# Patient Record
Sex: MALE | Race: BLACK OR AFRICAN AMERICAN | Employment: OTHER | URBAN - METROPOLITAN AREA
[De-identification: names, ages, dates, MRNs, and addresses within clinical notes are randomized per-mention and may not be internally consistent; named-entity substitution may affect disease eponyms.]

---

## 2017-01-10 RX ORDER — GLIPIZIDE 5 MG/1
TABLET ORAL
Qty: 60 TABLET | Refills: 5 | Status: SHIPPED | OUTPATIENT
Start: 2017-01-10 | End: 2018-01-08 | Stop reason: SDUPTHER

## 2017-03-15 ENCOUNTER — OFFICE VISIT (OUTPATIENT)
Dept: FAMILY MEDICINE CLINIC | Age: 77
End: 2017-03-15

## 2017-03-15 VITALS
HEART RATE: 80 BPM | BODY MASS INDEX: 24.05 KG/M2 | SYSTOLIC BLOOD PRESSURE: 122 MMHG | HEIGHT: 71 IN | DIASTOLIC BLOOD PRESSURE: 72 MMHG | WEIGHT: 171.8 LBS | RESPIRATION RATE: 16 BRPM

## 2017-03-15 DIAGNOSIS — I10 ESSENTIAL HYPERTENSION, MALIGNANT: ICD-10-CM

## 2017-03-15 DIAGNOSIS — D50.0 IRON DEFICIENCY ANEMIA DUE TO CHRONIC BLOOD LOSS: ICD-10-CM

## 2017-03-15 DIAGNOSIS — E11.9 CONTROLLED TYPE 2 DIABETES MELLITUS WITHOUT COMPLICATION, WITHOUT LONG-TERM CURRENT USE OF INSULIN (HCC): Primary | ICD-10-CM

## 2017-03-15 DIAGNOSIS — E78.00 PURE HYPERCHOLESTEROLEMIA: ICD-10-CM

## 2017-03-15 LAB
ANION GAP SERPL CALCULATED.3IONS-SCNC: 18 MMOL/L (ref 3–16)
BUN BLDV-MCNC: 22 MG/DL (ref 7–20)
CALCIUM SERPL-MCNC: 11.1 MG/DL (ref 8.3–10.6)
CHLORIDE BLD-SCNC: 98 MMOL/L (ref 99–110)
CO2: 24 MMOL/L (ref 21–32)
CREAT SERPL-MCNC: 1.5 MG/DL (ref 0.8–1.3)
GFR AFRICAN AMERICAN: 55
GFR NON-AFRICAN AMERICAN: 45
GLUCOSE BLD-MCNC: 55 MG/DL (ref 70–99)
POTASSIUM SERPL-SCNC: 4 MMOL/L (ref 3.5–5.1)
SODIUM BLD-SCNC: 140 MMOL/L (ref 136–145)

## 2017-03-15 PROCEDURE — 36415 COLL VENOUS BLD VENIPUNCTURE: CPT | Performed by: INTERNAL MEDICINE

## 2017-03-15 PROCEDURE — 99214 OFFICE O/P EST MOD 30 MIN: CPT | Performed by: INTERNAL MEDICINE

## 2017-03-15 ASSESSMENT — ENCOUNTER SYMPTOMS
GASTROINTESTINAL NEGATIVE: 1
RESPIRATORY NEGATIVE: 1
ALLERGIC/IMMUNOLOGIC NEGATIVE: 1

## 2017-03-16 LAB
ESTIMATED AVERAGE GLUCOSE: 139.9 MG/DL
HBA1C MFR BLD: 6.5 %

## 2017-06-09 RX ORDER — VERAPAMIL HYDROCHLORIDE 240 MG/1
TABLET, FILM COATED, EXTENDED RELEASE ORAL
Qty: 90 TABLET | Refills: 1 | Status: SHIPPED | OUTPATIENT
Start: 2017-06-09 | End: 2017-12-08 | Stop reason: SDUPTHER

## 2017-06-21 RX ORDER — ATORVASTATIN CALCIUM 20 MG/1
TABLET, FILM COATED ORAL
Qty: 30 TABLET | Refills: 0 | Status: SHIPPED | OUTPATIENT
Start: 2017-06-21 | End: 2017-07-21 | Stop reason: SDUPTHER

## 2017-06-29 ENCOUNTER — OFFICE VISIT (OUTPATIENT)
Dept: FAMILY MEDICINE CLINIC | Age: 77
End: 2017-06-29

## 2017-06-29 VITALS
BODY MASS INDEX: 23.8 KG/M2 | WEIGHT: 170 LBS | OXYGEN SATURATION: 97 % | SYSTOLIC BLOOD PRESSURE: 128 MMHG | DIASTOLIC BLOOD PRESSURE: 78 MMHG | HEART RATE: 72 BPM | HEIGHT: 71 IN | RESPIRATION RATE: 18 BRPM

## 2017-06-29 DIAGNOSIS — E78.00 PURE HYPERCHOLESTEROLEMIA: ICD-10-CM

## 2017-06-29 DIAGNOSIS — I10 ESSENTIAL HYPERTENSION, MALIGNANT: ICD-10-CM

## 2017-06-29 DIAGNOSIS — E11.9 CONTROLLED TYPE 2 DIABETES MELLITUS WITHOUT COMPLICATION, WITHOUT LONG-TERM CURRENT USE OF INSULIN (HCC): Primary | ICD-10-CM

## 2017-06-29 LAB
ANION GAP SERPL CALCULATED.3IONS-SCNC: 16 MMOL/L (ref 3–16)
BUN BLDV-MCNC: 23 MG/DL (ref 7–20)
CALCIUM SERPL-MCNC: 10.9 MG/DL (ref 8.3–10.6)
CHLORIDE BLD-SCNC: 98 MMOL/L (ref 99–110)
CO2: 26 MMOL/L (ref 21–32)
CREAT SERPL-MCNC: 1.6 MG/DL (ref 0.8–1.3)
GFR AFRICAN AMERICAN: 51
GFR NON-AFRICAN AMERICAN: 42
GLUCOSE BLD-MCNC: 47 MG/DL (ref 70–99)
POTASSIUM SERPL-SCNC: 4.3 MMOL/L (ref 3.5–5.1)
SODIUM BLD-SCNC: 140 MMOL/L (ref 136–145)

## 2017-06-29 PROCEDURE — 99214 OFFICE O/P EST MOD 30 MIN: CPT | Performed by: INTERNAL MEDICINE

## 2017-06-29 PROCEDURE — 36415 COLL VENOUS BLD VENIPUNCTURE: CPT | Performed by: INTERNAL MEDICINE

## 2017-06-29 PROCEDURE — 4010F ACE/ARB THERAPY RXD/TAKEN: CPT | Performed by: INTERNAL MEDICINE

## 2017-06-29 ASSESSMENT — ENCOUNTER SYMPTOMS
RESPIRATORY NEGATIVE: 1
ALLERGIC/IMMUNOLOGIC NEGATIVE: 1
GASTROINTESTINAL NEGATIVE: 1

## 2017-06-30 ENCOUNTER — TELEPHONE (OUTPATIENT)
Dept: FAMILY MEDICINE CLINIC | Age: 77
End: 2017-06-30

## 2017-06-30 LAB
ESTIMATED AVERAGE GLUCOSE: 139.9 MG/DL
HBA1C MFR BLD: 6.5 %

## 2017-07-21 RX ORDER — ATORVASTATIN CALCIUM 20 MG/1
TABLET, FILM COATED ORAL
Qty: 30 TABLET | Refills: 5 | Status: SHIPPED | OUTPATIENT
Start: 2017-07-21 | End: 2018-01-22 | Stop reason: SDUPTHER

## 2017-10-02 ENCOUNTER — OFFICE VISIT (OUTPATIENT)
Dept: FAMILY MEDICINE CLINIC | Age: 77
End: 2017-10-02

## 2017-10-02 VITALS
BODY MASS INDEX: 24.11 KG/M2 | OXYGEN SATURATION: 92 % | DIASTOLIC BLOOD PRESSURE: 80 MMHG | WEIGHT: 172.2 LBS | HEART RATE: 73 BPM | RESPIRATION RATE: 18 BRPM | SYSTOLIC BLOOD PRESSURE: 132 MMHG | HEIGHT: 71 IN

## 2017-10-02 DIAGNOSIS — E78.00 PURE HYPERCHOLESTEROLEMIA: ICD-10-CM

## 2017-10-02 DIAGNOSIS — I10 ESSENTIAL HYPERTENSION, MALIGNANT: ICD-10-CM

## 2017-10-02 DIAGNOSIS — N18.30 CHRONIC RENAL FAILURE, STAGE 3 (MODERATE) (HCC): ICD-10-CM

## 2017-10-02 DIAGNOSIS — E11.9 CONTROLLED TYPE 2 DIABETES MELLITUS WITHOUT COMPLICATION, WITHOUT LONG-TERM CURRENT USE OF INSULIN (HCC): Primary | ICD-10-CM

## 2017-10-02 DIAGNOSIS — Z23 FLU VACCINE NEED: ICD-10-CM

## 2017-10-02 LAB
ANION GAP SERPL CALCULATED.3IONS-SCNC: 15 MMOL/L (ref 3–16)
BUN BLDV-MCNC: 24 MG/DL (ref 7–20)
CALCIUM SERPL-MCNC: 11 MG/DL (ref 8.3–10.6)
CHLORIDE BLD-SCNC: 100 MMOL/L (ref 99–110)
CO2: 27 MMOL/L (ref 21–32)
CREAT SERPL-MCNC: 1.4 MG/DL (ref 0.8–1.3)
GFR AFRICAN AMERICAN: 59
GFR NON-AFRICAN AMERICAN: 49
GLUCOSE BLD-MCNC: 51 MG/DL (ref 70–99)
POTASSIUM SERPL-SCNC: 4 MMOL/L (ref 3.5–5.1)
SODIUM BLD-SCNC: 142 MMOL/L (ref 136–145)

## 2017-10-02 PROCEDURE — 36415 COLL VENOUS BLD VENIPUNCTURE: CPT | Performed by: INTERNAL MEDICINE

## 2017-10-02 PROCEDURE — 90662 IIV NO PRSV INCREASED AG IM: CPT | Performed by: INTERNAL MEDICINE

## 2017-10-02 PROCEDURE — 99214 OFFICE O/P EST MOD 30 MIN: CPT | Performed by: INTERNAL MEDICINE

## 2017-10-02 PROCEDURE — G0008 ADMIN INFLUENZA VIRUS VAC: HCPCS | Performed by: INTERNAL MEDICINE

## 2017-10-02 ASSESSMENT — ENCOUNTER SYMPTOMS
RESPIRATORY NEGATIVE: 1
GASTROINTESTINAL NEGATIVE: 1
ALLERGIC/IMMUNOLOGIC NEGATIVE: 1

## 2017-10-02 NOTE — PROGRESS NOTES
Subjective:      Patient ID: Ailyn Roberts is a 76 y.o. male. HPI  Controlled type 2 diabetes mellitus without complication, without long-term current use of insulin (HCC)  Sugars are not taken, diet is stable, weight is unchanged, no reported neuropathy, no change in vision, no claudication, no foot ulcers, no new skin lesions. No change in medications. No c/o with meds. Last eye exam 12/2016. Essential hypertension, malignant  No change in meds, no c/o with meds, no chest pain, SOB, palpatations, or syncope. Home bp was 130s/80s. Pure hypercholesterolemia  Stable diet and wt. No new c/o. Chronic renal failure, stage 3 (moderate)  Stable BUN/Cr over last yr. No new c/o. Past Medical History:   Diagnosis Date    Anemia     Arthritis     MILD    Hyperlipidemia     Hypertension     Type II or unspecified type diabetes mellitus without mention of complication, not stated as uncontrolled      Current Outpatient Prescriptions   Medication Sig Dispense Refill    metFORMIN (GLUCOPHAGE) 1000 MG tablet TAKE ONE TABLET BY MOUTH ONCE DAILY WITH BREAKFAST 90 tablet 1    atorvastatin (LIPITOR) 20 MG tablet TAKE ONE TABLET BY MOUTH ONCE DAILY 30 tablet 5    verapamil (CALAN SR) 240 MG extended release tablet TAKE ONE TABLET BY MOUTH AT BEDTIME 90 tablet 1    glipiZIDE (GLUCOTROL) 5 MG tablet TAKE ONE TABLET BY MOUTH ONCE DAILY 60 tablet 5    hydrochlorothiazide (HYDRODIURIL) 25 MG tablet TAKE ONE TABLET BY MOUTH ONCE DAILY 30 tablet 11    trandolapril (MAVIK) 4 MG tablet TAKE ONE TABLET BY MOUTH TWICE DAILY 180 tablet 3    Cholecalciferol (VITAMIN D) 2000 UNITS TABS Take 1 tablet by mouth daily.  aspirin 81 MG EC tablet Take 81 mg by mouth daily. No current facility-administered medications for this visit.       No Known Allergies  Social History   Substance Use Topics    Smoking status: Never Smoker    Smokeless tobacco: Never Used    Alcohol use No     Family History   Problem Relation Age of Onset    High Blood Pressure Mother     High Blood Pressure Father     Heart Disease Father      cad    Cancer Sister      Review of Systems   Constitutional: Negative. Respiratory: Negative. Cardiovascular: Negative. Gastrointestinal: Negative. Endocrine: Negative. Genitourinary: Negative. Musculoskeletal: Negative. Allergic/Immunologic: Negative. Neurological: Negative. Hematological: Negative. Psychiatric/Behavioral: Negative. Vitals:    10/02/17 1125 10/02/17 1210   BP: 130/80 132/80   Pulse: 73    Resp: 18    SpO2: 92%    Weight: 172 lb 3.2 oz (78.1 kg)    Height: 5' 11\" (1.803 m)      Objective:   Physical Exam   Constitutional: He is oriented to person, place, and time. Vital signs are normal. He appears well-developed and well-nourished. No distress. HENT:   Head: Normocephalic and atraumatic. Right Ear: External ear normal.   Left Ear: External ear normal.   Nose: Nose normal.   Mouth/Throat: Oropharynx is clear and moist. No oropharyngeal exudate. Eyes: Conjunctivae and EOM are normal. Pupils are equal, round, and reactive to light. Neck: Trachea normal, normal range of motion, full passive range of motion without pain and phonation normal. Neck supple. Normal carotid pulses, no hepatojugular reflux and no JVD present. Carotid bruit is not present. No thyroid mass and no thyromegaly present. Cardiovascular: Normal rate, regular rhythm, normal heart sounds, intact distal pulses and normal pulses. No extrasystoles are present. PMI is not displaced. Exam reveals no gallop and no friction rub. No murmur heard. Pulses:       Carotid pulses are 2+ on the right side, and 2+ on the left side. Radial pulses are 2+ on the right side, and 2+ on the left side. Femoral pulses are 2+ on the right side, and 2+ on the left side. Popliteal pulses are 2+ on the right side, and 2+ on the left side.         Dorsalis pedis pulses are 2+ except for the following :     Chronic Renal Failure, Stage 3 (Moderate). Will do labs. Controlled Type 2 Diabetes Mellitus Without Complication, Without Long-Term Current Use of Insulin (Hcc). To continue to improve diet and maintain weight. To follow Diabetic diet. If needs further help w/ Diabetic diet to call and will refer back to dietician. Home sugar checks. To call if sudden change up or down in sugars. To do regular foot checks. Call if problem. Will do labs and adjust meds after results are evaluated. Essential Hypertension, Malignant. Continue present meds. Home BP checks. Call if>140/90. Improve diet. Avoid caffeine and salt. Call if new c/o w/ meds. Pure Hypercholesterolemia. To continue present meds. Call if new c/o.

## 2017-10-02 NOTE — ASSESSMENT & PLAN NOTE
No change in meds, no c/o with meds, no chest pain, SOB, palpatations, or syncope. Home bp was 130s/80s.

## 2017-10-02 NOTE — ASSESSMENT & PLAN NOTE
Sugars are not taken, diet is stable, weight is unchanged, no reported neuropathy, no change in vision, no claudication, no foot ulcers, no new skin lesions. No change in medications. No c/o with meds. Last eye exam 12/2016.

## 2017-10-02 NOTE — PROGRESS NOTES
Vaccine Information Sheet, \"Influenza - Inactivated\"  given to Jethro Mendez, or parent/legal guardian of  Jethro Mendez and verbalized understanding. Patient responses:    Have you ever had a reaction to a flu vaccine? No  Are you able to eat eggs without adverse effects? Yes  Do you have any current illness? No  Have you ever had Guillian Goodman Syndrome? No    Flu vaccine given per order. Please see immunization tab.

## 2017-10-03 LAB
ESTIMATED AVERAGE GLUCOSE: 134.1 MG/DL
HBA1C MFR BLD: 6.3 %

## 2017-10-05 ENCOUNTER — TELEPHONE (OUTPATIENT)
Dept: FAMILY MEDICINE CLINIC | Age: 77
End: 2017-10-05

## 2017-12-08 RX ORDER — VERAPAMIL HYDROCHLORIDE 240 MG/1
TABLET, FILM COATED, EXTENDED RELEASE ORAL
Qty: 30 TABLET | Refills: 5 | Status: SHIPPED | OUTPATIENT
Start: 2017-12-08 | End: 2018-06-11 | Stop reason: SDUPTHER

## 2017-12-11 RX ORDER — TRANDOLAPRIL TABLETS 4 MG/1
TABLET ORAL
Qty: 180 TABLET | Refills: 3 | Status: SHIPPED | OUTPATIENT
Start: 2017-12-11 | End: 2018-12-27 | Stop reason: SDUPTHER

## 2017-12-26 RX ORDER — HYDROCHLOROTHIAZIDE 25 MG/1
TABLET ORAL
Qty: 30 TABLET | Refills: 11 | Status: SHIPPED | OUTPATIENT
Start: 2017-12-26 | End: 2018-12-27 | Stop reason: SDUPTHER

## 2018-01-08 RX ORDER — GLIPIZIDE 5 MG/1
TABLET ORAL
Qty: 60 TABLET | Refills: 5 | Status: SHIPPED | OUTPATIENT
Start: 2018-01-08 | End: 2019-01-08 | Stop reason: SDUPTHER

## 2018-01-22 RX ORDER — ATORVASTATIN CALCIUM 20 MG/1
TABLET, FILM COATED ORAL
Qty: 30 TABLET | Refills: 5 | Status: SHIPPED | OUTPATIENT
Start: 2018-01-22 | End: 2018-07-28 | Stop reason: SDUPTHER

## 2018-04-03 ENCOUNTER — OFFICE VISIT (OUTPATIENT)
Dept: FAMILY MEDICINE CLINIC | Age: 78
End: 2018-04-03

## 2018-04-03 VITALS
BODY MASS INDEX: 24.22 KG/M2 | HEIGHT: 71 IN | WEIGHT: 173 LBS | DIASTOLIC BLOOD PRESSURE: 78 MMHG | SYSTOLIC BLOOD PRESSURE: 134 MMHG | HEART RATE: 88 BPM | RESPIRATION RATE: 18 BRPM

## 2018-04-03 DIAGNOSIS — N18.30 CHRONIC RENAL FAILURE, STAGE 3 (MODERATE) (HCC): ICD-10-CM

## 2018-04-03 DIAGNOSIS — E78.00 PURE HYPERCHOLESTEROLEMIA: ICD-10-CM

## 2018-04-03 DIAGNOSIS — N18.30 CONTROLLED TYPE 2 DIABETES MELLITUS WITH STAGE 3 CHRONIC KIDNEY DISEASE, WITHOUT LONG-TERM CURRENT USE OF INSULIN (HCC): ICD-10-CM

## 2018-04-03 DIAGNOSIS — K40.90 INGUINAL HERNIA, LEFT: ICD-10-CM

## 2018-04-03 DIAGNOSIS — I10 ESSENTIAL HYPERTENSION, MALIGNANT: ICD-10-CM

## 2018-04-03 DIAGNOSIS — E11.22 CONTROLLED TYPE 2 DIABETES MELLITUS WITH STAGE 3 CHRONIC KIDNEY DISEASE, WITHOUT LONG-TERM CURRENT USE OF INSULIN (HCC): ICD-10-CM

## 2018-04-03 DIAGNOSIS — D50.0 IRON DEFICIENCY ANEMIA DUE TO CHRONIC BLOOD LOSS: ICD-10-CM

## 2018-04-03 LAB
ALBUMIN SERPL-MCNC: 4.4 G/DL (ref 3.4–5)
ALP BLD-CCNC: 92 U/L (ref 40–129)
ALT SERPL-CCNC: 11 U/L (ref 10–40)
ANION GAP SERPL CALCULATED.3IONS-SCNC: 17 MMOL/L (ref 3–16)
AST SERPL-CCNC: 19 U/L (ref 15–37)
BASOPHILS ABSOLUTE: 0 K/UL (ref 0–0.2)
BASOPHILS RELATIVE PERCENT: 0.5 %
BILIRUB SERPL-MCNC: 0.4 MG/DL (ref 0–1)
BILIRUBIN DIRECT: <0.2 MG/DL (ref 0–0.3)
BILIRUBIN, INDIRECT: NORMAL MG/DL (ref 0–1)
BUN BLDV-MCNC: 27 MG/DL (ref 7–20)
CALCIUM SERPL-MCNC: 10.4 MG/DL (ref 8.3–10.6)
CHLORIDE BLD-SCNC: 98 MMOL/L (ref 99–110)
CHOLESTEROL, TOTAL: 175 MG/DL (ref 0–199)
CO2: 27 MMOL/L (ref 21–32)
CREAT SERPL-MCNC: 1.5 MG/DL (ref 0.8–1.3)
EOSINOPHILS ABSOLUTE: 0 K/UL (ref 0–0.6)
EOSINOPHILS RELATIVE PERCENT: 0.6 %
GFR AFRICAN AMERICAN: 55
GFR NON-AFRICAN AMERICAN: 45
GLUCOSE BLD-MCNC: 172 MG/DL (ref 70–99)
HCT VFR BLD CALC: 38.4 % (ref 40.5–52.5)
HDLC SERPL-MCNC: 78 MG/DL (ref 40–60)
HEMOGLOBIN: 12.7 G/DL (ref 13.5–17.5)
LDL CHOLESTEROL CALCULATED: 78 MG/DL
LYMPHOCYTES ABSOLUTE: 1.4 K/UL (ref 1–5.1)
LYMPHOCYTES RELATIVE PERCENT: 21.7 %
MCH RBC QN AUTO: 25.7 PG (ref 26–34)
MCHC RBC AUTO-ENTMCNC: 33.2 G/DL (ref 31–36)
MCV RBC AUTO: 77.6 FL (ref 80–100)
MONOCYTES ABSOLUTE: 0.5 K/UL (ref 0–1.3)
MONOCYTES RELATIVE PERCENT: 8.2 %
NEUTROPHILS ABSOLUTE: 4.5 K/UL (ref 1.7–7.7)
NEUTROPHILS RELATIVE PERCENT: 69 %
PDW BLD-RTO: 16.3 % (ref 12.4–15.4)
PLATELET # BLD: 300 K/UL (ref 135–450)
PMV BLD AUTO: 8.4 FL (ref 5–10.5)
POTASSIUM SERPL-SCNC: 4 MMOL/L (ref 3.5–5.1)
RBC # BLD: 4.94 M/UL (ref 4.2–5.9)
SODIUM BLD-SCNC: 142 MMOL/L (ref 136–145)
TOTAL PROTEIN: 7.1 G/DL (ref 6.4–8.2)
TRIGL SERPL-MCNC: 97 MG/DL (ref 0–150)
VLDLC SERPL CALC-MCNC: 19 MG/DL
WBC # BLD: 6.6 K/UL (ref 4–11)

## 2018-04-03 PROCEDURE — G8427 DOCREV CUR MEDS BY ELIG CLIN: HCPCS | Performed by: INTERNAL MEDICINE

## 2018-04-03 PROCEDURE — 1123F ACP DISCUSS/DSCN MKR DOCD: CPT | Performed by: INTERNAL MEDICINE

## 2018-04-03 PROCEDURE — 36415 COLL VENOUS BLD VENIPUNCTURE: CPT | Performed by: INTERNAL MEDICINE

## 2018-04-03 PROCEDURE — G8420 CALC BMI NORM PARAMETERS: HCPCS | Performed by: INTERNAL MEDICINE

## 2018-04-03 PROCEDURE — 1036F TOBACCO NON-USER: CPT | Performed by: INTERNAL MEDICINE

## 2018-04-03 PROCEDURE — 99214 OFFICE O/P EST MOD 30 MIN: CPT | Performed by: INTERNAL MEDICINE

## 2018-04-03 PROCEDURE — 4040F PNEUMOC VAC/ADMIN/RCVD: CPT | Performed by: INTERNAL MEDICINE

## 2018-04-03 ASSESSMENT — ENCOUNTER SYMPTOMS
ALLERGIC/IMMUNOLOGIC NEGATIVE: 1
RESPIRATORY NEGATIVE: 1
GASTROINTESTINAL NEGATIVE: 1

## 2018-04-04 LAB
ESTIMATED AVERAGE GLUCOSE: 134.1 MG/DL
HBA1C MFR BLD: 6.3 %

## 2018-04-05 ENCOUNTER — TELEPHONE (OUTPATIENT)
Dept: FAMILY MEDICINE CLINIC | Age: 78
End: 2018-04-05

## 2018-04-05 DIAGNOSIS — E61.1 IRON DEFICIENCY: Primary | ICD-10-CM

## 2018-04-07 DIAGNOSIS — E61.1 IRON DEFICIENCY: ICD-10-CM

## 2018-04-07 LAB
FERRITIN: 871.2 NG/ML (ref 30–400)
IRON SATURATION: 27 % (ref 20–50)
IRON: 73 UG/DL (ref 59–158)
TOTAL IRON BINDING CAPACITY: 275 UG/DL (ref 260–445)

## 2018-06-11 RX ORDER — VERAPAMIL HYDROCHLORIDE 240 MG/1
TABLET, FILM COATED, EXTENDED RELEASE ORAL
Qty: 30 TABLET | Refills: 5 | Status: SHIPPED | OUTPATIENT
Start: 2018-06-11 | End: 2018-12-13 | Stop reason: SDUPTHER

## 2018-07-20 ENCOUNTER — OFFICE VISIT (OUTPATIENT)
Dept: FAMILY MEDICINE CLINIC | Age: 78
End: 2018-07-20

## 2018-07-20 VITALS
HEIGHT: 71 IN | RESPIRATION RATE: 16 BRPM | DIASTOLIC BLOOD PRESSURE: 78 MMHG | BODY MASS INDEX: 24.22 KG/M2 | HEART RATE: 77 BPM | OXYGEN SATURATION: 98 % | SYSTOLIC BLOOD PRESSURE: 132 MMHG | WEIGHT: 173 LBS

## 2018-07-20 DIAGNOSIS — I10 ESSENTIAL HYPERTENSION, MALIGNANT: ICD-10-CM

## 2018-07-20 DIAGNOSIS — E11.22 CONTROLLED TYPE 2 DIABETES MELLITUS WITH STAGE 3 CHRONIC KIDNEY DISEASE, WITHOUT LONG-TERM CURRENT USE OF INSULIN (HCC): ICD-10-CM

## 2018-07-20 DIAGNOSIS — D50.9 IRON DEFICIENCY ANEMIA, UNSPECIFIED IRON DEFICIENCY ANEMIA TYPE: Primary | ICD-10-CM

## 2018-07-20 DIAGNOSIS — E78.00 PURE HYPERCHOLESTEROLEMIA: ICD-10-CM

## 2018-07-20 DIAGNOSIS — N18.30 CONTROLLED TYPE 2 DIABETES MELLITUS WITH STAGE 3 CHRONIC KIDNEY DISEASE, WITHOUT LONG-TERM CURRENT USE OF INSULIN (HCC): ICD-10-CM

## 2018-07-20 DIAGNOSIS — N18.30 CHRONIC RENAL FAILURE, STAGE 3 (MODERATE) (HCC): ICD-10-CM

## 2018-07-20 LAB
ANION GAP SERPL CALCULATED.3IONS-SCNC: 14 MMOL/L (ref 3–16)
BASOPHILS ABSOLUTE: 0.1 K/UL (ref 0–0.2)
BASOPHILS RELATIVE PERCENT: 0.9 %
BUN BLDV-MCNC: 19 MG/DL (ref 7–20)
CALCIUM SERPL-MCNC: 10.7 MG/DL (ref 8.3–10.6)
CHLORIDE BLD-SCNC: 104 MMOL/L (ref 99–110)
CO2: 24 MMOL/L (ref 21–32)
CREAT SERPL-MCNC: 1.6 MG/DL (ref 0.8–1.3)
EOSINOPHILS ABSOLUTE: 0.1 K/UL (ref 0–0.6)
EOSINOPHILS RELATIVE PERCENT: 1 %
GFR AFRICAN AMERICAN: 51
GFR NON-AFRICAN AMERICAN: 42
GLUCOSE BLD-MCNC: 78 MG/DL (ref 70–99)
HCT VFR BLD CALC: 39.1 % (ref 40.5–52.5)
HEMOGLOBIN: 12.6 G/DL (ref 13.5–17.5)
LYMPHOCYTES ABSOLUTE: 1.3 K/UL (ref 1–5.1)
LYMPHOCYTES RELATIVE PERCENT: 18.8 %
MCH RBC QN AUTO: 25.7 PG (ref 26–34)
MCHC RBC AUTO-ENTMCNC: 32.2 G/DL (ref 31–36)
MCV RBC AUTO: 80 FL (ref 80–100)
MONOCYTES ABSOLUTE: 0.7 K/UL (ref 0–1.3)
MONOCYTES RELATIVE PERCENT: 10.5 %
NEUTROPHILS ABSOLUTE: 4.8 K/UL (ref 1.7–7.7)
NEUTROPHILS RELATIVE PERCENT: 68.8 %
PDW BLD-RTO: 16.6 % (ref 12.4–15.4)
PLATELET # BLD: 274 K/UL (ref 135–450)
PMV BLD AUTO: 8.3 FL (ref 5–10.5)
POTASSIUM SERPL-SCNC: 4 MMOL/L (ref 3.5–5.1)
RBC # BLD: 4.88 M/UL (ref 4.2–5.9)
SODIUM BLD-SCNC: 142 MMOL/L (ref 136–145)
WBC # BLD: 7 K/UL (ref 4–11)

## 2018-07-20 PROCEDURE — G8427 DOCREV CUR MEDS BY ELIG CLIN: HCPCS | Performed by: INTERNAL MEDICINE

## 2018-07-20 PROCEDURE — 99214 OFFICE O/P EST MOD 30 MIN: CPT | Performed by: INTERNAL MEDICINE

## 2018-07-20 PROCEDURE — G8420 CALC BMI NORM PARAMETERS: HCPCS | Performed by: INTERNAL MEDICINE

## 2018-07-20 PROCEDURE — 3288F FALL RISK ASSESSMENT DOCD: CPT | Performed by: INTERNAL MEDICINE

## 2018-07-20 PROCEDURE — G8510 SCR DEP NEG, NO PLAN REQD: HCPCS | Performed by: INTERNAL MEDICINE

## 2018-07-20 PROCEDURE — 1123F ACP DISCUSS/DSCN MKR DOCD: CPT | Performed by: INTERNAL MEDICINE

## 2018-07-20 PROCEDURE — 1036F TOBACCO NON-USER: CPT | Performed by: INTERNAL MEDICINE

## 2018-07-20 PROCEDURE — 1101F PT FALLS ASSESS-DOCD LE1/YR: CPT | Performed by: INTERNAL MEDICINE

## 2018-07-20 PROCEDURE — 36415 COLL VENOUS BLD VENIPUNCTURE: CPT | Performed by: INTERNAL MEDICINE

## 2018-07-20 PROCEDURE — 4040F PNEUMOC VAC/ADMIN/RCVD: CPT | Performed by: INTERNAL MEDICINE

## 2018-07-20 ASSESSMENT — ENCOUNTER SYMPTOMS
GASTROINTESTINAL NEGATIVE: 1
RESPIRATORY NEGATIVE: 1
ALLERGIC/IMMUNOLOGIC NEGATIVE: 1

## 2018-07-20 ASSESSMENT — PATIENT HEALTH QUESTIONNAIRE - PHQ9
1. LITTLE INTEREST OR PLEASURE IN DOING THINGS: 0
SUM OF ALL RESPONSES TO PHQ QUESTIONS 1-9: 0
SUM OF ALL RESPONSES TO PHQ9 QUESTIONS 1 & 2: 0
2. FEELING DOWN, DEPRESSED OR HOPELESS: 0

## 2018-07-20 NOTE — PATIENT INSTRUCTIONS
All above problems reviewed and the found to be unchanged except for the following :     Chronic Renal Failure, Stage 3 (Moderate). Will check lab. 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 Will do labs and adjust meds if needed. Continue to improve diet and lose weight. Will check Blood count, possible low Iron. Essential Hypertension, Malignant. Continue present meds. Home BP checks. Call if>140/90. Improve diet. Avoid caffeine and salt. Call if new c/o w/ meds. Pure Hypercholesterolemia. Continue med. Continue to improve diet and lose weight. Call if new c/o.

## 2018-07-20 NOTE — PROGRESS NOTES
and 2+ on the left side. Dorsalis pedis pulses are 2+ on the right side, and 2+ on the left side. Posterior tibial pulses are 2+ on the right side, and 2+ on the left side. Pulmonary/Chest: Effort normal and breath sounds normal. No accessory muscle usage. No apnea, no tachypnea and no bradypnea. No respiratory distress. He has no decreased breath sounds. He has no wheezes. He has no rhonchi. He has no rales. He exhibits no tenderness. Abdominal: Soft. Normal appearance, normal aorta and bowel sounds are normal. There is no hepatosplenomegaly. There is no CVA tenderness. No hernia. Hernia confirmed negative in the ventral area. L inguinal hernia now gone s/p repair. .   Genitourinary: Rectal exam shows guaiac negative stool. Musculoskeletal: Normal range of motion. He exhibits tenderness and deformity. He exhibits no edema. OA knees moderate. 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 Lymphadenopathy:     He has no cervical adenopathy. Neurological: He is alert and oriented to person, place, and time. He has normal strength. He is not disoriented. He displays no atrophy and no tremor. No cranial nerve deficit or sensory deficit. He displays a negative Romberg sign. Diabetic foot exam was normal with intact light touch and vibratory senses. Skin: Skin is warm, dry and intact. No abrasion and no rash noted. He is not diaphoretic. No cyanosis or erythema. No pallor. Nails show no clubbing. Psychiatric: He has a normal mood and affect. His speech is normal and behavior is normal. Judgment and thought content normal. Cognition and memory are normal.       Assessment:      Problem   Chronic Renal Failure, Stage 3 (Moderate). Stables. 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 Good w/ meds. Essential Hypertension, Malignant. Good w/ meds. Pure Hypercholesterolemia. Good w/ meds. Plan:      All above problems reviewed and the found to be unchanged except for the following :     Chronic Renal Failure, Stage 3 (Moderate). Will check lab. 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 Will do labs and adjust meds if needed. Continue to improve diet and lose weight. Will check Blood count, possible low Iron. Essential Hypertension, Malignant. Continue present meds. Home BP checks. Call if>140/90. Improve diet. Avoid caffeine and salt. Call if new c/o w/ meds. Pure Hypercholesterolemia. Continue med. Continue to improve diet and lose weight. Call if new c/o.

## 2018-07-21 LAB
ESTIMATED AVERAGE GLUCOSE: 151.3 MG/DL
HBA1C MFR BLD: 6.9 %

## 2018-07-30 RX ORDER — ATORVASTATIN CALCIUM 20 MG/1
TABLET, FILM COATED ORAL
Qty: 30 TABLET | Refills: 5 | Status: SHIPPED | OUTPATIENT
Start: 2018-07-30 | End: 2019-01-29 | Stop reason: SDUPTHER

## 2018-11-02 ENCOUNTER — OFFICE VISIT (OUTPATIENT)
Dept: FAMILY MEDICINE CLINIC | Age: 78
End: 2018-11-02
Payer: MEDICARE

## 2018-11-02 VITALS
BODY MASS INDEX: 23.8 KG/M2 | HEIGHT: 71 IN | DIASTOLIC BLOOD PRESSURE: 72 MMHG | OXYGEN SATURATION: 97 % | HEART RATE: 89 BPM | WEIGHT: 170 LBS | SYSTOLIC BLOOD PRESSURE: 130 MMHG | RESPIRATION RATE: 16 BRPM

## 2018-11-02 DIAGNOSIS — E11.22 CONTROLLED TYPE 2 DIABETES MELLITUS WITH STAGE 3 CHRONIC KIDNEY DISEASE, WITHOUT LONG-TERM CURRENT USE OF INSULIN (HCC): Primary | ICD-10-CM

## 2018-11-02 DIAGNOSIS — E78.00 PURE HYPERCHOLESTEROLEMIA: ICD-10-CM

## 2018-11-02 DIAGNOSIS — N18.30 CONTROLLED TYPE 2 DIABETES MELLITUS WITH STAGE 3 CHRONIC KIDNEY DISEASE, WITHOUT LONG-TERM CURRENT USE OF INSULIN (HCC): Primary | ICD-10-CM

## 2018-11-02 DIAGNOSIS — I10 ESSENTIAL HYPERTENSION, MALIGNANT: ICD-10-CM

## 2018-11-02 DIAGNOSIS — D50.0 IRON DEFICIENCY ANEMIA DUE TO CHRONIC BLOOD LOSS: ICD-10-CM

## 2018-11-02 DIAGNOSIS — N18.30 CHRONIC RENAL FAILURE, STAGE 3 (MODERATE) (HCC): ICD-10-CM

## 2018-11-02 DIAGNOSIS — Z23 FLU VACCINE NEED: ICD-10-CM

## 2018-11-02 LAB
ANION GAP SERPL CALCULATED.3IONS-SCNC: 13 MMOL/L (ref 3–16)
BUN BLDV-MCNC: 20 MG/DL (ref 7–20)
CALCIUM SERPL-MCNC: 10.7 MG/DL (ref 8.3–10.6)
CHLORIDE BLD-SCNC: 101 MMOL/L (ref 99–110)
CO2: 26 MMOL/L (ref 21–32)
CREAT SERPL-MCNC: 1.6 MG/DL (ref 0.8–1.3)
GFR AFRICAN AMERICAN: 51
GFR NON-AFRICAN AMERICAN: 42
GLUCOSE BLD-MCNC: 43 MG/DL (ref 70–99)
HBA1C MFR BLD: 6.5 %
POTASSIUM SERPL-SCNC: 4 MMOL/L (ref 3.5–5.1)
SODIUM BLD-SCNC: 140 MMOL/L (ref 136–145)

## 2018-11-02 PROCEDURE — 83036 HEMOGLOBIN GLYCOSYLATED A1C: CPT | Performed by: INTERNAL MEDICINE

## 2018-11-02 PROCEDURE — 1123F ACP DISCUSS/DSCN MKR DOCD: CPT | Performed by: INTERNAL MEDICINE

## 2018-11-02 PROCEDURE — 1036F TOBACCO NON-USER: CPT | Performed by: INTERNAL MEDICINE

## 2018-11-02 PROCEDURE — G0008 ADMIN INFLUENZA VIRUS VAC: HCPCS | Performed by: INTERNAL MEDICINE

## 2018-11-02 PROCEDURE — G8427 DOCREV CUR MEDS BY ELIG CLIN: HCPCS | Performed by: INTERNAL MEDICINE

## 2018-11-02 PROCEDURE — G8420 CALC BMI NORM PARAMETERS: HCPCS | Performed by: INTERNAL MEDICINE

## 2018-11-02 PROCEDURE — 4040F PNEUMOC VAC/ADMIN/RCVD: CPT | Performed by: INTERNAL MEDICINE

## 2018-11-02 PROCEDURE — 1101F PT FALLS ASSESS-DOCD LE1/YR: CPT | Performed by: INTERNAL MEDICINE

## 2018-11-02 PROCEDURE — 99214 OFFICE O/P EST MOD 30 MIN: CPT | Performed by: INTERNAL MEDICINE

## 2018-11-02 PROCEDURE — 90662 IIV NO PRSV INCREASED AG IM: CPT | Performed by: INTERNAL MEDICINE

## 2018-11-02 PROCEDURE — G8484 FLU IMMUNIZE NO ADMIN: HCPCS | Performed by: INTERNAL MEDICINE

## 2018-11-02 ASSESSMENT — ENCOUNTER SYMPTOMS
RESPIRATORY NEGATIVE: 1
RHINORRHEA: 1
GASTROINTESTINAL NEGATIVE: 1
ALLERGIC/IMMUNOLOGIC NEGATIVE: 1

## 2018-11-02 NOTE — PROGRESS NOTES
Vaccine Information Sheet, \"Influenza - Inactivated\"  given to Keon Puente, or parent/legal guardian of  Keon Puente and verbalized understanding. Patient responses:    Have you ever had a reaction to a flu vaccine? No  Are you able to eat eggs without adverse effects? Yes  Do you have any current illness? No  Have you ever had Guillian Menan Syndrome? No    Flu vaccine given per order. Please see immunization tab.

## 2018-11-02 NOTE — PROGRESS NOTES
Carotid pulses are 2+ on the right side, and 2+ on the left side. Radial pulses are 2+ on the right side, and 2+ on the left side. Femoral pulses are 2+ on the right side, and 2+ on the left side. Popliteal pulses are 2+ on the right side, and 2+ on the left side. Dorsalis pedis pulses are 2+ on the right side, and 2+ on the left side. Posterior tibial pulses are 2+ on the right side, and 2+ on the left side. Pulmonary/Chest: Effort normal and breath sounds normal. No accessory muscle usage. No apnea, no tachypnea and no bradypnea. No respiratory distress. He has no decreased breath sounds. He has no wheezes. He has no rhonchi. He has no rales. Abdominal: Soft. Normal appearance, normal aorta and bowel sounds are normal. There is no hepatosplenomegaly. There is no CVA tenderness. No hernia. Hernia confirmed negative in the ventral area. L inguinal hernia now gone s/p repair. .   Genitourinary: Prostate normal and penis normal. Rectal exam shows guaiac positive stool. Musculoskeletal: Normal range of motion. He exhibits tenderness. He exhibits no edema. OA knees moderate. s/p amputations toes R foot old. Lymphadenopathy:     He has no cervical adenopathy. Neurological: He is alert and oriented to person, place, and time. He has normal strength. He is not disoriented. He displays no atrophy and no tremor. No cranial nerve deficit or sensory deficit. He exhibits normal muscle tone. He displays a negative Romberg sign. Coordination normal.   Diabetic foot exam was normal with intact light touch and vibratory senses. Skin: Skin is warm, dry and intact. No abrasion and no rash noted. He is not diaphoretic. No cyanosis or erythema. No pallor. Nails show no clubbing. Psychiatric: He has a normal mood and affect.  His speech is normal and behavior is normal. Judgment and thought content normal. Cognition and memory are normal.       Assessment:      Problem   Chronic Renal Failure, Stage 3 (Moderate) (Hcc). No new c/o. Controlled Type 2 Diabetes Mellitus With Stage 3 Chronic Kidney Disease, Without Long-Term Current Use of Insulin ([facility]). Sugars stable. Essential Hypertension, Malignant. Good w/ med. Pure Hypercholesterolemia. Stable diet and wt. Iron Deficiency Anemia Due to Chronic Blood Loss. No new c/o. Plan:     All above problems reviewed and the found to be unchanged except for the following :     Chronic Renal Failure, Stage 3 (Moderate) ([facility]). Will do labs. To control DM amd BP. Call if new c/o. Controlled Type 2 Diabetes Mellitus With Stage 3 Chronic Kidney Disease, Without Long-Term Current Use of Insulin ([facility]). Will do labs and adjust meds as needed. Call if new c/o. Continue to improve diet and exercise as tolerated. Essential Hypertension, Malignant. Continue present meds. Home BP checks. Call if>140/90. Improve diet. Avoid caffeine and salt. Call if new c/o w/ meds. Pure Hypercholesterolemia. Continue meds and diet. Call if new c/o. Iron Deficiency Anemia Due to Chronic Blood Loss. Will do labs and adjust meds if needed.               Jonathan Daley MD

## 2018-11-02 NOTE — ASSESSMENT & PLAN NOTE
No change in meds, no c/o with meds, no chest pain, SOB, palpatations, or syncope. Home bp was 130-140s/80s.

## 2018-12-13 RX ORDER — VERAPAMIL HYDROCHLORIDE 240 MG/1
TABLET, FILM COATED, EXTENDED RELEASE ORAL
Qty: 30 TABLET | Refills: 5 | Status: SHIPPED | OUTPATIENT
Start: 2018-12-13 | End: 2019-05-28 | Stop reason: SDUPTHER

## 2018-12-27 RX ORDER — HYDROCHLOROTHIAZIDE 25 MG/1
TABLET ORAL
Qty: 30 TABLET | Refills: 11 | Status: ON HOLD | OUTPATIENT
Start: 2018-12-27 | End: 2019-07-01 | Stop reason: HOSPADM

## 2018-12-27 RX ORDER — TRANDOLAPRIL TABLETS 4 MG/1
TABLET ORAL
Qty: 60 TABLET | Refills: 11 | Status: SHIPPED | OUTPATIENT
Start: 2018-12-27 | End: 2019-06-28

## 2019-01-08 RX ORDER — GLIPIZIDE 5 MG/1
TABLET ORAL
Qty: 60 TABLET | Refills: 5 | Status: SHIPPED | OUTPATIENT
Start: 2019-01-08 | End: 2019-12-03 | Stop reason: SDUPTHER

## 2019-01-29 RX ORDER — ATORVASTATIN CALCIUM 20 MG/1
TABLET, FILM COATED ORAL
Qty: 30 TABLET | Refills: 5 | Status: SHIPPED | OUTPATIENT
Start: 2019-01-29 | End: 2019-05-28 | Stop reason: SDUPTHER

## 2019-02-18 ENCOUNTER — OFFICE VISIT (OUTPATIENT)
Dept: FAMILY MEDICINE CLINIC | Age: 79
End: 2019-02-18
Payer: MEDICARE

## 2019-02-18 VITALS
WEIGHT: 176.8 LBS | BODY MASS INDEX: 24.75 KG/M2 | HEART RATE: 96 BPM | SYSTOLIC BLOOD PRESSURE: 132 MMHG | DIASTOLIC BLOOD PRESSURE: 86 MMHG | HEIGHT: 71 IN | OXYGEN SATURATION: 97 %

## 2019-02-18 DIAGNOSIS — I10 ESSENTIAL HYPERTENSION, MALIGNANT: Primary | ICD-10-CM

## 2019-02-18 DIAGNOSIS — Z01.818 PREOP EXAMINATION: ICD-10-CM

## 2019-02-18 DIAGNOSIS — H26.9 CATARACT OF BOTH EYES, UNSPECIFIED CATARACT TYPE: ICD-10-CM

## 2019-02-18 PROCEDURE — G8427 DOCREV CUR MEDS BY ELIG CLIN: HCPCS | Performed by: PHYSICIAN ASSISTANT

## 2019-02-18 PROCEDURE — 4040F PNEUMOC VAC/ADMIN/RCVD: CPT | Performed by: PHYSICIAN ASSISTANT

## 2019-02-18 PROCEDURE — 1036F TOBACCO NON-USER: CPT | Performed by: PHYSICIAN ASSISTANT

## 2019-02-18 PROCEDURE — G8420 CALC BMI NORM PARAMETERS: HCPCS | Performed by: PHYSICIAN ASSISTANT

## 2019-02-18 PROCEDURE — G8482 FLU IMMUNIZE ORDER/ADMIN: HCPCS | Performed by: PHYSICIAN ASSISTANT

## 2019-02-18 PROCEDURE — 99213 OFFICE O/P EST LOW 20 MIN: CPT | Performed by: PHYSICIAN ASSISTANT

## 2019-02-18 PROCEDURE — 1123F ACP DISCUSS/DSCN MKR DOCD: CPT | Performed by: PHYSICIAN ASSISTANT

## 2019-02-18 PROCEDURE — 1101F PT FALLS ASSESS-DOCD LE1/YR: CPT | Performed by: PHYSICIAN ASSISTANT

## 2019-05-20 ENCOUNTER — OFFICE VISIT (OUTPATIENT)
Dept: FAMILY MEDICINE CLINIC | Age: 79
End: 2019-05-20
Payer: MEDICARE

## 2019-05-20 VITALS
HEART RATE: 98 BPM | SYSTOLIC BLOOD PRESSURE: 128 MMHG | RESPIRATION RATE: 16 BRPM | WEIGHT: 173.2 LBS | DIASTOLIC BLOOD PRESSURE: 78 MMHG | HEIGHT: 71 IN | BODY MASS INDEX: 24.25 KG/M2 | OXYGEN SATURATION: 98 %

## 2019-05-20 DIAGNOSIS — N18.30 CONTROLLED TYPE 2 DIABETES MELLITUS WITH STAGE 3 CHRONIC KIDNEY DISEASE, WITHOUT LONG-TERM CURRENT USE OF INSULIN (HCC): ICD-10-CM

## 2019-05-20 DIAGNOSIS — I10 ESSENTIAL HYPERTENSION, MALIGNANT: ICD-10-CM

## 2019-05-20 DIAGNOSIS — Z00.00 MEDICARE ANNUAL WELLNESS VISIT, SUBSEQUENT: Primary | ICD-10-CM

## 2019-05-20 DIAGNOSIS — E11.22 CONTROLLED TYPE 2 DIABETES MELLITUS WITH STAGE 3 CHRONIC KIDNEY DISEASE, WITHOUT LONG-TERM CURRENT USE OF INSULIN (HCC): ICD-10-CM

## 2019-05-20 DIAGNOSIS — R53.83 FATIGUE, UNSPECIFIED TYPE: ICD-10-CM

## 2019-05-20 DIAGNOSIS — M17.0 PRIMARY OSTEOARTHRITIS OF BOTH KNEES: ICD-10-CM

## 2019-05-20 DIAGNOSIS — Z12.5 SCREENING FOR PROSTATE CANCER: ICD-10-CM

## 2019-05-20 DIAGNOSIS — I45.2 RIGHT BBB/LEFT ANT FASC BLOCK: ICD-10-CM

## 2019-05-20 DIAGNOSIS — N18.30 CHRONIC RENAL FAILURE, STAGE 3 (MODERATE) (HCC): ICD-10-CM

## 2019-05-20 DIAGNOSIS — E78.00 PURE HYPERCHOLESTEROLEMIA: ICD-10-CM

## 2019-05-20 DIAGNOSIS — Z00.00 ROUTINE GENERAL MEDICAL EXAMINATION AT A HEALTH CARE FACILITY: ICD-10-CM

## 2019-05-20 PROCEDURE — 4040F PNEUMOC VAC/ADMIN/RCVD: CPT | Performed by: INTERNAL MEDICINE

## 2019-05-20 PROCEDURE — 1123F ACP DISCUSS/DSCN MKR DOCD: CPT | Performed by: INTERNAL MEDICINE

## 2019-05-20 PROCEDURE — G0438 PPPS, INITIAL VISIT: HCPCS | Performed by: INTERNAL MEDICINE

## 2019-05-20 PROCEDURE — 99214 OFFICE O/P EST MOD 30 MIN: CPT | Performed by: INTERNAL MEDICINE

## 2019-05-20 ASSESSMENT — PATIENT HEALTH QUESTIONNAIRE - PHQ9
SUM OF ALL RESPONSES TO PHQ QUESTIONS 1-9: 0
SUM OF ALL RESPONSES TO PHQ QUESTIONS 1-9: 0

## 2019-05-20 ASSESSMENT — ENCOUNTER SYMPTOMS
RESPIRATORY NEGATIVE: 1
EYES NEGATIVE: 1
GASTROINTESTINAL NEGATIVE: 1

## 2019-05-20 ASSESSMENT — LIFESTYLE VARIABLES: HOW OFTEN DO YOU HAVE A DRINK CONTAINING ALCOHOL: 0

## 2019-05-20 ASSESSMENT — ANXIETY QUESTIONNAIRES: GAD7 TOTAL SCORE: 0

## 2019-05-20 NOTE — ASSESSMENT & PLAN NOTE
Prostate: today  Colonoscopy: 1/16/2017. Diverticulosis. No further exam recommended unless symptoms. DEXA: 2014  Eye: bilateral Cataracts 3/2019. appt next mo. Hearing: passed in office exam  Immunization: rx for Shingrix given. All other vaccines up to date.   MMSE: 30/30  Get Up and Go: passed  Tob: nonsmoker/never  ETOH: never  Caffeine: low am  Cardiac Risk Assessment: >75 yo   Living will: yes  Medical power of : yes

## 2019-05-20 NOTE — PROGRESS NOTES
Subjective:      Patient ID: Ryan Celeste is a [de-identified] y.o. male. HPI Medicare annual wellness visit, subsequent  Prostate: today  Colonoscopy: 1/16/2017. Diverticulosis. No further exam recommended unless symptoms. DEXA: 2014  Eye: bilateral Cataracts 3/2019. appt next mo. Hearing: passed in office exam  Immunization: rx for Shingrix given. All other vaccines up to date. MMSE: 30/30  Get Up and Go: passed  Tob: nonsmoker/never  ETOH: never  Caffeine: low am  Cardiac Risk Assessment: >77 yo   Living will: yes  Medical power of : yes    Controlled type 2 diabetes mellitus with stage 3 chronic kidney disease, without long-term current use of insulin (HCC)  Sugars are 100-130s, diet is stable, weight is unchanged, no reported neuropathy, no change in vision, no claudication, no foot ulcers, no new skin lesions. No change in medications. No c/o with meds. Last eye exam 3/2019. Has Retinopathy mild    Essential hypertension, malignant  No change in meds, no c/o with meds, no chest pain, SOB, palpatations, or syncope. Home bp was 130-140s/80s. Pure hypercholesterolemia  Stable diet and wt. No new c/o w/ med. Chronic renal failure, stage 3 (moderate) ([facility])  Stable BUN/Cr over last several yrs. No new c/o. Good DM and BP control. Right BBB/left ant fasc block  No change. Dr Dylan Horner- to go  back if symptoms. Primary osteoarthritis of both knees   The most affected joints are knees . No new c/o of pain ,decrease ROM , or dysfunction of affect joints since last visit. . NO effusion or erythema since last visit. Gets stiff in air conditioning and when it gets cold.     Past Medical History:   Diagnosis Date    Anemia     Arthritis     MILD    Hyperlipidemia     Hypertension     Type II or unspecified type diabetes mellitus without mention of complication, not stated as uncontrolled      Current Outpatient Medications   Medication Sig Dispense Refill    metFORMIN (GLUCOPHAGE) 1000 MG tablet TAKE 1 TABLET BY MOUTH ONCE DAILY WITH BREAKFAST 90 tablet 3    atorvastatin (LIPITOR) 20 MG tablet TAKE 1 TABLET BY MOUTH ONCE DAILY 30 tablet 5    glipiZIDE (GLUCOTROL) 5 MG tablet TAKE ONE TABLET BY MOUTH ONCE DAILY 60 tablet 5    hydrochlorothiazide (HYDRODIURIL) 25 MG tablet TAKE ONE TABLET BY MOUTH ONCE DAILY 30 tablet 11    trandolapril (MAVIK) 4 MG tablet TAKE ONE TABLET BY MOUTH TWICE DAILY 60 tablet 11    verapamil (CALAN SR) 240 MG extended release tablet TAKE 1 TABLET BY MOUTH AT BEDTIME 30 tablet 5    Cholecalciferol (VITAMIN D) 2000 UNITS TABS Take 1 tablet by mouth daily.  aspirin 81 MG EC tablet Take 81 mg by mouth daily. No current facility-administered medications for this visit. No Known Allergies  Social History     Tobacco Use    Smoking status: Never Smoker    Smokeless tobacco: Never Used   Substance Use Topics    Alcohol use: No     Family History   Problem Relation Age of Onset    High Blood Pressure Mother     High Blood Pressure Father     Heart Disease Father         cad    Cancer Sister        Review of Systems   Constitutional: Positive for fatigue. HENT: Negative. Eyes: Negative. Respiratory: Negative. Cardiovascular: Negative. Gastrointestinal: Negative. Endocrine: Negative. Genitourinary: Negative. Musculoskeletal: Positive for arthralgias. Skin: Negative. Neurological: Negative. Hematological: Negative. Psychiatric/Behavioral: Negative. Vitals:    05/20/19 1001 05/20/19 1045   BP: 122/78 128/78   Pulse: 98    Resp: 16    SpO2: 98%    Weight: 173 lb 3.2 oz (78.6 kg)    Height: 5' 11\" (1.803 m)      Objective:   Physical Exam   Constitutional: He is oriented to person, place, and time. Vital signs are normal. He appears well-developed and well-nourished. No distress. HENT:   Head: Normocephalic and atraumatic.    Right Ear: External ear normal.   Left Ear: External ear normal.   Nose: Nose normal.   Mouth/Throat: Oropharynx is clear and moist. No oropharyngeal exudate. Eyes: Pupils are equal, round, and reactive to light. Conjunctivae and EOM are normal.   S/p Bilateral Cataract surgery   Neck: Trachea normal, normal range of motion, full passive range of motion without pain and phonation normal. Neck supple. Normal carotid pulses, no hepatojugular reflux and no JVD present. Carotid bruit is not present. No thyroid mass and no thyromegaly present. Cardiovascular: Normal rate, regular rhythm, normal heart sounds, intact distal pulses and normal pulses. No extrasystoles are present. PMI is not displaced. Exam reveals no gallop and no friction rub. No murmur heard. Pulses:       Carotid pulses are 2+ on the right side, and 2+ on the left side. Radial pulses are 2+ on the right side, and 2+ on the left side. Femoral pulses are 2+ on the right side, and 2+ on the left side. Popliteal pulses are 2+ on the right side, and 2+ on the left side. Dorsalis pedis pulses are 2+ on the right side, and 2+ on the left side. Posterior tibial pulses are 2+ on the right side, and 2+ on the left side. Pulmonary/Chest: Effort normal and breath sounds normal. No accessory muscle usage. No apnea, no tachypnea and no bradypnea. No respiratory distress. He has no decreased breath sounds. He has no wheezes. He has no rhonchi. He has no rales. He exhibits no tenderness. Abdominal: Soft. Normal appearance, normal aorta and bowel sounds are normal. He exhibits no distension and no mass. There is no hepatosplenomegaly. There is no tenderness. There is no rebound, no guarding and no CVA tenderness. No hernia. Hernia confirmed negative in the ventral area. Huge L inguinal hernia now gone s/p repair. .   Genitourinary: Prostate normal and penis normal. Rectal exam shows guaiac positive stool. Musculoskeletal: Normal range of motion. He exhibits tenderness. He exhibits no edema.    OA knees moderate on L but severe on R. S/p R hip repair from fx/accident(1962). Lymphadenopathy:     He has no cervical adenopathy. Neurological: He is alert and oriented to person, place, and time. He has normal strength. He is not disoriented. He displays no atrophy and no tremor. No cranial nerve deficit or sensory deficit. He exhibits normal muscle tone. He displays a negative Romberg sign. Coordination normal.   Diabetic foot exam unchanged. 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 Has intact light touch and vibratory senses. Skin: Skin is warm, dry and intact. No abrasion and no rash noted. He is not diaphoretic. No cyanosis or erythema. No pallor. Nails show no clubbing. Psychiatric: He has a normal mood and affect. His speech is normal and behavior is normal. Judgment and thought content normal. Cognition and memory are normal.       Assessment:      Problem   Medicare Annual Wellness Visit, Subsequent   Chronic renal failure, stage 3 (moderate) ([address] No new c/o. Right Bbb/Left Ant Fasc Block. No new c/o. Primary Osteoarthritis of Both Knees. R knee is worse but no pain or falls. 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 Very good control and diet. No c/o w/ meds. Essential Hypertension, Malignant. Good w/ meds. Pure Hypercholesterolemia. Stable diet and wt. Plan:      All above problems reviewed and the found to be unchanged except for the following :     Medicare Annual Wellness Visit, Subsequent. To Eye MD as scheduled. Rx for Shingrix given. Chronic renal failure, stage 3 (moderate) ([address] Will do labs and call if further w/u is required. Right Bbb/Left Ant Fasc Block. Call if new c/o. Primary Osteoarthritis of Both Knees. To ORtho as needed. Call if new c/o. 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  Will do labs and adjust meds if needed. Essential Hypertension, Malignant. Continue present meds. Home BP checks. Call if>140/90. Improve diet. Avoid caffeine and salt. Call if new c/o w/ meds. Pure Hypercholesterolemia. Will continue meds. Call if new c/o. Will do albs and adjust med if needed. Erica Chaves MD   Medicare Annual Wellness Visit  Name: Maria Dick Date: 2019   MRN: [id number] Sex: Male   Age: [de-identified] y.o. Ethnicity: Non-/Non    : 1940 Race: Black      Nalini Other is here for Medicare AWV    Screenings for behavioral, psychosocial and functional/safety risks, and cognitive dysfunction are all negative except as indicated below. These results, as well as other patient data from the [address] form, are documented in Flowsheets linked to this Encounter. No Known Allergies  Prior to Visit Medications    Medication Sig Taking? Authorizing Provider   metFORMIN (GLUCOPHAGE) 1000 MG tablet TAKE 1 TABLET BY MOUTH ONCE DAILY WITH BREAKFAST  C Mitzy Spivey MD   atorvastatin (LIPITOR) 20 MG tablet TAKE 1 TABLET BY MOUTH ONCE DAILY  C Mitzy Spivey MD   glipiZIDE (GLUCOTROL) 5 MG tablet TAKE ONE TABLET BY MOUTH ONCE DAILY  C Mitzy Spivey MD   hydrochlorothiazide (HYDRODIURIL) 25 MG tablet TAKE ONE TABLET BY MOUTH ONCE DAILY  SELAM Spivey MD   trandolapril (MAVIK) 4 MG tablet TAKE ONE TABLET BY MOUTH TWICE DAILY  SELAM Spivey MD   verapamil (CALAN SR) 240 MG extended release tablet TAKE 1 TABLET BY MOUTH AT BEDTIME  SELAM Spivey MD   Cholecalciferol (VITAMIN D) 2000 UNITS TABS Take 1 tablet by mouth daily. Historical Provider, MD   aspirin 81 MG EC tablet Take 81 mg by mouth daily.   Historical Provider, MD     Past Medical History:   Diagnosis Date    Anemia     Arthritis     MILD    Hyperlipidemia     Hypertension     Type II or unspecified type diabetes mellitus without mention of complication, not stated as uncontrolled      Past Surgical History:   Procedure Laterality Date    HERNIA REPAIR      HIP SURGERY      OTHER SURGICAL HISTORY N/A 12/15/2015    LAPAROSCOPIC LEFT INGUINAL HERNIA REPAIR WITH MESH     Family History   Problem Relation Age of Onset    High Blood Pressure Mother     High Blood Pressure Father     Heart Disease Father         cad    Cancer Sister        CareTeam (Including outside providers/suppliers regularly involved in providing care):   Patient Care Team:  Rambo Liu MD as PCP - General (Internal Medicine)  Rambo Liu MD as PCP - S Attributed Provider  Ras Sen MD as Surgeon (General Surgery)    Wt Readings from Last 3 Encounters:   05/20/19 173 lb 3.2 oz (78.6 kg)   02/18/19 176 lb 12.8 oz (80.2 kg)   11/02/18 170 lb (77.1 kg)     Vitals:    05/20/19 1001 05/20/19 1045   BP: 122/78 128/78   Pulse: 98    Resp: 16    SpO2: 98%    Weight: 173 lb 3.2 oz (78.6 kg)    Height: 5' 11\" (1.803 m)      Body mass index is 24.16 kg/m². Based upon direct observation of the patient, evaluation of cognition reveals recent and remote memory intact. See H&P above    Patient's complete Health Risk Assessment and screening values have been reviewed and are found in Flowsheets. The following problems were reviewed today and where indicated follow up appointments were made and/or referrals ordered. Positive Risk Factor Screenings with Interventions:     General Health:  General  In general, how would you say your health is?: Good  In the past 7 days, have you experienced any of the following? New or Increased Pain, New or Increased Fatigue, Loneliness, Social Isolation, Stress or Anger?: None of These  Do you get the social and emotional support that you need?: Yes  Do you have a Living Will?: (!) No  General Health Risk Interventions:  · up to date.     Personalized Preventive Plan   Current Health Maintenance Status  Immunization History   Administered Date(s) Administered    Influenza Virus Vaccine 11/20/2007, 12/01/2008, 12/02/2009, 12/02/2010, 11/02/2012    Influenza Whole 12/02/2009    Influenza, High Dose (Fluzone 65 yrs and older) 11/25/2013, 12/01/2014, 12/11/2015, 12/15/2016, 10/02/2017, 11/02/2018    Pneumococcal 13-valent Conjugate ([id number]) 12/01/2014    Pneumococcal Polysaccharide ([id number]) 04/18/2005, 12/15/2016    Td, unspecified formulation 05/23/2007    Tdap (Boostrix, Adacel) 11/14/2018        Health Maintenance   Topic Date Due    Shingles Vaccine (1 of 2) 08/13/1988    Potassium monitoring  11/02/2019    Creatinine monitoring  11/02/2019    DTaP/Tdap/Td vaccine (2 - Td) 11/14/2028    Flu vaccine  Completed    Pneumococcal 65+ years Vaccine  Completed     Recommendations for Preventive Services Due: see orders and patient instructions/AVS.  .   Recommended screening schedule for the next 5-10 years is provided to the patient in written form: see Patient Instructions/AVS.

## 2019-05-20 NOTE — ASSESSMENT & PLAN NOTE
The most affected joints are knees . No new c/o of pain ,decrease ROM , or dysfunction of affect joints since last visit. . NO effusion or erythema since last visit. Gets stiff in air conditioning and when it gets cold.

## 2019-05-20 NOTE — PATIENT INSTRUCTIONS
All above problems reviewed and the found to be unchanged except for the following :     Medicare Annual Wellness Visit, Subsequent. To Eye MD as scheduled. Rx for Shingrix given. Chronic renal failure, stage 3 (moderate) ([address] Will do labs and call if further w/u is required. Right Bbb/Left Ant Fasc Block. Call if new c/o. Primary Osteoarthritis of Both Knees. To ORtho as needed. Call if new c/o. 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 Will do labs and adjust meds if needed. Essential Hypertension, Malignant. Continue present meds. Home BP checks. Call if>140/90. Improve diet. Avoid caffeine and salt. Call if new c/o w/ meds. Pure Hypercholesterolemia. Will continue meds. Call if new c/o. Will do albs and adjust med if needed. Prostate: today  Colonoscopy: 1/16/2017. Diverticulosis. No further exam recommended unless symptoms. DEXA: 2014  Eye: bilateral Cataracts 3/2019. appt next mo. Hearing: passed in office exam  Immunization: rx for Shingrix given. All other vaccines up to date. MMSE: 30/30  Get Up and Go: passed  Tob: nonsmoker/never  ETOH: never  Caffeine: low am  Cardiac Risk Assessment: >75 yo   Living will: yes  Medical power of : yes    Personalized Preventive Plan for Alexa Carter - 5/20/2019  Medicare offers a range of preventive health benefits. Some of the tests and screenings are paid in full while other may be subject to a deductible, co-insurance, and/or copay. Some of these benefits include a comprehensive review of your medical history including lifestyle, illnesses that may run in your family, and various assessments and screenings as appropriate. After reviewing your medical record and screening and assessments performed today your provider may have ordered immunizations, labs, imaging, and/or referrals for you.   A list of these orders (if applicable) as well as

## 2019-05-20 NOTE — ASSESSMENT & PLAN NOTE
Sugars are 100-130s, diet is stable, weight is unchanged, no reported neuropathy, no change in vision, no claudication, no foot ulcers, no new skin lesions. No change in medications. No c/o with meds. Last eye exam 3/2019.  Has Retinopathy mild

## 2019-05-22 DIAGNOSIS — N18.30 CHRONIC RENAL FAILURE, STAGE 3 (MODERATE) (HCC): ICD-10-CM

## 2019-05-22 DIAGNOSIS — R53.83 FATIGUE, UNSPECIFIED TYPE: ICD-10-CM

## 2019-05-22 DIAGNOSIS — N18.30 CONTROLLED TYPE 2 DIABETES MELLITUS WITH STAGE 3 CHRONIC KIDNEY DISEASE, WITHOUT LONG-TERM CURRENT USE OF INSULIN (HCC): ICD-10-CM

## 2019-05-22 DIAGNOSIS — E11.22 CONTROLLED TYPE 2 DIABETES MELLITUS WITH STAGE 3 CHRONIC KIDNEY DISEASE, WITHOUT LONG-TERM CURRENT USE OF INSULIN (HCC): ICD-10-CM

## 2019-05-22 DIAGNOSIS — E78.00 PURE HYPERCHOLESTEROLEMIA: ICD-10-CM

## 2019-05-22 DIAGNOSIS — I10 ESSENTIAL HYPERTENSION, MALIGNANT: ICD-10-CM

## 2019-05-22 DIAGNOSIS — I45.2 RIGHT BBB/LEFT ANT FASC BLOCK: ICD-10-CM

## 2019-05-22 DIAGNOSIS — Z12.5 SCREENING FOR PROSTATE CANCER: ICD-10-CM

## 2019-05-22 DIAGNOSIS — M17.0 PRIMARY OSTEOARTHRITIS OF BOTH KNEES: ICD-10-CM

## 2019-05-22 LAB
ALBUMIN SERPL-MCNC: 4.3 G/DL (ref 3.4–5)
ALP BLD-CCNC: 96 U/L (ref 40–129)
ALT SERPL-CCNC: 11 U/L (ref 10–40)
ANION GAP SERPL CALCULATED.3IONS-SCNC: 12 MMOL/L (ref 3–16)
AST SERPL-CCNC: 18 U/L (ref 15–37)
BILIRUB SERPL-MCNC: 0.6 MG/DL (ref 0–1)
BILIRUBIN DIRECT: <0.2 MG/DL (ref 0–0.3)
BILIRUBIN, INDIRECT: NORMAL MG/DL (ref 0–1)
BUN BLDV-MCNC: 23 MG/DL (ref 7–20)
CALCIUM SERPL-MCNC: 10.5 MG/DL (ref 8.3–10.6)
CHLORIDE BLD-SCNC: 102 MMOL/L (ref 99–110)
CHOLESTEROL, TOTAL: 167 MG/DL (ref 0–199)
CO2: 27 MMOL/L (ref 21–32)
CREAT SERPL-MCNC: 1.9 MG/DL (ref 0.8–1.3)
GFR AFRICAN AMERICAN: 41
GFR NON-AFRICAN AMERICAN: 34
GLUCOSE BLD-MCNC: 136 MG/DL (ref 70–99)
HCT VFR BLD CALC: 40.1 % (ref 40.5–52.5)
HDLC SERPL-MCNC: 68 MG/DL (ref 40–60)
HEMOGLOBIN: 13 G/DL (ref 13.5–17.5)
LDL CHOLESTEROL CALCULATED: 83 MG/DL
MCH RBC QN AUTO: 25.8 PG (ref 26–34)
MCHC RBC AUTO-ENTMCNC: 32.4 G/DL (ref 31–36)
MCV RBC AUTO: 79.5 FL (ref 80–100)
PDW BLD-RTO: 16.2 % (ref 12.4–15.4)
PHOSPHORUS: 3.2 MG/DL (ref 2.5–4.9)
PLATELET # BLD: 285 K/UL (ref 135–450)
PMV BLD AUTO: 8.5 FL (ref 5–10.5)
POTASSIUM SERPL-SCNC: 4 MMOL/L (ref 3.5–5.1)
PROSTATE SPECIFIC ANTIGEN: 0.64 NG/ML (ref 0–4)
RBC # BLD: 5.05 M/UL (ref 4.2–5.9)
SODIUM BLD-SCNC: 141 MMOL/L (ref 136–145)
TOTAL PROTEIN: 7.1 G/DL (ref 6.4–8.2)
TRIGL SERPL-MCNC: 81 MG/DL (ref 0–150)
TSH SERPL DL<=0.05 MIU/L-ACNC: 1.55 UIU/ML (ref 0.27–4.2)
VLDLC SERPL CALC-MCNC: 16 MG/DL
WBC # BLD: 5.2 K/UL (ref 4–11)

## 2019-05-23 ENCOUNTER — TELEPHONE (OUTPATIENT)
Dept: FAMILY MEDICINE CLINIC | Age: 79
End: 2019-05-23

## 2019-05-23 DIAGNOSIS — I10 ESSENTIAL HYPERTENSION, MALIGNANT: Primary | ICD-10-CM

## 2019-05-23 LAB
ESTIMATED AVERAGE GLUCOSE: 165.7 MG/DL
HBA1C MFR BLD: 7.4 %

## 2019-05-23 NOTE — TELEPHONE ENCOUNTER
----- Message from Roger Barclay MD sent at 5/23/2019  1:23 PM EDT -----  DM control is a little worse, Kidney function is some worse, blood counts are about the same. Hold Mavik and Metformin. Check Sugars and BP daily for 2 weeks. rechx Renal in 2 weeks. If no better will need to do further kidney tests. If BP or sugar go up will need to add new meds.

## 2019-05-28 RX ORDER — VERAPAMIL HYDROCHLORIDE 240 MG/1
TABLET, FILM COATED, EXTENDED RELEASE ORAL
Qty: 90 TABLET | Refills: 1 | Status: ON HOLD | OUTPATIENT
Start: 2019-05-28 | End: 2019-07-01 | Stop reason: HOSPADM

## 2019-05-28 RX ORDER — ATORVASTATIN CALCIUM 20 MG/1
TABLET, FILM COATED ORAL
Qty: 90 TABLET | Refills: 1 | Status: ON HOLD | OUTPATIENT
Start: 2019-05-28 | End: 2019-07-01 | Stop reason: HOSPADM

## 2019-05-28 NOTE — TELEPHONE ENCOUNTER
Golden Holguin [phone] (home)    is requesting refill(s) of medication LIPITOR to preferred pharmacy [facility] 5/20/2019 (pertaining to medication)   Last refill 1/29/2019 (per medication requested)  Next office visit scheduled or attempted Yes  Date 10/07/2019  If No, reason         Golden Holguin [phone] (home)    is requesting refill(s) of medication VERAPIMIL to preferred pharmacy [facility] [address] 5/20/2019 (pertaining to medication)   Last refill 12/13/2018 (per medication requested)  Next office visit scheduled or attempted Yes  Date 10/07/2019  If No, reason

## 2019-06-05 DIAGNOSIS — I10 ESSENTIAL HYPERTENSION, MALIGNANT: ICD-10-CM

## 2019-06-05 LAB
ALBUMIN SERPL-MCNC: 4.2 G/DL (ref 3.4–5)
ANION GAP SERPL CALCULATED.3IONS-SCNC: 10 MMOL/L (ref 3–16)
BUN BLDV-MCNC: 24 MG/DL (ref 7–20)
CALCIUM SERPL-MCNC: 10.4 MG/DL (ref 8.3–10.6)
CHLORIDE BLD-SCNC: 104 MMOL/L (ref 99–110)
CO2: 28 MMOL/L (ref 21–32)
CREAT SERPL-MCNC: 1.7 MG/DL (ref 0.8–1.3)
GFR AFRICAN AMERICAN: 47
GFR NON-AFRICAN AMERICAN: 39
GLUCOSE BLD-MCNC: 133 MG/DL (ref 70–99)
PHOSPHORUS: 2.9 MG/DL (ref 2.5–4.9)
POTASSIUM SERPL-SCNC: 3.9 MMOL/L (ref 3.5–5.1)
SODIUM BLD-SCNC: 142 MMOL/L (ref 136–145)

## 2019-06-28 ENCOUNTER — TELEPHONE (OUTPATIENT)
Dept: FAMILY MEDICINE CLINIC | Age: 79
End: 2019-06-28

## 2019-06-28 ENCOUNTER — APPOINTMENT (OUTPATIENT)
Dept: GENERAL RADIOLOGY | Age: 79
DRG: 280 | End: 2019-06-28
Payer: MEDICARE

## 2019-06-28 ENCOUNTER — HOSPITAL ENCOUNTER (INPATIENT)
Age: 79
LOS: 3 days | Discharge: HOME HEALTH | DRG: 280 | End: 2019-07-01
Attending: EMERGENCY MEDICINE | Admitting: INTERNAL MEDICINE
Payer: MEDICARE

## 2019-06-28 DIAGNOSIS — R79.89 ELEVATED BRAIN NATRIURETIC PEPTIDE (BNP) LEVEL: ICD-10-CM

## 2019-06-28 DIAGNOSIS — R94.31 ABNORMAL EKG: ICD-10-CM

## 2019-06-28 DIAGNOSIS — I50.9 ACUTE CONGESTIVE HEART FAILURE, UNSPECIFIED HEART FAILURE TYPE (HCC): Primary | ICD-10-CM

## 2019-06-28 DIAGNOSIS — R77.8 ELEVATED TROPONIN: ICD-10-CM

## 2019-06-28 DIAGNOSIS — I21.4 NSTEMI (NON-ST ELEVATED MYOCARDIAL INFARCTION) (HCC): ICD-10-CM

## 2019-06-28 DIAGNOSIS — R06.02 SHORTNESS OF BREATH: ICD-10-CM

## 2019-06-28 DIAGNOSIS — I50.21 ACUTE SYSTOLIC CONGESTIVE HEART FAILURE (HCC): ICD-10-CM

## 2019-06-28 PROBLEM — I50.31 CHF NYHA CLASS I, ACUTE, DIASTOLIC (HCC): Status: ACTIVE | Noted: 2019-06-28

## 2019-06-28 LAB
ANION GAP SERPL CALCULATED.3IONS-SCNC: 13 MMOL/L (ref 3–16)
BASOPHILS ABSOLUTE: 0 K/UL (ref 0–0.2)
BASOPHILS RELATIVE PERCENT: 0.5 %
BUN BLDV-MCNC: 25 MG/DL (ref 7–20)
CALCIUM SERPL-MCNC: 10.1 MG/DL (ref 8.3–10.6)
CHLORIDE BLD-SCNC: 97 MMOL/L (ref 99–110)
CO2: 24 MMOL/L (ref 21–32)
CREAT SERPL-MCNC: 1.7 MG/DL (ref 0.8–1.3)
EOSINOPHILS ABSOLUTE: 0 K/UL (ref 0–0.6)
EOSINOPHILS RELATIVE PERCENT: 0.6 %
GFR AFRICAN AMERICAN: 47
GFR NON-AFRICAN AMERICAN: 39
GLUCOSE BLD-MCNC: 154 MG/DL (ref 70–99)
GLUCOSE BLD-MCNC: 232 MG/DL (ref 70–99)
HCT VFR BLD CALC: 36.7 % (ref 40.5–52.5)
HEMOGLOBIN: 12 G/DL (ref 13.5–17.5)
LYMPHOCYTES ABSOLUTE: 1.4 K/UL (ref 1–5.1)
LYMPHOCYTES RELATIVE PERCENT: 18.8 %
MCH RBC QN AUTO: 25.2 PG (ref 26–34)
MCHC RBC AUTO-ENTMCNC: 32.7 G/DL (ref 31–36)
MCV RBC AUTO: 77.3 FL (ref 80–100)
MONOCYTES ABSOLUTE: 1 K/UL (ref 0–1.3)
MONOCYTES RELATIVE PERCENT: 14.1 %
NEUTROPHILS ABSOLUTE: 4.8 K/UL (ref 1.7–7.7)
NEUTROPHILS RELATIVE PERCENT: 66 %
PDW BLD-RTO: 15.5 % (ref 12.4–15.4)
PERFORMED ON: ABNORMAL
PLATELET # BLD: 310 K/UL (ref 135–450)
PMV BLD AUTO: 7.9 FL (ref 5–10.5)
POTASSIUM SERPL-SCNC: 3.3 MMOL/L (ref 3.5–5.1)
PRO-BNP: 5287 PG/ML (ref 0–449)
RBC # BLD: 4.74 M/UL (ref 4.2–5.9)
SODIUM BLD-SCNC: 134 MMOL/L (ref 136–145)
TROPONIN: 0.05 NG/ML
WBC # BLD: 7.4 K/UL (ref 4–11)

## 2019-06-28 PROCEDURE — 80048 BASIC METABOLIC PNL TOTAL CA: CPT

## 2019-06-28 PROCEDURE — 2580000003 HC RX 258: Performed by: INTERNAL MEDICINE

## 2019-06-28 PROCEDURE — 83880 ASSAY OF NATRIURETIC PEPTIDE: CPT

## 2019-06-28 PROCEDURE — 83036 HEMOGLOBIN GLYCOSYLATED A1C: CPT

## 2019-06-28 PROCEDURE — 99285 EMERGENCY DEPT VISIT HI MDM: CPT

## 2019-06-28 PROCEDURE — 36415 COLL VENOUS BLD VENIPUNCTURE: CPT

## 2019-06-28 PROCEDURE — 6370000000 HC RX 637 (ALT 250 FOR IP): Performed by: EMERGENCY MEDICINE

## 2019-06-28 PROCEDURE — 6370000000 HC RX 637 (ALT 250 FOR IP): Performed by: STUDENT IN AN ORGANIZED HEALTH CARE EDUCATION/TRAINING PROGRAM

## 2019-06-28 PROCEDURE — 6360000002 HC RX W HCPCS: Performed by: STUDENT IN AN ORGANIZED HEALTH CARE EDUCATION/TRAINING PROGRAM

## 2019-06-28 PROCEDURE — 6360000002 HC RX W HCPCS: Performed by: INTERNAL MEDICINE

## 2019-06-28 PROCEDURE — 85025 COMPLETE CBC W/AUTO DIFF WBC: CPT

## 2019-06-28 PROCEDURE — 84484 ASSAY OF TROPONIN QUANT: CPT

## 2019-06-28 PROCEDURE — 71046 X-RAY EXAM CHEST 2 VIEWS: CPT

## 2019-06-28 PROCEDURE — 1200000000 HC SEMI PRIVATE

## 2019-06-28 PROCEDURE — 6370000000 HC RX 637 (ALT 250 FOR IP): Performed by: INTERNAL MEDICINE

## 2019-06-28 RX ORDER — DEXTROSE MONOHYDRATE 50 MG/ML
100 INJECTION, SOLUTION INTRAVENOUS PRN
Status: DISCONTINUED | OUTPATIENT
Start: 2019-06-28 | End: 2019-07-01 | Stop reason: HOSPADM

## 2019-06-28 RX ORDER — ASPIRIN 81 MG/1
324 TABLET, CHEWABLE ORAL ONCE
Status: COMPLETED | OUTPATIENT
Start: 2019-06-28 | End: 2019-06-28

## 2019-06-28 RX ORDER — FUROSEMIDE 10 MG/ML
20 INJECTION INTRAMUSCULAR; INTRAVENOUS ONCE
Status: DISCONTINUED | OUTPATIENT
Start: 2019-06-28 | End: 2019-06-28

## 2019-06-28 RX ORDER — FUROSEMIDE 10 MG/ML
40 INJECTION INTRAMUSCULAR; INTRAVENOUS 2 TIMES DAILY
Status: DISCONTINUED | OUTPATIENT
Start: 2019-06-29 | End: 2019-06-30

## 2019-06-28 RX ORDER — NICOTINE POLACRILEX 4 MG
15 LOZENGE BUCCAL PRN
Status: DISCONTINUED | OUTPATIENT
Start: 2019-06-28 | End: 2019-07-01 | Stop reason: HOSPADM

## 2019-06-28 RX ORDER — FUROSEMIDE 10 MG/ML
40 INJECTION INTRAMUSCULAR; INTRAVENOUS ONCE
Status: COMPLETED | OUTPATIENT
Start: 2019-06-28 | End: 2019-06-28

## 2019-06-28 RX ORDER — SODIUM CHLORIDE 0.9 % (FLUSH) 0.9 %
10 SYRINGE (ML) INJECTION EVERY 12 HOURS SCHEDULED
Status: DISCONTINUED | OUTPATIENT
Start: 2019-06-28 | End: 2019-07-01 | Stop reason: HOSPADM

## 2019-06-28 RX ORDER — ASPIRIN 81 MG/1
81 TABLET ORAL EVERY OTHER DAY
Status: DISCONTINUED | OUTPATIENT
Start: 2019-06-30 | End: 2019-07-01 | Stop reason: HOSPADM

## 2019-06-28 RX ORDER — ACETAMINOPHEN 325 MG/1
650 TABLET ORAL EVERY 4 HOURS PRN
Status: DISCONTINUED | OUTPATIENT
Start: 2019-06-28 | End: 2019-07-01 | Stop reason: HOSPADM

## 2019-06-28 RX ORDER — GLIPIZIDE 5 MG/1
5 TABLET ORAL DAILY
Status: DISCONTINUED | OUTPATIENT
Start: 2019-06-29 | End: 2019-07-01 | Stop reason: HOSPADM

## 2019-06-28 RX ORDER — POTASSIUM CHLORIDE 1.5 G/1.77G
20 POWDER, FOR SOLUTION ORAL ONCE
Status: COMPLETED | OUTPATIENT
Start: 2019-06-28 | End: 2019-06-28

## 2019-06-28 RX ORDER — ATORVASTATIN CALCIUM 20 MG/1
20 TABLET, FILM COATED ORAL DAILY
Status: DISCONTINUED | OUTPATIENT
Start: 2019-06-29 | End: 2019-07-01

## 2019-06-28 RX ORDER — DEXTROSE MONOHYDRATE 25 G/50ML
12.5 INJECTION, SOLUTION INTRAVENOUS PRN
Status: DISCONTINUED | OUTPATIENT
Start: 2019-06-28 | End: 2019-07-01 | Stop reason: HOSPADM

## 2019-06-28 RX ORDER — SODIUM CHLORIDE 0.9 % (FLUSH) 0.9 %
10 SYRINGE (ML) INJECTION PRN
Status: DISCONTINUED | OUTPATIENT
Start: 2019-06-28 | End: 2019-07-01 | Stop reason: HOSPADM

## 2019-06-28 RX ORDER — ONDANSETRON 2 MG/ML
4 INJECTION INTRAMUSCULAR; INTRAVENOUS EVERY 6 HOURS PRN
Status: DISCONTINUED | OUTPATIENT
Start: 2019-06-28 | End: 2019-07-01 | Stop reason: HOSPADM

## 2019-06-28 RX ORDER — VERAPAMIL HYDROCHLORIDE 240 MG/1
240 TABLET, FILM COATED, EXTENDED RELEASE ORAL NIGHTLY
Status: DISCONTINUED | OUTPATIENT
Start: 2019-06-28 | End: 2019-06-30

## 2019-06-28 RX ADMIN — INSULIN LISPRO 1 UNITS: 100 INJECTION, SOLUTION INTRAVENOUS; SUBCUTANEOUS at 22:41

## 2019-06-28 RX ADMIN — POTASSIUM CHLORIDE 20 MEQ: 1.5 POWDER, FOR SOLUTION ORAL at 20:45

## 2019-06-28 RX ADMIN — Medication 10 ML: at 22:45

## 2019-06-28 RX ADMIN — POTASSIUM CHLORIDE 20 MEQ: 1.5 POWDER, FOR SOLUTION ORAL at 22:41

## 2019-06-28 RX ADMIN — FUROSEMIDE 40 MG: 10 INJECTION, SOLUTION INTRAMUSCULAR; INTRAVENOUS at 20:44

## 2019-06-28 RX ADMIN — ASPIRIN 81 MG 324 MG: 81 TABLET ORAL at 17:45

## 2019-06-28 RX ADMIN — ENOXAPARIN SODIUM 40 MG: 40 INJECTION SUBCUTANEOUS at 22:41

## 2019-06-28 ASSESSMENT — ENCOUNTER SYMPTOMS
CONSTIPATION: 0
SORE THROAT: 0
RHINORRHEA: 0
COUGH: 1
ABDOMINAL PAIN: 0
DIARRHEA: 0
EYE PAIN: 0
NAUSEA: 0
VOMITING: 0
SHORTNESS OF BREATH: 1
CHEST TIGHTNESS: 0
ABDOMINAL DISTENTION: 0

## 2019-06-28 ASSESSMENT — HEART SCORE: ECG: 1

## 2019-06-28 ASSESSMENT — PAIN SCALES - GENERAL
PAINLEVEL_OUTOF10: 0

## 2019-06-28 NOTE — DISCHARGE INSTR - COC
BBB/left ant fasc block I45.2    Abnormal EKG R94.31    Chronic renal failure, stage 3 (moderate) (HCC) N18.3       Isolation/Infection:   Isolation          No Isolation            Nurse Assessment:  Last Vital Signs: BP (!) 133/98   Pulse 76   Temp 98.4 °F (36.9 °C) (Oral)   Resp 18   SpO2 97%     Last documented pain score (0-10 scale):    Last Weight:   Wt Readings from Last 1 Encounters:   05/20/19 173 lb 3.2 oz (78.6 kg)     Mental Status:  {IP PT MENTAL STATUS:20030}    IV Access:  { JAMAAL IV ACCESS:329862293}    Nursing Mobility/ADLs:  Walking   {CHP DME YRXR:742506737}  Transfer  {CHP DME PZZX:758371583}  Bathing  {CHP DME XITT:808222833}  Dressing  {CHP DME ITDU:205542905}  Toileting  {CHP DME VBSL:107779877}  Feeding  {CHP DME IVQT:982960654}  Med Admin  {CHP DME UKNZ:025834799}  Med Delivery   { JAMAAL MED Delivery:141520515}    Wound Care Documentation and Therapy:  Incision 12/15/15 Abdomen Mid (Active)   Number of days: 7448        Elimination:  Continence:   · Bowel: {YES / EH:53046}  · Bladder: {YES / DV:14230}  Urinary Catheter: {Urinary Catheter:218208105}   Colostomy/Ileostomy/Ileal Conduit: {YES / UJ:76087}       Date of Last BM: ***  No intake or output data in the 24 hours ending 06/28/19 1900  No intake/output data recorded.     Safety Concerns:     [address] Safety Concerns:593746945}    Impairments/Disabilities:      [address] Impairments/Disabilities:031974206}    Nutrition Therapy:  Current Nutrition Therapy:   [address] Diet List:721692677}    Routes of Feeding: {CHP DME Other Feedings:998730351}  Liquids: {Slp liquid thickness:01052}  Daily Fluid Restriction: {CHP DME Yes amt example:712927918}  Last Modified Barium Swallow with Video (Video Swallowing Test): {Done Not Done JXVV:749979056}    Treatments at the Time of Hospital Discharge:   Respiratory Treatments: ***  Oxygen Therapy:  {Therapy; copd oxygen:65667}  Ventilator:    {MH CC Vent HMYE:636990592}    Rehab Therapies: {THERAPEUTIC

## 2019-06-29 PROBLEM — R79.89 ELEVATED TROPONIN: Status: ACTIVE | Noted: 2019-06-29

## 2019-06-29 PROBLEM — R77.8 ELEVATED TROPONIN: Status: ACTIVE | Noted: 2019-06-29

## 2019-06-29 LAB
ANION GAP SERPL CALCULATED.3IONS-SCNC: 14 MMOL/L (ref 3–16)
BUN BLDV-MCNC: 23 MG/DL (ref 7–20)
CALCIUM SERPL-MCNC: 10.5 MG/DL (ref 8.3–10.6)
CHLORIDE BLD-SCNC: 99 MMOL/L (ref 99–110)
CHOLESTEROL, TOTAL: 146 MG/DL (ref 0–199)
CO2: 28 MMOL/L (ref 21–32)
CREAT SERPL-MCNC: 1.6 MG/DL (ref 0.8–1.3)
ESTIMATED AVERAGE GLUCOSE: 177.2 MG/DL
GFR AFRICAN AMERICAN: 50
GFR NON-AFRICAN AMERICAN: 42
GLUCOSE BLD-MCNC: 144 MG/DL (ref 70–99)
GLUCOSE BLD-MCNC: 148 MG/DL (ref 70–99)
GLUCOSE BLD-MCNC: 150 MG/DL (ref 70–99)
GLUCOSE BLD-MCNC: 150 MG/DL (ref 70–99)
GLUCOSE BLD-MCNC: 157 MG/DL (ref 70–99)
HBA1C MFR BLD: 7.8 %
HDLC SERPL-MCNC: 65 MG/DL (ref 40–60)
LDL CHOLESTEROL CALCULATED: 64 MG/DL
LV EF: 38 %
LVEF MODALITY: NORMAL
MAGNESIUM: 1.8 MG/DL (ref 1.8–2.4)
PERFORMED ON: ABNORMAL
POTASSIUM SERPL-SCNC: 3.7 MMOL/L (ref 3.5–5.1)
PRO-BNP: 3982 PG/ML (ref 0–449)
SODIUM BLD-SCNC: 141 MMOL/L (ref 136–145)
TRIGL SERPL-MCNC: 83 MG/DL (ref 0–150)
VLDLC SERPL CALC-MCNC: 17 MG/DL

## 2019-06-29 PROCEDURE — 2580000003 HC RX 258: Performed by: INTERNAL MEDICINE

## 2019-06-29 PROCEDURE — 80061 LIPID PANEL: CPT

## 2019-06-29 PROCEDURE — 1200000000 HC SEMI PRIVATE

## 2019-06-29 PROCEDURE — 83880 ASSAY OF NATRIURETIC PEPTIDE: CPT

## 2019-06-29 PROCEDURE — 36415 COLL VENOUS BLD VENIPUNCTURE: CPT

## 2019-06-29 PROCEDURE — 6370000000 HC RX 637 (ALT 250 FOR IP): Performed by: INTERNAL MEDICINE

## 2019-06-29 PROCEDURE — 6360000002 HC RX W HCPCS: Performed by: INTERNAL MEDICINE

## 2019-06-29 PROCEDURE — 99223 1ST HOSP IP/OBS HIGH 75: CPT | Performed by: INTERNAL MEDICINE

## 2019-06-29 PROCEDURE — 83735 ASSAY OF MAGNESIUM: CPT

## 2019-06-29 PROCEDURE — 93306 TTE W/DOPPLER COMPLETE: CPT

## 2019-06-29 PROCEDURE — 80048 BASIC METABOLIC PNL TOTAL CA: CPT

## 2019-06-29 RX ADMIN — INSULIN LISPRO 1 UNITS: 100 INJECTION, SOLUTION INTRAVENOUS; SUBCUTANEOUS at 21:42

## 2019-06-29 RX ADMIN — FUROSEMIDE 40 MG: 10 INJECTION, SOLUTION INTRAMUSCULAR; INTRAVENOUS at 09:42

## 2019-06-29 RX ADMIN — FUROSEMIDE 40 MG: 10 INJECTION, SOLUTION INTRAMUSCULAR; INTRAVENOUS at 21:30

## 2019-06-29 RX ADMIN — GLIPIZIDE 5 MG: 5 TABLET ORAL at 09:41

## 2019-06-29 RX ADMIN — ENOXAPARIN SODIUM 40 MG: 40 INJECTION SUBCUTANEOUS at 09:41

## 2019-06-29 RX ADMIN — INSULIN LISPRO 1 UNITS: 100 INJECTION, SOLUTION INTRAVENOUS; SUBCUTANEOUS at 09:46

## 2019-06-29 RX ADMIN — Medication 10 ML: at 09:50

## 2019-06-29 RX ADMIN — INSULIN LISPRO 1 UNITS: 100 INJECTION, SOLUTION INTRAVENOUS; SUBCUTANEOUS at 12:01

## 2019-06-29 RX ADMIN — Medication 10 ML: at 21:30

## 2019-06-29 RX ADMIN — ATORVASTATIN CALCIUM 20 MG: 20 TABLET, FILM COATED ORAL at 09:42

## 2019-06-29 RX ADMIN — INSULIN LISPRO 1 UNITS: 100 INJECTION, SOLUTION INTRAVENOUS; SUBCUTANEOUS at 16:52

## 2019-06-29 RX ADMIN — VERAPAMIL HYDROCHLORIDE 240 MG: 240 TABLET, FILM COATED, EXTENDED RELEASE ORAL at 21:29

## 2019-06-29 ASSESSMENT — PAIN SCALES - GENERAL
PAINLEVEL_OUTOF10: 0

## 2019-06-29 NOTE — PROGRESS NOTES
Nutrition Education    Type and Reason for Visit: Initial, Patient Education    Nutrition Assessment:  Consult for CHF. Provided patient with heart failure nutrition therapy and carbohydrate counting handouts from [facility]. Encouraged low sodium diet, avoidance of processed snacks and meats. Discussed healthier alternatives including fresh fruit and vegetables, low fat dairy, and using canola oil when cooking. Also breifly encouraged patient to decreased consumption of pop to aid in glucose control. Patient verbalized understanding and accepted handouts. · Verbally reviewed following information with Patient  · Written educational materials provided. · Refer to Patient Education activity for more details.     Electronically signed by Rossana Bui RD, LD on 6/29/19 at 10:29 AM    Contact Number: [phone]

## 2019-06-29 NOTE — PLAN OF CARE
will improve  Description  Hemodynamic stability will improve  Outcome: Ongoing     Problem: Tobacco Use:  Goal: Will participate in inpatient tobacco-use cessation counseling  Description  Will participate in inpatient tobacco-use cessation counseling  Outcome: Ongoing

## 2019-06-30 LAB
ANION GAP SERPL CALCULATED.3IONS-SCNC: 16 MMOL/L (ref 3–16)
BUN BLDV-MCNC: 27 MG/DL (ref 7–20)
CALCIUM SERPL-MCNC: 10.6 MG/DL (ref 8.3–10.6)
CHLORIDE BLD-SCNC: 97 MMOL/L (ref 99–110)
CO2: 26 MMOL/L (ref 21–32)
CREAT SERPL-MCNC: 1.8 MG/DL (ref 0.8–1.3)
GFR AFRICAN AMERICAN: 44
GFR NON-AFRICAN AMERICAN: 36
GLUCOSE BLD-MCNC: 170 MG/DL (ref 70–99)
GLUCOSE BLD-MCNC: 187 MG/DL (ref 70–99)
GLUCOSE BLD-MCNC: 191 MG/DL (ref 70–99)
GLUCOSE BLD-MCNC: 191 MG/DL (ref 70–99)
GLUCOSE BLD-MCNC: 201 MG/DL (ref 70–99)
MAGNESIUM: 1.8 MG/DL (ref 1.8–2.4)
PERFORMED ON: ABNORMAL
POTASSIUM SERPL-SCNC: 3.5 MMOL/L (ref 3.5–5.1)
SODIUM BLD-SCNC: 139 MMOL/L (ref 136–145)

## 2019-06-30 PROCEDURE — 6370000000 HC RX 637 (ALT 250 FOR IP): Performed by: INTERNAL MEDICINE

## 2019-06-30 PROCEDURE — 99233 SBSQ HOSP IP/OBS HIGH 50: CPT | Performed by: INTERNAL MEDICINE

## 2019-06-30 PROCEDURE — 6360000002 HC RX W HCPCS: Performed by: INTERNAL MEDICINE

## 2019-06-30 PROCEDURE — 2580000003 HC RX 258: Performed by: INTERNAL MEDICINE

## 2019-06-30 PROCEDURE — 80048 BASIC METABOLIC PNL TOTAL CA: CPT

## 2019-06-30 PROCEDURE — 36415 COLL VENOUS BLD VENIPUNCTURE: CPT

## 2019-06-30 PROCEDURE — 83735 ASSAY OF MAGNESIUM: CPT

## 2019-06-30 PROCEDURE — 1200000000 HC SEMI PRIVATE

## 2019-06-30 RX ORDER — MAGNESIUM SULFATE 1 G/100ML
1 INJECTION INTRAVENOUS ONCE
Status: COMPLETED | OUTPATIENT
Start: 2019-06-30 | End: 2019-06-30

## 2019-06-30 RX ORDER — POTASSIUM CHLORIDE 20 MEQ/1
20 TABLET, EXTENDED RELEASE ORAL ONCE
Status: COMPLETED | OUTPATIENT
Start: 2019-06-30 | End: 2019-06-30

## 2019-06-30 RX ORDER — HYDRALAZINE HYDROCHLORIDE 10 MG/1
10 TABLET, FILM COATED ORAL EVERY 8 HOURS SCHEDULED
Status: DISCONTINUED | OUTPATIENT
Start: 2019-06-30 | End: 2019-07-01 | Stop reason: HOSPADM

## 2019-06-30 RX ORDER — ISOSORBIDE MONONITRATE 30 MG/1
30 TABLET, EXTENDED RELEASE ORAL DAILY
Status: DISCONTINUED | OUTPATIENT
Start: 2019-07-01 | End: 2019-07-01 | Stop reason: HOSPADM

## 2019-06-30 RX ADMIN — Medication 10 ML: at 09:04

## 2019-06-30 RX ADMIN — METOPROLOL TARTRATE 25 MG: 25 TABLET ORAL at 20:28

## 2019-06-30 RX ADMIN — FUROSEMIDE 40 MG: 10 INJECTION, SOLUTION INTRAMUSCULAR; INTRAVENOUS at 09:03

## 2019-06-30 RX ADMIN — INSULIN LISPRO 1 UNITS: 100 INJECTION, SOLUTION INTRAVENOUS; SUBCUTANEOUS at 20:32

## 2019-06-30 RX ADMIN — INSULIN LISPRO 1 UNITS: 100 INJECTION, SOLUTION INTRAVENOUS; SUBCUTANEOUS at 17:47

## 2019-06-30 RX ADMIN — INSULIN LISPRO 1 UNITS: 100 INJECTION, SOLUTION INTRAVENOUS; SUBCUTANEOUS at 13:16

## 2019-06-30 RX ADMIN — ASPIRIN 81 MG: 81 TABLET, COATED ORAL at 09:03

## 2019-06-30 RX ADMIN — ATORVASTATIN CALCIUM 20 MG: 20 TABLET, FILM COATED ORAL at 09:03

## 2019-06-30 RX ADMIN — GLIPIZIDE 5 MG: 5 TABLET ORAL at 09:04

## 2019-06-30 RX ADMIN — Medication 10 ML: at 20:28

## 2019-06-30 RX ADMIN — MAGNESIUM SULFATE HEPTAHYDRATE 1 G: 1 INJECTION, SOLUTION INTRAVENOUS at 10:18

## 2019-06-30 RX ADMIN — POTASSIUM CHLORIDE 20 MEQ: 20 TABLET, EXTENDED RELEASE ORAL at 10:18

## 2019-06-30 RX ADMIN — HYDRALAZINE HYDROCHLORIDE 10 MG: 10 TABLET, FILM COATED ORAL at 16:01

## 2019-06-30 RX ADMIN — MAGNESIUM HYDROXIDE 30 ML: 400 SUSPENSION ORAL at 10:18

## 2019-06-30 RX ADMIN — ENOXAPARIN SODIUM 40 MG: 40 INJECTION SUBCUTANEOUS at 09:04

## 2019-06-30 RX ADMIN — METOPROLOL TARTRATE 25 MG: 25 TABLET ORAL at 09:03

## 2019-06-30 RX ADMIN — INSULIN LISPRO 1 UNITS: 100 INJECTION, SOLUTION INTRAVENOUS; SUBCUTANEOUS at 09:06

## 2019-06-30 RX ADMIN — HYDRALAZINE HYDROCHLORIDE 10 MG: 10 TABLET, FILM COATED ORAL at 20:28

## 2019-06-30 ASSESSMENT — PAIN SCALES - GENERAL
PAINLEVEL_OUTOF10: 0

## 2019-06-30 NOTE — PLAN OF CARE
Problem: Falls - Risk of:  Goal: Will remain free from falls  Description  Will remain free from falls  Outcome: Ongoing  Goal: Absence of physical injury  Description  Absence of physical injury  Outcome: Ongoing     Problem: OXYGENATION/RESPIRATORY FUNCTION  Goal: Patient will maintain patent airway  Outcome: Ongoing  Goal: Patient will achieve/maintain normal respiratory rate/effort  Description  Respiratory rate and effort will be within normal limits for the patient  Outcome: Ongoing     Problem: HEMODYNAMIC STATUS  Goal: Patient has stable vital signs and fluid balance  Outcome: Ongoing     Problem: FLUID AND ELECTROLYTE IMBALANCE  Goal: Fluid and electrolyte balance are achieved/maintained  Outcome: Ongoing     Problem: ACTIVITY INTOLERANCE/IMPAIRED MOBILITY  Goal: Mobility/activity is maintained at optimum level for patient  Outcome: Ongoing     Problem: Discharge Planning:  Goal: Discharged to appropriate level of care  Description  Discharged to appropriate level of care  Outcome: Ongoing     Problem: Cardiac Output - Decreased:  Goal: Hemodynamic stability will improve  Description  Hemodynamic stability will improve  Outcome: Ongoing     Problem: Serum Glucose Level - Abnormal:  Goal: Ability to maintain appropriate glucose levels will improve  Description  Ability to maintain appropriate glucose levels will improve  Outcome: Ongoing     Problem: Pain:  Goal: Pain level will decrease  Description  Pain level will decrease  Outcome: Ongoing  Goal: Control of acute pain  Description  Control of acute pain  Outcome: Ongoing  Goal: Control of chronic pain  Description  Control of chronic pain  Outcome: Ongoing     Problem: Tissue Perfusion - Cardiopulmonary, Altered:  Goal: Absence of angina  Description  Absence of angina  Outcome: Ongoing  Goal: Circulatory function within specified parameters  Description  Circulatory function within specified parameters  Outcome: Ongoing  Goal: Hemodynamic stability will improve  Description  Hemodynamic stability will improve  Outcome: Ongoing     Problem: Tobacco Use:  Goal: Will participate in inpatient tobacco-use cessation counseling  Description  Will participate in inpatient tobacco-use cessation counseling  Outcome: Ongoing

## 2019-06-30 NOTE — PROGRESS NOTES
[address] Daily Progress Note      Admit Date:  6/28/2019    Subjective:  Mr. Severa Overman was seen and examined. F/U CHF/trop/RBBB/LAFB/abn ekg. No complaints. Breathing good. No chest pain. Objective:   /73   Pulse 81   Temp 97.3 °F (36.3 °C) (Oral)   Resp 16   Ht 5' 11\" (1.803 m)   Wt 156 lb 8.4 oz (71 kg)   SpO2 99%   BMI 21.83 kg/m²       Intake/Output Summary (Last 24 hours) at 6/30/2019 0629  Last data filed at 6/29/2019 2342  Gross per 24 hour   Intake 620 ml   Output 1600 ml   Net -980 ml       TELEMETRY: Sinus     Physical Exam:  General:  Awake, alert, NAD  Skin:  Warm and dry, no rashes  Neck:  JVP difficult. Neck supple  Chest:   Decreased BS,  No wheezes,  No rales, respiration normal  Cardiovascular:  RRR S1S2, no S3  Abdomen:  Soft nontender, + BS, non distended  Extremities:  Warm, no  edema    Medications:    aspirin  81 mg Oral Every Other Day    atorvastatin  20 mg Oral Daily    glipiZIDE  5 mg Oral Daily    verapamil  240 mg Oral Nightly    sodium chloride flush  10 mL Intravenous 2 times per day    enoxaparin  40 mg Subcutaneous Daily    insulin lispro  0-6 Units Subcutaneous TID WC    insulin lispro  0-3 Units Subcutaneous Nightly    furosemide  40 mg Intravenous BID      dextrose       sodium chloride flush, magnesium hydroxide, ondansetron, glucose, dextrose, glucagon (rDNA), dextrose, acetaminophen    Lab Data:  CBC:   Recent Labs     06/28/19  1558   WBC 7.4   HGB 12.0*   HCT 36.7*   MCV 77.3*        BMP:   Recent Labs     06/28/19  1558 06/29/19  0505 06/30/19  0503   * 141 139   K 3.3* 3.7 3.5   CL 97* 99 97*   CO2 24 28 26   BUN 25* 23* 27*   CREATININE 1.7* 1.6* 1.8*     LIVER PROFILE: No results for input(s): AST, ALT, LIPASE, BILIDIR, BILITOT, ALKPHOS in the last 72 hours. Invalid input(s): AMYLASE,  ALB  PT/INR: No results for input(s): PROTIME, INR in the last 72 hours. APTT: No results for input(s):  APTT in the last 72 hours.  BNP:  No results for input(s): BNP in the last 72 hours. IMAGING:     Assessment:  Patient Active Problem List    Diagnosis Date Noted    Right BBB/left ant fasc block 12/01/2014     Priority: High    Vitamin D deficiency 03/02/2011     Priority: High    Primary osteoarthritis of both knees 12/02/2010     Priority: High    Controlled type 2 diabetes mellitus with stage 3 chronic kidney disease, without long-term current use of insulin (HCC)      Priority: High    Essential hypertension, malignant      Priority: High    Pure hypercholesterolemia      Priority: High    Iron deficiency anemia due to chronic blood loss      Priority: High    Elevated troponin 06/29/2019    [facility] congestive heart failure ([facility] [address]     CHF NYHA class I, acute, diastolic ([facility] [address] 53/21/5481    Chronic renal failure, stage 3 (moderate) ([facility]) 10/02/2017    Abnormal EKG 01/27/2015    Abdominal pain 11/12/2012     Imp:  CHF/Trop/RBBB/LAFB/abn ekg    Plan:  1. Breathing good today. On RA. Decrease lasix since cr somewhat up. No chest pain. Watch cr. Will check echo. Metoprolol for CHF/HTN.       Core Measures:  · Discharge instructions:   · LVEF documented:   · ACEI for LV dysfunction:   · Smoking Cessation:    Margarette Opitz, MD 6/30/2019 6:29 AM

## 2019-07-01 VITALS
TEMPERATURE: 97.4 F | RESPIRATION RATE: 18 BRPM | BODY MASS INDEX: 22.04 KG/M2 | HEART RATE: 92 BPM | DIASTOLIC BLOOD PRESSURE: 84 MMHG | OXYGEN SATURATION: 97 % | SYSTOLIC BLOOD PRESSURE: 128 MMHG | HEIGHT: 71 IN | WEIGHT: 157.41 LBS

## 2019-07-01 LAB
ANION GAP SERPL CALCULATED.3IONS-SCNC: 13 MMOL/L (ref 3–16)
BUN BLDV-MCNC: 35 MG/DL (ref 7–20)
CALCIUM SERPL-MCNC: 10.5 MG/DL (ref 8.3–10.6)
CHLORIDE BLD-SCNC: 97 MMOL/L (ref 99–110)
CO2: 28 MMOL/L (ref 21–32)
CREAT SERPL-MCNC: 1.6 MG/DL (ref 0.8–1.3)
GFR AFRICAN AMERICAN: 50
GFR NON-AFRICAN AMERICAN: 42
GLUCOSE BLD-MCNC: 186 MG/DL (ref 70–99)
GLUCOSE BLD-MCNC: 227 MG/DL (ref 70–99)
MAGNESIUM: 2.1 MG/DL (ref 1.8–2.4)
PERFORMED ON: ABNORMAL
POTASSIUM SERPL-SCNC: 3.4 MMOL/L (ref 3.5–5.1)
SODIUM BLD-SCNC: 138 MMOL/L (ref 136–145)

## 2019-07-01 PROCEDURE — 36415 COLL VENOUS BLD VENIPUNCTURE: CPT

## 2019-07-01 PROCEDURE — 2580000003 HC RX 258: Performed by: INTERNAL MEDICINE

## 2019-07-01 PROCEDURE — 80048 BASIC METABOLIC PNL TOTAL CA: CPT

## 2019-07-01 PROCEDURE — 83735 ASSAY OF MAGNESIUM: CPT

## 2019-07-01 PROCEDURE — 6370000000 HC RX 637 (ALT 250 FOR IP): Performed by: INTERNAL MEDICINE

## 2019-07-01 PROCEDURE — 6370000000 HC RX 637 (ALT 250 FOR IP): Performed by: NURSE PRACTITIONER

## 2019-07-01 PROCEDURE — 99232 SBSQ HOSP IP/OBS MODERATE 35: CPT | Performed by: INTERNAL MEDICINE

## 2019-07-01 PROCEDURE — 6360000002 HC RX W HCPCS: Performed by: INTERNAL MEDICINE

## 2019-07-01 PROCEDURE — APPSS30 APP SPLIT SHARED TIME 16-30 MINUTES: Performed by: NURSE PRACTITIONER

## 2019-07-01 RX ORDER — CLOPIDOGREL BISULFATE 75 MG/1
75 TABLET ORAL DAILY
Status: DISCONTINUED | OUTPATIENT
Start: 2019-07-02 | End: 2019-07-01 | Stop reason: HOSPADM

## 2019-07-01 RX ORDER — HYDRALAZINE HYDROCHLORIDE 10 MG/1
10 TABLET, FILM COATED ORAL EVERY 8 HOURS SCHEDULED
Qty: 90 TABLET | Refills: 3 | Status: SHIPPED | OUTPATIENT
Start: 2019-07-01 | End: 2019-11-05 | Stop reason: SDUPTHER

## 2019-07-01 RX ORDER — CLOPIDOGREL BISULFATE 75 MG/1
300 TABLET ORAL ONCE
Status: DISCONTINUED | OUTPATIENT
Start: 2019-07-01 | End: 2019-07-01 | Stop reason: HOSPADM

## 2019-07-01 RX ORDER — METOPROLOL SUCCINATE 100 MG/1
100 TABLET, EXTENDED RELEASE ORAL DAILY
Status: DISCONTINUED | OUTPATIENT
Start: 2019-07-02 | End: 2019-07-01 | Stop reason: HOSPADM

## 2019-07-01 RX ORDER — CLOPIDOGREL BISULFATE 75 MG/1
75 TABLET ORAL DAILY
Qty: 30 TABLET | Refills: 3 | Status: SHIPPED | OUTPATIENT
Start: 2019-07-02 | End: 2019-11-05 | Stop reason: SDUPTHER

## 2019-07-01 RX ORDER — METOPROLOL TARTRATE 50 MG/1
50 TABLET, FILM COATED ORAL 2 TIMES DAILY
Status: DISCONTINUED | OUTPATIENT
Start: 2019-07-01 | End: 2019-07-01 | Stop reason: HOSPADM

## 2019-07-01 RX ORDER — POTASSIUM CHLORIDE 20 MEQ/1
20 TABLET, EXTENDED RELEASE ORAL DAILY
Qty: 30 TABLET | Refills: 0 | Status: SHIPPED | OUTPATIENT
Start: 2019-07-01 | End: 2019-07-05 | Stop reason: ALTCHOICE

## 2019-07-01 RX ORDER — ATORVASTATIN CALCIUM 40 MG/1
40 TABLET, FILM COATED ORAL DAILY
Qty: 30 TABLET | Refills: 3 | Status: SHIPPED | OUTPATIENT
Start: 2019-07-02 | End: 2019-11-05 | Stop reason: SDUPTHER

## 2019-07-01 RX ORDER — ISOSORBIDE MONONITRATE 30 MG/1
30 TABLET, EXTENDED RELEASE ORAL DAILY
Qty: 30 TABLET | Refills: 3 | Status: SHIPPED | OUTPATIENT
Start: 2019-07-02 | End: 2019-11-05 | Stop reason: SDUPTHER

## 2019-07-01 RX ORDER — FUROSEMIDE 40 MG/1
40 TABLET ORAL DAILY
Status: DISCONTINUED | OUTPATIENT
Start: 2019-07-01 | End: 2019-07-01 | Stop reason: HOSPADM

## 2019-07-01 RX ORDER — ASPIRIN 81 MG/1
81 TABLET ORAL DAILY
Qty: 30 TABLET | Refills: 3 | Status: SHIPPED | OUTPATIENT
Start: 2019-07-01

## 2019-07-01 RX ORDER — POTASSIUM CHLORIDE 20 MEQ/1
40 TABLET, EXTENDED RELEASE ORAL ONCE
Status: COMPLETED | OUTPATIENT
Start: 2019-07-01 | End: 2019-07-01

## 2019-07-01 RX ORDER — ATORVASTATIN CALCIUM 40 MG/1
40 TABLET, FILM COATED ORAL DAILY
Status: DISCONTINUED | OUTPATIENT
Start: 2019-07-02 | End: 2019-07-01 | Stop reason: HOSPADM

## 2019-07-01 RX ORDER — FUROSEMIDE 40 MG/1
40 TABLET ORAL DAILY
Qty: 30 TABLET | Refills: 3 | Status: SHIPPED | OUTPATIENT
Start: 2019-07-02 | End: 2019-11-08 | Stop reason: SDUPTHER

## 2019-07-01 RX ORDER — METOPROLOL SUCCINATE 100 MG/1
100 TABLET, EXTENDED RELEASE ORAL DAILY
Qty: 30 TABLET | Refills: 3 | Status: ON HOLD | OUTPATIENT
Start: 2019-07-02 | End: 2019-09-09 | Stop reason: HOSPADM

## 2019-07-01 RX ADMIN — HYDRALAZINE HYDROCHLORIDE 10 MG: 10 TABLET, FILM COATED ORAL at 05:17

## 2019-07-01 RX ADMIN — ISOSORBIDE MONONITRATE 30 MG: 30 TABLET, EXTENDED RELEASE ORAL at 10:03

## 2019-07-01 RX ADMIN — FUROSEMIDE 40 MG: 40 TABLET ORAL at 10:03

## 2019-07-01 RX ADMIN — METOPROLOL TARTRATE 50 MG: 50 TABLET ORAL at 10:03

## 2019-07-01 RX ADMIN — ENOXAPARIN SODIUM 40 MG: 40 INJECTION SUBCUTANEOUS at 10:03

## 2019-07-01 RX ADMIN — POTASSIUM CHLORIDE 40 MEQ: 20 TABLET, EXTENDED RELEASE ORAL at 10:03

## 2019-07-01 RX ADMIN — GLIPIZIDE 5 MG: 5 TABLET ORAL at 10:03

## 2019-07-01 RX ADMIN — ATORVASTATIN CALCIUM 20 MG: 20 TABLET, FILM COATED ORAL at 10:03

## 2019-07-01 RX ADMIN — Medication 10 ML: at 10:07

## 2019-07-01 ASSESSMENT — PAIN SCALES - GENERAL
PAINLEVEL_OUTOF10: 0

## 2019-07-01 NOTE — PLAN OF CARE
Problem: Falls - Risk of:  Goal: Will remain free from falls  Description  Will remain free from falls  6/30/2019 2123 by Eileen Spivey, RN  Outcome: Ongoing, pt aware of surrounding, steady gait, non slip socks on.  6/30/2019 0743 by Kendell Ivy RN  Outcome: Ongoing  Note:   Pt free from injury or falls at this time, fall precautions in place, bed in low position, side rail up x2, [place] Fall Risk: Low (0-24), bed alarm on, reoriented to room and call light, reminded not to get up without assistance, call light in reach, will continue to monitor. Pt verbalizes understanding of fall risk procedures.

## 2019-07-01 NOTE — PROGRESS NOTES
Hospitalist Progress Note      PCP: Yuliana Uribe MD    Date of Admission: 6/28/2019    Chief Complaint: SOB    Hospital Course: Admitted from home with YAMILA HANSEN. Found top be in acute on chronic diastolic CHF. Started on diuresis, responding well. Subjective: Patient seen and examined. Reports no chest pain or trouble breathing, slept well. Waiting for breakfast.   Creatinine stabilized. weight is stable. Medications:  Reviewed    Infusion Medications    dextrose       Scheduled Medications    potassium chloride  40 mEq Oral Once    furosemide  40 mg Oral Daily    metoprolol tartrate  25 mg Oral BID    hydrALAZINE  10 mg Oral 3 times per day    isosorbide mononitrate  30 mg Oral Daily    aspirin  81 mg Oral Every Other Day    atorvastatin  20 mg Oral Daily    glipiZIDE  5 mg Oral Daily    sodium chloride flush  10 mL Intravenous 2 times per day    enoxaparin  40 mg Subcutaneous Daily    insulin lispro  0-6 Units Subcutaneous TID WC    insulin lispro  0-3 Units Subcutaneous Nightly     PRN Meds: sodium chloride flush, magnesium hydroxide, ondansetron, glucose, dextrose, glucagon (rDNA), dextrose, acetaminophen      Intake/Output Summary (Last 24 hours) at 7/1/2019 0807  Last data filed at 7/1/2019 0518  Gross per 24 hour   Intake 965 ml   Output 1275 ml   Net -310 ml       Physical Exam Performed:    BP (!) 134/90   Pulse 89   Temp 97.4 °F (36.3 °C) (Oral)   Resp 18   Ht 5' 11\" (1.803 m)   Wt 157 lb 6.5 oz (71.4 kg)   SpO2 98%   BMI 21.95 kg/m²     General appearance: No apparent distress, appears stated age and cooperative. Respiratory:  Normal respiratory effort. Diminished breath sounds at bases without crackles or wheezing  Cardiovascular: Regular rate and rhythm with normal S1/S2 without murmurs, rubs or gallops. Abdomen: Soft, non-tender, non-distended with normal bowel sounds. Musculoskeletal: No clubbing, cyanosis or edema bilaterally.    Skin: Skin color, texture, cardiology    Catherine Au MD

## 2019-07-01 NOTE — CARE COORDINATION
[facility]  CHF Team Conference Note      Patient ID: Rekha Aceves [de-identified] y.o. 1940    Admission Date: 6/28/2019   Admission Weight: 169 lb  Discharge Date:   Discharge Weight:     30 Day Readmit? No    Pt History and Precipitating Cause of Decompensation:   Pmhx of anemia, arthritis, HTN, HLD, and DM2. No history CHF, COPD or any other lung disease. presents with complaints of shortness of breath at rest as well as on exertion for the past 2 to 3 days. Patient states the shortness of breath comes and goes and he cannot identify any triggering factor. Ejection Fraction: 6/29/2019 Echo:   Left ventricular cavity size is normal. There is severe concentric left   ventricular hypertrophy.   Overall left ventricular systolic function appears reduced with an estimated   ejection fraction of 35-40%.   There is global hypokinesis of the left ventricle which appears to be more   marked in the inferior wall which is severely hypo-akinetic.   Diastolic function is indeterminate.   Mitral annular calcification is present.   Mild mitral, pulmonic and aortic regurgitation is present.   The right ventricle is normal in size with thickened walls.   TAPSE measures: 1.22 cm.   RV S velocity measures: 7.94 cm/s.       Has patient been diagnosed with DARRION: No  Is patient being treated: No    Cardiology Consulted: Yes  Heart Failure Order Set Used: Yes  Complete Intake and Output: Yes  Complete Daily Weights: Yes    BMP:  Lab Results   Component Value Date     07/01/2019     06/30/2019     06/29/2019    K 3.4 07/01/2019    K 3.5 06/30/2019    K 3.7 06/29/2019    CL 97 07/01/2019    CL 97 06/30/2019    CL 99 06/29/2019    CO2 28 07/01/2019    CO2 26 06/30/2019    CO2 28 06/29/2019    PHOS 2.9 06/05/2019    PHOS 3.2 05/22/2019    PHOS 3.3 12/19/2016    BUN 35 07/01/2019    BUN 27 06/30/2019    BUN 23 06/29/2019    CREATININE 1.6 07/01/2019    CREATININE 1.8 06/30/2019    CREATININE 1.6 less, K <5on admit, Cr<2 (female) <2.5 (male), Aldosterone antagonist ordered: EF > 35%  Received Influenza Vaccine (Oct-Mar) n/a - seasonal   DVT Prophylaxis by Day 2: Yes - enoxaparin  Prescription drug coverage: Yes  Can afford prescription co-pay: Yes  Time Spent Educatin minutes      NURSING  Patient Education:       Worsening HF symptoms Yes    fluid restriction  Yes    Tobacco cessation (Smoked in the last 12 months) Yes  daily weights and parameters  Yes  Avoid NSAIDS in HF Yes  written HF education given Yes     Follow up appointment Yes    ICD counseling No and new diagnosis, discuss OP  Patient can Teach Back:   Worsening HF symptoms and when to report Yes   Weight gain to report to healthcare provider Yes  Amount of sodium to eat per day Yes  Name of their Shea Mcfarland Yes    Time Spent Educatin minutes      Total Time Spent on Patient Education:  60minutesYes    DISCHARGE PLAN:  Services at Discharge: none- refused Lizzy Dante at this time  Community Resources:   Equipment at Discharge:   Destination: home with wife and son    Patient Self-Management:   Working scale at home: Yes  Reliable transportation: Yes  PCP: Yes  Advance Directive: No  Palliative Care Consult: No    Discharge Instructions:   Daily Weights: Weigh each morning at the same time and write down weights to bring to clinic visit  Goal Weight: 155 lb    Follow Up Instructions: 7 day hospital f/u scheduled  Stevphen Gaucher, APRN - CNP  Go on 2019  @ 9:30 AM  [address] [facility]  [phone]           I approve the established CHF interdisciplinary plan of care as documented within the medical record of [facility].        Signature Lucy Lyle RN  [phone] [id number]

## 2019-07-01 NOTE — PROGRESS NOTES
IV solution, 12.5 g, Intravenous, PRN, Chi Ramirez MD    glucagon (rDNA) injection 1 mg, 1 mg, Intramuscular, PRN, Chi Ramirez MD    dextrose 5 % solution, 100 mL/hr, Intravenous, PRN, Chi Ramirez MD    acetaminophen (TYLENOL) tablet 650 mg, 650 mg, Oral, Q4H PRN, Chi Ramirez MD    insulin lispro (HUMALOG) injection pen 0-6 Units, 0-6 Units, Subcutaneous, TID WC, Chi Ramirez MD, Stopped at 07/01/19 0902    insulin lispro (HUMALOG) injection pen 0-3 Units, 0-3 Units, Subcutaneous, Nightly, Chi Ramirez MD, 1 Units at 06/30/19 2032    A/P:  [de-identified] y.o. with sob. Issues:  -NSTEMI  -Acute systolic chf  -Severe LVH  -Essential HTN  -Hyperlipidemia    Recs:  -Change BB to Toprol 100 mg daily  -Plavix load and start 75 mg daily  -Cont imdur/hydralazine for now  -lasix daily  -Increase statin to 40 mg daily  -Offerred and rec cor angio with poss pci. He would like med mgt only at this time. Given new lv dysfunction with wma, concern for CAD is high.  -See me in 2-3 weeks.   -Plan is to go home today. Chloe Montelongo MD, [facility]

## 2019-07-01 NOTE — FLOWSHEET NOTE
07/01/19 1418   Encounter Summary   Services provided to: Patient   Referral/Consult From: Rounding   Continue Visiting   (7/1, benitez )   Length of Encounter 30 minutes  (7/1, benitez )   Routine   Type Initial   Assessment Calm; Approachable; Hopeful   Intervention Active listening;Explored feelings, thoughts, concerns   Outcome Comfort;Expressed gratitude;Engaged in conversation

## 2019-07-01 NOTE — DISCHARGE SUMMARY
to promote better adherenceNormal                Time Spent on discharge is more than 30 minutes in the examination, evaluation, counseling and review of medications and discharge plan. Signed:  Inocente Wu MD   7/1/2019      Thank you Steven Barger MD for the opportunity to be involved in this patient's care. If you have any questions or concerns please feel free to contact me at [phone].

## 2019-07-01 NOTE — CARE COORDINATION
same day  3. Copy of patient's insurance/ prescription drug card and patient face sheet must be sent along with the prescription(s)  4. Cost of Rx cannot be added to hospital bill. If financial assistance is needed, please contact unit  or ;  or  CANNOT provide pharmacy voucher for patients co-pays  5.  Patients can then  the prescription on their way out of the hospital at discharge, or pharmacy can deliver to the bedside if staff is available. (payment due at time of pick-up or delivery - cash, check, or card accepted)     Able to afford home medications/ co-pay costs: Yes    ADLS  Support Systems: Spouse/Significant Other, Family Members, Children    PT AM-PAC:   /24  OT AM-PAC:   /24    [place]: from home with spouse  Steps: 2-3    Plans to RETURN to current housing: Yes  Barriers to RETURNING to current housing: none indicated by patient    [address]  Currently ACTIVE with [address]: No  Home Care Agency: Not Applicable    Currently ACTIVE with [place] on Aging: No  Passport/ Waiver: No  Passport/ Waiver Services: Not Applicable    :  Phone:       [email or web address]  [facility] Provider: none per patient  Equipment:     Home Oxygen and [address] prior to admission: No  [address]: Not Applicable    Informed of need to bring portable home O2 tank on day of DISCHARGE for nursing to connect prior to leaving: No  Verbalized agreement/Understanding: No  Person to bring portable tank at discharge:     Dialysis  Active with HD/PD prior to admission: No  Nephrologist:     HD Center:  Not Applicable    DISCHARGE PLAN:  Disposition: Home    Transportation PLAN for discharge: family     Factors facilitating achievement of predicted outcomes: Family support, Cooperative and Pleasant    Barriers to discharge: Medication managment    Additional Case Management Notes: CM offered [facility] for HF

## 2019-07-02 ENCOUNTER — TELEPHONE (OUTPATIENT)
Dept: FAMILY MEDICINE CLINIC | Age: 79
End: 2019-07-02

## 2019-07-02 NOTE — TELEPHONE ENCOUNTER
[address] Transitions Initial Follow Up Call    Outreach made within 2 business days of discharge: Yes    Patient: Rekha Aceves Patient : 1940   MRN: [id number]  Reason for Admission: There are no discharge diagnoses documented for the most recent discharge. Discharge Date: 19       Spoke with: Saige Todd    Discharge department/facility: [facility] Interactive Patient Contact:  Was patient able to fill all prescriptions: Yes  Was patient instructed to bring all medications to the follow-up visit: Yes  Is patient taking all medications as directed in the discharge summary?  Yes  Does patient understand their discharge instructions: Yes  Does patient have questions or concerns that need addressed prior to 7-14 day follow up office visit: no    Scheduled appointment with PCP within 7-14 days - patient will call to schedule after follow ups with cardiology    Follow Up  Future Appointments   Date Time Provider Micheline Gaines   2019  9:30 AM Denver Faes, APRN - CNP [facility]   10/7/2019  9:40 AM SELAM Rahman MD [facility]       Jay Jay Dobbins MA

## 2019-07-03 NOTE — PROGRESS NOTES
[address]   Congestive Heart Failure    Primary Care Doctor:  Owen Taveras MD  Primary Cardiologist: Hema Sung      Chief Complaint: CHF    History of Present Illness:  Sebastian Banegas is a [de-identified] y.o. male with PMH DM, HTN, newly dx HFrEF (35-40%) who presents today for hospital f/u. Sebastian Banegas was admitted 6/28/19 with shortness of breath, and discharged 7/1/19. Hospital course: presented with worsening shortness of breath. He was found to be in acute heart failure and diuresed well. Repeated ECHO showed wall motion abnormalities and EF 35-40%, new from baseline. His medications were adjusted according to heart failure guidelines. Patient was offered ischemic work up, but opted to do medication management for possible CAD- aspirin, plavix and statin were added. Patient reported complete resolution of his shortness of breath on dsicharge, weight was 157lbs by scale. He will follow up closely with cardiology. ED visit 7/4/19 pt had episode of SOB. Labs and workup all came back negative. Symptoms resolved on their own. No interventions needed. Since hospitalization denies chest pain, dyspnea, palpitations, orthopnea, PND, exertional chest pressure/discomfort, fatigue, early saiety, edema, syncope. Hospital weight on d/c was 157lb. Daily wts since that time have varied ranging from 164-169 lb.   Today's home wt: 169lb      Baseline Weight: 164-169 lb    Admit BNP: 5287   Discharge BNP: 3982    EF: 35-40%  Cardiac Imaging: Echo 6/28/19   Left ventricular cavity size is normal. There is severe concentric left   ventricular hypertrophy.   Overall left ventricular systolic function appears reduced with an estimated   ejection fraction of 35-40%.   There is global hypokinesis of the left ventricle which appears to be more   marked in the inferior wall which is severely hypo-akinetic.   Diastolic function is indeterminate.   Mitral annular calcification is present.   Mild mitral, pulmonic and

## 2019-07-04 ENCOUNTER — APPOINTMENT (OUTPATIENT)
Dept: GENERAL RADIOLOGY | Age: 79
End: 2019-07-04
Payer: MEDICARE

## 2019-07-04 ENCOUNTER — HOSPITAL ENCOUNTER (EMERGENCY)
Age: 79
Discharge: HOME | End: 2019-07-05
Attending: EMERGENCY MEDICINE
Payer: MEDICARE

## 2019-07-04 ENCOUNTER — TELEPHONE (OUTPATIENT)
Dept: OTHER | Facility: CLINIC | Age: 79
End: 2019-07-04

## 2019-07-04 DIAGNOSIS — R06.00 DYSPNEA, UNSPECIFIED TYPE: Primary | ICD-10-CM

## 2019-07-04 LAB
ANION GAP SERPL CALCULATED.3IONS-SCNC: 14 MMOL/L (ref 3–16)
BASE EXCESS VENOUS: 4 (ref -3–3)
BASOPHILS ABSOLUTE: 0 K/UL (ref 0–0.2)
BASOPHILS RELATIVE PERCENT: 0.6 %
BUN BLDV-MCNC: 38 MG/DL (ref 7–20)
CALCIUM SERPL-MCNC: 10.1 MG/DL (ref 8.3–10.6)
CHLORIDE BLD-SCNC: 95 MMOL/L (ref 99–110)
CO2: 26 MMOL/L (ref 21–32)
CREAT SERPL-MCNC: 1.9 MG/DL (ref 0.8–1.3)
EOSINOPHILS ABSOLUTE: 0.2 K/UL (ref 0–0.6)
EOSINOPHILS RELATIVE PERCENT: 2.1 %
GFR AFRICAN AMERICAN: 41
GFR NON-AFRICAN AMERICAN: 34
GLUCOSE BLD-MCNC: 266 MG/DL (ref 70–99)
HCO3 VENOUS: 28.6 MMOL/L (ref 23–29)
HCT VFR BLD CALC: 36.8 % (ref 40.5–52.5)
HEMOGLOBIN: 12.1 G/DL (ref 13.5–17.5)
LYMPHOCYTES ABSOLUTE: 1.5 K/UL (ref 1–5.1)
LYMPHOCYTES RELATIVE PERCENT: 21 %
MCH RBC QN AUTO: 25.5 PG (ref 26–34)
MCHC RBC AUTO-ENTMCNC: 32.8 G/DL (ref 31–36)
MCV RBC AUTO: 77.7 FL (ref 80–100)
MONOCYTES ABSOLUTE: 0.7 K/UL (ref 0–1.3)
MONOCYTES RELATIVE PERCENT: 9.5 %
NEUTROPHILS ABSOLUTE: 4.8 K/UL (ref 1.7–7.7)
NEUTROPHILS RELATIVE PERCENT: 66.8 %
O2 SAT, VEN: 42 %
PCO2, VEN: 44.2 MM HG (ref 40–50)
PDW BLD-RTO: 15.2 % (ref 12.4–15.4)
PERFORMED ON: ABNORMAL
PH VENOUS: 7.42 (ref 7.35–7.45)
PLATELET # BLD: 340 K/UL (ref 135–450)
PMV BLD AUTO: 7.9 FL (ref 5–10.5)
PO2, VEN: 23 MM HG
POC SAMPLE TYPE: ABNORMAL
POTASSIUM REFLEX MAGNESIUM: 3.7 MMOL/L (ref 3.5–5.1)
PRO-BNP: 2186 PG/ML (ref 0–449)
RBC # BLD: 4.74 M/UL (ref 4.2–5.9)
SODIUM BLD-SCNC: 135 MMOL/L (ref 136–145)
TCO2 CALC VENOUS: 30 MMOL/L
TROPONIN: 0.05 NG/ML
WBC # BLD: 7.2 K/UL (ref 4–11)

## 2019-07-04 PROCEDURE — 83880 ASSAY OF NATRIURETIC PEPTIDE: CPT

## 2019-07-04 PROCEDURE — 84484 ASSAY OF TROPONIN QUANT: CPT

## 2019-07-04 PROCEDURE — 82803 BLOOD GASES ANY COMBINATION: CPT

## 2019-07-04 PROCEDURE — 99285 EMERGENCY DEPT VISIT HI MDM: CPT

## 2019-07-04 PROCEDURE — 85025 COMPLETE CBC W/AUTO DIFF WBC: CPT

## 2019-07-04 PROCEDURE — 80048 BASIC METABOLIC PNL TOTAL CA: CPT

## 2019-07-04 PROCEDURE — 71046 X-RAY EXAM CHEST 2 VIEWS: CPT

## 2019-07-04 PROCEDURE — 93005 ELECTROCARDIOGRAM TRACING: CPT | Performed by: PHYSICIAN ASSISTANT

## 2019-07-04 ASSESSMENT — ENCOUNTER SYMPTOMS
VOMITING: 0
SHORTNESS OF BREATH: 1
SORE THROAT: 0
VOMITING: 0
ABDOMINAL DISTENTION: 0
COUGH: 0
ABDOMINAL PAIN: 0
NAUSEA: 0
RHINORRHEA: 0
CHEST TIGHTNESS: 0
DIARRHEA: 0
COUGH: 1
CONSTIPATION: 0
EYE PAIN: 0
ABDOMINAL PAIN: 0
SHORTNESS OF BREATH: 1
NAUSEA: 0

## 2019-07-04 ASSESSMENT — PAIN SCALES - GENERAL: PAINLEVEL_OUTOF10: 0

## 2019-07-05 ENCOUNTER — TELEPHONE (OUTPATIENT)
Dept: OTHER | Facility: CLINIC | Age: 79
End: 2019-07-05

## 2019-07-05 ENCOUNTER — OFFICE VISIT (OUTPATIENT)
Dept: CARDIOLOGY CLINIC | Age: 79
End: 2019-07-05
Payer: MEDICARE

## 2019-07-05 VITALS
OXYGEN SATURATION: 100 % | RESPIRATION RATE: 19 BRPM | HEART RATE: 60 BPM | TEMPERATURE: 97.7 F | SYSTOLIC BLOOD PRESSURE: 120 MMHG | WEIGHT: 165 LBS | DIASTOLIC BLOOD PRESSURE: 79 MMHG | BODY MASS INDEX: 23.01 KG/M2

## 2019-07-05 VITALS
BODY MASS INDEX: 23.38 KG/M2 | WEIGHT: 167.6 LBS | DIASTOLIC BLOOD PRESSURE: 72 MMHG | SYSTOLIC BLOOD PRESSURE: 138 MMHG | HEART RATE: 79 BPM

## 2019-07-05 DIAGNOSIS — I50.22 CHRONIC SYSTOLIC HEART FAILURE (HCC): Primary | ICD-10-CM

## 2019-07-05 DIAGNOSIS — I10 ESSENTIAL HYPERTENSION, MALIGNANT: ICD-10-CM

## 2019-07-05 DIAGNOSIS — I42.0 DILATED CARDIOMYOPATHY (HCC): ICD-10-CM

## 2019-07-05 LAB
EKG ATRIAL RATE: 58 BPM
EKG DIAGNOSIS: NORMAL
EKG P AXIS: 75 DEGREES
EKG P-R INTERVAL: 200 MS
EKG Q-T INTERVAL: 528 MS
EKG QRS DURATION: 158 MS
EKG QTC CALCULATION (BAZETT): 518 MS
EKG R AXIS: 268 DEGREES
EKG T AXIS: 64 DEGREES
EKG VENTRICULAR RATE: 58 BPM
TROPONIN: 0.05 NG/ML

## 2019-07-05 PROCEDURE — 4040F PNEUMOC VAC/ADMIN/RCVD: CPT | Performed by: NURSE PRACTITIONER

## 2019-07-05 PROCEDURE — 99214 OFFICE O/P EST MOD 30 MIN: CPT | Performed by: NURSE PRACTITIONER

## 2019-07-05 PROCEDURE — G8598 ASA/ANTIPLAT THER USED: HCPCS | Performed by: NURSE PRACTITIONER

## 2019-07-05 PROCEDURE — G8427 DOCREV CUR MEDS BY ELIG CLIN: HCPCS | Performed by: NURSE PRACTITIONER

## 2019-07-05 PROCEDURE — G8420 CALC BMI NORM PARAMETERS: HCPCS | Performed by: NURSE PRACTITIONER

## 2019-07-05 PROCEDURE — 1123F ACP DISCUSS/DSCN MKR DOCD: CPT | Performed by: NURSE PRACTITIONER

## 2019-07-05 PROCEDURE — 1036F TOBACCO NON-USER: CPT | Performed by: NURSE PRACTITIONER

## 2019-07-05 PROCEDURE — 1111F DSCHRG MED/CURRENT MED MERGE: CPT | Performed by: NURSE PRACTITIONER

## 2019-07-05 RX ORDER — SPIRONOLACTONE 25 MG/1
12.5 TABLET ORAL DAILY
Qty: 30 TABLET | Refills: 3 | Status: SHIPPED | OUTPATIENT
Start: 2019-07-05 | End: 2019-08-26 | Stop reason: SDUPTHER

## 2019-07-05 ASSESSMENT — ENCOUNTER SYMPTOMS: SHORTNESS OF BREATH: 0

## 2019-07-05 NOTE — ED PROVIDER NOTES
[facility] ENCOUNTER          PHYSICIAN ASSISTANT NOTE       Date of evaluation: 7/4/2019    Chief Complaint     Shortness of Breath    History of Present Illness     Andrey Keita is a [de-identified] y.o. male with history of anemia, hyperlipidemia, hypertension and diabetes who presents to the emergency department with shortness of breath. Patient states that his episode of dyspnea started at 5 PM and lasted for approximately 30 minutes. The patient denies any associated chest pain, fever, chills, cough, lightheadedness or headache. The patient was recently admitted after experiencing similar symptoms, but does not recall what they ended up telling him. Patient was started on new medications for this issue. Review of Systems     Review of Systems   Constitutional: Negative for chills and fever. Respiratory: Positive for shortness of breath. Negative for cough. Cardiovascular: Negative for chest pain. Gastrointestinal: Negative for abdominal pain, nausea and vomiting. Musculoskeletal: Negative for arthralgias. Neurological: Negative for dizziness and headaches. Past Medical, Surgical, Family, and Social History     He has a past medical history of Anemia, Arthritis, Hyperlipidemia, Hypertension, and Type II or unspecified type diabetes mellitus without mention of complication, not stated as uncontrolled. He has a past surgical history that includes hernia repair; hip surgery; and other surgical history (N/A, 12/15/2015). His family history includes Cancer in his sister; Heart Disease in his father; High Blood Pressure in his father and mother. He reports that he has never smoked. He has never used smokeless tobacco. He reports that he does not drink alcohol or use drugs.     Medications     Previous Medications    ASPIRIN 81 MG EC TABLET    Take 1 tablet by mouth daily    ATORVASTATIN (LIPITOR) 40 MG TABLET    Take 1 tablet by mouth daily    CLOPIDOGREL (PLAVIX) 75 MG TABLET    Take 1 tablet by mouth daily    FUROSEMIDE (LASIX) 40 MG TABLET    Take 1 tablet by mouth daily    GLIPIZIDE (GLUCOTROL) 5 MG TABLET    TAKE ONE TABLET BY MOUTH ONCE DAILY    HYDRALAZINE (APRESOLINE) 10 MG TABLET    Take 1 tablet by mouth every 8 hours    ISOSORBIDE MONONITRATE (IMDUR) 30 MG EXTENDED RELEASE TABLET    Take 1 tablet by mouth daily    METOPROLOL SUCCINATE (TOPROL XL) 100 MG EXTENDED RELEASE TABLET    Take 1 tablet by mouth daily    POTASSIUM CHLORIDE (KLOR-CON M) 20 MEQ EXTENDED RELEASE TABLET    Take 1 tablet by mouth daily       Allergies     He has No Known Allergies. Physical Exam     INITIAL VITALS: BP: 131/86, Temp: 97.7 °F (36.5 °C), Pulse: 64, Resp: 20, SpO2: 100 %  Physical Exam   Constitutional: He is oriented to person, place, and time. He appears well-developed and well-nourished. HENT:   Head: Normocephalic and atraumatic. Eyes: EOM are normal.   Neck: Normal range of motion. Cardiovascular: Normal rate and regular rhythm. Murmur heard. Split S2   Pulmonary/Chest: Effort normal and breath sounds normal. No respiratory distress. He has no wheezes. He has no rales. Abdominal: Soft. There is no tenderness. Musculoskeletal: Normal range of motion. Neurological: He is alert and oriented to person, place, and time. Skin: Skin is warm and dry. Psychiatric: He has a normal mood and affect. His behavior is normal.       Diagnostic Results     EKG   Interpreted in conjunction with emergency department physician No att. providers found  Rhythm: normal sinus   Rate: bradycardia  Axis: normal  Ectopy: none  Conduction: right bundle branch block (complete)  ST Segments: no acute change  T Waves: no acute change  Q Waves:none  Clinical Impression: no acute changes    RADIOLOGY:  XR CHEST STANDARD (2 VW)   Final Result   1. No acute disease.           LABS:   Results for orders placed or performed during the hospital encounter of 07/04/19   CBC Auto Differential   Result

## 2019-07-08 ENCOUNTER — OFFICE VISIT (OUTPATIENT)
Dept: FAMILY MEDICINE CLINIC | Age: 79
End: 2019-07-08
Payer: MEDICARE

## 2019-07-08 VITALS
HEART RATE: 72 BPM | WEIGHT: 165.6 LBS | BODY MASS INDEX: 23.18 KG/M2 | RESPIRATION RATE: 18 BRPM | DIASTOLIC BLOOD PRESSURE: 70 MMHG | OXYGEN SATURATION: 99 % | SYSTOLIC BLOOD PRESSURE: 128 MMHG | HEIGHT: 71 IN

## 2019-07-08 DIAGNOSIS — I10 ESSENTIAL HYPERTENSION, MALIGNANT: ICD-10-CM

## 2019-07-08 DIAGNOSIS — N18.30 CHRONIC RENAL FAILURE, STAGE 3 (MODERATE) (HCC): ICD-10-CM

## 2019-07-08 DIAGNOSIS — I50.41 ACUTE COMBINED SYSTOLIC AND DIASTOLIC CONGESTIVE HEART FAILURE (HCC): Primary | ICD-10-CM

## 2019-07-08 DIAGNOSIS — N18.30 CONTROLLED TYPE 2 DIABETES MELLITUS WITH STAGE 3 CHRONIC KIDNEY DISEASE, WITHOUT LONG-TERM CURRENT USE OF INSULIN (HCC): ICD-10-CM

## 2019-07-08 DIAGNOSIS — R53.83 FATIGUE, UNSPECIFIED TYPE: ICD-10-CM

## 2019-07-08 DIAGNOSIS — E11.22 CONTROLLED TYPE 2 DIABETES MELLITUS WITH STAGE 3 CHRONIC KIDNEY DISEASE, WITHOUT LONG-TERM CURRENT USE OF INSULIN (HCC): ICD-10-CM

## 2019-07-08 LAB
ANION GAP SERPL CALCULATED.3IONS-SCNC: 16 MMOL/L (ref 3–16)
BASOPHILS ABSOLUTE: 0 K/UL (ref 0–0.2)
BASOPHILS RELATIVE PERCENT: 0.7 %
BUN BLDV-MCNC: 32 MG/DL (ref 7–20)
C-REACTIVE PROTEIN: 3.3 MG/L (ref 0–5.1)
CALCIUM SERPL-MCNC: 11.4 MG/DL (ref 8.3–10.6)
CHLORIDE BLD-SCNC: 96 MMOL/L (ref 99–110)
CO2: 28 MMOL/L (ref 21–32)
CREAT SERPL-MCNC: 1.8 MG/DL (ref 0.8–1.3)
EOSINOPHILS ABSOLUTE: 0.1 K/UL (ref 0–0.6)
EOSINOPHILS RELATIVE PERCENT: 1.6 %
GFR AFRICAN AMERICAN: 44
GFR NON-AFRICAN AMERICAN: 36
GLUCOSE BLD-MCNC: 158 MG/DL (ref 70–99)
HCT VFR BLD CALC: 40.9 % (ref 40.5–52.5)
HEMOGLOBIN: 13.4 G/DL (ref 13.5–17.5)
LYMPHOCYTES ABSOLUTE: 1.9 K/UL (ref 1–5.1)
LYMPHOCYTES RELATIVE PERCENT: 27.1 %
MCH RBC QN AUTO: 25.8 PG (ref 26–34)
MCHC RBC AUTO-ENTMCNC: 32.8 G/DL (ref 31–36)
MCV RBC AUTO: 78.6 FL (ref 80–100)
MONOCYTES ABSOLUTE: 0.8 K/UL (ref 0–1.3)
MONOCYTES RELATIVE PERCENT: 11 %
NEUTROPHILS ABSOLUTE: 4.2 K/UL (ref 1.7–7.7)
NEUTROPHILS RELATIVE PERCENT: 59.6 %
PDW BLD-RTO: 15.1 % (ref 12.4–15.4)
PLATELET # BLD: 381 K/UL (ref 135–450)
PMV BLD AUTO: 8.2 FL (ref 5–10.5)
POTASSIUM SERPL-SCNC: 3.3 MMOL/L (ref 3.5–5.1)
PRO-BNP: 1641 PG/ML (ref 0–449)
RBC # BLD: 5.21 M/UL (ref 4.2–5.9)
SEDIMENTATION RATE, ERYTHROCYTE: 24 MM/HR (ref 0–20)
SODIUM BLD-SCNC: 140 MMOL/L (ref 136–145)
TSH SERPL DL<=0.05 MIU/L-ACNC: 1.91 UIU/ML (ref 0.27–4.2)
WBC # BLD: 7.1 K/UL (ref 4–11)

## 2019-07-08 PROCEDURE — G8427 DOCREV CUR MEDS BY ELIG CLIN: HCPCS | Performed by: INTERNAL MEDICINE

## 2019-07-08 PROCEDURE — 36415 COLL VENOUS BLD VENIPUNCTURE: CPT | Performed by: INTERNAL MEDICINE

## 2019-07-08 PROCEDURE — 1123F ACP DISCUSS/DSCN MKR DOCD: CPT | Performed by: INTERNAL MEDICINE

## 2019-07-08 PROCEDURE — 99214 OFFICE O/P EST MOD 30 MIN: CPT | Performed by: INTERNAL MEDICINE

## 2019-07-08 PROCEDURE — G8598 ASA/ANTIPLAT THER USED: HCPCS | Performed by: INTERNAL MEDICINE

## 2019-07-08 PROCEDURE — 4040F PNEUMOC VAC/ADMIN/RCVD: CPT | Performed by: INTERNAL MEDICINE

## 2019-07-08 PROCEDURE — 1036F TOBACCO NON-USER: CPT | Performed by: INTERNAL MEDICINE

## 2019-07-08 PROCEDURE — 1111F DSCHRG MED/CURRENT MED MERGE: CPT | Performed by: INTERNAL MEDICINE

## 2019-07-08 PROCEDURE — G8420 CALC BMI NORM PARAMETERS: HCPCS | Performed by: INTERNAL MEDICINE

## 2019-07-08 ASSESSMENT — ENCOUNTER SYMPTOMS
RESPIRATORY NEGATIVE: 1
ALLERGIC/IMMUNOLOGIC NEGATIVE: 1
GASTROINTESTINAL NEGATIVE: 1

## 2019-07-08 NOTE — PROGRESS NOTES
tablet by mouth daily 30 tablet 3    aspirin 81 MG EC tablet Take 1 tablet by mouth daily 30 tablet 3    glipiZIDE (GLUCOTROL) 5 MG tablet TAKE ONE TABLET BY MOUTH ONCE DAILY 60 tablet 5     No current facility-administered medications for this visit. No Known Allergies  Social History     Tobacco Use    Smoking status: Never Smoker    Smokeless tobacco: Never Used   Substance Use Topics    Alcohol use: No     Family History   Problem Relation Age of Onset    High Blood Pressure Mother     High Blood Pressure Father     Heart Disease Father         cad    Cancer Sister        Review of Systems   Constitutional: Negative. Respiratory: Negative. Cardiovascular: Negative. Gastrointestinal: Negative. Endocrine: Negative. Genitourinary: Negative. Musculoskeletal: Negative. Skin: Negative. Allergic/Immunologic: Negative. Neurological: Negative. Hematological: Negative. Psychiatric/Behavioral: Negative. Vitals:    07/08/19 1001 07/08/19 1032   BP: 138/76 128/70   Pulse: 72    Resp: 18    SpO2: 99%    Weight: 165 lb 9.6 oz (75.1 kg)    Height: 5' 11\" (1.803 m)        Objective:   Physical Exam   Constitutional: He is oriented to person, place, and time. Vital signs are normal. He appears well-developed and well-nourished. No distress. HENT:   Head: Normocephalic and atraumatic. Eyes: Pupils are equal, round, and reactive to light. Conjunctivae and EOM are normal.   Neck: Trachea normal, normal range of motion, full passive range of motion without pain and phonation normal. Neck supple. Normal carotid pulses, no hepatojugular reflux and no JVD present. Carotid bruit is not present. No tracheal deviation present. No thyroid mass and no thyromegaly present. Cardiovascular: Normal rate, regular rhythm, normal heart sounds, intact distal pulses and normal pulses. No extrasystoles are present. PMI is not displaced.  Exam reveals no gallop, no friction rub and no decreased

## 2019-07-09 ENCOUNTER — TELEPHONE (OUTPATIENT)
Dept: FAMILY MEDICINE CLINIC | Age: 79
End: 2019-07-09

## 2019-07-09 LAB
ESTIMATED AVERAGE GLUCOSE: 188.6 MG/DL
HBA1C MFR BLD: 8.2 %

## 2019-07-10 ENCOUNTER — TELEPHONE (OUTPATIENT)
Dept: CARDIOLOGY CLINIC | Age: 79
End: 2019-07-10

## 2019-07-10 DIAGNOSIS — I42.0 DILATED CARDIOMYOPATHY (HCC): ICD-10-CM

## 2019-07-10 DIAGNOSIS — I50.22 CHRONIC SYSTOLIC HEART FAILURE (HCC): Primary | ICD-10-CM

## 2019-07-12 ENCOUNTER — HOSPITAL ENCOUNTER (OUTPATIENT)
Dept: NON INVASIVE DIAGNOSTICS | Age: 79
Discharge: HOME | End: 2019-07-12
Payer: MEDICARE

## 2019-07-12 DIAGNOSIS — I42.0 DILATED CARDIOMYOPATHY (HCC): ICD-10-CM

## 2019-07-12 LAB
LV EF: 39 %
LVEF MODALITY: NORMAL

## 2019-07-12 PROCEDURE — 93017 CV STRESS TEST TRACING ONLY: CPT

## 2019-07-12 PROCEDURE — 78452 HT MUSCLE IMAGE SPECT MULT: CPT

## 2019-07-12 PROCEDURE — 6360000002 HC RX W HCPCS: Performed by: INTERNAL MEDICINE

## 2019-07-12 PROCEDURE — 2580000003 HC RX 258: Performed by: NURSE PRACTITIONER

## 2019-07-12 PROCEDURE — 3430000000 HC RX DIAGNOSTIC RADIOPHARMACEUTICAL: Performed by: NURSE PRACTITIONER

## 2019-07-12 PROCEDURE — A9502 TC99M TETROFOSMIN: HCPCS | Performed by: NURSE PRACTITIONER

## 2019-07-12 RX ORDER — SODIUM CHLORIDE 0.9 % (FLUSH) 0.9 %
10 SYRINGE (ML) INJECTION PRN
Status: DISCONTINUED | OUTPATIENT
Start: 2019-07-12 | End: 2019-07-13 | Stop reason: HOSPADM

## 2019-07-12 RX ADMIN — Medication 10 ML: at 08:39

## 2019-07-12 RX ADMIN — Medication 10 ML: at 10:57

## 2019-07-12 RX ADMIN — TETROFOSMIN 10 MILLICURIE: 1.38 INJECTION, POWDER, LYOPHILIZED, FOR SOLUTION INTRAVENOUS at 08:39

## 2019-07-12 RX ADMIN — TETROFOSMIN 30 MILLICURIE: 1.38 INJECTION, POWDER, LYOPHILIZED, FOR SOLUTION INTRAVENOUS at 10:57

## 2019-07-12 RX ADMIN — REGADENOSON 0.4 MG: 0.08 INJECTION, SOLUTION INTRAVENOUS at 10:57

## 2019-07-15 ENCOUNTER — TELEPHONE (OUTPATIENT)
Dept: FAMILY MEDICINE CLINIC | Age: 79
End: 2019-07-15

## 2019-07-15 ENCOUNTER — TELEPHONE (OUTPATIENT)
Dept: CARDIOLOGY CLINIC | Age: 79
End: 2019-07-15

## 2019-07-15 DIAGNOSIS — I50.41 ACUTE COMBINED SYSTOLIC AND DIASTOLIC CONGESTIVE HEART FAILURE (HCC): ICD-10-CM

## 2019-07-15 DIAGNOSIS — R94.39 ABNORMAL STRESS TEST: ICD-10-CM

## 2019-07-15 DIAGNOSIS — I10 ESSENTIAL HYPERTENSION, MALIGNANT: Primary | ICD-10-CM

## 2019-07-17 ENCOUNTER — TELEPHONE (OUTPATIENT)
Dept: FAMILY MEDICINE CLINIC | Age: 79
End: 2019-07-17

## 2019-07-17 DIAGNOSIS — D50.9 MICROCYTIC ANEMIA: Primary | ICD-10-CM

## 2019-07-17 DIAGNOSIS — I50.41 ACUTE COMBINED SYSTOLIC AND DIASTOLIC CONGESTIVE HEART FAILURE (HCC): ICD-10-CM

## 2019-07-17 DIAGNOSIS — R94.39 ABNORMAL STRESS TEST: ICD-10-CM

## 2019-07-17 DIAGNOSIS — I10 ESSENTIAL HYPERTENSION, MALIGNANT: ICD-10-CM

## 2019-07-17 LAB
A/G RATIO: 1.4 (ref 1.1–2.2)
ALBUMIN SERPL-MCNC: 4.6 G/DL (ref 3.4–5)
ALP BLD-CCNC: 114 U/L (ref 40–129)
ALT SERPL-CCNC: 24 U/L (ref 10–40)
ANION GAP SERPL CALCULATED.3IONS-SCNC: 15 MMOL/L (ref 3–16)
AST SERPL-CCNC: 21 U/L (ref 15–37)
BILIRUB SERPL-MCNC: 0.6 MG/DL (ref 0–1)
BUN BLDV-MCNC: 53 MG/DL (ref 7–20)
CALCIUM SERPL-MCNC: 11.1 MG/DL (ref 8.3–10.6)
CHLORIDE BLD-SCNC: 95 MMOL/L (ref 99–110)
CO2: 26 MMOL/L (ref 21–32)
CREAT SERPL-MCNC: 2.3 MG/DL (ref 0.8–1.3)
GFR AFRICAN AMERICAN: 33
GFR NON-AFRICAN AMERICAN: 27
GLOBULIN: 3.2 G/DL
GLUCOSE BLD-MCNC: 193 MG/DL (ref 70–99)
HCT VFR BLD CALC: 41.8 % (ref 40.5–52.5)
HEMOGLOBIN: 13.6 G/DL (ref 13.5–17.5)
INR BLD: 1.04 (ref 0.86–1.14)
MCH RBC QN AUTO: 25.4 PG (ref 26–34)
MCHC RBC AUTO-ENTMCNC: 32.5 G/DL (ref 31–36)
MCV RBC AUTO: 78 FL (ref 80–100)
PDW BLD-RTO: 15.5 % (ref 12.4–15.4)
PLATELET # BLD: 308 K/UL (ref 135–450)
PMV BLD AUTO: 8.5 FL (ref 5–10.5)
POTASSIUM SERPL-SCNC: 4 MMOL/L (ref 3.5–5.1)
PROTHROMBIN TIME: 11.8 SEC (ref 9.8–13)
RBC # BLD: 5.36 M/UL (ref 4.2–5.9)
SODIUM BLD-SCNC: 136 MMOL/L (ref 136–145)
TOTAL PROTEIN: 7.8 G/DL (ref 6.4–8.2)
WBC # BLD: 6.5 K/UL (ref 4–11)

## 2019-07-17 RX ORDER — POTASSIUM CHLORIDE 20 MEQ/1
20 TABLET, EXTENDED RELEASE ORAL DAILY
Qty: 30 TABLET | Refills: 5 | Status: SHIPPED | OUTPATIENT
Start: 2019-07-17 | End: 2019-08-20 | Stop reason: SDUPTHER

## 2019-07-18 DIAGNOSIS — I50.41 ACUTE COMBINED SYSTOLIC AND DIASTOLIC CONGESTIVE HEART FAILURE (HCC): Primary | ICD-10-CM

## 2019-07-22 DIAGNOSIS — I50.41 ACUTE COMBINED SYSTOLIC AND DIASTOLIC CONGESTIVE HEART FAILURE (HCC): ICD-10-CM

## 2019-07-22 DIAGNOSIS — D50.9 MICROCYTIC ANEMIA: ICD-10-CM

## 2019-07-22 LAB
ANION GAP SERPL CALCULATED.3IONS-SCNC: 16 MMOL/L (ref 3–16)
BUN BLDV-MCNC: 48 MG/DL (ref 7–20)
CALCIUM SERPL-MCNC: 11.1 MG/DL (ref 8.3–10.6)
CHLORIDE BLD-SCNC: 97 MMOL/L (ref 99–110)
CO2: 25 MMOL/L (ref 21–32)
CREAT SERPL-MCNC: 2.2 MG/DL (ref 0.8–1.3)
GFR AFRICAN AMERICAN: 35
GFR NON-AFRICAN AMERICAN: 29
GLUCOSE BLD-MCNC: 247 MG/DL (ref 70–99)
POTASSIUM SERPL-SCNC: 4.4 MMOL/L (ref 3.5–5.1)
SODIUM BLD-SCNC: 138 MMOL/L (ref 136–145)

## 2019-07-23 ENCOUNTER — FOLLOWUP TELEPHONE ENCOUNTER (OUTPATIENT)
Dept: INPATIENT UNIT | Age: 79
End: 2019-07-23

## 2019-07-23 LAB
HEMOGLOBIN ELECTROPHORESIS: NORMAL
HGB ELECTROPHORESIS INTERP: NORMAL

## 2019-07-26 ENCOUNTER — HOSPITAL ENCOUNTER (OUTPATIENT)
Dept: CARDIAC CATH/INVASIVE PROCEDURES | Age: 79
Discharge: HOME | End: 2019-07-26
Attending: INTERNAL MEDICINE | Admitting: INTERNAL MEDICINE
Payer: MEDICARE

## 2019-07-26 LAB
ANION GAP SERPL CALCULATED.3IONS-SCNC: 13 MMOL/L (ref 3–16)
BUN BLDV-MCNC: 46 MG/DL (ref 7–20)
CALCIUM SERPL-MCNC: 10.7 MG/DL (ref 8.3–10.6)
CHLORIDE BLD-SCNC: 97 MMOL/L (ref 99–110)
CO2: 24 MMOL/L (ref 21–32)
CREAT SERPL-MCNC: 2 MG/DL (ref 0.8–1.3)
EKG ATRIAL RATE: 64 BPM
EKG DIAGNOSIS: NORMAL
EKG P AXIS: 66 DEGREES
EKG P-R INTERVAL: 198 MS
EKG Q-T INTERVAL: 502 MS
EKG QRS DURATION: 162 MS
EKG QTC CALCULATION (BAZETT): 517 MS
EKG R AXIS: 262 DEGREES
EKG T AXIS: 63 DEGREES
EKG VENTRICULAR RATE: 64 BPM
GFR AFRICAN AMERICAN: 39
GFR NON-AFRICAN AMERICAN: 32
GLUCOSE BLD-MCNC: 236 MG/DL (ref 70–99)
POTASSIUM SERPL-SCNC: 4.6 MMOL/L (ref 3.5–5.1)
SODIUM BLD-SCNC: 134 MMOL/L (ref 136–145)

## 2019-07-26 PROCEDURE — 93010 ELECTROCARDIOGRAM REPORT: CPT | Performed by: INTERNAL MEDICINE

## 2019-07-26 PROCEDURE — 80048 BASIC METABOLIC PNL TOTAL CA: CPT

## 2019-07-26 PROCEDURE — 6360000002 HC RX W HCPCS

## 2019-07-26 PROCEDURE — 6360000004 HC RX CONTRAST MEDICATION: Performed by: INTERNAL MEDICINE

## 2019-07-26 PROCEDURE — C1894 INTRO/SHEATH, NON-LASER: HCPCS

## 2019-07-26 PROCEDURE — 2709999900 HC NON-CHARGEABLE SUPPLY

## 2019-07-26 PROCEDURE — 93005 ELECTROCARDIOGRAM TRACING: CPT | Performed by: INTERNAL MEDICINE

## 2019-07-26 PROCEDURE — 93458 L HRT ARTERY/VENTRICLE ANGIO: CPT

## 2019-07-26 PROCEDURE — 99152 MOD SED SAME PHYS/QHP 5/>YRS: CPT | Performed by: INTERNAL MEDICINE

## 2019-07-26 PROCEDURE — C1769 GUIDE WIRE: HCPCS

## 2019-07-26 PROCEDURE — 93458 L HRT ARTERY/VENTRICLE ANGIO: CPT | Performed by: INTERNAL MEDICINE

## 2019-07-26 PROCEDURE — 99217 PR OBSERVATION CARE DISCHARGE MANAGEMENT: CPT | Performed by: NURSE PRACTITIONER

## 2019-07-26 PROCEDURE — 2500000003 HC RX 250 WO HCPCS

## 2019-07-26 PROCEDURE — 99152 MOD SED SAME PHYS/QHP 5/>YRS: CPT

## 2019-07-26 RX ORDER — SODIUM CHLORIDE 0.9 % (FLUSH) 0.9 %
10 SYRINGE (ML) INJECTION PRN
Status: DISCONTINUED | OUTPATIENT
Start: 2019-07-26 | End: 2019-07-26 | Stop reason: HOSPADM

## 2019-07-26 RX ORDER — SODIUM CHLORIDE 0.9 % (FLUSH) 0.9 %
10 SYRINGE (ML) INJECTION EVERY 12 HOURS SCHEDULED
Status: DISCONTINUED | OUTPATIENT
Start: 2019-07-26 | End: 2019-07-26 | Stop reason: HOSPADM

## 2019-07-26 RX ORDER — ATROPINE SULFATE 0.4 MG/ML
0.5 AMPUL (ML) INJECTION
Status: DISCONTINUED | OUTPATIENT
Start: 2019-07-26 | End: 2019-07-26 | Stop reason: DRUGHIGH

## 2019-07-26 RX ORDER — SODIUM CHLORIDE 9 MG/ML
INJECTION, SOLUTION INTRAVENOUS CONTINUOUS
Status: ACTIVE | OUTPATIENT
Start: 2019-07-26 | End: 2019-07-26

## 2019-07-26 RX ORDER — ATROPINE SULFATE 0.1 MG/ML
0.5 INJECTION INTRAVENOUS
Status: DISCONTINUED | OUTPATIENT
Start: 2019-07-26 | End: 2019-07-26 | Stop reason: HOSPADM

## 2019-07-26 RX ORDER — ACETAMINOPHEN 325 MG/1
650 TABLET ORAL EVERY 4 HOURS PRN
Status: DISCONTINUED | OUTPATIENT
Start: 2019-07-26 | End: 2019-07-26 | Stop reason: HOSPADM

## 2019-07-26 RX ADMIN — IOPAMIDOL 100 ML: 755 INJECTION, SOLUTION INTRAVENOUS at 10:49

## 2019-07-26 NOTE — PROCEDURES
(82)    Conclusions:  -No obstructive CAD  -Normal LVEDP    Recommendations:  -Continue medical therapy.  -Followup in the CHF clinic

## 2019-07-26 NOTE — DISCHARGE SUMMARY
(IMDUR) 30 MG extended release tablet  Take 1 tablet by mouth daily             metoprolol succinate (TOPROL XL) 100 MG extended release tablet  Take 1 tablet by mouth daily             potassium chloride (KLOR-CON M) 20 MEQ extended release tablet  Take 1 tablet by mouth daily             spironolactone (ALDACTONE) 25 MG tablet  Take 0.5 tablets by mouth daily                                        Attending Physician: Dr. Marvin Cramer   Time Spent on Discharge: greater than 30 minutes

## 2019-07-29 PROBLEM — R79.89 ELEVATED TROPONIN: Status: RESOLVED | Noted: 2019-06-29 | Resolved: 2019-07-29

## 2019-07-29 PROBLEM — R77.8 ELEVATED TROPONIN: Status: RESOLVED | Noted: 2019-06-29 | Resolved: 2019-07-29

## 2019-08-02 ENCOUNTER — OFFICE VISIT (OUTPATIENT)
Dept: CARDIOLOGY CLINIC | Age: 79
End: 2019-08-02
Payer: MEDICARE

## 2019-08-02 VITALS
WEIGHT: 163.8 LBS | HEART RATE: 70 BPM | DIASTOLIC BLOOD PRESSURE: 74 MMHG | BODY MASS INDEX: 22.85 KG/M2 | SYSTOLIC BLOOD PRESSURE: 100 MMHG

## 2019-08-02 DIAGNOSIS — I10 ESSENTIAL HYPERTENSION: ICD-10-CM

## 2019-08-02 DIAGNOSIS — I50.41 ACUTE COMBINED SYSTOLIC AND DIASTOLIC CONGESTIVE HEART FAILURE (HCC): Primary | ICD-10-CM

## 2019-08-02 PROCEDURE — G8598 ASA/ANTIPLAT THER USED: HCPCS | Performed by: NURSE PRACTITIONER

## 2019-08-02 PROCEDURE — G8427 DOCREV CUR MEDS BY ELIG CLIN: HCPCS | Performed by: NURSE PRACTITIONER

## 2019-08-02 PROCEDURE — 1036F TOBACCO NON-USER: CPT | Performed by: NURSE PRACTITIONER

## 2019-08-02 PROCEDURE — 1123F ACP DISCUSS/DSCN MKR DOCD: CPT | Performed by: NURSE PRACTITIONER

## 2019-08-02 PROCEDURE — 4040F PNEUMOC VAC/ADMIN/RCVD: CPT | Performed by: NURSE PRACTITIONER

## 2019-08-02 PROCEDURE — 99214 OFFICE O/P EST MOD 30 MIN: CPT | Performed by: NURSE PRACTITIONER

## 2019-08-02 PROCEDURE — G8420 CALC BMI NORM PARAMETERS: HCPCS | Performed by: NURSE PRACTITIONER

## 2019-08-02 ASSESSMENT — ENCOUNTER SYMPTOMS
RESPIRATORY NEGATIVE: 1
SHORTNESS OF BREATH: 0
GASTROINTESTINAL NEGATIVE: 1
EYES NEGATIVE: 1

## 2019-08-02 NOTE — PROGRESS NOTES
[facility], [place], operated by [facility]   Congestive Heart Failure    Primary Care Doctor:  Steven Barger MD  Primary Cardiologist: Sarita Mendoza      Chief Complaint: CHF    History of Present Illness:  Norman Arredondo is a [de-identified] y.o. male with PMH DM, HTN, newly dx HFrEF (35-40%) who presents today for hospital f/u. Norman Arredondo had angiogram 7/26, no intervention. Pt denies pain, hematoma, or swelling to right groin site. Also, denies numbness or tingling to right leg. Pt states he is feeling great, and is ready to get back to exercising once restrictions are up. Baseline Weight: 164-169 lb  Home Weight: 164 lb  Admit BNP: 5287   Discharge BNP: 3982    EF: 35-40%  Cardiac Imaging: Echo 6/28/19   Left ventricular cavity size is normal. There is severe concentric left   ventricular hypertrophy.   Overall left ventricular systolic function appears reduced with an estimated   ejection fraction of 35-40%.   There is global hypokinesis of the left ventricle which appears to be more   marked in the inferior wall which is severely hypo-akinetic.   Diastolic function is indeterminate.   Mitral annular calcification is present.   Mild mitral, pulmonic and aortic regurgitation is present.   The right ventricle is normal in size with thickened walls.   TAPSE measures: 1.22 cm.   RV S velocity measures: 7.94 cm/s    Angiogram 7/26/19  Angiographic Findings:  Right dominant system  Left Main:  Normal  Left Anterior Descending:  Mild irregular plaque; no stenosis over 30%  Circumflex:  Mild irregular plaque; no stenosis over 30%  Right Coronary:  Mild irregular plaque; no stenosis over 30%  Left Ventriculogram:  Not performed due to Cr  Femoral Angiogram:  No obstructive plaque  Hemodynamics (mm Hg):  Left Ventricular Pressure:  96/1, 5  Central Aortic Pressure:  102/68 (82)  Device: No       ICD/Lifevest Counseling: No          Activity: baseline  Can you walk 1-2 blocks or do a moderate amount of house/yard work? No  Cardiac Rehab Referral: [facility] Heart Failure Hotline: [address]  CHF Resource Line: 837.349.8522    Heart Failure Websites:   www.heart. org  Www.cardiosmart. org    Websites with Low Sodium Recipes:   www.mayoclinic.org/healthy-lifestyle/recipes/low-sodium-recipes  www.TIME PLUS Q.Everything Club/recipes    Smartphone nimco's that can help you keep track of symptoms, weights, and medications:  HeartPartner  MyHearSpotRightpp  HeartSAproMed [employer]  WOUniversal World Entertainment LLCME    Nutrition Nimco to track calories, carbohydrates and sodium content of food:   CalorieKing  MyFitnessPal      I appreciate the opportunity of cooperating in the care of this individual.    Mona Preston CNP, 8/2/2019, 8:16 AM    QUALITY MEASURES  1. Tobacco Cessation Counseling: NA  2. Retake of BP if >140/90:   NA  3. Documentation to PCP/referring for new patient:  Sent to PCP at close of office visit  4. CAD patient on anti-platelet: Yes  5. CAD patient on STATIN therapy:  Yes  6. Patient with CHF and aFib on anticoagulation:  NA   7. Patient Education:  Yes   8. BB for LVSD :  Yes   9. ACE/ARB for LVSD:  No   10.  Left Ventricular Ejection Fraction (LVEF) Assessment:  Yes

## 2019-08-12 ENCOUNTER — HOSPITAL ENCOUNTER (OUTPATIENT)
Dept: NON INVASIVE DIAGNOSTICS | Age: 79
Discharge: HOME | End: 2019-08-12
Payer: MEDICARE

## 2019-08-12 DIAGNOSIS — I50.41 ACUTE COMBINED SYSTOLIC AND DIASTOLIC CONGESTIVE HEART FAILURE (HCC): ICD-10-CM

## 2019-08-19 ENCOUNTER — OFFICE VISIT (OUTPATIENT)
Dept: FAMILY MEDICINE CLINIC | Age: 79
End: 2019-08-19
Payer: MEDICARE

## 2019-08-19 VITALS
OXYGEN SATURATION: 95 % | RESPIRATION RATE: 16 BRPM | DIASTOLIC BLOOD PRESSURE: 68 MMHG | HEART RATE: 59 BPM | HEIGHT: 71 IN | SYSTOLIC BLOOD PRESSURE: 122 MMHG | BODY MASS INDEX: 22.68 KG/M2 | WEIGHT: 162 LBS

## 2019-08-19 DIAGNOSIS — I10 ESSENTIAL HYPERTENSION: ICD-10-CM

## 2019-08-19 DIAGNOSIS — N18.30 CONTROLLED TYPE 2 DIABETES MELLITUS WITH STAGE 3 CHRONIC KIDNEY DISEASE, WITHOUT LONG-TERM CURRENT USE OF INSULIN (HCC): ICD-10-CM

## 2019-08-19 DIAGNOSIS — E78.00 PURE HYPERCHOLESTEROLEMIA: ICD-10-CM

## 2019-08-19 DIAGNOSIS — I50.41 ACUTE COMBINED SYSTOLIC AND DIASTOLIC CONGESTIVE HEART FAILURE (HCC): Primary | ICD-10-CM

## 2019-08-19 DIAGNOSIS — E11.22 CONTROLLED TYPE 2 DIABETES MELLITUS WITH STAGE 3 CHRONIC KIDNEY DISEASE, WITHOUT LONG-TERM CURRENT USE OF INSULIN (HCC): ICD-10-CM

## 2019-08-19 LAB
ANION GAP SERPL CALCULATED.3IONS-SCNC: 20 MMOL/L (ref 3–16)
BUN BLDV-MCNC: 38 MG/DL (ref 7–20)
CALCIUM SERPL-MCNC: 10.6 MG/DL (ref 8.3–10.6)
CHLORIDE BLD-SCNC: 86 MMOL/L (ref 99–110)
CO2: 27 MMOL/L (ref 21–32)
CREAT SERPL-MCNC: 2 MG/DL (ref 0.8–1.3)
GFR AFRICAN AMERICAN: 39
GFR NON-AFRICAN AMERICAN: 32
GLUCOSE BLD-MCNC: 321 MG/DL (ref 70–99)
POTASSIUM SERPL-SCNC: 3.1 MMOL/L (ref 3.5–5.1)
PRO-BNP: 7296 PG/ML (ref 0–449)
SODIUM BLD-SCNC: 133 MMOL/L (ref 136–145)

## 2019-08-19 PROCEDURE — 1123F ACP DISCUSS/DSCN MKR DOCD: CPT | Performed by: INTERNAL MEDICINE

## 2019-08-19 PROCEDURE — G8420 CALC BMI NORM PARAMETERS: HCPCS | Performed by: INTERNAL MEDICINE

## 2019-08-19 PROCEDURE — G8598 ASA/ANTIPLAT THER USED: HCPCS | Performed by: INTERNAL MEDICINE

## 2019-08-19 PROCEDURE — 4040F PNEUMOC VAC/ADMIN/RCVD: CPT | Performed by: INTERNAL MEDICINE

## 2019-08-19 PROCEDURE — 1036F TOBACCO NON-USER: CPT | Performed by: INTERNAL MEDICINE

## 2019-08-19 PROCEDURE — 99214 OFFICE O/P EST MOD 30 MIN: CPT | Performed by: INTERNAL MEDICINE

## 2019-08-19 PROCEDURE — 36415 COLL VENOUS BLD VENIPUNCTURE: CPT | Performed by: INTERNAL MEDICINE

## 2019-08-19 PROCEDURE — G8427 DOCREV CUR MEDS BY ELIG CLIN: HCPCS | Performed by: INTERNAL MEDICINE

## 2019-08-19 ASSESSMENT — ENCOUNTER SYMPTOMS
ALLERGIC/IMMUNOLOGIC NEGATIVE: 1
GASTROINTESTINAL NEGATIVE: 1
RESPIRATORY NEGATIVE: 1

## 2019-08-19 NOTE — PROGRESS NOTES
Subjective:      Patient ID: Manuela Robin is a 78 y.o. male. HPI  Controlled type 2 diabetes mellitus with stage 3 chronic kidney disease, without long-term current use of insulin ([facility])  Sugars are 100-130s, diet is stable, weight is unchanged, no reported neuropathy, no change in vision, no claudication, no foot ulcers, no new skin lesions. No change in medications. No c/o with meds. Last eye exam 3/2019. Has Retinopathy mild    Essential hypertension  No change in meds, no c/o with meds, no chest pain, SOB, palpatations, or syncope. Home bp was 120-130s/70-80s. Pure hypercholesterolemia  Stable diet and wt. No new c/o w/ med. Acute combined systolic and diastolic congestive heart failure ([address]  Recent hospitalization for CHF. Cath ok, ECHO due in 9/2019. Lost 6 lbs at home but c/o about significant increase in urination. No CP,SOB, or increased fatigue.      Past Medical History:   Diagnosis Date    Anemia     Arthritis     MILD    Hyperlipidemia     Hypertension     Type II or unspecified type diabetes mellitus without mention of complication, not stated as uncontrolled      Current Outpatient Medications   Medication Sig Dispense Refill    aspirin 81 MG EC tablet Take 1 tablet by mouth daily 30 tablet 3    potassium chloride (KLOR-CON M) 20 MEQ extended release tablet Take 1 tablet by mouth daily 30 tablet 5    spironolactone (ALDACTONE) 25 MG tablet Take 0.5 tablets by mouth daily 30 tablet 3    isosorbide mononitrate (IMDUR) 30 MG extended release tablet Take 1 tablet by mouth daily 30 tablet 3    atorvastatin (LIPITOR) 40 MG tablet Take 1 tablet by mouth daily 30 tablet 3    hydrALAZINE (APRESOLINE) 10 MG tablet Take 1 tablet by mouth every 8 hours 90 tablet 3    metoprolol succinate (TOPROL XL) 100 MG extended release tablet Take 1 tablet by mouth daily 30 tablet 3    furosemide (LASIX) 40 MG tablet Take 1 tablet by mouth daily 30 tablet 3    clopidogrel (PLAVIX) 75 MG tablet Take 1

## 2019-08-20 ENCOUNTER — TELEPHONE (OUTPATIENT)
Dept: FAMILY MEDICINE CLINIC | Age: 79
End: 2019-08-20

## 2019-08-20 LAB
ESTIMATED AVERAGE GLUCOSE: 260.4 MG/DL
HBA1C MFR BLD: 10.7 %

## 2019-08-20 RX ORDER — POTASSIUM CHLORIDE 20 MEQ/1
20 TABLET, EXTENDED RELEASE ORAL 2 TIMES DAILY
Qty: 30 TABLET | Refills: 5 | Status: SHIPPED | OUTPATIENT
Start: 2019-08-20 | End: 2019-08-26 | Stop reason: ALTCHOICE

## 2019-08-20 RX ORDER — PEN NEEDLE, DIABETIC 30 GX5/16"
1 NEEDLE, DISPOSABLE MISCELLANEOUS DAILY
Qty: 100 EACH | Refills: 3 | Status: SHIPPED | OUTPATIENT
Start: 2019-08-20 | End: 2020-10-01

## 2019-08-26 RX ORDER — SPIRONOLACTONE 25 MG/1
12.5 TABLET ORAL DAILY
Qty: 30 TABLET | Refills: 3 | Status: ON HOLD | OUTPATIENT
Start: 2019-08-26 | End: 2019-09-09 | Stop reason: HOSPADM

## 2019-08-26 RX ORDER — SPIRONOLACTONE 25 MG/1
12.5 TABLET ORAL DAILY
Qty: 30 TABLET | Refills: 3 | Status: SHIPPED | OUTPATIENT
Start: 2019-08-26 | End: 2019-08-26 | Stop reason: SDUPTHER

## 2019-09-05 ENCOUNTER — HOSPITAL ENCOUNTER (INPATIENT)
Age: 79
LOS: 4 days | Discharge: HOME | DRG: 682 | End: 2019-09-09
Attending: EMERGENCY MEDICINE | Admitting: INTERNAL MEDICINE
Payer: MEDICARE

## 2019-09-05 ENCOUNTER — APPOINTMENT (OUTPATIENT)
Dept: GENERAL RADIOLOGY | Age: 79
DRG: 682 | End: 2019-09-05
Payer: MEDICARE

## 2019-09-05 ENCOUNTER — OFFICE VISIT (OUTPATIENT)
Dept: FAMILY MEDICINE CLINIC | Age: 79
End: 2019-09-05
Payer: MEDICARE

## 2019-09-05 VITALS
SYSTOLIC BLOOD PRESSURE: 122 MMHG | HEART RATE: 57 BPM | DIASTOLIC BLOOD PRESSURE: 60 MMHG | HEIGHT: 71 IN | RESPIRATION RATE: 16 BRPM | BODY MASS INDEX: 22.57 KG/M2 | OXYGEN SATURATION: 98 % | WEIGHT: 161.2 LBS

## 2019-09-05 DIAGNOSIS — I10 ESSENTIAL HYPERTENSION: ICD-10-CM

## 2019-09-05 DIAGNOSIS — N18.30 CONTROLLED TYPE 2 DIABETES MELLITUS WITH STAGE 3 CHRONIC KIDNEY DISEASE, WITHOUT LONG-TERM CURRENT USE OF INSULIN (HCC): Primary | ICD-10-CM

## 2019-09-05 DIAGNOSIS — E11.22 CONTROLLED TYPE 2 DIABETES MELLITUS WITH STAGE 3 CHRONIC KIDNEY DISEASE, WITHOUT LONG-TERM CURRENT USE OF INSULIN (HCC): Primary | ICD-10-CM

## 2019-09-05 DIAGNOSIS — D64.9 ANEMIA, UNSPECIFIED TYPE: ICD-10-CM

## 2019-09-05 DIAGNOSIS — N17.9 ACUTE RENAL FAILURE, UNSPECIFIED ACUTE RENAL FAILURE TYPE (HCC): ICD-10-CM

## 2019-09-05 DIAGNOSIS — I50.41 ACUTE COMBINED SYSTOLIC AND DIASTOLIC CONGESTIVE HEART FAILURE (HCC): ICD-10-CM

## 2019-09-05 DIAGNOSIS — R06.02 SHORTNESS OF BREATH: Primary | ICD-10-CM

## 2019-09-05 DIAGNOSIS — R77.8 ELEVATED TROPONIN: ICD-10-CM

## 2019-09-05 DIAGNOSIS — E87.5 SERUM POTASSIUM ELEVATED: ICD-10-CM

## 2019-09-05 DIAGNOSIS — R74.8 ELEVATED CREATINE KINASE: ICD-10-CM

## 2019-09-05 LAB
ANION GAP SERPL CALCULATED.3IONS-SCNC: 17 MMOL/L (ref 3–16)
ANION GAP SERPL CALCULATED.3IONS-SCNC: 17 MMOL/L (ref 3–16)
BACTERIA: ABNORMAL /HPF
BASE EXCESS VENOUS: -5.1 MMOL/L (ref -2–3)
BASOPHILS ABSOLUTE: 0 K/UL (ref 0–0.2)
BASOPHILS RELATIVE PERCENT: 0.3 %
BILIRUBIN URINE: NEGATIVE
BLOOD, URINE: ABNORMAL
BUN BLDV-MCNC: 44 MG/DL (ref 7–20)
BUN BLDV-MCNC: 51 MG/DL (ref 7–20)
CALCIUM SERPL-MCNC: 10.2 MG/DL (ref 8.3–10.6)
CALCIUM SERPL-MCNC: 10.5 MG/DL (ref 8.3–10.6)
CARBOXYHEMOGLOBIN: 0.7 % (ref 0–1.5)
CHLORIDE BLD-SCNC: 98 MMOL/L (ref 99–110)
CHLORIDE BLD-SCNC: 98 MMOL/L (ref 99–110)
CLARITY: CLEAR
CO2: 18 MMOL/L (ref 21–32)
CO2: 20 MMOL/L (ref 21–32)
COLOR: YELLOW
CREAT SERPL-MCNC: 2 MG/DL (ref 0.8–1.3)
CREAT SERPL-MCNC: 2.3 MG/DL (ref 0.8–1.3)
EOSINOPHILS ABSOLUTE: 0.1 K/UL (ref 0–0.6)
EOSINOPHILS RELATIVE PERCENT: 0.6 %
EPITHELIAL CELLS, UA: ABNORMAL /HPF
GFR AFRICAN AMERICAN: 33
GFR AFRICAN AMERICAN: 39
GFR NON-AFRICAN AMERICAN: 27
GFR NON-AFRICAN AMERICAN: 32
GLUCOSE BLD-MCNC: 179 MG/DL (ref 70–99)
GLUCOSE BLD-MCNC: 267 MG/DL (ref 70–99)
GLUCOSE URINE: NEGATIVE MG/DL
HCO3 VENOUS: 20.1 MMOL/L (ref 24–28)
HCT VFR BLD CALC: 32.8 % (ref 40.5–52.5)
HEMOGLOBIN, VEN, REDUCED: 57.3 %
HEMOGLOBIN: 10.7 G/DL (ref 13.5–17.5)
INR BLD: 1.13 (ref 0.86–1.14)
KETONES, URINE: NEGATIVE MG/DL
LEUKOCYTE ESTERASE, URINE: ABNORMAL
LYMPHOCYTES ABSOLUTE: 1.9 K/UL (ref 1–5.1)
LYMPHOCYTES RELATIVE PERCENT: 18.2 %
MCH RBC QN AUTO: 25.7 PG (ref 26–34)
MCHC RBC AUTO-ENTMCNC: 32.6 G/DL (ref 31–36)
MCV RBC AUTO: 78.8 FL (ref 80–100)
METHEMOGLOBIN VENOUS: 0.6 % (ref 0–1.5)
MICROSCOPIC EXAMINATION: YES
MONOCYTES ABSOLUTE: 1 K/UL (ref 0–1.3)
MONOCYTES RELATIVE PERCENT: 9.4 %
NEUTROPHILS ABSOLUTE: 7.6 K/UL (ref 1.7–7.7)
NEUTROPHILS RELATIVE PERCENT: 71.5 %
NITRITE, URINE: POSITIVE
O2 SAT, VEN: 42 %
PCO2, VEN: 40.5 MMHG (ref 41–51)
PDW BLD-RTO: 16.2 % (ref 12.4–15.4)
PH UA: 6 (ref 5–8)
PH VENOUS: 7.32 (ref 7.35–7.45)
PLATELET # BLD: 396 K/UL (ref 135–450)
PMV BLD AUTO: 7.7 FL (ref 5–10.5)
PO2, VEN: 28.3 MMHG (ref 25–40)
POTASSIUM REFLEX MAGNESIUM: 5.9 MMOL/L (ref 3.5–5.1)
POTASSIUM SERPL-SCNC: 5.4 MMOL/L (ref 3.5–5.1)
PRO-BNP: 3642 PG/ML (ref 0–449)
PROTEIN UA: 30 MG/DL
PROTHROMBIN TIME: 12.9 SEC (ref 9.8–13)
RBC # BLD: 4.17 M/UL (ref 4.2–5.9)
RBC UA: ABNORMAL /HPF (ref 0–2)
SODIUM BLD-SCNC: 133 MMOL/L (ref 136–145)
SODIUM BLD-SCNC: 135 MMOL/L (ref 136–145)
SPECIFIC GRAVITY UA: 1.02 (ref 1–1.03)
TCO2 CALC VENOUS: 21 MMOL/L
TROPONIN: 0.06 NG/ML
URINE REFLEX TO CULTURE: YES
URINE TYPE: ABNORMAL
UROBILINOGEN, URINE: 0.2 E.U./DL
WBC # BLD: 10.6 K/UL (ref 4–11)
WBC UA: >100 /HPF (ref 0–5)

## 2019-09-05 PROCEDURE — 84484 ASSAY OF TROPONIN QUANT: CPT

## 2019-09-05 PROCEDURE — 96360 HYDRATION IV INFUSION INIT: CPT

## 2019-09-05 PROCEDURE — 1036F TOBACCO NON-USER: CPT | Performed by: INTERNAL MEDICINE

## 2019-09-05 PROCEDURE — 87077 CULTURE AEROBIC IDENTIFY: CPT

## 2019-09-05 PROCEDURE — 1123F ACP DISCUSS/DSCN MKR DOCD: CPT | Performed by: INTERNAL MEDICINE

## 2019-09-05 PROCEDURE — 1200000000 HC SEMI PRIVATE

## 2019-09-05 PROCEDURE — 93005 ELECTROCARDIOGRAM TRACING: CPT | Performed by: EMERGENCY MEDICINE

## 2019-09-05 PROCEDURE — G8598 ASA/ANTIPLAT THER USED: HCPCS | Performed by: INTERNAL MEDICINE

## 2019-09-05 PROCEDURE — G8427 DOCREV CUR MEDS BY ELIG CLIN: HCPCS | Performed by: INTERNAL MEDICINE

## 2019-09-05 PROCEDURE — 85025 COMPLETE CBC W/AUTO DIFF WBC: CPT

## 2019-09-05 PROCEDURE — 99214 OFFICE O/P EST MOD 30 MIN: CPT | Performed by: INTERNAL MEDICINE

## 2019-09-05 PROCEDURE — 71046 X-RAY EXAM CHEST 2 VIEWS: CPT

## 2019-09-05 PROCEDURE — 81001 URINALYSIS AUTO W/SCOPE: CPT

## 2019-09-05 PROCEDURE — 4040F PNEUMOC VAC/ADMIN/RCVD: CPT | Performed by: INTERNAL MEDICINE

## 2019-09-05 PROCEDURE — G8420 CALC BMI NORM PARAMETERS: HCPCS | Performed by: INTERNAL MEDICINE

## 2019-09-05 PROCEDURE — 36415 COLL VENOUS BLD VENIPUNCTURE: CPT

## 2019-09-05 PROCEDURE — 87086 URINE CULTURE/COLONY COUNT: CPT

## 2019-09-05 PROCEDURE — 87186 SC STD MICRODIL/AGAR DIL: CPT

## 2019-09-05 PROCEDURE — 36415 COLL VENOUS BLD VENIPUNCTURE: CPT | Performed by: INTERNAL MEDICINE

## 2019-09-05 PROCEDURE — 2580000003 HC RX 258: Performed by: EMERGENCY MEDICINE

## 2019-09-05 PROCEDURE — 85610 PROTHROMBIN TIME: CPT

## 2019-09-05 PROCEDURE — 82803 BLOOD GASES ANY COMBINATION: CPT

## 2019-09-05 PROCEDURE — 99285 EMERGENCY DEPT VISIT HI MDM: CPT

## 2019-09-05 PROCEDURE — 80048 BASIC METABOLIC PNL TOTAL CA: CPT

## 2019-09-05 RX ORDER — 0.9 % SODIUM CHLORIDE 0.9 %
500 INTRAVENOUS SOLUTION INTRAVENOUS ONCE
Status: COMPLETED | OUTPATIENT
Start: 2019-09-05 | End: 2019-09-05

## 2019-09-05 RX ADMIN — SODIUM CHLORIDE 500 ML: 9 INJECTION, SOLUTION INTRAVENOUS at 21:13

## 2019-09-05 ASSESSMENT — ENCOUNTER SYMPTOMS
RECTAL PAIN: 0
ABDOMINAL PAIN: 0
VOMITING: 0
SHORTNESS OF BREATH: 1
GASTROINTESTINAL NEGATIVE: 1
BLOOD IN STOOL: 0
WHEEZING: 0
COUGH: 0
ALLERGIC/IMMUNOLOGIC NEGATIVE: 1
PHOTOPHOBIA: 0
NAUSEA: 0
RESPIRATORY NEGATIVE: 1
BACK PAIN: 0

## 2019-09-05 NOTE — PATIENT INSTRUCTIONS
All above problems reviewed and the found to be unchanged except for the following :     Acute Combined Systolic and Diastolic Congestive Heart Failure (Hcc). Continue daily weights. Call if >5 lbs. Will do labs. 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 To continue meds and sugar checks. Call if sugars go up, may need to adjust LAntus. Essential Hypertension. Continue present meds. Home BP checks. Call if>140/90. Improve diet. Avoid caffeine and salt. Call if new c/o w/ meds.  Will check K+

## 2019-09-05 NOTE — PROGRESS NOTES
Subjective:      Patient ID: Power Dodson is a 78 y.o. male. HPI  Controlled type 2 diabetes mellitus with stage 3 chronic kidney disease, without long-term current use of insulin ([facility])  Sugars are 100-200, diet is stable, weight is unchanged, no reported neuropathy, no change in vision, no claudication, no foot ulcers, no new skin lesions. recent change in medications(Lantus 10 u at bed time). No c/o with meds. Last eye exam 3/2019. Has Retinopathy mild. Acute combined systolic and diastolic congestive heart failure ([address]  Recent hospitalization for CHF. Cath ok, ECHO due in 9/2019. Weight stable at home and not urinating quit as much w/ LAsix ant 1/2 pill. No CP,SOB, or increased fatigue. Last BNP>7,000. Low K+ now taking KCL BID. Essential hypertension  No change in meds, no c/o with meds, no chest pain, SOB, palpatations, or syncope. Home bp was 120-130s/70-80s.     Past Medical History:   Diagnosis Date    Anemia     Arthritis     MILD    Hyperlipidemia     Hypertension     Type II or unspecified type diabetes mellitus without mention of complication, not stated as uncontrolled      Current Outpatient Medications   Medication Sig Dispense Refill    spironolactone (ALDACTONE) 25 MG tablet Take 0.5 tablets by mouth daily 30 tablet 3    insulin glargine (LANTUS SOLOSTAR) 100 UNIT/ML injection pen Inject 10 Units into the skin nightly 5 pen 3    Insulin Pen Needle (PEN NEEDLES 3/16\") 31G X 5 MM MISC 1 each by Does not apply route daily 100 each 3    isosorbide mononitrate (IMDUR) 30 MG extended release tablet Take 1 tablet by mouth daily 30 tablet 3    atorvastatin (LIPITOR) 40 MG tablet Take 1 tablet by mouth daily 30 tablet 3    hydrALAZINE (APRESOLINE) 10 MG tablet Take 1 tablet by mouth every 8 hours 90 tablet 3    metoprolol succinate (TOPROL XL) 100 MG extended release tablet Take 1 tablet by mouth daily 30 tablet 3    furosemide (LASIX) 40 MG tablet Take 1 tablet by mouth daily 30 Insulin (Hcc). To continue meds and sugar checks. Call if sugars go up, may need to adjust LAntus. Essential Hypertension. Continue present meds. Home BP checks. Call if>140/90. Improve diet. Avoid caffeine and salt. Call if new c/o w/ meds.  Will check K+               Dino Alston MD

## 2019-09-05 NOTE — ED PROVIDER NOTES
cooperative with exam. Wife present at the bedside   HENT:   Head: Normocephalic and atraumatic. Right Ear: External ear normal.   Left Ear: External ear normal.   Nose: Nose normal.   Mouth/Throat: Oropharynx is clear and moist.   Eyes: Pupils are equal, round, and reactive to light. Conjunctivae and EOM are normal. No scleral icterus. Neck: Normal range of motion. No tracheal deviation present. Cardiovascular: Intact distal pulses. Bradycardia present. No murmur heard. Pulses:       Radial pulses are 2+ on the right side, and 2+ on the left side. Pulmonary/Chest: Effort normal. No stridor. No respiratory distress. He has no wheezes. He has no rales. He exhibits no tenderness. Abdominal: Soft. He exhibits no distension. There is no tenderness. There is no guarding. Musculoskeletal: Normal range of motion. He exhibits no edema, tenderness or deformity. Lymphadenopathy:     He has no cervical adenopathy. Neurological: He is alert and oriented to person, place, and time. No cranial nerve deficit or sensory deficit. Face grossly symmetric. Moves all extremities x4. Skin: Skin is warm and dry. Capillary refill takes 2 to 3 seconds. He is not diaphoretic. No erythema. Psychiatric: He has a normal mood and affect. His behavior is normal. His speech is tangential.   Vitals reviewed. Diagnostic Results     EKG   Rate bradycardic 54, rhythm sinus with first degree AVB, axis right. Right BBB. QTc 470. Comparison to previous EKG from July 26, 2018 shows no acute ischemic changes at that time he was not bradycardic. RADIOLOGY:  XR CHEST STANDARD (2 VW)   Final Result      No acute pulmonary disease.              LABS:   Results for orders placed or performed during the hospital encounter of 09/05/19   CBC Auto Differential   Result Value Ref Range    WBC 10.6 4.0 - 11.0 K/uL    RBC 4.17 (L) 4.20 - 5.90 M/uL    Hemoglobin 10.7 (L) 13.5 - 17.5 g/dL    Hematocrit 32.8 (L) 40.5 - 52.5 %    MCV 78.8 HOSPITALIST    MEDICAL DECISIONMAKING / ASSESSMENT / PLAN     Keely Patterson is a 78 y.o. male who presents to the emergency department with multiple complaints and concerns and unclear specific symptoms. Medical record review shows he was seen by his primary care physician today and appeared to be doing well. After prolonged discussion it was determined the patient became short of breath around 530 this evening when walking although this has since resolved. On exam he has no obvious findings of heart failure exacerbation or fluid overload. He does have a BP with systolic in low 790P and pulse 50s/40s and it is possible he may be on a dose of medication that is too high which may be causing some of his symptoms. Given his medical history and comorbidities a cardiac work-up was started and a peripheral IV was sent, labs, EKG and chest x-ray ordered. EKG without significant change from previous. Chest xray negative. Labs show a worsening renal failure with elevated potassium 5.9 and a specimen is not hemolyzed. It is noted his potassium earlier today was 5.4 and the patient is on potassium pills twice a day. His troponin is also elevated 0.06 which is an increase from his previous troponins of 0.05.  proBNP from earlier today improved to 3600 when compared to previous 7200. Hemoglobin 11 he has a history of anemia and his baseline appears to be around 12 (he denies hematuria, hematochezia, melena). Creatinine mildly increased to 2.3 from baseline 1.9-2. Given his symptoms and lab abnormalities he will be admitted for further evaluation and management. Clinical Impression     1. Shortness of breath    2. Serum potassium elevated    3. Acute renal failure, unspecified acute renal failure type (HCC)    4. Elevated troponin    5. Elevated creatine kinase    6. Anemia, unspecified type        Disposition     PATIENT REFERRED TO:  No follow-up provider specified.     DISCHARGE MEDICATIONS:  New

## 2019-09-06 ENCOUNTER — APPOINTMENT (OUTPATIENT)
Dept: ULTRASOUND IMAGING | Age: 79
DRG: 682 | End: 2019-09-06
Payer: MEDICARE

## 2019-09-06 LAB
ALBUMIN SERPL-MCNC: 3.2 G/DL (ref 3.4–5)
ALBUMIN SERPL-MCNC: 3.7 G/DL (ref 3.4–5)
ANION GAP SERPL CALCULATED.3IONS-SCNC: 12 MMOL/L (ref 3–16)
ANION GAP SERPL CALCULATED.3IONS-SCNC: 14 MMOL/L (ref 3–16)
BACTERIA: ABNORMAL /HPF
BASOPHILS ABSOLUTE: 0 K/UL (ref 0–0.2)
BASOPHILS RELATIVE PERCENT: 0.2 %
BILIRUBIN URINE: NEGATIVE
BLOOD, URINE: ABNORMAL
BUN BLDV-MCNC: 42 MG/DL (ref 7–20)
BUN BLDV-MCNC: 45 MG/DL (ref 7–20)
BUN BLDV-MCNC: 52 MG/DL (ref 7–20)
BUN BLDV-MCNC: 53 MG/DL (ref 7–20)
CALCIUM SERPL-MCNC: 10.2 MG/DL (ref 8.3–10.6)
CALCIUM SERPL-MCNC: 10.3 MG/DL (ref 8.3–10.6)
CALCIUM SERPL-MCNC: 10.3 MG/DL (ref 8.3–10.6)
CALCIUM SERPL-MCNC: 9.8 MG/DL (ref 8.3–10.6)
CHLORIDE BLD-SCNC: 100 MMOL/L (ref 99–110)
CHLORIDE BLD-SCNC: 100 MMOL/L (ref 99–110)
CHLORIDE BLD-SCNC: 102 MMOL/L (ref 99–110)
CHLORIDE BLD-SCNC: 102 MMOL/L (ref 99–110)
CHLORIDE URINE RANDOM: 63 MMOL/L
CLARITY: CLEAR
CO2: 17 MMOL/L (ref 21–32)
CO2: 17 MMOL/L (ref 21–32)
CO2: 19 MMOL/L (ref 21–32)
CO2: 22 MMOL/L (ref 21–32)
COLOR: YELLOW
CREAT SERPL-MCNC: 1.7 MG/DL (ref 0.8–1.3)
CREAT SERPL-MCNC: 1.9 MG/DL (ref 0.8–1.3)
CREAT SERPL-MCNC: 2.2 MG/DL (ref 0.8–1.3)
CREAT SERPL-MCNC: 2.2 MG/DL (ref 0.8–1.3)
CREATININE URINE: 48.8 MG/DL (ref 39–259)
CREATININE URINE: 69 MG/DL (ref 39–259)
EKG ATRIAL RATE: 37 BPM
EKG ATRIAL RATE: 42 BPM
EKG ATRIAL RATE: 82 BPM
EKG ATRIAL RATE: 86 BPM
EKG DIAGNOSIS: NORMAL
EKG P AXIS: 58 DEGREES
EKG P AXIS: 59 DEGREES
EKG P-R INTERVAL: 136 MS
EKG P-R INTERVAL: 224 MS
EKG Q-T INTERVAL: 368 MS
EKG Q-T INTERVAL: 446 MS
EKG Q-T INTERVAL: 496 MS
EKG Q-T INTERVAL: 606 MS
EKG QRS DURATION: 108 MS
EKG QRS DURATION: 144 MS
EKG QRS DURATION: 152 MS
EKG QRS DURATION: 162 MS
EKG QTC CALCULATION (BAZETT): 440 MS
EKG QTC CALCULATION (BAZETT): 470 MS
EKG QTC CALCULATION (BAZETT): 518 MS
EKG QTC CALCULATION (BAZETT): 521 MS
EKG R AXIS: -83 DEGREES
EKG R AXIS: 119 DEGREES
EKG R AXIS: 263 DEGREES
EKG R AXIS: 34 DEGREES
EKG T AXIS: -83 DEGREES
EKG T AXIS: 49 DEGREES
EKG T AXIS: 66 DEGREES
EKG T AXIS: 75 DEGREES
EKG VENTRICULAR RATE: 44 BPM
EKG VENTRICULAR RATE: 54 BPM
EKG VENTRICULAR RATE: 82 BPM
EKG VENTRICULAR RATE: 86 BPM
EOSINOPHILS ABSOLUTE: 0 K/UL (ref 0–0.6)
EOSINOPHILS RELATIVE PERCENT: 0.1 %
EPITHELIAL CELLS, UA: ABNORMAL /HPF
GFR AFRICAN AMERICAN: 35
GFR AFRICAN AMERICAN: 35
GFR AFRICAN AMERICAN: 41
GFR AFRICAN AMERICAN: 47
GFR NON-AFRICAN AMERICAN: 29
GFR NON-AFRICAN AMERICAN: 29
GFR NON-AFRICAN AMERICAN: 34
GFR NON-AFRICAN AMERICAN: 39
GLUCOSE BLD-MCNC: 128 MG/DL (ref 70–99)
GLUCOSE BLD-MCNC: 143 MG/DL (ref 70–99)
GLUCOSE BLD-MCNC: 146 MG/DL (ref 70–99)
GLUCOSE BLD-MCNC: 151 MG/DL (ref 70–99)
GLUCOSE BLD-MCNC: 176 MG/DL (ref 70–99)
GLUCOSE BLD-MCNC: 181 MG/DL (ref 70–99)
GLUCOSE BLD-MCNC: 259 MG/DL (ref 70–99)
GLUCOSE BLD-MCNC: 312 MG/DL (ref 70–99)
GLUCOSE BLD-MCNC: 314 MG/DL (ref 70–99)
GLUCOSE BLD-MCNC: 316 MG/DL (ref 70–99)
GLUCOSE URINE: NEGATIVE MG/DL
HCT VFR BLD CALC: 32.1 % (ref 40.5–52.5)
HEMOGLOBIN: 10.5 G/DL (ref 13.5–17.5)
KETONES, URINE: NEGATIVE MG/DL
LEUKOCYTE ESTERASE, URINE: ABNORMAL
LYMPHOCYTES ABSOLUTE: 0.8 K/UL (ref 1–5.1)
LYMPHOCYTES RELATIVE PERCENT: 11.7 %
MAGNESIUM: 2.1 MG/DL (ref 1.8–2.4)
MAGNESIUM: 2.1 MG/DL (ref 1.8–2.4)
MCH RBC QN AUTO: 25.6 PG (ref 26–34)
MCHC RBC AUTO-ENTMCNC: 32.7 G/DL (ref 31–36)
MCV RBC AUTO: 78.3 FL (ref 80–100)
MICROALBUMIN UR-MCNC: 10.6 MG/DL
MICROALBUMIN/CREAT UR-RTO: 217.2 MG/G (ref 0–30)
MICROSCOPIC EXAMINATION: YES
MONOCYTES ABSOLUTE: 0.6 K/UL (ref 0–1.3)
MONOCYTES RELATIVE PERCENT: 8.6 %
NEUTROPHILS ABSOLUTE: 5.6 K/UL (ref 1.7–7.7)
NEUTROPHILS RELATIVE PERCENT: 79.4 %
NITRITE, URINE: NEGATIVE
OSMOLALITY URINE: 447 MOSM/KG (ref 390–1070)
OSMOLALITY: 309 MOSM/KG (ref 278–305)
PDW BLD-RTO: 16.3 % (ref 12.4–15.4)
PERFORMED ON: ABNORMAL
PH UA: 6 (ref 5–8)
PHOSPHORUS: 2.8 MG/DL (ref 2.5–4.9)
PHOSPHORUS: 3.4 MG/DL (ref 2.5–4.9)
PLATELET # BLD: 315 K/UL (ref 135–450)
PMV BLD AUTO: 7.5 FL (ref 5–10.5)
POTASSIUM SERPL-SCNC: 4.4 MMOL/L (ref 3.5–5.1)
POTASSIUM SERPL-SCNC: 4.5 MMOL/L (ref 3.5–5.1)
POTASSIUM SERPL-SCNC: 5.8 MMOL/L (ref 3.5–5.1)
POTASSIUM SERPL-SCNC: 5.9 MMOL/L (ref 3.5–5.1)
POTASSIUM, UR: 53.6 MMOL/L
PROTEIN PROTEIN: 0.03 G/DL
PROTEIN PROTEIN: 28 MG/DL
PROTEIN UA: 30 MG/DL
RBC # BLD: 4.1 M/UL (ref 4.2–5.9)
RBC UA: ABNORMAL /HPF (ref 0–2)
SODIUM BLD-SCNC: 131 MMOL/L (ref 136–145)
SODIUM BLD-SCNC: 133 MMOL/L (ref 136–145)
SODIUM BLD-SCNC: 134 MMOL/L (ref 136–145)
SODIUM BLD-SCNC: 135 MMOL/L (ref 136–145)
SPECIFIC GRAVITY UA: 1.01 (ref 1–1.03)
TROPONIN: 0.05 NG/ML
TROPONIN: 0.06 NG/ML
UREA NITROGEN, UR: 602.5 MG/DL (ref 800–1666)
URINE TYPE: ABNORMAL
UROBILINOGEN, URINE: 0.2 E.U./DL
WBC # BLD: 7.1 K/UL (ref 4–11)
WBC UA: >100 /HPF (ref 0–5)

## 2019-09-06 PROCEDURE — 6370000000 HC RX 637 (ALT 250 FOR IP): Performed by: INTERNAL MEDICINE

## 2019-09-06 PROCEDURE — 83883 ASSAY NEPHELOMETRY NOT SPEC: CPT

## 2019-09-06 PROCEDURE — 84484 ASSAY OF TROPONIN QUANT: CPT

## 2019-09-06 PROCEDURE — 1200000000 HC SEMI PRIVATE

## 2019-09-06 PROCEDURE — 93005 ELECTROCARDIOGRAM TRACING: CPT | Performed by: INTERNAL MEDICINE

## 2019-09-06 PROCEDURE — 6360000002 HC RX W HCPCS

## 2019-09-06 PROCEDURE — 2580000003 HC RX 258: Performed by: INTERNAL MEDICINE

## 2019-09-06 PROCEDURE — 93010 ELECTROCARDIOGRAM REPORT: CPT | Performed by: INTERNAL MEDICINE

## 2019-09-06 PROCEDURE — 84166 PROTEIN E-PHORESIS/URINE/CSF: CPT

## 2019-09-06 PROCEDURE — 2500000003 HC RX 250 WO HCPCS: Performed by: INTERNAL MEDICINE

## 2019-09-06 PROCEDURE — 83735 ASSAY OF MAGNESIUM: CPT

## 2019-09-06 PROCEDURE — 6370000000 HC RX 637 (ALT 250 FOR IP)

## 2019-09-06 PROCEDURE — 84540 ASSAY OF URINE/UREA-N: CPT

## 2019-09-06 PROCEDURE — 85025 COMPLETE CBC W/AUTO DIFF WBC: CPT

## 2019-09-06 PROCEDURE — 82436 ASSAY OF URINE CHLORIDE: CPT

## 2019-09-06 PROCEDURE — 93005 ELECTROCARDIOGRAM TRACING: CPT

## 2019-09-06 PROCEDURE — 84133 ASSAY OF URINE POTASSIUM: CPT

## 2019-09-06 PROCEDURE — 82043 UR ALBUMIN QUANTITATIVE: CPT

## 2019-09-06 PROCEDURE — 80069 RENAL FUNCTION PANEL: CPT

## 2019-09-06 PROCEDURE — 2580000003 HC RX 258

## 2019-09-06 PROCEDURE — 84156 ASSAY OF PROTEIN URINE: CPT

## 2019-09-06 PROCEDURE — 83930 ASSAY OF BLOOD OSMOLALITY: CPT

## 2019-09-06 PROCEDURE — 76770 US EXAM ABDO BACK WALL COMP: CPT

## 2019-09-06 PROCEDURE — 83935 ASSAY OF URINE OSMOLALITY: CPT

## 2019-09-06 PROCEDURE — 82570 ASSAY OF URINE CREATININE: CPT

## 2019-09-06 PROCEDURE — 84165 PROTEIN E-PHORESIS SERUM: CPT

## 2019-09-06 PROCEDURE — 81001 URINALYSIS AUTO W/SCOPE: CPT

## 2019-09-06 PROCEDURE — 36415 COLL VENOUS BLD VENIPUNCTURE: CPT

## 2019-09-06 PROCEDURE — 99223 1ST HOSP IP/OBS HIGH 75: CPT | Performed by: INTERNAL MEDICINE

## 2019-09-06 PROCEDURE — 84155 ASSAY OF PROTEIN SERUM: CPT

## 2019-09-06 PROCEDURE — 99221 1ST HOSP IP/OBS SF/LOW 40: CPT | Performed by: NURSE PRACTITIONER

## 2019-09-06 RX ORDER — HEPARIN SODIUM 5000 [USP'U]/ML
5000 INJECTION, SOLUTION INTRAVENOUS; SUBCUTANEOUS EVERY 8 HOURS SCHEDULED
Status: DISCONTINUED | OUTPATIENT
Start: 2019-09-06 | End: 2019-09-09 | Stop reason: HOSPADM

## 2019-09-06 RX ORDER — POTASSIUM CHLORIDE 1500 MG/1
20 TABLET, FILM COATED, EXTENDED RELEASE ORAL 2 TIMES DAILY
Status: ON HOLD | COMMUNITY
End: 2019-09-09 | Stop reason: HOSPADM

## 2019-09-06 RX ORDER — ONDANSETRON 2 MG/ML
4 INJECTION INTRAMUSCULAR; INTRAVENOUS EVERY 6 HOURS PRN
Status: DISCONTINUED | OUTPATIENT
Start: 2019-09-06 | End: 2019-09-09 | Stop reason: HOSPADM

## 2019-09-06 RX ORDER — ASPIRIN 81 MG/1
81 TABLET ORAL DAILY
Status: DISCONTINUED | OUTPATIENT
Start: 2019-09-06 | End: 2019-09-09 | Stop reason: HOSPADM

## 2019-09-06 RX ORDER — ISOSORBIDE MONONITRATE 30 MG/1
30 TABLET, EXTENDED RELEASE ORAL DAILY
Status: DISCONTINUED | OUTPATIENT
Start: 2019-09-06 | End: 2019-09-09 | Stop reason: HOSPADM

## 2019-09-06 RX ORDER — HYDRALAZINE HYDROCHLORIDE 10 MG/1
10 TABLET, FILM COATED ORAL EVERY 8 HOURS SCHEDULED
Status: DISCONTINUED | OUTPATIENT
Start: 2019-09-06 | End: 2019-09-09 | Stop reason: HOSPADM

## 2019-09-06 RX ORDER — VERAPAMIL HYDROCHLORIDE 240 MG/1
240 CAPSULE, EXTENDED RELEASE ORAL DAILY
COMMUNITY
End: 2019-10-08 | Stop reason: ALTCHOICE

## 2019-09-06 RX ORDER — NICOTINE POLACRILEX 4 MG
15 LOZENGE BUCCAL PRN
Status: DISCONTINUED | OUTPATIENT
Start: 2019-09-06 | End: 2019-09-09 | Stop reason: HOSPADM

## 2019-09-06 RX ORDER — ACETAMINOPHEN 325 MG/1
650 TABLET ORAL EVERY 4 HOURS PRN
Status: DISCONTINUED | OUTPATIENT
Start: 2019-09-06 | End: 2019-09-09 | Stop reason: HOSPADM

## 2019-09-06 RX ORDER — SODIUM CHLORIDE 0.9 % (FLUSH) 0.9 %
10 SYRINGE (ML) INJECTION EVERY 12 HOURS SCHEDULED
Status: DISCONTINUED | OUTPATIENT
Start: 2019-09-06 | End: 2019-09-09 | Stop reason: HOSPADM

## 2019-09-06 RX ORDER — DEXTROSE MONOHYDRATE 50 MG/ML
100 INJECTION, SOLUTION INTRAVENOUS PRN
Status: DISCONTINUED | OUTPATIENT
Start: 2019-09-06 | End: 2019-09-09 | Stop reason: HOSPADM

## 2019-09-06 RX ORDER — LIDOCAINE 4 G/G
1 PATCH TOPICAL DAILY
Status: DISCONTINUED | OUTPATIENT
Start: 2019-09-06 | End: 2019-09-09 | Stop reason: HOSPADM

## 2019-09-06 RX ORDER — SODIUM CHLORIDE 9 MG/ML
INJECTION, SOLUTION INTRAVENOUS CONTINUOUS
Status: DISCONTINUED | OUTPATIENT
Start: 2019-09-06 | End: 2019-09-06

## 2019-09-06 RX ORDER — DEXTROSE MONOHYDRATE 25 G/50ML
12.5 INJECTION, SOLUTION INTRAVENOUS PRN
Status: DISCONTINUED | OUTPATIENT
Start: 2019-09-06 | End: 2019-09-09 | Stop reason: HOSPADM

## 2019-09-06 RX ORDER — ATORVASTATIN CALCIUM 40 MG/1
40 TABLET, FILM COATED ORAL DAILY
Status: DISCONTINUED | OUTPATIENT
Start: 2019-09-06 | End: 2019-09-09 | Stop reason: HOSPADM

## 2019-09-06 RX ORDER — CLOPIDOGREL BISULFATE 75 MG/1
75 TABLET ORAL DAILY
Status: DISCONTINUED | OUTPATIENT
Start: 2019-09-06 | End: 2019-09-09 | Stop reason: HOSPADM

## 2019-09-06 RX ORDER — DEXTROSE MONOHYDRATE 25 G/50ML
25 INJECTION, SOLUTION INTRAVENOUS ONCE
Status: DISCONTINUED | OUTPATIENT
Start: 2019-09-06 | End: 2019-09-06

## 2019-09-06 RX ORDER — SODIUM CHLORIDE 0.9 % (FLUSH) 0.9 %
10 SYRINGE (ML) INJECTION PRN
Status: DISCONTINUED | OUTPATIENT
Start: 2019-09-06 | End: 2019-09-09 | Stop reason: HOSPADM

## 2019-09-06 RX ADMIN — Medication 10 ML: at 09:23

## 2019-09-06 RX ADMIN — HEPARIN SODIUM 5000 UNITS: 5000 INJECTION INTRAVENOUS; SUBCUTANEOUS at 21:47

## 2019-09-06 RX ADMIN — HYDRALAZINE HYDROCHLORIDE 10 MG: 10 TABLET, FILM COATED ORAL at 21:47

## 2019-09-06 RX ADMIN — INSULIN LISPRO 2 UNITS: 100 INJECTION, SOLUTION INTRAVENOUS; SUBCUTANEOUS at 09:47

## 2019-09-06 RX ADMIN — CEFTRIAXONE 1 G: 1 INJECTION, POWDER, FOR SOLUTION INTRAMUSCULAR; INTRAVENOUS at 04:09

## 2019-09-06 RX ADMIN — INSULIN HUMAN 10 UNITS: 100 INJECTION, SOLUTION PARENTERAL at 05:28

## 2019-09-06 RX ADMIN — INSULIN GLARGINE 10 UNITS: 100 INJECTION, SOLUTION SUBCUTANEOUS at 21:51

## 2019-09-06 RX ADMIN — CALCIUM GLUCONATE 1 G: 98 INJECTION, SOLUTION INTRAVENOUS at 02:51

## 2019-09-06 RX ADMIN — HEPARIN SODIUM 5000 UNITS: 5000 INJECTION INTRAVENOUS; SUBCUTANEOUS at 06:37

## 2019-09-06 RX ADMIN — HYDRALAZINE HYDROCHLORIDE 10 MG: 10 TABLET, FILM COATED ORAL at 06:37

## 2019-09-06 RX ADMIN — ATORVASTATIN CALCIUM 40 MG: 40 TABLET, FILM COATED ORAL at 09:23

## 2019-09-06 RX ADMIN — SODIUM ZIRCONIUM CYCLOSILICATE 10 G: 10 POWDER, FOR SUSPENSION ORAL at 21:47

## 2019-09-06 RX ADMIN — SODIUM BICARBONATE: 84 INJECTION, SOLUTION INTRAVENOUS at 12:46

## 2019-09-06 RX ADMIN — SODIUM CHLORIDE: 9 INJECTION, SOLUTION INTRAVENOUS at 02:03

## 2019-09-06 RX ADMIN — SODIUM ZIRCONIUM CYCLOSILICATE 10 G: 10 POWDER, FOR SUSPENSION ORAL at 12:18

## 2019-09-06 RX ADMIN — INSULIN LISPRO 2 UNITS: 100 INJECTION, SOLUTION INTRAVENOUS; SUBCUTANEOUS at 02:57

## 2019-09-06 RX ADMIN — INSULIN GLARGINE 10 UNITS: 100 INJECTION, SOLUTION SUBCUTANEOUS at 02:57

## 2019-09-06 RX ADMIN — ASPIRIN 81 MG: 81 TABLET, COATED ORAL at 12:46

## 2019-09-06 RX ADMIN — CLOPIDOGREL BISULFATE 75 MG: 75 TABLET ORAL at 09:23

## 2019-09-06 RX ADMIN — HYDRALAZINE HYDROCHLORIDE 10 MG: 10 TABLET, FILM COATED ORAL at 15:14

## 2019-09-06 RX ADMIN — ISOSORBIDE MONONITRATE 30 MG: 30 TABLET, EXTENDED RELEASE ORAL at 09:23

## 2019-09-06 RX ADMIN — HEPARIN SODIUM 5000 UNITS: 5000 INJECTION INTRAVENOUS; SUBCUTANEOUS at 15:14

## 2019-09-06 RX ADMIN — INSULIN LISPRO 2 UNITS: 100 INJECTION, SOLUTION INTRAVENOUS; SUBCUTANEOUS at 18:12

## 2019-09-06 RX ADMIN — INSULIN LISPRO 1 UNITS: 100 INJECTION, SOLUTION INTRAVENOUS; SUBCUTANEOUS at 21:49

## 2019-09-06 RX ADMIN — SODIUM BICARBONATE: 84 INJECTION, SOLUTION INTRAVENOUS at 23:08

## 2019-09-06 ASSESSMENT — PAIN SCALES - GENERAL
PAINLEVEL_OUTOF10: 0
PAINLEVEL_OUTOF10: 4

## 2019-09-06 ASSESSMENT — PAIN DESCRIPTION - ONSET: ONSET: ON-GOING

## 2019-09-06 ASSESSMENT — PAIN DESCRIPTION - DESCRIPTORS: DESCRIPTORS: ACHING

## 2019-09-06 ASSESSMENT — PAIN DESCRIPTION - PROGRESSION: CLINICAL_PROGRESSION: GRADUALLY WORSENING

## 2019-09-06 ASSESSMENT — PAIN DESCRIPTION - PAIN TYPE: TYPE: ACUTE PAIN

## 2019-09-06 ASSESSMENT — PAIN DESCRIPTION - LOCATION: LOCATION: NECK

## 2019-09-06 ASSESSMENT — PAIN DESCRIPTION - FREQUENCY: FREQUENCY: CONTINUOUS

## 2019-09-06 NOTE — H&P
atorvastatin (LIPITOR) 40 MG tablet Take 1 tablet by mouth daily 30 tablet 3    hydrALAZINE (APRESOLINE) 10 MG tablet Take 1 tablet by mouth every 8 hours 90 tablet 3    metoprolol succinate (TOPROL XL) 100 MG extended release tablet Take 1 tablet by mouth daily 30 tablet 3    furosemide (LASIX) 40 MG tablet Take 1 tablet by mouth daily 30 tablet 3    clopidogrel (PLAVIX) 75 MG tablet Take 1 tablet by mouth daily 30 tablet 3    aspirin 81 MG EC tablet Take 1 tablet by mouth daily 30 tablet 3    glipiZIDE (GLUCOTROL) 5 MG tablet TAKE ONE TABLET BY MOUTH ONCE DAILY 60 tablet 5   ·     Allergies:  Patient has no known allergies. Social History:   · TOBACCO:   reports that he has never smoked. He has never used smokeless tobacco.  · ETOH:   reports that he does not drink alcohol. · DRUGS : none  · Patient currently lives with family - wife  ·   Family History:       Problem Relation Age of Onset    High Blood Pressure Mother     High Blood Pressure Father     Heart Disease Father         cad    Cancer Sister    ·       ROS: A 10 point review of systems was conducted, significant findings as noted in HPI.     Physical exam:       Vitals:    09/05/19 2200   BP: 119/75   Pulse:    Resp:    Temp:    SpO2: 100%     General: Alert and Oriented, No acute distress, cooperative, appears stated age  Eye: PERRLA, EOMI, Normal Conjunctiva  HENT: Normocephalic, Normal Hearing, Moist oral mucosa, no pharyngeal edema  Neck: Supple, Non-tender, no carotid bruit, no JVD, no lymphadenopathy, no thyromegaly  Respiratory: Lungs clear to auscultation bilaterally, Non-labored respirations, BS equal, symmetrical expansion, no chest wall tenderness  Cardiovascular: Bradycardic, regular rhythm, no murmurs, no gallops, good pulses in all extremities, normal peripheral perfusion, no edema  Gastrointestinal: Soft, Non-tender, non-distended, normal bowel sounds, no organomegaly  Musculoskeletal: Normal ROM, Normal strength, No tenderness, No swelling, No deformity  Feet: Normal by visual exam, Normal pulses, Sensation intact  Integumentary: Warm, Dry, Pink, Intact, Moist, No pallor, No rash  Neurologic: Alert, Oriented, Normal sensory, Normal motor, No focal defects, CN II-XII intact, Normal DTRs  Psychiatric: Cooperative, appropriate mood and affect, normal judgment, non-suicidal    DATA:    Labs:  CBC:   Recent Labs     09/05/19 2120   WBC 10.6   HGB 10.7*   HCT 32.8*          BMP:   Recent Labs     09/05/19  1051 09/05/19 2120   * 133*   K 5.4* 5.9*   CL 98* 98*   CO2 20* 18*   BUN 44* 51*   CREATININE 2.0* 2.3*   GLUCOSE 179* 267*     LFT's: No results for input(s): AST, ALT, ALB, BILITOT, ALKPHOS in the last 72 hours. Troponin:   Recent Labs     09/05/19 2120   TROPONINI 0.06*     BNP:No results for input(s): BNP in the last 72 hours. ABGs: No results for input(s): PHART, YHR5YRH, PO2ART in the last 72 hours. INR:   Recent Labs     09/05/19 2120   INR 1.13       U/A:  Recent Labs     09/05/19 2228   COLORU Yellow   PHUR 6.0   WBCUA >100*   RBCUA 3-5*   BACTERIA 4+*   CLARITYU Clear   SPECGRAV 1.020   LEUKOCYTESUR MODERATE*   UROBILINOGEN 0.2   BILIRUBINUR Negative   BLOODU SMALL*   GLUCOSEU Negative       XR CHEST STANDARD (2 VW)   Final Result      No acute pulmonary disease. ASSESSMENT AND PLAN:    NISHI on CKD - baseline Cr 1.7-1.9. Today 2.3, BUN 51. Pt hypovolemic on exam. Also with UTI. Given 500 cc NS bolus in ED.  - NS @ 75 cc/h x 7h    Hyperkalemia - 5.9 today. NO EKG changes. On aldactone and KCl tabs at home  - Calcium gluconate 1g  - Holding aldactone, also holding lasix d/t prerenal NISHI  - NS @ 75 cc/h    UTI - LE & nitrite positive. >100 WBC, 4+ bacteria  - Rocephin 1g qd (Day 1/5)    Dyspnea on exertion, likely 2/2 NISHI and over-medication - Bradycardic today in ED. Pt looks dry  - Holding toprol XL    Bradycardia - SSS vs over-mediation with beta-blocker.  Has been bradycardic to 50s on

## 2019-09-06 NOTE — CONSULTS
09/06/19 ECG: Junctional rhythm with IVCD.    09/06/19 ECG: NSR (narrow qrs)    9/5/2019 ECG: NSR, first degree avb, rbbb    7/26/2019 LHC:   Angiographic Findings:  Right dominant system  Left Main:  Normal  Left Anterior Descending:  Mild irregular plaque; no stenosis over 30%  Circumflex:  Mild irregular plaque; no stenosis over 30%  Right Coronary:  Mild irregular plaque; no stenosis over 30%  Left Ventriculogram:  Not performed due to Cr  Femoral Angiogram:  No obstructive plaque  Hemodynamics (mm Hg):  Left Ventricular Pressure:  96/1, 5  Central Aortic Pressure:  102/68 (82)    9/5/19 CXR: No acute pulmonary disease. 7/2019 Nuc Stress:  Overall findings represent a intermediate to high risk scan.    LV is moderately dilated.  LVEF 39%    Moderate size fixed defect involving the mid to distal inferior wall along    with the apex.    ECG: Non-diagnostic EKG response due to failure to reach target heart rate. 6/28/2019 TTE: Severe LVH. EF 35-40%. Global HK, more marked in the inferior wall which is severely hypo-akinetic. Diastolic function is indeterminate. Mitral annular calcification is present.   Mild mitral, pulmonic and aortic regurgitation is present.   The right ventricle is normal in size with thickened walls.   TAPSE measures: 1.22 cm.   RV S velocity measures: 7.94 cm/s.       Current Facility-Administered Medications:     aspirin EC tablet 81 mg, 81 mg, Oral, Daily, Georgiana Gonzalez MD    atorvastatin (LIPITOR) tablet 40 mg, 40 mg, Oral, Daily, Georgiana Gonzalez MD    clopidogrel (PLAVIX) tablet 75 mg, 75 mg, Oral, Daily, Georgiana Gonzalez MD    hydrALAZINE (APRESOLINE) tablet 10 mg, 10 mg, Oral, 3 times per day, Georgiana Gonzalez MD, 10 mg at 09/06/19 0773    insulin glargine (LANTUS) injection pen 10 Units, 10 Units, Subcutaneous, Nightly, Georgiana Gonzalez MD, 10 Units at 09/06/19 0257    isosorbide mononitrate (IMDUR) extended release tablet 30 mg, 30 mg, Oral, Daily, Georgiana Gonzalez MD    sodium
junctional rhythm and IVCD. No LH/dizziness, palpitations or syncope. Issues:  -Abnormal ECst AVB, junctional rhythm and IVCD  -SOB  -chronic HFrEF (EF 35-40); compensated   -Non-obstructive CAD  -HTN  -HLD  -DM  -Acute on chronic renal failure (baseline Cr ~ 1.7-1.9)  -Hyperkalemia   -Elevated troponin: chronically 0.05 since 2019.   -UTI       Plan:    Abnormal ECst ABV junctional rhythm with IVCD. Could be related to hyperkalemia. No BB    No aldactone d/t hyperkalemia    May need pacemaker so that BB can be tolerated. Will discuss with Dr. Alessandra Ritchie. CAD/CHF/HTN/HLD to be managed per Dr. Allegra Wheeler  Patient interviewed, examined and pertinent medical data and imaging studies reviewed. Refer to the NP's note for details of clinical findings, assessment and plan with which I agree. Corrections and additions as noted:     Baseline, patient with a complete right bundle branch block, first-degree AV block  Presented with hyperkalemia (due to acute on chronic kidney injury) and in that setting he have junctional rhythm; Currently, his hyperkalemia is being corrected and he is back to sinus rhythm with right bundle branch block    No indication for pacing    Outpatient Zio monitor for 2 weeks    Follow-up with Ms. Tamy Boswell in 4 weeks    [address]  Office [phone]  [facility]
HCT 32.8* 32.1*    315     Recent Labs     09/05/19  1051 09/05/19  2120 09/06/19  0335   * 133* 133*  131*   K 5.4* 5.9* 5.9*  5.8*   CL 98* 98* 102  100   CO2 20* 18* 17*  17*   BUN 44* 51* 52*  53*   CREATININE 2.0* 2.3* 2.2*  2.2*   GLUCOSE 179* 267* 314*  312*   MG  --   --  2.10  2.10   PHOS  --   --  3.4        Nephrology  [address], [address]  Office: [phone]  Cell: [phone]  Fax: [phone]

## 2019-09-06 NOTE — CARE COORDINATION
[facility]    Spoke with patient regarding [facility] services. CTN spoke to patient to inform him of [facility] services. Patient drives a limited amount, but CTN believes he is homebound by definition. Explained to patient that [facility] goal is to help keep him out of the hospital. Rather than immediately going to ED for non-emergent care, SN would be able to get to their home and evaluate for needs. Patient is not interested, but seemed to be understanding of some services.  Electronically signed by Nataly Medina LPN on 6/1/7914 at 05:14 PM    Nataly Medina LPN  Care Transition Nurse  [address]  [phone]

## 2019-09-06 NOTE — PROGRESS NOTES
LABS:  Lab Results   Component Value Date    LABA1C 10.7 08/19/2019     CBC:   Recent Labs     09/05/19 2120 09/06/19  0335   WBC 10.6 7.1   HGB 10.7* 10.5*   HCT 32.8* 32.1*    315   MCV 78.8* 78.3*     Renal:    Recent Labs     09/05/19 2120 09/06/19 0335 09/06/19  1148   * 133*  131* 135*   K 5.9* 5.9*  5.8* 4.5   CL 98* 102  100 102   CO2 18* 17*  17* 19*   BUN 51* 52*  53* 45*   CREATININE 2.3* 2.2*  2.2* 1.7*   GLUCOSE 267* 314*  312* 128*   CALCIUM 10.2 10.3  10.2 10.3   MG  --  2.10  2.10  --    PHOS  --  3.4  --    ANIONGAP 17* 14  14 14     Hepatic:   Recent Labs     09/06/19  0335   LABALBU 3.7     Troponin:   Recent Labs     09/05/19 2120 09/06/19  0335   TROPONINI 0.06* 0.05*  0.06*     BNP: No results for input(s): BNP in the last 72 hours. Lipids: No results for input(s): CHOL, HDL in the last 72 hours. Invalid input(s): LDLCALCU, TRIGLYCERIDE  ABGs:  No results for input(s): PHART, PMU1HTS, PO2ART, KUN1LCF, BEART, THGBART, C2TNQCGX, RDS1LTO in the last 72 hours. INR:   Recent Labs     09/05/19 2120   INR 1.13     Lactate: No results for input(s): LACTATE in the last 72 hours. Cultures:  -----------------------------------------------------------------  RAD:   US RENAL COMPLETE   Final Result      No hydronephrosis      XR CHEST STANDARD (2 VW)   Final Result      No acute pulmonary disease. Assessment/Plan:   Kathia Muñoz is a 78y. o male with PMH of CHFrEF (35%-40%) who was admitted to the hospital for SOB. NISHI on CKD stage 3  2/2 overdiuresis  Patient Creatine baseline at (5/22/19) 1.9 and since (7/17) to (9/06) has been trending upwards from 2.0 - 2.3   - Cr 1.7 (9/06), BUN 45 and has been steadily increasing since (5/22) was 23.  Patient was hypovolemic on exam.  - Nephrologist consulted (9/06); will give Lokelma x 3, continue to hold aldactone and potassium.  -Start NS at 100 mL/h      Sever Hyperkalemia  Patients baseline on (5/22) 4.0 x2     Uncontrolled IDDM2 - Last HbA1c 10.7% 8/2019. Patients BG levels have been elevated since 5/22 at 136mg/dL and have been steadily up trending to 314 (9/06 @ 0335)  -Diabetic educator consulted for better patient understanding for tight controlled blood glucose. - POCT accuchecks  - Sliding Scale Insulin       CAD   - ASA 81  - Statin  - Holding BB d/t bradycardia     HLD  - continue statin     HTN  - Meds as above     Code Status: Full code  FEN: NS @ 100 cc/h, Carb control/Renal diet  PPX: SQH TID  DISPO: RAMSEY Bazzi, DPM, PGY-1  09/06/19  3:07 PM    This patient has been staffed and discussed with Gaby Malhotra MD.       Patient seen and evaluated with the medical resident. I was physically present during the critical portions of the service when performed by the resident including the assessment and management of the patient. He has multiple acute ongoing medical issues which include acute kidney injury on background of chronic kidney disease stage III with severe hyperkalemia  Symptomatic bradycardia  Chronic's severe systolic heart failure  Suspected UTI    At this point will start low Calma  Hold diuretics Aldactone  Slow IV fluids  Strict intake and output  Will hold metoprolol  Cardiac monitor  We will closely monitor this patient given his poor EF and bradycardia high risk for deterioration    I agree with the findings and plans as described.         Gaby Mlahotra MD  Hospitalist

## 2019-09-06 NOTE — DISCHARGE SUMMARY
Hospital Medicine Discharge Summary       Patient: Krishan Arroyo     MRN: [id number]  Gender: male  : 1940   Age: 78 y.o. Code Status: Full Code     Admit Date: 2019   Discharge Date:   2019  Disposition:  Home    Primary Care Provider: Isela Coombs MD    Admitting Physician: Jeffrey Hay MD  Discharge Physician: Kingston Lazar DPM            Discharge Diagnoses:    Patient Active Problem List   Diagnosis    Controlled type 2 diabetes mellitus with stage 3 chronic kidney disease, without long-term current use of insulin ([facility]    Benign essential HTN    Pure hypercholesterolemia    Iron deficiency anemia due to chronic blood loss    Primary osteoarthritis of both knees    Vitamin D deficiency    Abdominal pain    Right BBB/left ant fasc block    Abnormal EKG    Chronic renal failure, stage 3 (moderate) ([address]    Acute combined systolic and diastolic congestive heart failure (HCC)    Acute congestive heart failure (HCC)    Dilated cardiomyopathy (HCC)    Chronic systolic CHF (congestive heart failure) ([facility]    Mixed hyperlipidemia    Chronic kidney disease    S/P coronary angiogram    Hyperkalemia    Shortness of breath    Acute renal failure (HCC)          HPI on admission:  80 yo [address] (35-40%), IDDM2, CAD, HLD, CKD, HTN p/w dyspnea on exertion. Pt went to PCP's office today for a check up and was told if he experienced any SOB while walking around to go to the ED for evaluation. Later in the day he walked outside to the [address] and became dyspneic, had to sit down for a couple minutes to regain breath then walked back inside, again becoming dyspneic and needing to sit down. Wife brought him to ED for evaluation. Patient repeatedly said he believes he's on too many medications.  For the past day he has not been urinating as much as he used to, but no complaints of dysuria, fullness, or foul smelling urine. No complaints of fever, chills, headache,

## 2019-09-06 NOTE — CARE COORDINATION
Case Management Assessment           Initial Evaluation                Date / Time of Evaluation: 9/6/2019 3:58 PM                 Assessment Completed by: Ledell Jagdeep Day    Patient Name: Josh Anderson     YOB: 1940  Diagnosis: Hyperkalemia [E87.5]  Hyperkalemia [E87.5]     Date / Time: 9/5/2019  7:07 PM    Patient Admission Status: Inpatient    If patient is discharged prior to next notation, then this note serves as note for discharge by case management. Current PCP: Arthur Varghese MD  Clinic Patient: No    Chart Reviewed: Yes  Patient/ Family Interviewed: Yes    Initial assessment completed at bedside with: Patient     Hospitalization in the last 30 days: No    Emergency Contacts:  Extended Emergency Contact Information  Primary Emergency Contact: Batsheva Becerra  Address: [address] Phone: [phone]  Mobile Phone: [phone]  Relation: Spouse  Secondary Emergency Contact: [address] [place] [address], [address] [place] [address] Phone: [phone]  Relation: Child    Advance Directives:   Code Status: Full Code    Healthcare Power of : No    Financial:  Payor: Gladys Marshall / Plan: [place] / Product Type: *No Product type* /     Pre-cert required for SNF: No    Pharmacy:    [facility]  [phone] Libra Howard [phone]  [address] [place] [address] [address]  Phone: [phone] Fax: [phone]      Potential assistance Purchasing Medications: Potential Assistance Purchasing Medications: No  Does Patient want to participate in local refill/ meds to beds program?: No    Meds To Beds General Rules:  1. Can ONLY be done Monday- Friday between 8:30am-5pm  2. Prescription(s) must be in pharmacy by 3pm to be filled same day  3. Copy of patient's insurance/ prescription drug card and patient face sheet must be sent along with the prescription(s)  4. Cost of Rx cannot be added to hospital bill. If financial assistance is needed, please contact unit  or ;  or  CANNOT provide pharmacy voucher for patients co-pays  5. Patients can then  the prescription on their way out of the hospital at discharge, or pharmacy can deliver to the bedside if staff is available. (payment due at time of pick-up or delivery - cash, check, or card accepted)     Able to afford home medications/ co-pay costs: Yes    ADLS:  Support Systems: Family Members, Spouse/Significant Other    PT AM-PAC:   /24  OT AM-PAC:   /24    Housing:  Home Environment: Home with spouse. Steps: yes     Plans to RETURN to current housing: Yes  Barrier(s) to RETURNING to current housing: None at this time. Home Care Information:  Currently ACTIVE with Home Health Care: No  Home Care Agency: Not Applicable    Currently ACTIVE with [place] on Aging: No    Durable Medical Equipment:  DME Provider: unknown   Equipment: cane and walker, but does not use. Home Oxygen and Respiratory Equipment:  Has HOME OXYGEN prior to admission: No  Oxygen Company: Not Applicable    \  Dialysis:  Active with HD/PD prior to admission: No    DISCHARGE PLAN:  Disposition: Home    Transportation PLAN for discharge: family     Factors facilitating achievement of predicted outcomes: Family support, Cooperative, Pleasant and Has needed Durable Medical Equipment at home    Barriers to discharge: Refuses home health care. Additional Case Management Notes: SW met with patient at bedside. Patient stated he resides at home with his wife. Patient reported he is not active with any home care services. Patient has a cane and walker, but is not using the from ambulation. SW offered home care services and patient declined. He does not feel he needs them at this time. Spouse to provide transportation at discharge. IMM letter completed and copy at bedside. SW provided contact information to patient/family and placed information on white board in room should needs arise. No other needs noted at this time. Saige Perez and his family were provided with choice of provider; he and his family are in agreement with the discharge plan.     Care Transition patient: Harriet    Hannah Michael, MSW  [facility]  Case Management Department  Ph: [phone]   Fax: [phone]

## 2019-09-07 LAB
ALBUMIN SERPL-MCNC: 3.4 G/DL (ref 3.4–5)
ANION GAP SERPL CALCULATED.3IONS-SCNC: 11 MMOL/L (ref 3–16)
ANION GAP SERPL CALCULATED.3IONS-SCNC: 12 MMOL/L (ref 3–16)
ANION GAP SERPL CALCULATED.3IONS-SCNC: 13 MMOL/L (ref 3–16)
BASOPHILS ABSOLUTE: 0 K/UL (ref 0–0.2)
BASOPHILS RELATIVE PERCENT: 0.3 %
BUN BLDV-MCNC: 28 MG/DL (ref 7–20)
BUN BLDV-MCNC: 29 MG/DL (ref 7–20)
BUN BLDV-MCNC: 36 MG/DL (ref 7–20)
CALCIUM SERPL-MCNC: 10 MG/DL (ref 8.3–10.6)
CALCIUM SERPL-MCNC: 9.6 MG/DL (ref 8.3–10.6)
CALCIUM SERPL-MCNC: 9.9 MG/DL (ref 8.3–10.6)
CHLORIDE BLD-SCNC: 96 MMOL/L (ref 99–110)
CHLORIDE BLD-SCNC: 96 MMOL/L (ref 99–110)
CHLORIDE BLD-SCNC: 98 MMOL/L (ref 99–110)
CO2: 24 MMOL/L (ref 21–32)
CO2: 27 MMOL/L (ref 21–32)
CO2: 27 MMOL/L (ref 21–32)
CREAT SERPL-MCNC: 1.7 MG/DL (ref 0.8–1.3)
CREAT SERPL-MCNC: 1.8 MG/DL (ref 0.8–1.3)
CREAT SERPL-MCNC: 1.9 MG/DL (ref 0.8–1.3)
EOSINOPHILS ABSOLUTE: 0.1 K/UL (ref 0–0.6)
EOSINOPHILS RELATIVE PERCENT: 1.4 %
GFR AFRICAN AMERICAN: 41
GFR AFRICAN AMERICAN: 44
GFR AFRICAN AMERICAN: 47
GFR NON-AFRICAN AMERICAN: 34
GFR NON-AFRICAN AMERICAN: 36
GFR NON-AFRICAN AMERICAN: 39
GLUCOSE BLD-MCNC: 135 MG/DL (ref 70–99)
GLUCOSE BLD-MCNC: 140 MG/DL (ref 70–99)
GLUCOSE BLD-MCNC: 143 MG/DL (ref 70–99)
GLUCOSE BLD-MCNC: 147 MG/DL (ref 70–99)
GLUCOSE BLD-MCNC: 205 MG/DL (ref 70–99)
GLUCOSE BLD-MCNC: 256 MG/DL (ref 70–99)
GLUCOSE BLD-MCNC: 96 MG/DL (ref 70–99)
HCT VFR BLD CALC: 33.6 % (ref 40.5–52.5)
HEMOGLOBIN: 11.2 G/DL (ref 13.5–17.5)
KAPPA, FREE LIGHT CHAINS, SERUM: 37.61 MG/L (ref 3.3–19.4)
KAPPA/LAMBDA RATIO: 1.69 (ref 0.26–1.65)
KAPPA/LAMBDA TEST COMMENT: ABNORMAL
LAMBDA, FREE LIGHT CHAINS, SERUM: 22.25 MG/L (ref 5.71–26.3)
LYMPHOCYTES ABSOLUTE: 1.1 K/UL (ref 1–5.1)
LYMPHOCYTES RELATIVE PERCENT: 17 %
MAGNESIUM: 1.9 MG/DL (ref 1.8–2.4)
MCH RBC QN AUTO: 25.7 PG (ref 26–34)
MCHC RBC AUTO-ENTMCNC: 33.4 G/DL (ref 31–36)
MCV RBC AUTO: 76.7 FL (ref 80–100)
MONOCYTES ABSOLUTE: 0.7 K/UL (ref 0–1.3)
MONOCYTES RELATIVE PERCENT: 10.5 %
NEUTROPHILS ABSOLUTE: 4.8 K/UL (ref 1.7–7.7)
NEUTROPHILS RELATIVE PERCENT: 70.8 %
PDW BLD-RTO: 15.9 % (ref 12.4–15.4)
PERFORMED ON: ABNORMAL
PERFORMED ON: NORMAL
PHOSPHORUS: 3.2 MG/DL (ref 2.5–4.9)
PLATELET # BLD: 328 K/UL (ref 135–450)
PMV BLD AUTO: 7.6 FL (ref 5–10.5)
POTASSIUM SERPL-SCNC: 4 MMOL/L (ref 3.5–5.1)
POTASSIUM SERPL-SCNC: 4.4 MMOL/L (ref 3.5–5.1)
POTASSIUM SERPL-SCNC: 4.4 MMOL/L (ref 3.5–5.1)
RBC # BLD: 4.38 M/UL (ref 4.2–5.9)
SODIUM BLD-SCNC: 133 MMOL/L (ref 136–145)
SODIUM BLD-SCNC: 135 MMOL/L (ref 136–145)
SODIUM BLD-SCNC: 136 MMOL/L (ref 136–145)
WBC # BLD: 6.8 K/UL (ref 4–11)

## 2019-09-07 PROCEDURE — 6360000002 HC RX W HCPCS

## 2019-09-07 PROCEDURE — 85025 COMPLETE CBC W/AUTO DIFF WBC: CPT

## 2019-09-07 PROCEDURE — 6360000002 HC RX W HCPCS: Performed by: PODIATRIST

## 2019-09-07 PROCEDURE — 80069 RENAL FUNCTION PANEL: CPT

## 2019-09-07 PROCEDURE — 51798 US URINE CAPACITY MEASURE: CPT

## 2019-09-07 PROCEDURE — 99233 SBSQ HOSP IP/OBS HIGH 50: CPT | Performed by: NURSE PRACTITIONER

## 2019-09-07 PROCEDURE — 1200000000 HC SEMI PRIVATE

## 2019-09-07 PROCEDURE — 2580000003 HC RX 258

## 2019-09-07 PROCEDURE — 83735 ASSAY OF MAGNESIUM: CPT

## 2019-09-07 PROCEDURE — 36415 COLL VENOUS BLD VENIPUNCTURE: CPT

## 2019-09-07 PROCEDURE — 6370000000 HC RX 637 (ALT 250 FOR IP)

## 2019-09-07 PROCEDURE — 2580000003 HC RX 258: Performed by: INTERNAL MEDICINE

## 2019-09-07 RX ORDER — FUROSEMIDE 10 MG/ML
20 INJECTION INTRAMUSCULAR; INTRAVENOUS ONCE
Status: COMPLETED | OUTPATIENT
Start: 2019-09-07 | End: 2019-09-07

## 2019-09-07 RX ORDER — SODIUM CHLORIDE 9 MG/ML
INJECTION, SOLUTION INTRAVENOUS CONTINUOUS
Status: DISCONTINUED | OUTPATIENT
Start: 2019-09-07 | End: 2019-09-08

## 2019-09-07 RX ADMIN — FUROSEMIDE 20 MG: 10 INJECTION, SOLUTION INTRAMUSCULAR; INTRAVENOUS at 10:11

## 2019-09-07 RX ADMIN — HYDRALAZINE HYDROCHLORIDE 10 MG: 10 TABLET, FILM COATED ORAL at 15:23

## 2019-09-07 RX ADMIN — ISOSORBIDE MONONITRATE 30 MG: 30 TABLET, EXTENDED RELEASE ORAL at 10:13

## 2019-09-07 RX ADMIN — HEPARIN SODIUM 5000 UNITS: 5000 INJECTION INTRAVENOUS; SUBCUTANEOUS at 21:44

## 2019-09-07 RX ADMIN — INSULIN LISPRO 4 UNITS: 100 INJECTION, SOLUTION INTRAVENOUS; SUBCUTANEOUS at 18:09

## 2019-09-07 RX ADMIN — HEPARIN SODIUM 5000 UNITS: 5000 INJECTION INTRAVENOUS; SUBCUTANEOUS at 07:03

## 2019-09-07 RX ADMIN — CLOPIDOGREL BISULFATE 75 MG: 75 TABLET ORAL at 10:13

## 2019-09-07 RX ADMIN — HEPARIN SODIUM 5000 UNITS: 5000 INJECTION INTRAVENOUS; SUBCUTANEOUS at 15:23

## 2019-09-07 RX ADMIN — CEFTRIAXONE 1 G: 1 INJECTION, POWDER, FOR SOLUTION INTRAMUSCULAR; INTRAVENOUS at 02:29

## 2019-09-07 RX ADMIN — HYDRALAZINE HYDROCHLORIDE 10 MG: 10 TABLET, FILM COATED ORAL at 07:02

## 2019-09-07 RX ADMIN — SODIUM CHLORIDE: 9 INJECTION, SOLUTION INTRAVENOUS at 10:59

## 2019-09-07 RX ADMIN — SODIUM CHLORIDE: 9 INJECTION, SOLUTION INTRAVENOUS at 22:09

## 2019-09-07 RX ADMIN — INSULIN GLARGINE 10 UNITS: 100 INJECTION, SOLUTION SUBCUTANEOUS at 22:12

## 2019-09-07 RX ADMIN — ASPIRIN 81 MG: 81 TABLET, COATED ORAL at 10:12

## 2019-09-07 RX ADMIN — ATORVASTATIN CALCIUM 40 MG: 40 TABLET, FILM COATED ORAL at 22:08

## 2019-09-07 RX ADMIN — INSULIN LISPRO 2 UNITS: 100 INJECTION, SOLUTION INTRAVENOUS; SUBCUTANEOUS at 22:11

## 2019-09-07 RX ADMIN — Medication 10 ML: at 10:14

## 2019-09-07 ASSESSMENT — PAIN SCALES - GENERAL
PAINLEVEL_OUTOF10: 0

## 2019-09-07 NOTE — PROGRESS NOTES
upwards from 2.0 - 2.3. Renal ultrasound no hydronephrosis and normal kidneys size b/l. Dr. Molly Franklin recommended to continue to give IVF and if Cr has not changed he is OK for discharge tomorrow.  -Patient Cr 1.9 this AM.  -Patient BUN has been down trending. -BUN 28 (9/07), and has been steadily down trending.   -Nephrologist following (9/06); Lokelma x 3, and continue to hold aldactone and potassium.  -Continue NS at 100 mL/h; GFR at baseline  -Hold Lasix per Dr. Molly Franklin. Hyperkalemia  Patients baseline on (5/22) 4.0 and has been in the 3's - 4's. Up trending occurred (9/05) 5.4 and increased to 5.9 (9/06 @1148)   On admission was 5.9 . NO EKG changes. On aldactone and KCl tabs at home.  -Potassium 4.4mmol/L (9/07 @ 0941)  -Continue to hold aldactone, also holding lasix d/t prerenal NISHI. - Per Nephrologist hold lasix     UTI   Patient has been voiding without complications or difficulty. - UA culture (9/05); grew Klebsiella oxytoca - Ceftriaxone given for coverage.  - Continue Ceftriaxone 1g qd (started 9/05)         Dyspnea on exertion, likely 2/2 NISHI and over-medication - Bradycardic in ED (9/05). Patient looks better today and less dry has no shortness of breath during the examination.  - Continue to hold toprol XL     Bradycardia - SSS vs over-mediation with beta-blocker. Patent with history of bifascicular block and has been bradycardic to 50s on previous office visits (Dr. Luke Hill) over the last month. Patient has been in the mid 60's to 90s and has not dropped below 50's while on the floors. Patient has been feeling better and wants to know when he can go home. Cardiologist and Electrophysiologist following.  -Cardiologist recommendations (9/6); f/u with Dr. Isaiah Ventura as an outpatient. Hold Lasix per Dr. Edward Tavarez recommendation.  -Electrophysiologist recommendation; Hold BB, ACEI/ARB and consider low dose carvedilol. Will need follow up appointment.   - Holding metoprolol   - Hold aldactone     Chronic systolic

## 2019-09-08 LAB
ALBUMIN SERPL-MCNC: 3.1 G/DL (ref 3.4–5)
ANION GAP SERPL CALCULATED.3IONS-SCNC: 11 MMOL/L (ref 3–16)
BASOPHILS ABSOLUTE: 0 K/UL (ref 0–0.2)
BASOPHILS RELATIVE PERCENT: 0.4 %
BUN BLDV-MCNC: 23 MG/DL (ref 7–20)
CALCIUM SERPL-MCNC: 9.5 MG/DL (ref 8.3–10.6)
CHLORIDE BLD-SCNC: 101 MMOL/L (ref 99–110)
CO2: 27 MMOL/L (ref 21–32)
CREAT SERPL-MCNC: 1.7 MG/DL (ref 0.8–1.3)
EOSINOPHILS ABSOLUTE: 0.1 K/UL (ref 0–0.6)
EOSINOPHILS RELATIVE PERCENT: 1.5 %
GFR AFRICAN AMERICAN: 47
GFR NON-AFRICAN AMERICAN: 39
GLUCOSE BLD-MCNC: 131 MG/DL (ref 70–99)
GLUCOSE BLD-MCNC: 136 MG/DL (ref 70–99)
GLUCOSE BLD-MCNC: 145 MG/DL (ref 70–99)
GLUCOSE BLD-MCNC: 215 MG/DL (ref 70–99)
GLUCOSE BLD-MCNC: 231 MG/DL (ref 70–99)
HCT VFR BLD CALC: 31.6 % (ref 40.5–52.5)
HEMOGLOBIN: 10.5 G/DL (ref 13.5–17.5)
LYMPHOCYTES ABSOLUTE: 1.3 K/UL (ref 1–5.1)
LYMPHOCYTES RELATIVE PERCENT: 20.5 %
MAGNESIUM: 1.7 MG/DL (ref 1.8–2.4)
MCH RBC QN AUTO: 25.5 PG (ref 26–34)
MCHC RBC AUTO-ENTMCNC: 33.3 G/DL (ref 31–36)
MCV RBC AUTO: 76.6 FL (ref 80–100)
MONOCYTES ABSOLUTE: 0.9 K/UL (ref 0–1.3)
MONOCYTES RELATIVE PERCENT: 14.1 %
NEUTROPHILS ABSOLUTE: 4 K/UL (ref 1.7–7.7)
NEUTROPHILS RELATIVE PERCENT: 63.5 %
ORGANISM: ABNORMAL
PDW BLD-RTO: 16.2 % (ref 12.4–15.4)
PERFORMED ON: ABNORMAL
PHOSPHORUS: 3.1 MG/DL (ref 2.5–4.9)
PLATELET # BLD: 319 K/UL (ref 135–450)
PMV BLD AUTO: 7.5 FL (ref 5–10.5)
POTASSIUM SERPL-SCNC: 3.9 MMOL/L (ref 3.5–5.1)
RBC # BLD: 4.12 M/UL (ref 4.2–5.9)
SODIUM BLD-SCNC: 139 MMOL/L (ref 136–145)
URINE CULTURE, ROUTINE: ABNORMAL
WBC # BLD: 6.3 K/UL (ref 4–11)

## 2019-09-08 PROCEDURE — 6360000002 HC RX W HCPCS: Performed by: PODIATRIST

## 2019-09-08 PROCEDURE — 2580000003 HC RX 258: Performed by: PODIATRIST

## 2019-09-08 PROCEDURE — 2580000003 HC RX 258: Performed by: INTERNAL MEDICINE

## 2019-09-08 PROCEDURE — 85025 COMPLETE CBC W/AUTO DIFF WBC: CPT

## 2019-09-08 PROCEDURE — 6370000000 HC RX 637 (ALT 250 FOR IP)

## 2019-09-08 PROCEDURE — 6360000002 HC RX W HCPCS

## 2019-09-08 PROCEDURE — 6370000000 HC RX 637 (ALT 250 FOR IP): Performed by: STUDENT IN AN ORGANIZED HEALTH CARE EDUCATION/TRAINING PROGRAM

## 2019-09-08 PROCEDURE — 2580000003 HC RX 258

## 2019-09-08 PROCEDURE — 1200000000 HC SEMI PRIVATE

## 2019-09-08 PROCEDURE — 83735 ASSAY OF MAGNESIUM: CPT

## 2019-09-08 PROCEDURE — 36415 COLL VENOUS BLD VENIPUNCTURE: CPT

## 2019-09-08 PROCEDURE — 80069 RENAL FUNCTION PANEL: CPT

## 2019-09-08 PROCEDURE — 99233 SBSQ HOSP IP/OBS HIGH 50: CPT | Performed by: NURSE PRACTITIONER

## 2019-09-08 RX ORDER — METOPROLOL SUCCINATE 25 MG/1
12.5 TABLET, EXTENDED RELEASE ORAL DAILY
Status: DISCONTINUED | OUTPATIENT
Start: 2019-09-08 | End: 2019-09-09 | Stop reason: HOSPADM

## 2019-09-08 RX ADMIN — HYDRALAZINE HYDROCHLORIDE 10 MG: 10 TABLET, FILM COATED ORAL at 05:21

## 2019-09-08 RX ADMIN — CEFTRIAXONE 1 G: 1 INJECTION, POWDER, FOR SOLUTION INTRAMUSCULAR; INTRAVENOUS at 05:21

## 2019-09-08 RX ADMIN — HYDRALAZINE HYDROCHLORIDE 10 MG: 10 TABLET, FILM COATED ORAL at 13:15

## 2019-09-08 RX ADMIN — ATORVASTATIN CALCIUM 40 MG: 40 TABLET, FILM COATED ORAL at 23:32

## 2019-09-08 RX ADMIN — INSULIN LISPRO 2 UNITS: 100 INJECTION, SOLUTION INTRAVENOUS; SUBCUTANEOUS at 23:33

## 2019-09-08 RX ADMIN — ACETAMINOPHEN 650 MG: 325 TABLET ORAL at 12:21

## 2019-09-08 RX ADMIN — CLOPIDOGREL BISULFATE 75 MG: 75 TABLET ORAL at 10:37

## 2019-09-08 RX ADMIN — HEPARIN SODIUM 5000 UNITS: 5000 INJECTION INTRAVENOUS; SUBCUTANEOUS at 13:14

## 2019-09-08 RX ADMIN — METOPROLOL SUCCINATE 12.5 MG: 25 TABLET, EXTENDED RELEASE ORAL at 16:50

## 2019-09-08 RX ADMIN — Medication 10 ML: at 23:33

## 2019-09-08 RX ADMIN — ASPIRIN 81 MG: 81 TABLET, COATED ORAL at 10:37

## 2019-09-08 RX ADMIN — HEPARIN SODIUM 5000 UNITS: 5000 INJECTION INTRAVENOUS; SUBCUTANEOUS at 23:33

## 2019-09-08 RX ADMIN — SODIUM CHLORIDE: 9 INJECTION, SOLUTION INTRAVENOUS at 12:18

## 2019-09-08 RX ADMIN — HYDRALAZINE HYDROCHLORIDE 10 MG: 10 TABLET, FILM COATED ORAL at 23:32

## 2019-09-08 RX ADMIN — INSULIN GLARGINE 10 UNITS: 100 INJECTION, SOLUTION SUBCUTANEOUS at 23:33

## 2019-09-08 RX ADMIN — HEPARIN SODIUM 5000 UNITS: 5000 INJECTION INTRAVENOUS; SUBCUTANEOUS at 05:21

## 2019-09-08 RX ADMIN — INSULIN LISPRO 4 UNITS: 100 INJECTION, SOLUTION INTRAVENOUS; SUBCUTANEOUS at 18:03

## 2019-09-08 RX ADMIN — ISOSORBIDE MONONITRATE 30 MG: 30 TABLET, EXTENDED RELEASE ORAL at 10:37

## 2019-09-08 ASSESSMENT — ENCOUNTER SYMPTOMS
GASTROINTESTINAL NEGATIVE: 1
RESPIRATORY NEGATIVE: 1

## 2019-09-08 ASSESSMENT — PAIN SCALES - GENERAL
PAINLEVEL_OUTOF10: 0
PAINLEVEL_OUTOF10: 5
PAINLEVEL_OUTOF10: 0

## 2019-09-08 NOTE — PLAN OF CARE
D: No c/o c/p or SOB overnight. Slept overnight without any difficulties. Voided 500ml overnight and IV flds NS @ 75ml infusing. Instructed on fall protocol, bed alarm, & non skid socks.    A: Cont to monitor during hourly rounds

## 2019-09-08 NOTE — PROGRESS NOTES
chronic kidney disease     HPI :     Manuela Robin is a 78 y.o. male presented to   the hospital on 9/5/2019 with shortness of breath    He has past medical history of congestive heart failure with a EF of 35%, diabetes for many years with no history of diabetic retinopathy, hyperlipidemia, stage III chronic kidney disease, hypertension presented with shortness of breath. He denied fever, chills, chest pain, dizziness, headache. He did have only shortness of breath and was told by PCP to go to the ER. He was admitted for possible UTI and was placed on ceftriaxone. He was also bradycardic and cardiology was consulted. Metoprolol was held. Lasix, spironolactone was also stopped in the setting of acute kidney injury. His creatinine was 2.3 with a BUN of 51. Cardiology evaluated for first-degree    When he arrived his creatinine was 2.0. Baseline creatinine is 1.9. He has chronic kidney disease at least since 2010. At home he was on spironolactone, potassium, verapamil and Lasix. Interval History:     Junctional rhythm.  IVCD. He may need a pacemaker. Aldactone was stopped. EP was consulted. Blood pressure is controlled  Urine output is 1.7 L    ROS:     Seen with-wife    positives in bold   Constitutional:  fever, chills, weakness, weight change, fatigue  Skin:  rash, pruritus, hair loss, bruising, dry skin, petechiae  Head, Face, Neck   headaches, swelling,  cervical adenopathy  Respiratory: shortness of breath, cough, or wheezing  Cardiovascular: chest pain, palpitations, dizzy, edema  Gastrointestinal: nausea, vomiting, diarrhea, constipation,belly pain    Yellow skin, blood in stool  Musculoskeletal:  back pain, muscle weakness, gait problems,       joint pain or swelling. Genitourinary:  dysuria, poor urine flow, flank pain, blood in urine  Neurologic:  vertigo, TIA'S, syncope, seizures, focal weakness  Psychosocial:  insomnia, anxiety, or depression.   Additional positive findings:

## 2019-09-08 NOTE — PROGRESS NOTES
GLUCOSEU Negative       US RENAL COMPLETE   Final Result      No hydronephrosis      XR CHEST STANDARD (2 VW)   Final Result      No acute pulmonary disease.                ASSESSMENT AND PLAN:     ACUTE PROBLEMS    Bradycardia-Junctional rhythm now normal sinus  -EP following  -Outpatient monitor per EP  -recommend low dose BB    Hyperkalemia-resolved    NISHI on CKD-Improving  -Held home aldactone  -Held lasix  -IV fluids    UTI-klebsiella  -Rocephin Day 3 of 7  -Can switch to cefdinir on dc    CHRONIC PROBLEMS    Systolic heart failure not in exacerbation  -Held home diuretics  -Held BB    DM II  -Lantus nightly  -SSI    HTN  -Hydralazine, Imdur    Will discuss with attending physician Dr. Kristen Roberson    Code Status: Full  FEN: Cardiac  PPX: Heparin  DISPO: BRI Marquez MD, PGY-3  [employer]  9/8/2019,  12:12 PM

## 2019-09-09 VITALS
HEIGHT: 71 IN | SYSTOLIC BLOOD PRESSURE: 147 MMHG | BODY MASS INDEX: 22.99 KG/M2 | DIASTOLIC BLOOD PRESSURE: 51 MMHG | TEMPERATURE: 97.9 F | HEART RATE: 79 BPM | WEIGHT: 164.24 LBS | OXYGEN SATURATION: 98 % | RESPIRATION RATE: 16 BRPM

## 2019-09-09 LAB
ALBUMIN SERPL-MCNC: 2.7 G/DL (ref 3.1–4.9)
ALBUMIN SERPL-MCNC: 3.2 G/DL (ref 3.4–5)
ALPHA-1-GLOBULIN: 0.4 G/DL (ref 0.2–0.4)
ALPHA-2-GLOBULIN: 1.2 G/DL (ref 0.4–1.1)
ANION GAP SERPL CALCULATED.3IONS-SCNC: 10 MMOL/L (ref 3–16)
BASOPHILS ABSOLUTE: 0 K/UL (ref 0–0.2)
BASOPHILS RELATIVE PERCENT: 0.3 %
BETA GLOBULIN: 1.2 G/DL (ref 0.9–1.6)
BUN BLDV-MCNC: 19 MG/DL (ref 7–20)
CALCIUM SERPL-MCNC: 9.5 MG/DL (ref 8.3–10.6)
CHLORIDE BLD-SCNC: 104 MMOL/L (ref 99–110)
CO2: 25 MMOL/L (ref 21–32)
CREAT SERPL-MCNC: 1.6 MG/DL (ref 0.8–1.3)
EOSINOPHILS ABSOLUTE: 0.1 K/UL (ref 0–0.6)
EOSINOPHILS RELATIVE PERCENT: 1.7 %
GAMMA GLOBULIN: 1.1 G/DL (ref 0.6–1.8)
GFR AFRICAN AMERICAN: 50
GFR NON-AFRICAN AMERICAN: 42
GLUCOSE BLD-MCNC: 122 MG/DL (ref 70–99)
GLUCOSE BLD-MCNC: 133 MG/DL (ref 70–99)
GLUCOSE BLD-MCNC: 136 MG/DL (ref 70–99)
HCT VFR BLD CALC: 31.1 % (ref 40.5–52.5)
HEMOGLOBIN: 10.1 G/DL (ref 13.5–17.5)
LYMPHOCYTES ABSOLUTE: 1.2 K/UL (ref 1–5.1)
LYMPHOCYTES RELATIVE PERCENT: 20.4 %
MAGNESIUM: 1.9 MG/DL (ref 1.8–2.4)
MCH RBC QN AUTO: 25.3 PG (ref 26–34)
MCHC RBC AUTO-ENTMCNC: 32.6 G/DL (ref 31–36)
MCV RBC AUTO: 77.5 FL (ref 80–100)
MONOCYTES ABSOLUTE: 0.9 K/UL (ref 0–1.3)
MONOCYTES RELATIVE PERCENT: 15.8 %
NEUTROPHILS ABSOLUTE: 3.6 K/UL (ref 1.7–7.7)
NEUTROPHILS RELATIVE PERCENT: 61.8 %
PDW BLD-RTO: 16.4 % (ref 12.4–15.4)
PERFORMED ON: ABNORMAL
PERFORMED ON: ABNORMAL
PHOSPHORUS: 2.9 MG/DL (ref 2.5–4.9)
PLATELET # BLD: 348 K/UL (ref 135–450)
PMV BLD AUTO: 7.5 FL (ref 5–10.5)
POTASSIUM SERPL-SCNC: 3.9 MMOL/L (ref 3.5–5.1)
RBC # BLD: 4.01 M/UL (ref 4.2–5.9)
SODIUM BLD-SCNC: 139 MMOL/L (ref 136–145)
SPE/IFE INTERPRETATION: NORMAL
TOTAL PROTEIN: 6.5 G/DL (ref 6.4–8.2)
URINE ELECTROPHORESIS INTERP: NORMAL
WBC # BLD: 5.9 K/UL (ref 4–11)

## 2019-09-09 PROCEDURE — 85025 COMPLETE CBC W/AUTO DIFF WBC: CPT

## 2019-09-09 PROCEDURE — 6370000000 HC RX 637 (ALT 250 FOR IP)

## 2019-09-09 PROCEDURE — 2580000003 HC RX 258: Performed by: PODIATRIST

## 2019-09-09 PROCEDURE — 6360000002 HC RX W HCPCS

## 2019-09-09 PROCEDURE — 6370000000 HC RX 637 (ALT 250 FOR IP): Performed by: STUDENT IN AN ORGANIZED HEALTH CARE EDUCATION/TRAINING PROGRAM

## 2019-09-09 PROCEDURE — 80069 RENAL FUNCTION PANEL: CPT

## 2019-09-09 PROCEDURE — 99233 SBSQ HOSP IP/OBS HIGH 50: CPT | Performed by: NURSE PRACTITIONER

## 2019-09-09 PROCEDURE — 2580000003 HC RX 258

## 2019-09-09 PROCEDURE — 83735 ASSAY OF MAGNESIUM: CPT

## 2019-09-09 PROCEDURE — 99233 SBSQ HOSP IP/OBS HIGH 50: CPT | Performed by: INTERNAL MEDICINE

## 2019-09-09 PROCEDURE — 6360000002 HC RX W HCPCS: Performed by: PODIATRIST

## 2019-09-09 PROCEDURE — 36415 COLL VENOUS BLD VENIPUNCTURE: CPT

## 2019-09-09 RX ORDER — METOPROLOL SUCCINATE 25 MG/1
12.5 TABLET, EXTENDED RELEASE ORAL DAILY
Qty: 30 TABLET | Refills: 0 | Status: SHIPPED | OUTPATIENT
Start: 2019-09-09 | End: 2019-10-22 | Stop reason: SDUPTHER

## 2019-09-09 RX ORDER — CEFDINIR 300 MG/1
300 CAPSULE ORAL 2 TIMES DAILY
Qty: 6 CAPSULE | Refills: 0 | Status: CANCELLED | OUTPATIENT
Start: 2019-09-09 | End: 2019-09-12

## 2019-09-09 RX ADMIN — HYDRALAZINE HYDROCHLORIDE 10 MG: 10 TABLET, FILM COATED ORAL at 14:01

## 2019-09-09 RX ADMIN — MAGNESIUM HYDROXIDE 30 ML: 400 SUSPENSION ORAL at 09:02

## 2019-09-09 RX ADMIN — CLOPIDOGREL BISULFATE 75 MG: 75 TABLET ORAL at 08:58

## 2019-09-09 RX ADMIN — ISOSORBIDE MONONITRATE 30 MG: 30 TABLET, EXTENDED RELEASE ORAL at 08:58

## 2019-09-09 RX ADMIN — METOPROLOL SUCCINATE 12.5 MG: 25 TABLET, EXTENDED RELEASE ORAL at 08:58

## 2019-09-09 RX ADMIN — ASPIRIN 81 MG: 81 TABLET, COATED ORAL at 08:58

## 2019-09-09 RX ADMIN — HYDRALAZINE HYDROCHLORIDE 10 MG: 10 TABLET, FILM COATED ORAL at 06:11

## 2019-09-09 RX ADMIN — HEPARIN SODIUM 5000 UNITS: 5000 INJECTION INTRAVENOUS; SUBCUTANEOUS at 06:10

## 2019-09-09 RX ADMIN — Medication 10 ML: at 09:00

## 2019-09-09 RX ADMIN — CEFTRIAXONE 1 G: 1 INJECTION, POWDER, FOR SOLUTION INTRAMUSCULAR; INTRAVENOUS at 06:10

## 2019-09-09 ASSESSMENT — PAIN SCALES - GENERAL
PAINLEVEL_OUTOF10: 0

## 2019-09-09 NOTE — PROGRESS NOTES
40mg p.o at home and f/u as outpatient     NISHI on CKD-Improving  -Cr 1.6 and downtrending  -Held home aldactone       UTI-klebsiella  -Rocephin Day 4 of 7 (Started 9/05). -No home abx - patient asymptomatic.       Systolic heart failure not in exacerbation  -D/C with Rx Toprol Xl12.5 mg per cardiology       DM II. Diagnosis >5 years ago  -BG 133mg/dL  -Lantus nightly  -Sliding scale insulin  -POCT accuchecks     HTN  -Home meds Hydralazine, Imdur  - BP elevated in the 140's     Will discuss with attending physician Dr. Sheila Bob     Code Status: Full  FEN: Cardiac  PPX: Heparin  DISPO: GMF, Patient is OK for discharge.     Paco Dick DPM, PGY-1  09/09/19  11:31 AM    This patient has been staffed and discussed with Jackie Herman MD.

## 2019-09-09 NOTE — PROGRESS NOTES
lispro  0-6 Units Subcutaneous Nightly       IV drips:   dextrose         PRN:  sodium chloride flush, magnesium hydroxide, ondansetron, acetaminophen, glucose, dextrose, glucagon (rDNA), dextrose    Vitals:    09/08/19 2107 09/09/19 0140 09/09/19 0602 09/09/19 0810   BP: 138/84 (!) 147/86 (!) 146/91 (!) 143/92   Pulse: 81 80 85 78   Resp: 20 16 16 18   Temp: 97.9 °F (36.6 °C) 98.7 °F (37.1 °C) 98 °F (36.7 °C) 98.5 °F (36.9 °C)   TempSrc: Oral Oral Oral Oral   SpO2: 98% 99% 99% 99%   Weight:   164 lb 3.9 oz (74.5 kg)    Height:           Intake/Output Summary (Last 24 hours) at 9/9/2019 1048  Last data filed at 9/9/2019 0935  Gross per 24 hour   Intake 720 ml   Output 1050 ml   Net -330 ml     I/O last 3 completed shifts: In: 480 [P.O.:480]  Out: 1150 [Urine:1150]  Wt Readings from Last 3 Encounters:   09/09/19 164 lb 3.9 oz (74.5 kg)   09/05/19 161 lb 3.2 oz (73.1 kg)   08/19/19 162 lb (73.5 kg)       Admit Wt: Weight: 158 lb (71.7 kg)   Todays Wt: Weight: 164 lb 3.9 oz (74.5 kg)    TELEMETRY: Sinus     Physical Exam:         General Appearance:  Alert, cooperative, no distress, appears stated age Appropriate weight   Head:  Normocephalic, without obvious abnormality, atraumatic   Eyes:  PERRL, conjunctiva/corneas clear EOM intact  Ears normal   Throat no lesions       Nose: Nares normal, no drainage or sinus tenderness   Throat: Lips, mucosa, and tongue normal   Neck: Supple, symmetrical, trachea midline, no adenopathy, thyroid: not enlarged, symmetric, no tenderness/mass/nodules, no carotid bruit. Lungs:   Normal respiratory rate, lungs clear to auscultation without any wheezes, rubs or ronchi bilaterally.     Chest Wall:  No tenderness or deformity   Heart:  Regular rhythm, rate is controlled, S1, S2 normal, there is no murmur, there is no rub or gallop, no jvd, no bilateral lower extremity edema   Abdomen:   Soft, non-tender, bowel sounds active all four quadrants,  no masses, no organomegaly Extremities: Extremities normal, atraumatic, no cyanosis. Pulses: 2+ and symmetric   Skin: Skin color, texture, turgor normal, no rashes or lesions   Pysch: Normal mood and affect   Neurologic: Normal gross motor and sensory exam.  Cranial nerves intact       Labs:   Recent Labs     09/07/19 0941 09/08/19 0802 09/09/19 0712     135* 139 139   K 4.4  4.4 3.9 3.9   BUN 28*  29* 23* 19   CREATININE 1.8*  1.7* 1.7* 1.6*   CL 96*  96* 101 104   CO2 27  27 27 25   GLUCOSE 143*  140* 145* 133*   CALCIUM 9.9  10.0 9.5 9.5   MG 1.90 1.70* 1.90     Recent Labs     09/07/19 0941 09/08/19 0802 09/09/19 0712   WBC 6.8 6.3 5.9   HGB 11.2* 10.5* 10.1*   HCT 33.6* 31.6* 31.1*    319 348   MCV 76.7* 76.6* 77.5*     No results for input(s): CHOLTOT, TRIG, HDL in the last 72 hours. Invalid input(s): LIPIDCOMM, CHOLHDL, VLDCHOL, LDL  No results for input(s): PTT, INR in the last 72 hours. Invalid input(s): PT  No results for input(s): CKTOTAL, CKMB, CKMBINDEX, TROPONINI in the last 72 hours. No results for input(s): BNP in the last 72 hours. No results for input(s): NTPROBNP in the last 72 hours. No results for input(s): TSH in the last 72 hours. Imaging:       Assessment & Plan:     1. Chronic HFrEF:   Patient remains euvolemic and hemo stable. His NYHA FC is II, stage C.     -Agree with resuming his home lasix 40 daily.   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 Patient will need a Tc-PYP scan; if he goes home today, may be done as outpatient, otherwise please order it as inpatient if he stays today. -C/w Toprol 12.5 daily, hydra 10 tid and imdur 30 daily. 2. Mild CAD:   Has h/o NSTEMI but cath with only mild CAD. -Agree with asa, plavix, bb, statin. 3. HTN:   BP is controlled.      4. Trifascicular block:   Stable, no need for PPM,

## 2019-09-09 NOTE — CARE COORDINATION
Case Management            Discharge Note                    Date / Time of Note: 9/9/2019 1:26 PM                  Discharge Note Completed by: Suyapa Lazar Day    Patient Name: Krishan Arroyo   YOB: 1940  Diagnosis: Hyperkalemia [E87.5]  Hyperkalemia [E87.5]   Date / Time: 9/5/2019  7:07 PM    Current PCP: Isela Coombs MD  Clinic patient: No    Hospitalization in the last 30 days: No    Advance Directives:  Code Status: Full Code  [place] DNR form completed and on chart: Not Indicated    Financial:  Payor: Israel Valenzuela / Plan: [place] / Product Type: *No Product type* /      Pharmacy:    Critical access hospital[facility],[address], [place]  [phone] [facility] [phone]  [address] [place] [address] [address]  Phone: [phone] Fax: [phone]      Assistance purchasing medications?: Potential Assistance Purchasing Medications: No  Assistance provided by Case Management: None at this time    Does patient want to participate in local refill/ meds to beds program?: No    Meds To Beds General Rules:  1. Can ONLY be done Monday- Friday between 8:30am-5pm  2. Prescription(s) must be in pharmacy by 3pm to be filled same day  3. Copy of patient's insurance/ prescription drug card and patient face sheet must be sent along with the prescription(s)  4. Cost of Rx cannot be added to hospital bill. If financial assistance is needed, please contact unit  or ;  or  CANNOT provide pharmacy voucher for patients co-pays  5.  Patients can then  the prescription on their way out of the hospital at discharge, or pharmacy can deliver to the bedside if staff is available. (payment due at time of pick-up or delivery - cash, check, or card accepted)     Able to afford home medications/ co-pay costs:

## 2019-09-10 ENCOUNTER — TELEPHONE (OUTPATIENT)
Dept: FAMILY MEDICINE CLINIC | Age: 79
End: 2019-09-10

## 2019-09-10 ENCOUNTER — CARE COORDINATION (OUTPATIENT)
Dept: CASE MANAGEMENT | Age: 79
End: 2019-09-10

## 2019-09-10 NOTE — CARE COORDINATION
[address] [facility] Transitions Initial Follow Up Call    Call within 2 business days of discharge: Yes    Patient: Holly Collier Patient : 1940   MRN: [id number]   Reason for Admission: Hyperkalemia   Discharge Date: 19 RARS: Readmission Risk Score: 22      Last Discharge [facility]       Complaint Diagnosis Description Type Department Provider    19 Shortness of Breath Shortness of breath . .. ED to Hosp-Admission (Discharged) (ADMITTED) [address] ALY MD; Cathi Segovia. .. Spoke with: [address]     Non-face-to-face services provided:  Obtained and reviewed discharge summary and/or continuity of care documents  Education of patient/family/caregiver/guardian to support self-management-. Assessment and support for treatment adherence and medication management-.    Care Transitions 24 Hour Call    Schedule Follow Up Appointment with PCP:  Declined  Do you have any ongoing symptoms?:  No  Do you have a copy of your discharge instructions?:  Yes  Do you have all of your prescriptions and are they filled?:  Yes  Have you been contacted by a [address] [employer] Pharmacist?:  No  Have you scheduled your follow up appointment?:  Yes  How are you going to get to your appointment?:  Car - family or friend to transport  Were you discharged with any Home Care or Post Acute Services:  Yes  Post Acute Services:  Home Health (Comment: [facility] )  Patient DME:  Straight cane, Walker  Do you have support at home?:  Partner/Spouse/SO  Do you feel like you have everything you need to keep you well at home?:  Yes  Are you an active caregiver in your home?:  No  Care Transitions Interventions  No Identified Needs       Summary  CTN spoke with patient's Spouse this afternoon for initial 24 hour discharge follow up CTN call. Spouse reports patient is doing okay, denies any complaints of nausea, vomiting, fevers, chills, SOB or Cough.    No ABD or Leg cramps this am, appetite is good, patient with no difficulties with urination or BM's. CTN encouraged spouse to have patient take all medications as prescribed. CTN and Patient completed 1111f, all medications filled and in home. Patient did not get weight this am, states battery is dead for scale, states she will go to store today and get battery for scale, CTN encouraged spouse to make sure patient is getting weighted daily, in am, after first void and before meals, also instructed to check FSBS as ordered, did not get FSBS this am.  CTN went over s/s of hyper/hypoglycemia. All follow up appointments with MD's has been scheduled, as well as special testing and labs. Patient and spouse declined [address] services on yesterday, still feel they don't need any [address] services at this time. CTN advised Pt of use of urgent care or physicians 24 hr access line if assistance is needed after hours or CTN weekend. CTN provided education on s/s that require medical attention and when to seek medical attention. Pt denies any needs or concerns and is agreeable with additional calls.     Follow Up  Future Appointments   Date Time Provider Micheline Gaines   9/12/2019  1:30 PM SCHEDULE, [address] Card [facility]   9/20/2019  9:30 AM SCHEDULE, [facility]   9/27/2019  3:45 [facility]   10/7/2019  9:40 AM SELAM Rodriguez MD [facility]   10/8/2019  2:00 PM Lulú Kaur, APRN - CNP [facility]       Opal Nick RN

## 2019-09-12 DIAGNOSIS — I50.22 CHRONIC SYSTOLIC HEART FAILURE (HCC): ICD-10-CM

## 2019-09-12 LAB
ANION GAP SERPL CALCULATED.3IONS-SCNC: 19 MMOL/L (ref 3–16)
BUN BLDV-MCNC: 18 MG/DL (ref 7–20)
CALCIUM SERPL-MCNC: 9.7 MG/DL (ref 8.3–10.6)
CHLORIDE BLD-SCNC: 99 MMOL/L (ref 99–110)
CO2: 20 MMOL/L (ref 21–32)
CREAT SERPL-MCNC: 1.7 MG/DL (ref 0.8–1.3)
GFR AFRICAN AMERICAN: 47
GFR NON-AFRICAN AMERICAN: 39
GLUCOSE BLD-MCNC: 180 MG/DL (ref 70–99)
POTASSIUM SERPL-SCNC: 4.1 MMOL/L (ref 3.5–5.1)
PRO-BNP: 3601 PG/ML (ref 0–449)
SODIUM BLD-SCNC: 138 MMOL/L (ref 136–145)

## 2019-09-13 ENCOUNTER — CARE COORDINATION (OUTPATIENT)
Dept: CASE MANAGEMENT | Age: 79
End: 2019-09-13

## 2019-09-16 ENCOUNTER — CARE COORDINATION (OUTPATIENT)
Dept: CASE MANAGEMENT | Age: 79
End: 2019-09-16

## 2019-09-19 ENCOUNTER — CARE COORDINATION (OUTPATIENT)
Dept: CASE MANAGEMENT | Age: 79
End: 2019-09-19

## 2019-09-20 ENCOUNTER — HOSPITAL ENCOUNTER (OUTPATIENT)
Dept: NON INVASIVE DIAGNOSTICS | Age: 79
Discharge: HOME | End: 2019-09-20
Payer: MEDICARE

## 2019-09-20 LAB
LV EF: 53 %
LVEF MODALITY: NORMAL

## 2019-09-20 PROCEDURE — 93306 TTE W/DOPPLER COMPLETE: CPT

## 2019-09-23 ENCOUNTER — CARE COORDINATION (OUTPATIENT)
Dept: CASE MANAGEMENT | Age: 79
End: 2019-09-23

## 2019-09-27 ENCOUNTER — OFFICE VISIT (OUTPATIENT)
Dept: CARDIOLOGY CLINIC | Age: 79
End: 2019-09-27
Payer: MEDICARE

## 2019-09-27 VITALS
BODY MASS INDEX: 22.18 KG/M2 | SYSTOLIC BLOOD PRESSURE: 114 MMHG | WEIGHT: 159 LBS | OXYGEN SATURATION: 97 % | DIASTOLIC BLOOD PRESSURE: 68 MMHG | HEART RATE: 82 BPM

## 2019-09-27 DIAGNOSIS — I50.22 CHRONIC SYSTOLIC HEART FAILURE (HCC): ICD-10-CM

## 2019-09-27 DIAGNOSIS — I42.0 DILATED CARDIOMYOPATHY (HCC): ICD-10-CM

## 2019-09-27 DIAGNOSIS — R94.39 ABNORMAL STRESS TEST: ICD-10-CM

## 2019-09-27 DIAGNOSIS — I10 ESSENTIAL HYPERTENSION: ICD-10-CM

## 2019-09-27 DIAGNOSIS — I50.22 CHRONIC SYSTOLIC HEART FAILURE (HCC): Primary | ICD-10-CM

## 2019-09-27 LAB
ANION GAP SERPL CALCULATED.3IONS-SCNC: 19 MMOL/L (ref 3–16)
BUN BLDV-MCNC: 31 MG/DL (ref 7–20)
CALCIUM SERPL-MCNC: 10.3 MG/DL (ref 8.3–10.6)
CHLORIDE BLD-SCNC: 97 MMOL/L (ref 99–110)
CO2: 22 MMOL/L (ref 21–32)
CREAT SERPL-MCNC: 2 MG/DL (ref 0.8–1.3)
GFR AFRICAN AMERICAN: 39
GFR NON-AFRICAN AMERICAN: 32
GLUCOSE BLD-MCNC: 195 MG/DL (ref 70–99)
POTASSIUM SERPL-SCNC: 4.3 MMOL/L (ref 3.5–5.1)
SODIUM BLD-SCNC: 138 MMOL/L (ref 136–145)

## 2019-09-27 PROCEDURE — G8427 DOCREV CUR MEDS BY ELIG CLIN: HCPCS | Performed by: INTERNAL MEDICINE

## 2019-09-27 PROCEDURE — 4040F PNEUMOC VAC/ADMIN/RCVD: CPT | Performed by: INTERNAL MEDICINE

## 2019-09-27 PROCEDURE — G8598 ASA/ANTIPLAT THER USED: HCPCS | Performed by: INTERNAL MEDICINE

## 2019-09-27 PROCEDURE — 1036F TOBACCO NON-USER: CPT | Performed by: INTERNAL MEDICINE

## 2019-09-27 PROCEDURE — 99214 OFFICE O/P EST MOD 30 MIN: CPT | Performed by: INTERNAL MEDICINE

## 2019-09-27 PROCEDURE — G8420 CALC BMI NORM PARAMETERS: HCPCS | Performed by: INTERNAL MEDICINE

## 2019-09-27 PROCEDURE — 1123F ACP DISCUSS/DSCN MKR DOCD: CPT | Performed by: INTERNAL MEDICINE

## 2019-09-27 PROCEDURE — 1111F DSCHRG MED/CURRENT MED MERGE: CPT | Performed by: INTERNAL MEDICINE

## 2019-09-27 NOTE — PROGRESS NOTES
No malaise/lethargy, palpitations, polydipsia/polyuria, temperature intolerance or unexpected weight changes. Skin: No rashes or non-healing ulcers. PE:  Blood pressure 114/68, pulse 82, weight 159 lb (72.1 kg), SpO2 97 %. General (appearance): Well devel. No distress  Eyes: Anicteric. EOMI  Neck: Supple. No JVD  Ears/Nose/Mouth/Thorat: No cyanosis  CV: RRR on exam. No m/r/g   Respiratory:  Clear B, normal respiratory effort  GI: Abd s/nt. No peritoneal signs  Skin: Warm, dry. No rashes  Neuro/Psych: Alert and oriented x 3. Appropriate behavior  Ext:  No c/c. No pitting edema  Pulses:  2+ carotid. Lab Results   Component Value Date    WBC 5.9 09/09/2019    HGB 10.1 (L) 09/09/2019    HCT 31.1 (L) 09/09/2019    MCV 77.5 (L) 09/09/2019     09/09/2019     Lab Results   Component Value Date     09/12/2019    K 4.1 09/12/2019    CL 99 09/12/2019    CO2 20 (L) 09/12/2019    BUN 18 09/12/2019    CREATININE 1.7 (H) 09/12/2019    GLUCOSE 180 (H) 09/12/2019    CALCIUM 9.7 09/12/2019    PROT 6.5 09/06/2019    LABALBU 3.2 (L) 09/09/2019    BILITOT 0.6 07/17/2019    ALKPHOS 114 07/17/2019    AST 21 07/17/2019    ALT 24 07/17/2019    LABGLOM 39 (A) 09/12/2019    GFRAA 47 (A) 09/12/2019    AGRATIO 1.4 07/17/2019    GLOB 3.2 07/17/2019     Lab Results   Component Value Date    INR 1.13 09/05/2019    INR 1.04 07/17/2019    PROTIME 12.9 09/05/2019    PROTIME 11.8 07/17/2019 9/2019 Echo:  Left ventricular cavity size is normal. There is severe concentric left   ventricular hypertrophy. Overall left ventricular systolic function appears   normal with an estimated ejection fraction of 50-55%. Diastolic filling   parameters suggest grade III diastolic dysfunction. Myocardial appearance   and the presence apical sparing on strain imaging suggests cardiac   amyloidosis.   Mitral annular calcification is present. Mitral valve leaflets appear   thickened.  Mild mitral regurgitation is present.   The left atrium is

## 2019-09-27 NOTE — Clinical Note
Mynor,Please take over care of . Geraldo Banegas. 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 No sig cad on cath, had CHF. I believe you've seen him previously as well. Sx/ef improved on meds. I don't think he has completed his amyloid w/u. Let me know if there's anything I can help out with. Thanks,Justin

## 2019-10-07 ENCOUNTER — OFFICE VISIT (OUTPATIENT)
Dept: FAMILY MEDICINE CLINIC | Age: 79
End: 2019-10-07
Payer: MEDICARE

## 2019-10-07 VITALS
OXYGEN SATURATION: 98 % | HEIGHT: 71 IN | WEIGHT: 165 LBS | RESPIRATION RATE: 16 BRPM | SYSTOLIC BLOOD PRESSURE: 132 MMHG | HEART RATE: 71 BPM | DIASTOLIC BLOOD PRESSURE: 74 MMHG | BODY MASS INDEX: 23.1 KG/M2

## 2019-10-07 DIAGNOSIS — E78.00 PURE HYPERCHOLESTEROLEMIA: ICD-10-CM

## 2019-10-07 DIAGNOSIS — E11.22 CONTROLLED TYPE 2 DIABETES MELLITUS WITH STAGE 3 CHRONIC KIDNEY DISEASE, WITHOUT LONG-TERM CURRENT USE OF INSULIN (HCC): Primary | ICD-10-CM

## 2019-10-07 DIAGNOSIS — I50.41 ACUTE COMBINED SYSTOLIC AND DIASTOLIC CONGESTIVE HEART FAILURE (HCC): ICD-10-CM

## 2019-10-07 DIAGNOSIS — N18.30 CONTROLLED TYPE 2 DIABETES MELLITUS WITH STAGE 3 CHRONIC KIDNEY DISEASE, WITHOUT LONG-TERM CURRENT USE OF INSULIN (HCC): Primary | ICD-10-CM

## 2019-10-07 DIAGNOSIS — I10 BENIGN ESSENTIAL HTN: ICD-10-CM

## 2019-10-07 LAB
ANION GAP SERPL CALCULATED.3IONS-SCNC: 14 MMOL/L (ref 3–16)
BUN BLDV-MCNC: 25 MG/DL (ref 7–20)
CALCIUM SERPL-MCNC: 10.5 MG/DL (ref 8.3–10.6)
CHLORIDE BLD-SCNC: 99 MMOL/L (ref 99–110)
CO2: 26 MMOL/L (ref 21–32)
CREAT SERPL-MCNC: 1.8 MG/DL (ref 0.8–1.3)
GFR AFRICAN AMERICAN: 44
GFR NON-AFRICAN AMERICAN: 36
GLUCOSE BLD-MCNC: 116 MG/DL (ref 70–99)
POTASSIUM SERPL-SCNC: 3.9 MMOL/L (ref 3.5–5.1)
SODIUM BLD-SCNC: 139 MMOL/L (ref 136–145)

## 2019-10-07 PROCEDURE — 36415 COLL VENOUS BLD VENIPUNCTURE: CPT | Performed by: INTERNAL MEDICINE

## 2019-10-07 PROCEDURE — 99214 OFFICE O/P EST MOD 30 MIN: CPT | Performed by: INTERNAL MEDICINE

## 2019-10-07 PROCEDURE — 1123F ACP DISCUSS/DSCN MKR DOCD: CPT | Performed by: INTERNAL MEDICINE

## 2019-10-07 PROCEDURE — 90653 IIV ADJUVANT VACCINE IM: CPT | Performed by: INTERNAL MEDICINE

## 2019-10-07 PROCEDURE — 4040F PNEUMOC VAC/ADMIN/RCVD: CPT | Performed by: INTERNAL MEDICINE

## 2019-10-07 PROCEDURE — 1111F DSCHRG MED/CURRENT MED MERGE: CPT | Performed by: INTERNAL MEDICINE

## 2019-10-07 PROCEDURE — G8598 ASA/ANTIPLAT THER USED: HCPCS | Performed by: INTERNAL MEDICINE

## 2019-10-07 PROCEDURE — G0008 ADMIN INFLUENZA VIRUS VAC: HCPCS | Performed by: INTERNAL MEDICINE

## 2019-10-07 PROCEDURE — G8420 CALC BMI NORM PARAMETERS: HCPCS | Performed by: INTERNAL MEDICINE

## 2019-10-07 PROCEDURE — G8427 DOCREV CUR MEDS BY ELIG CLIN: HCPCS | Performed by: INTERNAL MEDICINE

## 2019-10-07 PROCEDURE — 1036F TOBACCO NON-USER: CPT | Performed by: INTERNAL MEDICINE

## 2019-10-07 PROCEDURE — G8482 FLU IMMUNIZE ORDER/ADMIN: HCPCS | Performed by: INTERNAL MEDICINE

## 2019-10-07 ASSESSMENT — ENCOUNTER SYMPTOMS
ALLERGIC/IMMUNOLOGIC NEGATIVE: 1
RESPIRATORY NEGATIVE: 1
GASTROINTESTINAL NEGATIVE: 1

## 2019-10-08 ENCOUNTER — OFFICE VISIT (OUTPATIENT)
Dept: CARDIOLOGY CLINIC | Age: 79
End: 2019-10-08
Payer: MEDICARE

## 2019-10-08 ENCOUNTER — TELEPHONE (OUTPATIENT)
Dept: FAMILY MEDICINE CLINIC | Age: 79
End: 2019-10-08

## 2019-10-08 ENCOUNTER — TELEPHONE (OUTPATIENT)
Dept: CARDIOLOGY CLINIC | Age: 79
End: 2019-10-08

## 2019-10-08 VITALS
BODY MASS INDEX: 23.07 KG/M2 | DIASTOLIC BLOOD PRESSURE: 70 MMHG | SYSTOLIC BLOOD PRESSURE: 110 MMHG | HEART RATE: 66 BPM | WEIGHT: 165.4 LBS

## 2019-10-08 DIAGNOSIS — I49.8 JUNCTIONAL CARDIAC ARRHYTHMIA: Primary | ICD-10-CM

## 2019-10-08 DIAGNOSIS — I42.0 DILATED CARDIOMYOPATHY (HCC): ICD-10-CM

## 2019-10-08 DIAGNOSIS — I45.10 RBBB: ICD-10-CM

## 2019-10-08 LAB
ESTIMATED AVERAGE GLUCOSE: 208.7 MG/DL
HBA1C MFR BLD: 8.9 %

## 2019-10-08 PROCEDURE — 0296T PR EXT ECG > 48HR TO 21 DAY RCRD W/CONECT INTL RCRD: CPT | Performed by: INTERNAL MEDICINE

## 2019-10-08 PROCEDURE — 93000 ELECTROCARDIOGRAM COMPLETE: CPT | Performed by: NURSE PRACTITIONER

## 2019-10-08 PROCEDURE — G8482 FLU IMMUNIZE ORDER/ADMIN: HCPCS | Performed by: NURSE PRACTITIONER

## 2019-10-08 PROCEDURE — G8427 DOCREV CUR MEDS BY ELIG CLIN: HCPCS | Performed by: NURSE PRACTITIONER

## 2019-10-08 PROCEDURE — 1111F DSCHRG MED/CURRENT MED MERGE: CPT | Performed by: NURSE PRACTITIONER

## 2019-10-08 PROCEDURE — 1123F ACP DISCUSS/DSCN MKR DOCD: CPT | Performed by: NURSE PRACTITIONER

## 2019-10-08 PROCEDURE — G8420 CALC BMI NORM PARAMETERS: HCPCS | Performed by: NURSE PRACTITIONER

## 2019-10-08 PROCEDURE — 1036F TOBACCO NON-USER: CPT | Performed by: NURSE PRACTITIONER

## 2019-10-08 PROCEDURE — G8598 ASA/ANTIPLAT THER USED: HCPCS | Performed by: NURSE PRACTITIONER

## 2019-10-08 PROCEDURE — 4040F PNEUMOC VAC/ADMIN/RCVD: CPT | Performed by: NURSE PRACTITIONER

## 2019-10-08 PROCEDURE — 99213 OFFICE O/P EST LOW 20 MIN: CPT | Performed by: NURSE PRACTITIONER

## 2019-10-22 RX ORDER — METOPROLOL SUCCINATE 25 MG/1
12.5 TABLET, EXTENDED RELEASE ORAL DAILY
Qty: 30 TABLET | Refills: 0 | Status: SHIPPED | OUTPATIENT
Start: 2019-10-22 | End: 2019-12-09 | Stop reason: SDUPTHER

## 2019-10-25 ENCOUNTER — OFFICE VISIT (OUTPATIENT)
Dept: CARDIOLOGY CLINIC | Age: 79
End: 2019-10-25
Payer: MEDICARE

## 2019-10-25 VITALS
HEART RATE: 80 BPM | DIASTOLIC BLOOD PRESSURE: 70 MMHG | BODY MASS INDEX: 23.96 KG/M2 | SYSTOLIC BLOOD PRESSURE: 128 MMHG | WEIGHT: 171.8 LBS

## 2019-10-25 DIAGNOSIS — E78.00 PURE HYPERCHOLESTEROLEMIA: ICD-10-CM

## 2019-10-25 DIAGNOSIS — I50.22 CHRONIC SYSTOLIC CHF (CONGESTIVE HEART FAILURE) (HCC): Primary | ICD-10-CM

## 2019-10-25 DIAGNOSIS — I51.7 SEVERE LEFT VENTRICULAR HYPERTROPHY: ICD-10-CM

## 2019-10-25 PROCEDURE — 1036F TOBACCO NON-USER: CPT | Performed by: INTERNAL MEDICINE

## 2019-10-25 PROCEDURE — 4040F PNEUMOC VAC/ADMIN/RCVD: CPT | Performed by: INTERNAL MEDICINE

## 2019-10-25 PROCEDURE — G8420 CALC BMI NORM PARAMETERS: HCPCS | Performed by: INTERNAL MEDICINE

## 2019-10-25 PROCEDURE — G8427 DOCREV CUR MEDS BY ELIG CLIN: HCPCS | Performed by: INTERNAL MEDICINE

## 2019-10-25 PROCEDURE — G8598 ASA/ANTIPLAT THER USED: HCPCS | Performed by: INTERNAL MEDICINE

## 2019-10-25 PROCEDURE — 99214 OFFICE O/P EST MOD 30 MIN: CPT | Performed by: INTERNAL MEDICINE

## 2019-10-25 PROCEDURE — G8482 FLU IMMUNIZE ORDER/ADMIN: HCPCS | Performed by: INTERNAL MEDICINE

## 2019-10-25 PROCEDURE — 1123F ACP DISCUSS/DSCN MKR DOCD: CPT | Performed by: INTERNAL MEDICINE

## 2019-10-29 PROCEDURE — 0298T PR EXT ECG > 48HR TO 21 DAY REVIEW AND INTERPRETATN: CPT | Performed by: INTERNAL MEDICINE

## 2019-10-29 RX ORDER — VERAPAMIL HYDROCHLORIDE 40 MG/1
40 TABLET ORAL DAILY
Qty: 90 TABLET | Refills: 3
Start: 2019-10-29 | End: 2019-12-10 | Stop reason: SDUPTHER

## 2019-10-29 RX ORDER — SPIRONOLACTONE 25 MG/1
25 TABLET ORAL DAILY
Qty: 90 TABLET | Refills: 3
Start: 2019-10-29 | End: 2019-12-10 | Stop reason: SDUPTHER

## 2019-11-01 ENCOUNTER — TELEPHONE (OUTPATIENT)
Dept: CARDIOLOGY CLINIC | Age: 79
End: 2019-11-01

## 2019-11-01 DIAGNOSIS — R00.1 BRADYCARDIA: ICD-10-CM

## 2019-11-05 RX ORDER — HYDRALAZINE HYDROCHLORIDE 10 MG/1
10 TABLET, FILM COATED ORAL DAILY
Qty: 90 TABLET | Refills: 1 | Status: SHIPPED | OUTPATIENT
Start: 2019-11-05 | End: 2019-12-10 | Stop reason: SDUPTHER

## 2019-11-05 RX ORDER — ISOSORBIDE MONONITRATE 30 MG/1
30 TABLET, EXTENDED RELEASE ORAL DAILY
Qty: 30 TABLET | Refills: 3 | Status: SHIPPED | OUTPATIENT
Start: 2019-11-05 | End: 2020-03-04

## 2019-11-05 RX ORDER — CLOPIDOGREL BISULFATE 75 MG/1
75 TABLET ORAL DAILY
Qty: 90 TABLET | Refills: 1 | Status: SHIPPED | OUTPATIENT
Start: 2019-11-05 | End: 2020-05-05

## 2019-11-05 RX ORDER — ATORVASTATIN CALCIUM 40 MG/1
40 TABLET, FILM COATED ORAL DAILY
Qty: 90 TABLET | Refills: 1 | Status: SHIPPED | OUTPATIENT
Start: 2019-11-05 | End: 2020-04-03

## 2019-11-08 ENCOUNTER — APPOINTMENT (OUTPATIENT)
Dept: GENERAL RADIOLOGY | Age: 79
End: 2019-11-08
Payer: MEDICARE

## 2019-11-08 ENCOUNTER — HOSPITAL ENCOUNTER (EMERGENCY)
Age: 79
Discharge: HOME | End: 2019-11-09
Attending: EMERGENCY MEDICINE
Payer: MEDICARE

## 2019-11-08 VITALS
DIASTOLIC BLOOD PRESSURE: 99 MMHG | SYSTOLIC BLOOD PRESSURE: 149 MMHG | WEIGHT: 164 LBS | OXYGEN SATURATION: 100 % | TEMPERATURE: 97.8 F | HEART RATE: 92 BPM | BODY MASS INDEX: 22.96 KG/M2 | RESPIRATION RATE: 22 BRPM | HEIGHT: 71 IN

## 2019-11-08 DIAGNOSIS — I50.33 ACUTE ON CHRONIC DIASTOLIC CONGESTIVE HEART FAILURE (HCC): Primary | ICD-10-CM

## 2019-11-08 LAB
ALBUMIN SERPL-MCNC: 4 G/DL (ref 3.4–5)
ALP BLD-CCNC: 128 U/L (ref 40–129)
ALT SERPL-CCNC: 21 U/L (ref 10–40)
ANION GAP SERPL CALCULATED.3IONS-SCNC: 12 MMOL/L (ref 3–16)
AST SERPL-CCNC: 24 U/L (ref 15–37)
BASE EXCESS VENOUS: -6 (ref -3–3)
BASOPHILS ABSOLUTE: 0.1 K/UL (ref 0–0.2)
BASOPHILS RELATIVE PERCENT: 0.5 %
BILIRUB SERPL-MCNC: 0.5 MG/DL (ref 0–1)
BILIRUBIN DIRECT: <0.2 MG/DL (ref 0–0.3)
BILIRUBIN URINE: NEGATIVE
BILIRUBIN, INDIRECT: NORMAL MG/DL (ref 0–1)
BLOOD, URINE: NEGATIVE
BUN BLDV-MCNC: 22 MG/DL (ref 7–20)
CALCIUM SERPL-MCNC: 9.8 MG/DL (ref 8.3–10.6)
CHLORIDE BLD-SCNC: 104 MMOL/L (ref 99–110)
CLARITY: CLEAR
CO2: 20 MMOL/L (ref 21–32)
COLOR: YELLOW
CREAT SERPL-MCNC: 1.6 MG/DL (ref 0.8–1.3)
EOSINOPHILS ABSOLUTE: 0 K/UL (ref 0–0.6)
EOSINOPHILS RELATIVE PERCENT: 0.4 %
EPITHELIAL CELLS, UA: NORMAL /HPF
GFR AFRICAN AMERICAN: 50
GFR NON-AFRICAN AMERICAN: 42
GLUCOSE BLD-MCNC: 219 MG/DL (ref 70–99)
GLUCOSE URINE: NEGATIVE MG/DL
HCO3 VENOUS: 19.9 MMOL/L (ref 23–29)
HCT VFR BLD CALC: 35.3 % (ref 40.5–52.5)
HEMOGLOBIN: 11.4 G/DL (ref 13.5–17.5)
INR BLD: 1.13 (ref 0.86–1.14)
KETONES, URINE: NEGATIVE MG/DL
LEUKOCYTE ESTERASE, URINE: NEGATIVE
LYMPHOCYTES ABSOLUTE: 1.1 K/UL (ref 1–5.1)
LYMPHOCYTES RELATIVE PERCENT: 11.2 %
MCH RBC QN AUTO: 26.1 PG (ref 26–34)
MCHC RBC AUTO-ENTMCNC: 32.2 G/DL (ref 31–36)
MCV RBC AUTO: 80.9 FL (ref 80–100)
MICROSCOPIC EXAMINATION: YES
MONOCYTES ABSOLUTE: 0.8 K/UL (ref 0–1.3)
MONOCYTES RELATIVE PERCENT: 7.4 %
NEUTROPHILS ABSOLUTE: 8.1 K/UL (ref 1.7–7.7)
NEUTROPHILS RELATIVE PERCENT: 80.5 %
NITRITE, URINE: NEGATIVE
O2 SAT, VEN: 35 %
PCO2, VEN: 35.9 MM HG (ref 40–50)
PDW BLD-RTO: 18.2 % (ref 12.4–15.4)
PERFORMED ON: ABNORMAL
PH UA: 6 (ref 5–8)
PH VENOUS: 7.35 (ref 7.35–7.45)
PLATELET # BLD: 376 K/UL (ref 135–450)
PMV BLD AUTO: 7.9 FL (ref 5–10.5)
PO2, VEN: 22 MM HG
POC SAMPLE TYPE: ABNORMAL
POTASSIUM REFLEX MAGNESIUM: 4.2 MMOL/L (ref 3.5–5.1)
PRO-BNP: 8616 PG/ML (ref 0–449)
PROTEIN UA: ABNORMAL MG/DL
PROTHROMBIN TIME: 12.9 SEC (ref 9.8–13)
RBC # BLD: 4.36 M/UL (ref 4.2–5.9)
RBC UA: NORMAL /HPF (ref 0–2)
SODIUM BLD-SCNC: 136 MMOL/L (ref 136–145)
SPECIFIC GRAVITY UA: 1.01 (ref 1–1.03)
TCO2 CALC VENOUS: 21 MMOL/L
TOTAL PROTEIN: 7.6 G/DL (ref 6.4–8.2)
TROPONIN: 0.05 NG/ML
URINE REFLEX TO CULTURE: ABNORMAL
URINE TYPE: ABNORMAL
UROBILINOGEN, URINE: 0.2 E.U./DL
WBC # BLD: 10.1 K/UL (ref 4–11)
WBC UA: NORMAL /HPF (ref 0–5)

## 2019-11-08 PROCEDURE — 83880 ASSAY OF NATRIURETIC PEPTIDE: CPT

## 2019-11-08 PROCEDURE — 82803 BLOOD GASES ANY COMBINATION: CPT

## 2019-11-08 PROCEDURE — 80076 HEPATIC FUNCTION PANEL: CPT

## 2019-11-08 PROCEDURE — 81001 URINALYSIS AUTO W/SCOPE: CPT

## 2019-11-08 PROCEDURE — 6360000002 HC RX W HCPCS: Performed by: PHYSICIAN ASSISTANT

## 2019-11-08 PROCEDURE — 85610 PROTHROMBIN TIME: CPT

## 2019-11-08 PROCEDURE — 99285 EMERGENCY DEPT VISIT HI MDM: CPT

## 2019-11-08 PROCEDURE — 93005 ELECTROCARDIOGRAM TRACING: CPT | Performed by: PHYSICIAN ASSISTANT

## 2019-11-08 PROCEDURE — 85025 COMPLETE CBC W/AUTO DIFF WBC: CPT

## 2019-11-08 PROCEDURE — 71045 X-RAY EXAM CHEST 1 VIEW: CPT

## 2019-11-08 PROCEDURE — 80048 BASIC METABOLIC PNL TOTAL CA: CPT

## 2019-11-08 PROCEDURE — 96374 THER/PROPH/DIAG INJ IV PUSH: CPT

## 2019-11-08 PROCEDURE — 84484 ASSAY OF TROPONIN QUANT: CPT

## 2019-11-08 RX ORDER — FUROSEMIDE 10 MG/ML
40 INJECTION INTRAMUSCULAR; INTRAVENOUS ONCE
Status: COMPLETED | OUTPATIENT
Start: 2019-11-08 | End: 2019-11-08

## 2019-11-08 RX ORDER — FUROSEMIDE 40 MG/1
40 TABLET ORAL DAILY
Qty: 30 TABLET | Refills: 0 | Status: SHIPPED | OUTPATIENT
Start: 2019-11-08 | End: 2019-12-10 | Stop reason: SDUPTHER

## 2019-11-08 RX ADMIN — FUROSEMIDE 40 MG: 10 INJECTION, SOLUTION INTRAMUSCULAR; INTRAVENOUS at 22:06

## 2019-11-08 ASSESSMENT — ENCOUNTER SYMPTOMS
DIARRHEA: 0
CHEST TIGHTNESS: 0
NAUSEA: 0
WHEEZING: 0
TROUBLE SWALLOWING: 0
VOMITING: 0
SHORTNESS OF BREATH: 1
COLOR CHANGE: 0
ABDOMINAL PAIN: 0
RHINORRHEA: 0
COUGH: 0
EYE PAIN: 0
SORE THROAT: 0
ABDOMINAL DISTENTION: 0

## 2019-11-09 LAB
EKG ATRIAL RATE: 97 BPM
EKG DIAGNOSIS: NORMAL
EKG P AXIS: 53 DEGREES
EKG P-R INTERVAL: 188 MS
EKG Q-T INTERVAL: 432 MS
EKG QRS DURATION: 152 MS
EKG QTC CALCULATION (BAZETT): 548 MS
EKG R AXIS: 262 DEGREES
EKG T AXIS: 54 DEGREES
EKG VENTRICULAR RATE: 97 BPM

## 2019-12-03 RX ORDER — GLIPIZIDE 5 MG/1
5 TABLET ORAL
Qty: 60 TABLET | Refills: 5 | Status: SHIPPED | OUTPATIENT
Start: 2019-12-03 | End: 2020-11-16

## 2019-12-09 RX ORDER — METOPROLOL SUCCINATE 25 MG/1
TABLET, EXTENDED RELEASE ORAL
Qty: 30 TABLET | Refills: 0 | Status: SHIPPED | OUTPATIENT
Start: 2019-12-09 | End: 2019-12-10 | Stop reason: SDUPTHER

## 2019-12-10 ENCOUNTER — OFFICE VISIT (OUTPATIENT)
Dept: CARDIOLOGY CLINIC | Age: 79
End: 2019-12-10
Payer: MEDICARE

## 2019-12-10 VITALS
DIASTOLIC BLOOD PRESSURE: 80 MMHG | WEIGHT: 171 LBS | BODY MASS INDEX: 23.94 KG/M2 | HEART RATE: 95 BPM | HEIGHT: 71 IN | SYSTOLIC BLOOD PRESSURE: 128 MMHG

## 2019-12-10 DIAGNOSIS — R00.1 BRADYCARDIA: Primary | ICD-10-CM

## 2019-12-10 DIAGNOSIS — I10 ESSENTIAL HYPERTENSION: ICD-10-CM

## 2019-12-10 DIAGNOSIS — I45.10 RBBB: ICD-10-CM

## 2019-12-10 DIAGNOSIS — I50.22 CHRONIC SYSTOLIC CHF (CONGESTIVE HEART FAILURE) (HCC): ICD-10-CM

## 2019-12-10 DIAGNOSIS — I49.8 JUNCTIONAL CARDIAC ARRHYTHMIA: ICD-10-CM

## 2019-12-10 DIAGNOSIS — I42.0 DILATED CARDIOMYOPATHY (HCC): ICD-10-CM

## 2019-12-10 PROCEDURE — 93000 ELECTROCARDIOGRAM COMPLETE: CPT | Performed by: INTERNAL MEDICINE

## 2019-12-10 PROCEDURE — 99215 OFFICE O/P EST HI 40 MIN: CPT | Performed by: INTERNAL MEDICINE

## 2019-12-10 RX ORDER — FUROSEMIDE 40 MG/1
40 TABLET ORAL DAILY
Qty: 30 TABLET | Refills: 3 | Status: SHIPPED | OUTPATIENT
Start: 2019-12-10 | End: 2019-12-10 | Stop reason: SDUPTHER

## 2019-12-10 RX ORDER — FUROSEMIDE 40 MG/1
40 TABLET ORAL DAILY
Qty: 30 TABLET | Refills: 5 | Status: SHIPPED | OUTPATIENT
Start: 2019-12-10 | End: 2020-10-05

## 2019-12-19 ASSESSMENT — ENCOUNTER SYMPTOMS
GASTROINTESTINAL NEGATIVE: 1
SHORTNESS OF BREATH: 0
EYES NEGATIVE: 1
RESPIRATORY NEGATIVE: 1

## 2019-12-20 ENCOUNTER — OFFICE VISIT (OUTPATIENT)
Dept: CARDIOLOGY CLINIC | Age: 79
End: 2019-12-20
Payer: MEDICARE

## 2019-12-20 VITALS
WEIGHT: 168.6 LBS | OXYGEN SATURATION: 96 % | HEART RATE: 78 BPM | DIASTOLIC BLOOD PRESSURE: 70 MMHG | BODY MASS INDEX: 23.6 KG/M2 | SYSTOLIC BLOOD PRESSURE: 110 MMHG | HEIGHT: 71 IN

## 2019-12-20 DIAGNOSIS — I50.22 CHRONIC SYSTOLIC CHF (CONGESTIVE HEART FAILURE) (HCC): ICD-10-CM

## 2019-12-20 DIAGNOSIS — I42.0 DILATED CARDIOMYOPATHY (HCC): ICD-10-CM

## 2019-12-20 DIAGNOSIS — I50.22 CHRONIC SYSTOLIC CHF (CONGESTIVE HEART FAILURE) (HCC): Primary | ICD-10-CM

## 2019-12-20 DIAGNOSIS — R06.02 SHORTNESS OF BREATH: ICD-10-CM

## 2019-12-20 LAB
ANION GAP SERPL CALCULATED.3IONS-SCNC: 14 MMOL/L (ref 3–16)
BUN BLDV-MCNC: 25 MG/DL (ref 7–20)
CALCIUM SERPL-MCNC: 10 MG/DL (ref 8.3–10.6)
CHLORIDE BLD-SCNC: 103 MMOL/L (ref 99–110)
CO2: 26 MMOL/L (ref 21–32)
CREAT SERPL-MCNC: 1.9 MG/DL (ref 0.8–1.3)
GFR AFRICAN AMERICAN: 41
GFR NON-AFRICAN AMERICAN: 34
GLUCOSE BLD-MCNC: 124 MG/DL (ref 70–99)
POTASSIUM SERPL-SCNC: 4 MMOL/L (ref 3.5–5.1)
PRO-BNP: 2221 PG/ML (ref 0–449)
SODIUM BLD-SCNC: 143 MMOL/L (ref 136–145)

## 2019-12-20 PROCEDURE — 1123F ACP DISCUSS/DSCN MKR DOCD: CPT | Performed by: NURSE PRACTITIONER

## 2019-12-20 PROCEDURE — G8482 FLU IMMUNIZE ORDER/ADMIN: HCPCS | Performed by: NURSE PRACTITIONER

## 2019-12-20 PROCEDURE — G8427 DOCREV CUR MEDS BY ELIG CLIN: HCPCS | Performed by: NURSE PRACTITIONER

## 2019-12-20 PROCEDURE — 1036F TOBACCO NON-USER: CPT | Performed by: NURSE PRACTITIONER

## 2019-12-20 PROCEDURE — 99213 OFFICE O/P EST LOW 20 MIN: CPT | Performed by: NURSE PRACTITIONER

## 2019-12-20 PROCEDURE — G8420 CALC BMI NORM PARAMETERS: HCPCS | Performed by: NURSE PRACTITIONER

## 2019-12-20 PROCEDURE — G8598 ASA/ANTIPLAT THER USED: HCPCS | Performed by: NURSE PRACTITIONER

## 2019-12-20 PROCEDURE — 4040F PNEUMOC VAC/ADMIN/RCVD: CPT | Performed by: NURSE PRACTITIONER

## 2019-12-23 DIAGNOSIS — Z79.899 LONG TERM USE OF DRUG: ICD-10-CM

## 2019-12-23 DIAGNOSIS — I50.22 CHRONIC SYSTOLIC CONGESTIVE HEART FAILURE (HCC): Primary | ICD-10-CM

## 2020-01-30 RX ORDER — HYDRALAZINE HYDROCHLORIDE 10 MG/1
10 TABLET, FILM COATED ORAL EVERY 8 HOURS SCHEDULED
Qty: 90 TABLET | Refills: 3 | Status: CANCELLED | OUTPATIENT
Start: 2020-01-30

## 2020-02-20 ENCOUNTER — OFFICE VISIT (OUTPATIENT)
Dept: FAMILY MEDICINE CLINIC | Age: 80
End: 2020-02-20
Payer: MEDICARE

## 2020-02-20 VITALS
OXYGEN SATURATION: 96 % | HEART RATE: 77 BPM | BODY MASS INDEX: 24.36 KG/M2 | WEIGHT: 174 LBS | SYSTOLIC BLOOD PRESSURE: 142 MMHG | RESPIRATION RATE: 16 BRPM | HEIGHT: 71 IN | DIASTOLIC BLOOD PRESSURE: 78 MMHG

## 2020-02-20 PROBLEM — N17.9 AKI (ACUTE KIDNEY INJURY) (HCC): Status: ACTIVE | Noted: 2020-02-20

## 2020-02-20 PROBLEM — I10 HTN (HYPERTENSION): Status: ACTIVE | Noted: 2020-02-20

## 2020-02-20 LAB
ANION GAP SERPL CALCULATED.3IONS-SCNC: 15 MMOL/L (ref 3–16)
BUN BLDV-MCNC: 31 MG/DL (ref 7–20)
CALCIUM SERPL-MCNC: 10.4 MG/DL (ref 8.3–10.6)
CHLORIDE BLD-SCNC: 102 MMOL/L (ref 99–110)
CO2: 25 MMOL/L (ref 21–32)
CREAT SERPL-MCNC: 2 MG/DL (ref 0.8–1.3)
GFR AFRICAN AMERICAN: 39
GFR NON-AFRICAN AMERICAN: 32
GLUCOSE BLD-MCNC: 80 MG/DL (ref 70–99)
POTASSIUM SERPL-SCNC: 4.1 MMOL/L (ref 3.5–5.1)
PRO-BNP: 2194 PG/ML (ref 0–449)
SODIUM BLD-SCNC: 142 MMOL/L (ref 136–145)

## 2020-02-20 PROCEDURE — G8510 SCR DEP NEG, NO PLAN REQD: HCPCS | Performed by: INTERNAL MEDICINE

## 2020-02-20 PROCEDURE — G8420 CALC BMI NORM PARAMETERS: HCPCS | Performed by: INTERNAL MEDICINE

## 2020-02-20 PROCEDURE — 4040F PNEUMOC VAC/ADMIN/RCVD: CPT | Performed by: INTERNAL MEDICINE

## 2020-02-20 PROCEDURE — 99214 OFFICE O/P EST MOD 30 MIN: CPT | Performed by: INTERNAL MEDICINE

## 2020-02-20 PROCEDURE — 36415 COLL VENOUS BLD VENIPUNCTURE: CPT | Performed by: INTERNAL MEDICINE

## 2020-02-20 PROCEDURE — G8482 FLU IMMUNIZE ORDER/ADMIN: HCPCS | Performed by: INTERNAL MEDICINE

## 2020-02-20 PROCEDURE — 1123F ACP DISCUSS/DSCN MKR DOCD: CPT | Performed by: INTERNAL MEDICINE

## 2020-02-20 PROCEDURE — G8427 DOCREV CUR MEDS BY ELIG CLIN: HCPCS | Performed by: INTERNAL MEDICINE

## 2020-02-20 PROCEDURE — 3288F FALL RISK ASSESSMENT DOCD: CPT | Performed by: INTERNAL MEDICINE

## 2020-02-20 PROCEDURE — 1036F TOBACCO NON-USER: CPT | Performed by: INTERNAL MEDICINE

## 2020-02-20 RX ORDER — HYDRALAZINE HYDROCHLORIDE 10 MG/1
1 TABLET, FILM COATED ORAL DAILY
Status: ON HOLD | COMMUNITY
Start: 2020-01-28 | End: 2020-03-13 | Stop reason: HOSPADM

## 2020-02-20 RX ORDER — SPIRONOLACTONE 25 MG/1
0.5 TABLET ORAL DAILY
COMMUNITY
Start: 2020-02-11 | End: 2020-02-20 | Stop reason: ALTCHOICE

## 2020-02-20 RX ORDER — METOPROLOL SUCCINATE 25 MG/1
12.5 TABLET, EXTENDED RELEASE ORAL DAILY
COMMUNITY
End: 2020-02-28

## 2020-02-20 ASSESSMENT — PATIENT HEALTH QUESTIONNAIRE - PHQ9
2. FEELING DOWN, DEPRESSED OR HOPELESS: 0
SUM OF ALL RESPONSES TO PHQ QUESTIONS 1-9: 0
1. LITTLE INTEREST OR PLEASURE IN DOING THINGS: 0
SUM OF ALL RESPONSES TO PHQ QUESTIONS 1-9: 0
SUM OF ALL RESPONSES TO PHQ9 QUESTIONS 1 & 2: 0

## 2020-02-20 NOTE — ASSESSMENT & PLAN NOTE
Sugars are , diet is stable, weight is unchanged, no reported neuropathy, no change in vision, no claudication, no foot ulcers, no new skin lesions. recent change in medications(Lantus 10 u at bed time). No c/o with meds. Last eye exam 3/2019. Has Retinopathy mild.

## 2020-02-20 NOTE — PROGRESS NOTES
Subjective:      Patient ID: Wilton Santana is a 78 y.o. male. HPI  Controlled type 2 diabetes mellitus with stage 3 chronic kidney disease, without long-term current use of insulin ([facility])  Sugars are , diet is stable, weight is unchanged, no reported neuropathy, no change in vision, no claudication, no foot ulcers, no new skin lesions. recent change in medications(Lantus 10 u at bed time). No c/o with meds. Last eye exam 3/2019. Has Retinopathy mild. Benign essential HTN  No change in meds, no c/o with meds, no chest pain, SOB, palpatations, or syncope. Home bp was 120-130s/70-80s. Pure hypercholesterolemia  Stable diet and wt. No new c/o w/ med. Chronic renal failure, stage 3 (moderate) ([facility])  Stable BUN/Cr (1.6-2.0) since hospital 9/2019. No new c/o. Good DM and BP control. Recent episode of CHF(9/2019). No reoccurrence. Acute combined systolic and diastolic congestive heart failure ([address]  Recent hospitalization for CHF. Cath ok, ECHO done in 9/2019(EjFr 30-35% in past). Weight stable at home and not urinating quit as much w/ Lasix and 1/2 pill. No CP,SOB, or increased fatigue. Last BNP- 2,200. Past Medical History:   Diagnosis Date    Anemia     Arthritis     MILD    CHF (congestive heart failure) ([facility])     EF 35-40%.  Non-ischemic     Hyperlipidemia     Hypertension     Type II or unspecified type diabetes mellitus without mention of complication, not stated as uncontrolled      Current Outpatient Medications   Medication Sig Dispense Refill    metoprolol succinate (TOPROL XL) 25 MG extended release tablet Take 12.5 mg by mouth daily      furosemide (LASIX) 40 MG tablet Take 1 tablet by mouth daily 30 tablet 5    glipiZIDE (GLUCOTROL) 5 MG tablet Take 1 tablet by mouth 2 times daily (before meals) 60 tablet 5    clopidogrel (PLAVIX) 75 MG tablet Take 1 tablet by mouth daily 90 tablet 1    isosorbide mononitrate (IMDUR) 30 MG extended release tablet Take 1 tablet by mouth daily regular rhythm. No extrasystoles are present. Chest Wall: PMI is not displaced. Pulses: Normal pulses. No decreased pulses. Carotid pulses are 2+ on the right side and 2+ on the left side. Radial pulses are 2+ on the right side and 2+ on the left side. Femoral pulses are 2+ on the right side and 2+ on the left side. Popliteal pulses are 2+ on the right side and 2+ on the left side. Dorsalis pedis pulses are 2+ on the right side and 2+ on the left side. Posterior tibial pulses are 2+ on the right side and 2+ on the left side. Heart sounds: No murmur. No friction rub. Gallop present. S3 (slit ) sounds present. Comments: Normal JVD. Only mild HJR. Pulmonary:      Effort: Pulmonary effort is normal. No tachypnea, bradypnea, accessory muscle usage or respiratory distress. Breath sounds: Normal breath sounds. No decreased breath sounds, wheezing, rhonchi or rales. Chest:      Chest wall: No tenderness. Abdominal:      Hernia: No hernia is present. There is no hernia in the ventral area. Musculoskeletal:         General: Tenderness (OA knees.) and deformity (s/p traumatic amutation 3 toes R foot(many yrs ago from accident)) present. No swelling. Right lower leg: No edema. Left lower leg: No edema. Lymphadenopathy:      Cervical: No cervical adenopathy. Skin:     General: Skin is warm and dry. Coloration: Skin is not jaundiced or pale. Findings: No abrasion, bruising, erythema, lesion or rash. Nails: There is no clubbing. Neurological:      General: No focal deficit present. Mental Status: He is alert and oriented to person, place, and time. Mental status is at baseline. He is not disoriented. Cranial Nerves: No cranial nerve deficit. Sensory: No sensory deficit. Motor: No weakness, tremor, atrophy or abnormal muscle tone.       Coordination: Coordination normal.      Gait: Gait normal.

## 2020-02-20 NOTE — PATIENT INSTRUCTIONS
All above problems reviewed and the found to be unchanged except for the following :     Acute Combined Systolic and Diastolic Congestive Heart Failure (Hcc). Will do labs and adjust meds if needed. Daily weights. Call if wt>3 lbs. Call if new SOB or Edema. Chronic renal failure, stage 3 (moderate) ([facility] Will do labs. F/u w/ Nephrology as scheduled. 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 Will do labs and adjust meds if needed. To continue to improve diet and activity as tolerated. Benign Essential Htn. Continue present meds. Home BP checks. Call if>140/90. Improve diet. Avoid caffeine and salt. Call if new c/o w/ meds. Pure Hypercholesterolemia. To improve diet and continue meds. Call if new c/o.

## 2020-02-21 LAB
ESTIMATED AVERAGE GLUCOSE: 139.9 MG/DL
HBA1C MFR BLD: 6.5 %

## 2020-02-28 RX ORDER — METOPROLOL SUCCINATE 25 MG/1
TABLET, EXTENDED RELEASE ORAL
Qty: 30 TABLET | Refills: 5 | Status: ON HOLD
Start: 2020-02-28 | End: 2020-03-13 | Stop reason: HOSPADM

## 2020-03-04 RX ORDER — ISOSORBIDE MONONITRATE 30 MG/1
TABLET, EXTENDED RELEASE ORAL
Qty: 30 TABLET | Refills: 0 | Status: SHIPPED | OUTPATIENT
Start: 2020-03-04 | End: 2020-04-03

## 2020-03-11 ENCOUNTER — HOSPITAL ENCOUNTER (INPATIENT)
Age: 80
LOS: 1 days | Discharge: HOME | DRG: 291 | End: 2020-03-13
Attending: EMERGENCY MEDICINE | Admitting: INTERNAL MEDICINE
Payer: MEDICARE

## 2020-03-11 PROCEDURE — 99285 EMERGENCY DEPT VISIT HI MDM: CPT

## 2020-03-11 PROCEDURE — 93005 ELECTROCARDIOGRAM TRACING: CPT | Performed by: EMERGENCY MEDICINE

## 2020-03-12 ENCOUNTER — APPOINTMENT (OUTPATIENT)
Dept: NUCLEAR MEDICINE | Age: 80
DRG: 291 | End: 2020-03-12
Payer: MEDICARE

## 2020-03-12 ENCOUNTER — APPOINTMENT (OUTPATIENT)
Dept: GENERAL RADIOLOGY | Age: 80
DRG: 291 | End: 2020-03-12
Payer: MEDICARE

## 2020-03-12 PROBLEM — I50.31 ACUTE CONGESTIVE HEART FAILURE WITH LEFT VENTRICULAR DIASTOLIC DYSFUNCTION (HCC): Status: ACTIVE | Noted: 2020-03-12

## 2020-03-12 LAB
ANION GAP SERPL CALCULATED.3IONS-SCNC: 17 MMOL/L (ref 3–16)
BASE EXCESS VENOUS: -0.3 MMOL/L (ref -2–3)
BASOPHILS ABSOLUTE: 0 K/UL (ref 0–0.2)
BASOPHILS RELATIVE PERCENT: 0.6 %
BUN BLDV-MCNC: 23 MG/DL (ref 7–20)
CALCIUM SERPL-MCNC: 10 MG/DL (ref 8.3–10.6)
CARBOXYHEMOGLOBIN: 1.9 % (ref 0–1.5)
CHLORIDE BLD-SCNC: 101 MMOL/L (ref 99–110)
CO2: 20 MMOL/L (ref 21–32)
CREAT SERPL-MCNC: 2 MG/DL (ref 0.8–1.3)
EKG ATRIAL RATE: 92 BPM
EKG DIAGNOSIS: NORMAL
EKG P AXIS: 44 DEGREES
EKG P-R INTERVAL: 194 MS
EKG Q-T INTERVAL: 436 MS
EKG QRS DURATION: 160 MS
EKG QTC CALCULATION (BAZETT): 539 MS
EKG R AXIS: 270 DEGREES
EKG T AXIS: 64 DEGREES
EKG VENTRICULAR RATE: 92 BPM
EOSINOPHILS ABSOLUTE: 0.1 K/UL (ref 0–0.6)
EOSINOPHILS RELATIVE PERCENT: 0.9 %
GFR AFRICAN AMERICAN: 39
GFR NON-AFRICAN AMERICAN: 32
GLUCOSE BLD-MCNC: 114 MG/DL (ref 70–99)
GLUCOSE BLD-MCNC: 118 MG/DL (ref 70–99)
GLUCOSE BLD-MCNC: 162 MG/DL (ref 70–99)
GLUCOSE BLD-MCNC: 198 MG/DL (ref 70–99)
GLUCOSE BLD-MCNC: 84 MG/DL (ref 70–99)
HCO3 VENOUS: 24.1 MMOL/L (ref 24–28)
HCT VFR BLD CALC: 38.8 % (ref 40.5–52.5)
HEMOGLOBIN, VEN, REDUCED: 29.3 %
HEMOGLOBIN: 12.6 G/DL (ref 13.5–17.5)
LYMPHOCYTES ABSOLUTE: 1.4 K/UL (ref 1–5.1)
LYMPHOCYTES RELATIVE PERCENT: 19.1 %
MCH RBC QN AUTO: 25.2 PG (ref 26–34)
MCHC RBC AUTO-ENTMCNC: 32.4 G/DL (ref 31–36)
MCV RBC AUTO: 77.5 FL (ref 80–100)
METHEMOGLOBIN VENOUS: 0.4 % (ref 0–1.5)
MONOCYTES ABSOLUTE: 0.8 K/UL (ref 0–1.3)
MONOCYTES RELATIVE PERCENT: 10.9 %
NEUTROPHILS ABSOLUTE: 5.2 K/UL (ref 1.7–7.7)
NEUTROPHILS RELATIVE PERCENT: 68.5 %
O2 SAT, VEN: 70 %
PCO2, VEN: 40.9 MMHG (ref 41–51)
PDW BLD-RTO: 17.7 % (ref 12.4–15.4)
PERFORMED ON: ABNORMAL
PERFORMED ON: NORMAL
PH VENOUS: 7.39 (ref 7.35–7.45)
PLATELET # BLD: 280 K/UL (ref 135–450)
PMV BLD AUTO: 7.8 FL (ref 5–10.5)
PO2, VEN: 40.8 MMHG (ref 25–40)
POTASSIUM SERPL-SCNC: 4.1 MMOL/L (ref 3.5–5.1)
PRO-BNP: 6484 PG/ML (ref 0–449)
RBC # BLD: 5.01 M/UL (ref 4.2–5.9)
SODIUM BLD-SCNC: 138 MMOL/L (ref 136–145)
TCO2 CALC VENOUS: 25 MMOL/L
TROPONIN: 0.06 NG/ML
TROPONIN: 0.06 NG/ML
TROPONIN: 0.07 NG/ML
WBC # BLD: 7.5 K/UL (ref 4–11)

## 2020-03-12 PROCEDURE — 71046 X-RAY EXAM CHEST 2 VIEWS: CPT

## 2020-03-12 PROCEDURE — 80048 BASIC METABOLIC PNL TOTAL CA: CPT

## 2020-03-12 PROCEDURE — 78803 RP LOCLZJ TUM SPECT 1 AREA: CPT

## 2020-03-12 PROCEDURE — A9538 TC99M PYROPHOSPHATE: HCPCS | Performed by: INTERNAL MEDICINE

## 2020-03-12 PROCEDURE — 1200000000 HC SEMI PRIVATE

## 2020-03-12 PROCEDURE — 96374 THER/PROPH/DIAG INJ IV PUSH: CPT

## 2020-03-12 PROCEDURE — 82803 BLOOD GASES ANY COMBINATION: CPT

## 2020-03-12 PROCEDURE — 85025 COMPLETE CBC W/AUTO DIFF WBC: CPT

## 2020-03-12 PROCEDURE — 3430000000 HC RX DIAGNOSTIC RADIOPHARMACEUTICAL: Performed by: INTERNAL MEDICINE

## 2020-03-12 PROCEDURE — 6360000002 HC RX W HCPCS: Performed by: EMERGENCY MEDICINE

## 2020-03-12 PROCEDURE — 96372 THER/PROPH/DIAG INJ SC/IM: CPT

## 2020-03-12 PROCEDURE — 83880 ASSAY OF NATRIURETIC PEPTIDE: CPT

## 2020-03-12 PROCEDURE — 99222 1ST HOSP IP/OBS MODERATE 55: CPT | Performed by: INTERNAL MEDICINE

## 2020-03-12 PROCEDURE — 6370000000 HC RX 637 (ALT 250 FOR IP): Performed by: INTERNAL MEDICINE

## 2020-03-12 PROCEDURE — 84484 ASSAY OF TROPONIN QUANT: CPT

## 2020-03-12 PROCEDURE — G0378 HOSPITAL OBSERVATION PER HR: HCPCS

## 2020-03-12 PROCEDURE — 6360000002 HC RX W HCPCS: Performed by: INTERNAL MEDICINE

## 2020-03-12 PROCEDURE — 36415 COLL VENOUS BLD VENIPUNCTURE: CPT

## 2020-03-12 PROCEDURE — 96376 TX/PRO/DX INJ SAME DRUG ADON: CPT

## 2020-03-12 PROCEDURE — 2580000003 HC RX 258: Performed by: INTERNAL MEDICINE

## 2020-03-12 PROCEDURE — 6370000000 HC RX 637 (ALT 250 FOR IP): Performed by: EMERGENCY MEDICINE

## 2020-03-12 RX ORDER — CLOPIDOGREL BISULFATE 75 MG/1
75 TABLET ORAL DAILY
Status: DISCONTINUED | OUTPATIENT
Start: 2020-03-12 | End: 2020-03-13 | Stop reason: HOSPADM

## 2020-03-12 RX ORDER — HYDRALAZINE HYDROCHLORIDE 10 MG/1
10 TABLET, FILM COATED ORAL DAILY
Status: DISCONTINUED | OUTPATIENT
Start: 2020-03-12 | End: 2020-03-12

## 2020-03-12 RX ORDER — ALOGLIPTIN 12.5 MG/1
12.5 TABLET, FILM COATED ORAL DAILY
Status: DISCONTINUED | OUTPATIENT
Start: 2020-03-12 | End: 2020-03-13 | Stop reason: HOSPADM

## 2020-03-12 RX ORDER — FUROSEMIDE 10 MG/ML
40 INJECTION INTRAMUSCULAR; INTRAVENOUS ONCE
Status: COMPLETED | OUTPATIENT
Start: 2020-03-12 | End: 2020-03-12

## 2020-03-12 RX ORDER — PROMETHAZINE HYDROCHLORIDE 12.5 MG/1
12.5 TABLET ORAL EVERY 6 HOURS PRN
Status: DISCONTINUED | OUTPATIENT
Start: 2020-03-12 | End: 2020-03-13 | Stop reason: HOSPADM

## 2020-03-12 RX ORDER — HEPARIN SODIUM 5000 [USP'U]/ML
5000 INJECTION, SOLUTION INTRAVENOUS; SUBCUTANEOUS EVERY 8 HOURS SCHEDULED
Status: DISCONTINUED | OUTPATIENT
Start: 2020-03-12 | End: 2020-03-13 | Stop reason: HOSPADM

## 2020-03-12 RX ORDER — INSULIN LISPRO 100 [IU]/ML
0-6 INJECTION, SOLUTION INTRAVENOUS; SUBCUTANEOUS NIGHTLY
Status: DISCONTINUED | OUTPATIENT
Start: 2020-03-12 | End: 2020-03-13 | Stop reason: HOSPADM

## 2020-03-12 RX ORDER — AMLODIPINE BESYLATE 5 MG/1
5 TABLET ORAL DAILY
Status: DISCONTINUED | OUTPATIENT
Start: 2020-03-12 | End: 2020-03-13

## 2020-03-12 RX ORDER — SODIUM CHLORIDE 0.9 % (FLUSH) 0.9 %
10 SYRINGE (ML) INJECTION PRN
Status: DISCONTINUED | OUTPATIENT
Start: 2020-03-12 | End: 2020-03-13 | Stop reason: HOSPADM

## 2020-03-12 RX ORDER — ONDANSETRON 2 MG/ML
4 INJECTION INTRAMUSCULAR; INTRAVENOUS EVERY 6 HOURS PRN
Status: DISCONTINUED | OUTPATIENT
Start: 2020-03-12 | End: 2020-03-13 | Stop reason: HOSPADM

## 2020-03-12 RX ORDER — ACETAMINOPHEN 650 MG/1
650 SUPPOSITORY RECTAL EVERY 6 HOURS PRN
Status: DISCONTINUED | OUTPATIENT
Start: 2020-03-12 | End: 2020-03-13 | Stop reason: HOSPADM

## 2020-03-12 RX ORDER — ACETAMINOPHEN 325 MG/1
650 TABLET ORAL EVERY 6 HOURS PRN
Status: DISCONTINUED | OUTPATIENT
Start: 2020-03-12 | End: 2020-03-13 | Stop reason: HOSPADM

## 2020-03-12 RX ORDER — GLIPIZIDE 10 MG/1
5 TABLET ORAL
Status: DISCONTINUED | OUTPATIENT
Start: 2020-03-12 | End: 2020-03-13

## 2020-03-12 RX ORDER — ISOSORBIDE MONONITRATE 30 MG/1
30 TABLET, EXTENDED RELEASE ORAL DAILY
Status: DISCONTINUED | OUTPATIENT
Start: 2020-03-12 | End: 2020-03-13 | Stop reason: HOSPADM

## 2020-03-12 RX ORDER — METOPROLOL SUCCINATE 25 MG/1
25 TABLET, EXTENDED RELEASE ORAL DAILY
Status: DISCONTINUED | OUTPATIENT
Start: 2020-03-13 | End: 2020-03-13 | Stop reason: HOSPADM

## 2020-03-12 RX ORDER — FUROSEMIDE 10 MG/ML
60 INJECTION INTRAMUSCULAR; INTRAVENOUS 2 TIMES DAILY
Status: DISCONTINUED | OUTPATIENT
Start: 2020-03-12 | End: 2020-03-12

## 2020-03-12 RX ORDER — METOPROLOL SUCCINATE 25 MG/1
12.5 TABLET, EXTENDED RELEASE ORAL DAILY
Status: DISCONTINUED | OUTPATIENT
Start: 2020-03-12 | End: 2020-03-12

## 2020-03-12 RX ORDER — METOPROLOL SUCCINATE 25 MG/1
12.5 TABLET, EXTENDED RELEASE ORAL ONCE
Status: COMPLETED | OUTPATIENT
Start: 2020-03-12 | End: 2020-03-12

## 2020-03-12 RX ORDER — INSULIN LISPRO 100 [IU]/ML
0-12 INJECTION, SOLUTION INTRAVENOUS; SUBCUTANEOUS
Status: DISCONTINUED | OUTPATIENT
Start: 2020-03-12 | End: 2020-03-13 | Stop reason: HOSPADM

## 2020-03-12 RX ORDER — FUROSEMIDE 40 MG/1
40 TABLET ORAL DAILY
Status: DISCONTINUED | OUTPATIENT
Start: 2020-03-13 | End: 2020-03-13 | Stop reason: HOSPADM

## 2020-03-12 RX ORDER — POLYETHYLENE GLYCOL 3350 17 G/17G
17 POWDER, FOR SOLUTION ORAL DAILY PRN
Status: DISCONTINUED | OUTPATIENT
Start: 2020-03-12 | End: 2020-03-13 | Stop reason: HOSPADM

## 2020-03-12 RX ORDER — HYDRALAZINE HYDROCHLORIDE 10 MG/1
10 TABLET, FILM COATED ORAL EVERY 8 HOURS SCHEDULED
Status: DISCONTINUED | OUTPATIENT
Start: 2020-03-12 | End: 2020-03-12

## 2020-03-12 RX ORDER — FUROSEMIDE 10 MG/ML
40 INJECTION INTRAMUSCULAR; INTRAVENOUS 2 TIMES DAILY
Status: DISCONTINUED | OUTPATIENT
Start: 2020-03-12 | End: 2020-03-12 | Stop reason: SDUPTHER

## 2020-03-12 RX ORDER — SODIUM CHLORIDE 0.9 % (FLUSH) 0.9 %
10 SYRINGE (ML) INJECTION EVERY 12 HOURS SCHEDULED
Status: DISCONTINUED | OUTPATIENT
Start: 2020-03-12 | End: 2020-03-13 | Stop reason: HOSPADM

## 2020-03-12 RX ORDER — ASPIRIN 81 MG/1
81 TABLET ORAL DAILY
Status: DISCONTINUED | OUTPATIENT
Start: 2020-03-12 | End: 2020-03-13 | Stop reason: HOSPADM

## 2020-03-12 RX ORDER — ATORVASTATIN CALCIUM 40 MG/1
40 TABLET, FILM COATED ORAL DAILY
Status: DISCONTINUED | OUTPATIENT
Start: 2020-03-12 | End: 2020-03-13 | Stop reason: HOSPADM

## 2020-03-12 RX ADMIN — ALOGLIPTIN 12.5 MG: 12.5 TABLET, FILM COATED ORAL at 08:44

## 2020-03-12 RX ADMIN — CLOPIDOGREL BISULFATE 75 MG: 75 TABLET ORAL at 08:42

## 2020-03-12 RX ADMIN — Medication 10 ML: at 21:12

## 2020-03-12 RX ADMIN — HEPARIN SODIUM 5000 UNITS: 5000 INJECTION INTRAVENOUS; SUBCUTANEOUS at 15:59

## 2020-03-12 RX ADMIN — HYDRALAZINE HYDROCHLORIDE 10 MG: 10 TABLET, FILM COATED ORAL at 08:42

## 2020-03-12 RX ADMIN — INSULIN GLARGINE 15 UNITS: 100 INJECTION, SOLUTION SUBCUTANEOUS at 21:09

## 2020-03-12 RX ADMIN — FUROSEMIDE 60 MG: 10 INJECTION, SOLUTION INTRAMUSCULAR; INTRAVENOUS at 08:39

## 2020-03-12 RX ADMIN — AMLODIPINE BESYLATE 5 MG: 5 TABLET ORAL at 12:24

## 2020-03-12 RX ADMIN — ISOSORBIDE MONONITRATE 30 MG: 30 TABLET, EXTENDED RELEASE ORAL at 08:43

## 2020-03-12 RX ADMIN — Medication 10 ML: at 08:44

## 2020-03-12 RX ADMIN — GLIPIZIDE 5 MG: 10 TABLET ORAL at 06:40

## 2020-03-12 RX ADMIN — METOPROLOL SUCCINATE 12.5 MG: 25 TABLET, FILM COATED, EXTENDED RELEASE ORAL at 08:42

## 2020-03-12 RX ADMIN — ASPIRIN 81 MG: 81 TABLET, COATED ORAL at 08:43

## 2020-03-12 RX ADMIN — INSULIN LISPRO 2 UNITS: 100 INJECTION, SOLUTION INTRAVENOUS; SUBCUTANEOUS at 17:36

## 2020-03-12 RX ADMIN — NITROGLYCERIN 1 INCH: 20 OINTMENT TOPICAL at 02:09

## 2020-03-12 RX ADMIN — ATORVASTATIN CALCIUM 40 MG: 40 TABLET, FILM COATED ORAL at 08:43

## 2020-03-12 RX ADMIN — Medication 17 MILLICURIE: at 11:30

## 2020-03-12 RX ADMIN — METOPROLOL SUCCINATE 12.5 MG: 25 TABLET, EXTENDED RELEASE ORAL at 11:17

## 2020-03-12 RX ADMIN — GLIPIZIDE 5 MG: 10 TABLET ORAL at 15:59

## 2020-03-12 RX ADMIN — FUROSEMIDE 40 MG: 10 INJECTION, SOLUTION INTRAMUSCULAR; INTRAVENOUS at 02:11

## 2020-03-12 RX ADMIN — HEPARIN SODIUM 5000 UNITS: 5000 INJECTION INTRAVENOUS; SUBCUTANEOUS at 06:40

## 2020-03-12 RX ADMIN — HEPARIN SODIUM 5000 UNITS: 5000 INJECTION INTRAVENOUS; SUBCUTANEOUS at 21:09

## 2020-03-12 ASSESSMENT — PAIN SCALES - GENERAL
PAINLEVEL_OUTOF10: 0

## 2020-03-12 ASSESSMENT — ENCOUNTER SYMPTOMS
GASTROINTESTINAL NEGATIVE: 1
EYES NEGATIVE: 1
SHORTNESS OF BREATH: 1

## 2020-03-12 NOTE — PROGRESS NOTES
4 Eyes Admission Assessment     I agree as the admission nurse that 2 RN's have performed a thorough Head to Toe Skin Assessment on the patient. ALL assessment sites listed below have been assessed on admission. Areas assessed by both nurses: yes  [x]   Head, Face, and Ears   [x]   Shoulders, Back, and Chest  [x]   Arms, Elbows, and Hands   [x]   Coccyx, Sacrum, and Ischum  [x]   Legs, Feet, and Heels        Does the Patient have Skin Breakdown?   No         Efderico Prevention initiated:  No   Wound Care Orders initiated:  No      [facility] nurse consulted for Pressure Injury (Stage 3,4, Unstageable, DTI, NWPT, and Complex wounds):  No      Nurse 1 eSignature: Electronically signed by Norris Sinha RN on 3/12/20 at 3:50 AM EDT    **SHARE this note so that the co-signing nurse is able to place an eSignature**    Nurse 2 eSignature: Electronically signed by Johnathan Ramos RN on 3/12/20 at 4:11 AM EDT

## 2020-03-12 NOTE — CARE COORDINATION
>40%  Upon Discharge  If EF <40% ACE ordered ___, or ARB ___ or contraindication documented: >40%  If EF <40% Evidence-Based Beta Blocker ordered ____ or contraindication documented: >40%   If EF 35% or less, K <5on admit, Cr<2 (female) <2.5 (male), Aldosterone antagonist ordered: >40%  If discharge on Spironalactone / Aldactone / Eppleronone/ Inspra - is the patient's Potassium acceptable N/A  Received Influenza Vaccine (Oct-Mar) Yes- 10/7/2019  DVT Prophylaxis by Day 2: Yes  Prescription drug coverage: Yes  Can afford prescription co-pay: Yes  Time Spent Educating: 15 mins      NURSING  Patient Education:       Worsening HF symptoms Yes    fluid restriction  Yes    Tobacco cessation (Smoked in the last 12 months) N/A  daily weights and parameters  Yes  Avoid NSAIDS in HF Yes  written HF education given Yes     Follow up appointment Yes    ICD counseling No  Patient can Teach Back:   Worsening HF symptoms and when to report Yes   Weight gain to report to healthcare provider Yes  Amount of sodium to eat per day Yes  Name of their Belia Ding Yes    Time Spent Educatinmins      Total Time Spent on Patient Education:  60minutesYes    DISCHARGE PLAN:  Services at Discharge: Pt Declined  Community Resources: Given CHF class information  Equipment at Discharge: None  Destination: Home with Wife    Patient Self-Management:   Working scale at home: Yes  Reliable transportation: Yes  PCP: Yes  Advance Directive: Yes  Palliative Care Consult: No    Discharge Instructions:   Daily Weights: Weigh each morning at the same time and write down weights to bring to clinic visit  Goal Weight: 167lbs    Follow Up Instructions: 7 day hospital f/u scheduled  Provider: Dr. Ghada Lam  [facility]: [facility]  Date and Time: 3/18/2020 1245      I approve the established CHF interdisciplinary plan of care as documented within the medical record of Kendal Mckeon.        Signature Electronically signed by Olman Mcghee RN on 3/13/2020

## 2020-03-12 NOTE — PROGRESS NOTES
Progress Note    Admit Date: 3/11/2020  Day: 1  Diet: DIET CARB CONTROL;    CC: SOB    Interval history: Given 40 mg IV lasix yesterday with 1.7L urine output. Pt seen and examined at bedside this morning. He is sitting comfortably in bed. His breathing has significantly improved from presentation. No orthopnea, PND. Pt uses 2 pillows at night. No fevers, chills, nausea, vomiting, WILFRED, HA, abdominal pain, chest pain, leg swelling. HPI: Mr. Mare Ashley is an 77 yo M with PMH of CHF who presented with SOB. He was tachycardic and hypertensive on presentation despite being compliant with his low salt diet. No fevers, chills, cough, phlegm. Pt's baseline weight was 164 (2/2020) and 175 on presentation . Of note, his last admission for CHF was 11/2019.     Medications:     Scheduled Meds:   aspirin  81 mg Oral Daily    atorvastatin  40 mg Oral Daily    clopidogrel  75 mg Oral Daily    glipiZIDE  5 mg Oral BID AC    hydrALAZINE  10 mg Oral Daily    insulin glargine  15 Units Subcutaneous Nightly    isosorbide mononitrate  30 mg Oral Daily    metoprolol succinate  12.5 mg Oral Daily    alogliptin  12.5 mg Oral Daily    furosemide  60 mg Intravenous BID    insulin lispro  0-12 Units Subcutaneous TID WC    insulin lispro  0-6 Units Subcutaneous Nightly    sodium chloride flush  10 mL Intravenous 2 times per day    heparin (porcine)  5,000 Units Subcutaneous 3 times per day     Continuous Infusions:  PRN Meds:sodium chloride flush, acetaminophen **OR** acetaminophen, polyethylene glycol, promethazine **OR** ondansetron    Objective:   Vitals:   T-max:  Patient Vitals for the past 8 hrs:   BP Temp Temp src Pulse Resp SpO2 Height Weight   03/12/20 0829 (!) 154/100 98.9 °F (37.2 °C) Oral 90 16 98 % -- --   03/12/20 0338 -- -- -- -- -- -- 5' 11\" (1.803 m) 175 lb 11.3 oz (79.7 kg)   03/12/20 0331 (!) 165/93 98.4 °F (36.9 °C) Oral 96 18 97 % -- --   03/12/20 0300 (!) 159/109 -- -- 90 25 98 % -- --   03/12/20 0233 (!) 152/102 -- -- 84 16 98 % -- --   03/12/20 0200 (!) 160/110 -- -- 85 20 96 % -- --       Intake/Output Summary (Last 24 hours) at 3/12/2020 0843  Last data filed at 3/12/2020 8870  Gross per 24 hour   Intake 240 ml   Output 2000 ml   Net -1760 ml       Review of Systems  As per above    Physical Exam  Constitutional:       General: He is not in acute distress. Appearance: Normal appearance. He is not ill-appearing, toxic-appearing or diaphoretic. HENT:      Head: Normocephalic and atraumatic. Nose: Nose normal. No rhinorrhea. Mouth/Throat:      Mouth: Mucous membranes are moist.      Pharynx: Oropharynx is clear. Eyes:      General:         Right eye: No discharge. Left eye: No discharge. Conjunctiva/sclera: Conjunctivae normal.   Neck:      Musculoskeletal: Normal range of motion. No muscular tenderness. Cardiovascular:      Rate and Rhythm: Normal rate and regular rhythm. Heart sounds: No murmur. No friction rub. No gallop. Comments: JVD not appreciated  Pulmonary:      Effort: Pulmonary effort is normal. No respiratory distress. Breath sounds: Normal breath sounds. No stridor. No wheezing, rhonchi or rales. Chest:      Chest wall: No tenderness. Abdominal:      General: Bowel sounds are normal. There is no distension. Palpations: Abdomen is soft. Tenderness: There is no abdominal tenderness. There is no rebound. Neurological:      Mental Status: He is alert and oriented to person, place, and time. Cranial Nerves: No cranial nerve deficit. Sensory: No sensory deficit. Motor: No weakness.    Psychiatric:         Mood and Affect: Mood normal.         Behavior: Behavior normal.            LABS:    CBC:   Recent Labs     03/12/20  0005   WBC 7.5   HGB 12.6*   HCT 38.8*      MCV 77.5*     Renal:    Recent Labs     03/12/20  0005      K 4.1      CO2 20*   BUN 23*   CREATININE 2.0*   GLUCOSE 198*   CALCIUM 10.0   ANIONGAP 17*     Hepatic: No results for input(s): AST, ALT, BILITOT, BILIDIR, PROT, LABALBU, ALKPHOS in the last 72 hours. Troponin:   Recent Labs     03/12/20  0005 03/12/20  0616   TROPONINI 0.06* 0.06*     BNP: No results for input(s): BNP in the last 72 hours. Lipids: No results for input(s): CHOL, HDL in the last 72 hours. Invalid input(s): LDLCALCU, TRIGLYCERIDE  ABGs:  No results for input(s): PHART, LVW5IWI, PO2ART, CLX8HUK, BEART, THGBART, S5RJJVJI, HTL2WNG in the last 72 hours. INR: No results for input(s): INR in the last 72 hours. Lactate: No results for input(s): LACTATE in the last 72 hours. Cultures:  -----------------------------------------------------------------  RAD:   XR CHEST STANDARD (2 VW)   Final Result       Cardiomegaly. Mild vascular congestion. Trace bilateral pleural    effusions. Assessment/Plan:     Acute on chronic diastolic heart failure  BNP elevated (6,484 from 2,221 12/2019). CXR with cardiomegaly, mild vascular congestion, trace bilateral pleural effusions. Given lasix IV 40 mg in ED with good urine output. TTE (9/2019) with LVEF 50-55%, grade III diastolic dysfunction, cardiac amyloidosis. - PO lasix 40 mg daily  - Strict I/Os  - Daily weights  - Cardiology consulted: appreciate recs  - CHF nurse  - BMP daily  - NM cardiac amyloidosis    Hypertension  - Hydralazine 10 mg daily  - Imdur 30 mg    Tachycardia - resolved  - Metoprolol ER 25 mg    NSTEMI Type II  Trops 0.06 with repeat x2 stable in the setting of CHF and CKD. Cardiac cath (2019) with nonobstructive CAD. CKD stage III  Cr 2 from baseline 1.9 - 2.  - Serial Cr    DMII  Glucose 198. HbA1c 6.5 (2/2020). - Continue home alogliptin, glipizide  - Continue home lantus 15U nightly  - MDSSI  - Carb control diet    HLD  LDL 64 (6/2019).   - Atorvastatin 40 mg daily    Nonobstructive CAD  Cardiac cath (2019) with nonobstructive CAD.  - Cardiology on board  - ASA and plavix    Code Status: Full Code   FEN: Carb

## 2020-03-12 NOTE — CONSULTS
tremor. HEMATOLOGIC: No abnormal bruising or bleeding. ALLERGY: No nasal congestion or hives. Physical Examination:     Vitals:    03/12/20 0338 03/12/20 0829 03/12/20 1117 03/12/20 1217   BP:  (!) 154/100 (!) 147/95 (!) 146/99   Pulse:  90 91 89   Resp:  16  18   Temp:  98.9 °F (37.2 °C)  97.8 °F (36.6 °C)   TempSrc:  Oral  Oral   SpO2:  98%  99%   Weight: 175 lb 11.3 oz (79.7 kg)      Height: 5' 11\" (1.803 m)          Wt Readings from Last 3 Encounters:   03/12/20 175 lb 11.3 oz (79.7 kg)   02/20/20 174 lb (78.9 kg)   12/20/19 168 lb 9.6 oz (76.5 kg)         General Appearance:  Alert, cooperative, no distress, appears stated age Appropriate weight   Head:  Normocephalic, without obvious abnormality, atraumatic   Eyes:  PERRL, conjunctiva/corneas clear EOM intact  Ears normal   Throat no lesions       Nose: Nares normal, no drainage or sinus tenderness   Throat: Lips, mucosa, and tongue normal   Neck: Supple, symmetrical, trachea midline, no adenopathy, thyroid: not enlarged, symmetric, no tenderness/mass/nodules, no carotid bruit. Lungs:   Respirations unlabored, clear to auscultation bilaterally, without any wheezes, rubs or ronchi. Chest Wall:  No tenderness or deformity   Heart:  Regular rhythm, rate is controlled, S1, S2 normal, there is no murmur, there is no rub or gallop, no jvd, no bilateral lower extremity edema   Abdomen:   Soft, non-tender, bowel sounds active all four quadrants,  no masses, no organomegaly       Extremities: Extremities normal, atraumatic, no cyanosis.     Pulses: 2+ and symmetric   Skin: Skin color, texture, turgor normal, no rashes or lesions   Pysch: Normal mood and affect   Neurologic: Normal gross motor and sensory exam.  Cranial nerves intact        Labs:     Recent Labs     03/12/20  0005      K 4.1   BUN 23*   CREATININE 2.0*      CO2 20*   GLUCOSE 198*   CALCIUM 10.0     Recent Labs     03/12/20  0005   WBC 7.5   HGB 12.6*   HCT 38.8*   
TROPONINI 0.07 (H) 03/12/2020         Imaging:     Telemetry:  ***    Assessment / Plan:     Acute on chronic diastolic heart failure: Exacerbation likely 2/2 increased salt intake. Echo on 9/20/2019: left ventricular systolic function appeared normal with an estimated ejection fraction of 50-55%. Diastolic filling parameters suggested grade III diastolic dysfunction. Myocardial appearance and the presence apical sparing on strain imaging suggests cardiac amyloidosis. -Switch IV Lasix to PO lasix 40mg daily   -Daily standing weights   -I/O's qshift   -Daily BMP   -Increase to Toprol 25mg BID   -d/c Hydralazine and start Norvasc 5mg daily tomorrow if BP today is 360-508 systolic. If not, administer first dose of Norvasc 5mg today. -PYP     HTN: patient presented with elevated BP and narrow pulse pressure     -Same as above     Hyperlipidemia     -Continue Lipitor 40mg daily     CAD: EKG showed NSR with RBBB. [facility] 7/2019: The LAD, LCX and RCA had mild irregular plaque but no stenosis over 30%.  Patient stable w/o signs of infarct.     -Continue home meds    CKD: baseline Cr (1.6-1.9)    -Monitor Cr  -Defer to primary team     T2DM  -defer to primary team         Mei Ribeiro, medical student

## 2020-03-12 NOTE — ED PROVIDER NOTES
[address] [facility]          ATTENDING PHYSICIAN NOTE       Date of evaluation: 3/11/2020    Chief Complaint     Shortness of Breath      History of Present Illness     Keron Castrejon is a 78 y.o. male who presents to the emergency department complaining of shortness of breath. Patient states of last 2 days he been having increasing shortness of breath. He also notes a sensation of pressure located in the center of his chest.  He denies any fever chills. He denies any cough. He denies any swelling or pain in his extremities. He states he has been compliant with his medications. Review of Systems     Review of Systems   Constitutional: Negative. HENT: Negative. Eyes: Negative. Respiratory: Positive for shortness of breath. Cardiovascular: Positive for chest pain. Gastrointestinal: Negative. Genitourinary: Negative. Musculoskeletal: Negative. Neurological: Negative. All other systems reviewed and are negative. Past Medical, Surgical, Family, and Social History     He has a past medical history of Anemia, Arthritis, CHF (congestive heart failure) ([address] Hyperlipidemia, Hypertension, and Type II or unspecified type diabetes mellitus without mention of complication, not stated as uncontrolled. He has a past surgical history that includes hernia repair; hip surgery; and other surgical history (N/A, 12/15/2015). His family history includes Cancer in his sister; Heart Disease in his father; High Blood Pressure in his father and mother. He reports that he has never smoked. He has never used smokeless tobacco. He reports that he does not drink alcohol or use drugs.     Medications     Previous Medications    ASPIRIN 81 MG EC TABLET    Take 1 tablet by mouth daily    ATORVASTATIN (LIPITOR) 40 MG TABLET    Take 1 tablet by mouth daily    CLOPIDOGREL (PLAVIX) 75 MG TABLET    Take 1 tablet by mouth daily    FUROSEMIDE (LASIX) 40 MG TABLET    Take 1 tablet by mouth daily    GLIPIZIDE (GLUCOTROL) 5 MG TABLET    Take 1 tablet by mouth 2 times daily (before meals)    HYDRALAZINE (APRESOLINE) 10 MG TABLET    Take 1 tablet by mouth daily    INSULIN GLARGINE (LANTUS SOLOSTAR) 100 UNIT/ML INJECTION PEN    Inject 15 Units into the skin nightly    INSULIN PEN NEEDLE (PEN NEEDLES 3/16\") 31G X 5 MM MISC    1 each by Does not apply route daily    ISOSORBIDE MONONITRATE (IMDUR) 30 MG EXTENDED RELEASE TABLET    Take 1 tablet by mouth once daily    METOPROLOL SUCCINATE (TOPROL XL) 25 MG EXTENDED RELEASE TABLET    Take 1/2 (one-half) tablet by mouth once daily    SITAGLIPTIN (JANUVIA) 50 MG TABLET    Take 1 tablet by mouth daily       Allergies     He has No Known Allergies. Physical Exam     INITIAL VITALS: BP: (!) 169/122, Temp: 97.7 °F (36.5 °C), Pulse: 109, Resp: 16, SpO2: 98 %   Physical Exam  Vitals signs and nursing note reviewed. Constitutional:       General: He is not in acute distress. HENT:      Head: Normocephalic and atraumatic. Mouth/Throat:      Mouth: Mucous membranes are moist.      Pharynx: No oropharyngeal exudate. Eyes:      General: No scleral icterus. Extraocular Movements: Extraocular movements intact. Conjunctiva/sclera: Conjunctivae normal.      Pupils: Pupils are equal, round, and reactive to light. Neck:      Musculoskeletal: Normal range of motion and neck supple. Cardiovascular:      Rate and Rhythm: Regular rhythm. Tachycardia present. Heart sounds: Murmur present. Pulmonary:      Effort: Pulmonary effort is normal.      Breath sounds: Normal breath sounds. No wheezing, rhonchi or rales. Abdominal:      General: Bowel sounds are normal.      Palpations: Abdomen is soft. Tenderness: There is no abdominal tenderness. There is no guarding or rebound. Musculoskeletal: Normal range of motion. General: No swelling. Skin:     General: Skin is warm and dry.    Neurological:      General: No focal deficit 449 pg/mL   Blood gas, venous (Lab)   Result Value Ref Range    pH, Estrada 7.388 7.350 - 7.450    pCO2, Estrada 40.9 (L) 41.0 - 51.0 mmHg    pO2, Estrada 40.8 (H) 25.0 - 40.0 mmHg    HCO3, Venous 24.1 24.0 - 28.0 mmol/L    Base Excess, Estrada -0.3 -2.0 - 3.0 mmol/L    O2 Sat, Estrada 70 Not established %    Carboxyhemoglobin 1.9 (H) 0.0 - 1.5 %    MetHgb, Estrada 0.4 0.0 - 1.5 %    TC02 (Calc), Estrada 25 mmol/L    Hemoglobin, Estrada, Reduced 29.30 %     RECENT VITALS:  BP: (!) 169/122,Temp: 97.7 °F (36.5 °C), Pulse: 109, Resp: 16, SpO2: 98 %     Procedures     N/A    ED Course     Nursing Notes, Past Medical Hx, Past Surgical Hx, Social Hx,Allergies, and Family Hx were reviewed. patient was given the following medications:  Orders Placed This Encounter   Medications    furosemide (LASIX) injection 40 mg    nitroglycerin (NITRO-BID) 2 % ointment 1 inch       CONSULTS:  IP CONSULT TO HOSPITALIST    MEDICAL DECISIONMAKING / ASSESSMENT / Allison Raul is a 78 y.o. male presents to the emergency department complaining of shortness of breath and chest pain. Patient states that symptoms have been going for last 2 days but worse tonight. On arrival, the patient noted to be hypertensive in the 160s over 120s. He is clear breath sounds bilaterally. He does have a murmur present. EKG does show right bundle branch block which is old. Troponin is elevated at 0.06 which is similar to his troponins in the past.  NT proBNP is elevated 6400 which is significantly increased from 2100 three weeks ago through his primary care provider. Patient was written for 40 mg of Lasix IV as well as an inch of Nitropaste to the chest.  In review of records, the patient did have an angiogram performed approximately 9 months ago that showed no more than 30% stenosis in the coronary vessels. The patient will be admitted to the hospitalist service for diuresis. Clinical Impression     1. Acute pulmonary edema (HCC)    2. Essential hypertension    3. Chest pain, unspecified type        Disposition     PATIENT REFERRED TO:  No follow-up provider specified.     DISCHARGE MEDICATIONS:  New Prescriptions    No medications on file       DISPOSITION Decision To Admit 03/12/2020 01:49:57 AM        Priya Crystal MD  03/12/20 [id number]

## 2020-03-12 NOTE — H&P
Take 1 tablet by mouth daily      furosemide (LASIX) 40 MG tablet Take 1 tablet by mouth daily 30 tablet 5    glipiZIDE (GLUCOTROL) 5 MG tablet Take 1 tablet by mouth 2 times daily (before meals) 60 tablet 5    clopidogrel (PLAVIX) 75 MG tablet Take 1 tablet by mouth daily 90 tablet 1    atorvastatin (LIPITOR) 40 MG tablet Take 1 tablet by mouth daily 90 tablet 1    SITagliptin (JANUVIA) 50 MG tablet Take 1 tablet by mouth daily 30 tablet 5    insulin glargine (LANTUS SOLOSTAR) 100 UNIT/ML injection pen Inject 15 Units into the skin nightly 5 pen 3    Insulin Pen Needle (PEN NEEDLES 3/16\") 31G X 5 MM MISC 1 each by Does not apply route daily 100 each 3    aspirin 81 MG EC tablet Take 1 tablet by mouth daily 30 tablet 3       Allergies:  No Known Allergies     Social History:  Patient Lives with his wife   reports that he has never smoked. He has never used smokeless tobacco. He reports that he does not drink alcohol or use drugs. Family History:  family history includes Cancer in his sister; Heart Disease in his father; High Blood Pressure in his father and mother. ,    Physical Exam:  BP (!) 169/122   Pulse 109   Temp 97.7 °F (36.5 °C) (Oral)   Resp 16   SpO2 98%     General appearance:  Appears comfortable. Well nourished  Eyes: Sclera clear, pupils equal  ENT: Moist mucus membranes, no thrush. Trachea midline. Cardiovascular: Regular rhythm, normal S1, S2. No murmur, gallop, rub. No edema in lower extremities  Respiratory: Basilar lung crackles posteriorly no use of sensory muscles at time of my evaluation rt  Gastrointestinal: Abdomen soft, non-tender, not distended, normal bowel sounds  Musculoskeletal: No cyanosis in digits, neck supple  Neurology: Cranial nerves grossly intact. Alert and oriented in time, place and person. No speech or motor deficits  Psychiatry: Appropriate affect.  Not agitated  Skin: Warm, dry, normal turgor, no rash  Brisk capillary refill, peripheral pulses palpable Labs:  CBC:   Lab Results   Component Value Date    WBC 7.5 03/12/2020    RBC 5.01 03/12/2020    HGB 12.6 03/12/2020    HCT 38.8 03/12/2020    MCV 77.5 03/12/2020    MCH 25.2 03/12/2020    MCHC 32.4 03/12/2020    RDW 17.7 03/12/2020     03/12/2020    MPV 7.8 03/12/2020     BMP:    Lab Results   Component Value Date     03/12/2020    K 4.1 03/12/2020    K 4.2 11/08/2019     03/12/2020    CO2 20 03/12/2020    BUN 23 03/12/2020    CREATININE 2.0 03/12/2020    CALCIUM 10.0 03/12/2020    GFRAA 39 03/12/2020    GFRAA >60 05/16/2013    LABGLOM 32 03/12/2020    GLUCOSE 198 03/12/2020     XR CHEST STANDARD (2 VW)   Final Result       Cardiomegaly. Mild vascular congestion. Trace bilateral pleural    effusions. Chest Xray:   EKG:    I visualized CXR images and EKG strips NSR with rt bundle branch block     Discussed case  with Dr. Allegra Porras    Problem List  Acute on chronic diastolic heart failure  Elevated blood pressure  Tachycardia    Assessment/Plan:   Acute on chronic diastolic heart failure with chronic kidney disease stage III  IV diuretics, monitor the response, telemetry, cath in July 2019 nonobstructive disease,cardiology consult, if renal function worsens might have to consider nephrology consult    Elevated troponin  Probably related to heart failure and chronic kidney disease, cath done in 2019 nonobstructive coronary artery disease    Diabetes mellitus  Insulin sliding scale carb consistent diet        DVT prophylaxis Heparin  Code status full code  Diet carb consistent cardiac  IV access peripheral  Lyle Catheter no    Admit as inpatient. I anticipate hospitalization spanning more than two midnights for investigation and treatment of the above medically necessary diagnoses. Please note that some part of this chart was generated using Dragon dictation software.  Although every effort was made to ensure the accuracy of this automated transcription, some errors in transcription may have occurred inadvertently. If you may need any clarification, please do not hesitate to contact me through [facility].        Angel Dowell MD    3/12/2020 2:06 AM

## 2020-03-12 NOTE — CARE COORDINATION
Case Management Assessment           Initial Evaluation         Date / Time of Evaluation: 3/12/2020 11:26 AM  Assessment Completed by: Yanna Brendan    Patient is a 79 y/o male admitted with DX of Acute congestive heart failure with left ventricular diastolic dysfunction. Patient lives with his wife and has children who are available to assist if needed. Patient is not active with HHC, SW provided an AQ List for [address] if patient's medical team recommends at d/c. Patient would be agreeable to [address] if his \"doctor\" recommends HHC. SW reviewed discharge planning with patient and CM/SW will continue to follow to assist with final d/c dispo. Patient Name: Osker Collet     YOB: 1940  Diagnosis: Acute congestive heart failure with left ventricular diastolic dysfunction ([address] [I50.31]  Acute congestive heart failure with left ventricular diastolic dysfunction ([address] [I50.31]     Date / Time: 3/11/2020 11:21 PM    Patient Admission Status: Inpatient    If patient is discharged prior to next notation, then this note serves as note for discharge by case management.      Current PCP: Zain Mcgill MD  Clinic Patient: No    Chart Reviewed: Yes  Patient/ Family Interviewed: Yes    Initial assessment completed at bedside with: Patient    Hospitalization in the last 30 days: No    Emergency Contacts:  Extended Emergency Contact Information  Primary Emergency Contact: Batsheva Becerra  Address: [address] Phone: [phone]  Mobile Phone: [phone]  Relation: Spouse  Secondary Emergency Contact: [address] [place] [address], [address] [place] [address] Phone: [phone]  Relation: Child    Advance Directives:   Code Status: Full Code     Advance Directives (For Healthcare)  Healthcare Directive: Yes, patient has an advance directive for healthcare treatment  Values / Beliefs  Do you have any ethnic, cultural, sacramental, or spiritual Christian needs you would like us to be stabilization    Additional Case Management Notes: Patient plans to return home with wife. The Plan for Transition of Care is related to the following treatment goals of Acute congestive heart failure with left ventricular diastolic dysfunction (HCC) [I50.31]  Acute congestive heart failure with left ventricular diastolic dysfunction ([address] [I50.31]    The Patient and/or patient representative Anel Srinivasan and his family were provided with a choice of provider and agrees with the discharge plan Yes    Freedom of choice list was provided with basic dialogue that supports the patient's individualized plan of care/goals and shares the quality data associated with the providers.  Yes    Care Transition patient: Yes    JR Boswell, [facility]  Case Management Department  Ph: [phone]  Fax: [phone]

## 2020-03-12 NOTE — PROGRESS NOTES
Admission: Patient received to room 6302 from ED. Patient admitted with Dx of Acute congestive heart failure. Patient A&Ox 4 upon arrival. Tele monitor applied, rate and rhythm verified with monitor reader. VSS. Patient oriented to room, staff, and call system. Educated on fall protocol and hourly rounding. Assessment as documented. Admission navigator complete. IV Lasix given in the ED. Bed locked in low position. Patient informed to utilize call light with any needs. Pt verbalized understanding. Call light within reach. Will continue to monitor.

## 2020-03-13 VITALS
HEART RATE: 80 BPM | HEIGHT: 71 IN | OXYGEN SATURATION: 100 % | BODY MASS INDEX: 23.38 KG/M2 | WEIGHT: 167 LBS | RESPIRATION RATE: 16 BRPM | SYSTOLIC BLOOD PRESSURE: 149 MMHG | DIASTOLIC BLOOD PRESSURE: 93 MMHG | TEMPERATURE: 97.6 F

## 2020-03-13 LAB
ANION GAP SERPL CALCULATED.3IONS-SCNC: 13 MMOL/L (ref 3–16)
BUN BLDV-MCNC: 24 MG/DL (ref 7–20)
CALCIUM SERPL-MCNC: 10.3 MG/DL (ref 8.3–10.6)
CHLORIDE BLD-SCNC: 103 MMOL/L (ref 99–110)
CO2: 26 MMOL/L (ref 21–32)
CREAT SERPL-MCNC: 1.8 MG/DL (ref 0.8–1.3)
GFR AFRICAN AMERICAN: 44
GFR NON-AFRICAN AMERICAN: 36
GLUCOSE BLD-MCNC: 54 MG/DL (ref 70–99)
GLUCOSE BLD-MCNC: 56 MG/DL (ref 70–99)
GLUCOSE BLD-MCNC: 83 MG/DL (ref 70–99)
GLUCOSE BLD-MCNC: 91 MG/DL (ref 70–99)
MAGNESIUM: 2 MG/DL (ref 1.8–2.4)
PERFORMED ON: ABNORMAL
PERFORMED ON: NORMAL
PERFORMED ON: NORMAL
POTASSIUM REFLEX MAGNESIUM: 3.1 MMOL/L (ref 3.5–5.1)
SODIUM BLD-SCNC: 142 MMOL/L (ref 136–145)

## 2020-03-13 PROCEDURE — G0378 HOSPITAL OBSERVATION PER HR: HCPCS

## 2020-03-13 PROCEDURE — 6370000000 HC RX 637 (ALT 250 FOR IP): Performed by: STUDENT IN AN ORGANIZED HEALTH CARE EDUCATION/TRAINING PROGRAM

## 2020-03-13 PROCEDURE — 96372 THER/PROPH/DIAG INJ SC/IM: CPT

## 2020-03-13 PROCEDURE — 6370000000 HC RX 637 (ALT 250 FOR IP): Performed by: INTERNAL MEDICINE

## 2020-03-13 PROCEDURE — 2580000003 HC RX 258: Performed by: INTERNAL MEDICINE

## 2020-03-13 PROCEDURE — 99233 SBSQ HOSP IP/OBS HIGH 50: CPT | Performed by: INTERNAL MEDICINE

## 2020-03-13 PROCEDURE — 36415 COLL VENOUS BLD VENIPUNCTURE: CPT

## 2020-03-13 PROCEDURE — 83735 ASSAY OF MAGNESIUM: CPT

## 2020-03-13 PROCEDURE — 6360000002 HC RX W HCPCS: Performed by: INTERNAL MEDICINE

## 2020-03-13 PROCEDURE — 80048 BASIC METABOLIC PNL TOTAL CA: CPT

## 2020-03-13 RX ORDER — AMLODIPINE BESYLATE 5 MG/1
5 TABLET ORAL DAILY
Qty: 30 TABLET | Refills: 3 | Status: CANCELLED | OUTPATIENT
Start: 2020-03-14

## 2020-03-13 RX ORDER — NICOTINE POLACRILEX 4 MG
15 LOZENGE BUCCAL PRN
Status: DISCONTINUED | OUTPATIENT
Start: 2020-03-13 | End: 2020-03-13 | Stop reason: HOSPADM

## 2020-03-13 RX ORDER — DEXTROSE MONOHYDRATE 25 G/50ML
12.5 INJECTION, SOLUTION INTRAVENOUS PRN
Status: DISCONTINUED | OUTPATIENT
Start: 2020-03-13 | End: 2020-03-13 | Stop reason: HOSPADM

## 2020-03-13 RX ORDER — METOPROLOL SUCCINATE 25 MG/1
25 TABLET, EXTENDED RELEASE ORAL DAILY
Qty: 30 TABLET | Refills: 3 | Status: SHIPPED | OUTPATIENT
Start: 2020-03-14 | End: 2020-05-01

## 2020-03-13 RX ORDER — AMLODIPINE BESYLATE 10 MG/1
10 TABLET ORAL DAILY
Status: DISCONTINUED | OUTPATIENT
Start: 2020-03-14 | End: 2020-03-13 | Stop reason: HOSPADM

## 2020-03-13 RX ORDER — DEXTROSE MONOHYDRATE 50 MG/ML
100 INJECTION, SOLUTION INTRAVENOUS PRN
Status: DISCONTINUED | OUTPATIENT
Start: 2020-03-13 | End: 2020-03-13 | Stop reason: HOSPADM

## 2020-03-13 RX ORDER — AMLODIPINE BESYLATE 10 MG/1
10 TABLET ORAL DAILY
Qty: 30 TABLET | Refills: 3 | Status: SHIPPED | OUTPATIENT
Start: 2020-03-14 | End: 2020-05-13

## 2020-03-13 RX ADMIN — ALOGLIPTIN 12.5 MG: 12.5 TABLET, FILM COATED ORAL at 08:57

## 2020-03-13 RX ADMIN — METOPROLOL SUCCINATE 25 MG: 25 TABLET, EXTENDED RELEASE ORAL at 08:57

## 2020-03-13 RX ADMIN — GLIPIZIDE 5 MG: 10 TABLET ORAL at 06:00

## 2020-03-13 RX ADMIN — POTASSIUM BICARBONATE 40 MEQ: 782 TABLET, EFFERVESCENT ORAL at 11:52

## 2020-03-13 RX ADMIN — ISOSORBIDE MONONITRATE 30 MG: 30 TABLET, EXTENDED RELEASE ORAL at 08:57

## 2020-03-13 RX ADMIN — ATORVASTATIN CALCIUM 40 MG: 40 TABLET, FILM COATED ORAL at 08:57

## 2020-03-13 RX ADMIN — AMLODIPINE BESYLATE 5 MG: 5 TABLET ORAL at 08:57

## 2020-03-13 RX ADMIN — HEPARIN SODIUM 5000 UNITS: 5000 INJECTION INTRAVENOUS; SUBCUTANEOUS at 05:59

## 2020-03-13 RX ADMIN — Medication 10 ML: at 08:58

## 2020-03-13 RX ADMIN — FUROSEMIDE 40 MG: 40 TABLET ORAL at 08:57

## 2020-03-13 RX ADMIN — HEPARIN SODIUM 5000 UNITS: 5000 INJECTION INTRAVENOUS; SUBCUTANEOUS at 14:43

## 2020-03-13 RX ADMIN — CLOPIDOGREL BISULFATE 75 MG: 75 TABLET ORAL at 08:57

## 2020-03-13 RX ADMIN — ASPIRIN 81 MG: 81 TABLET, COATED ORAL at 08:57

## 2020-03-13 ASSESSMENT — PAIN SCALES - GENERAL
PAINLEVEL_OUTOF10: 0

## 2020-03-13 NOTE — DISCHARGE INSTR - COC
Continuity of Care Form    Patient Name: Maira Hernandez   :  1940  MRN:  [id number]    [facility] date:  3/11/2020  Discharge date:  ***    Code Status Order: Full Code   Advance Directives:   Advance Care Flowsheet Documentation     Date/Time Healthcare Directive Type of Healthcare Directive Copy in [address] Agent's Name Healthcare Agent's Phone Number    [phone]  Yes, patient has an advance directive for healthcare treatment  --  --  --  --  --          Admitting Physician:  Mabel Vasques MD  PCP: Andres Borges MD    Discharging Nurse: [facility] Unit/Room#: 6621/2987-69  Discharging Unit Phone Number: ***    Emergency Contact:   Extended Emergency Contact Information  Primary Emergency Contact: [place]  Address: [address] [place] [address], [address] [place] [address] Phone: [phone]  Mobile Phone: [phone]  Relation: Spouse  Secondary Emergency Contact: Inderjit Velázquez, [address] [place] [address] Phone: [phone]  Relation: Child    Past Surgical History:  Past Surgical History:   Procedure Laterality Date    HERNIA REPAIR      HIP SURGERY      OTHER SURGICAL HISTORY N/A 12/15/2015    LAPAROSCOPIC LEFT INGUINAL HERNIA REPAIR WITH MESH       Immunization History:   Immunization History   Administered Date(s) Administered    Influenza 2010, 2012    Influenza Vaccine, unspecified formulation 2013, 2014, 2015    Influenza Virus Vaccine 2007, 2008, 2009    Influenza Whole 2009    Influenza, High Dose (Fluzone 65 yrs and older) 2013, 2014, 2015, 12/15/2016, 10/02/2017, 2018    Influenza, Triv, inactivated, subunit, adjuvanted, IM (Fluad 65 yrs and older) 10/07/2019    Pneumococcal Conjugate 13-valent ([id number]) 2014    Pneumococcal Polysaccharide (Frtryavad06) 2005, 12/15/2016    Td, unspecified formulation 2007    Tdap (Boostrix, Adacel) 11/14/2018       Active Problems:  Patient Active Problem List   Diagnosis Code    Controlled type 2 diabetes mellitus with stage 3 chronic kidney disease, without long-term current use of insulin ([facility]) E11.22, N18.3    Benign essential HTN I10    Pure hypercholesterolemia E78.00    Iron deficiency anemia due to chronic blood loss D50.0    Primary osteoarthritis of both knees M17.0    Vitamin D deficiency E55.9    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    Abnormal EKG R94.31    Chronic renal failure, stage 3 (moderate) ([facility]) N18.3    Acute combined systolic and diastolic congestive heart failure ([facility]) I50.41    Acute congestive heart failure (HCC) I50.9    Dilated cardiomyopathy ([facility]) I42.0    Chronic systolic CHF (congestive heart failure) ([facility]) I50.22    Chronic kidney disease N18.9    S/P coronary angiogram Z98.890    Hyperkalemia E87.5    Acute renal failure ([facility]) N17.9    Severe left ventricular hypertrophy I51.7    Acute congestive heart failure with left ventricular diastolic dysfunction ([facility]) I50.31       Isolation/Infection:   Isolation          No Isolation        Patient Infection Status     None to display          Nurse Assessment:  Last Vital Signs: BP (!) 149/93   Pulse 80   Temp 97.6 °F (36.4 °C) (Oral)   Resp 16   Ht 5' 11\" (1.803 m)   Wt 167 lb (75.8 kg)   SpO2 100%   BMI 23.29 kg/m²     Last documented pain score (0-10 scale): Pain Level: 0  Last Weight:   Wt Readings from Last 1 Encounters:   03/13/20 167 lb (75.8 kg)     Mental Status:  {IP PT MENTAL STATUS:20030}    IV Access:  { JAMAAL IV ACCESS:596647489}    Nursing Mobility/ADLs:  Walking   {P DME AIQT:855637658}  Transfer  {P DME UYSU:231254587}  Bathing  {P DME EXJC:505029336}  Dressing  {P DME PMAE:660218288}  Toileting  {P DME DOXV:862862911}  Feeding  {P DME IGTU:615277683}  Med Admin  {[facility] DME GAPD:365115856}  Med Delivery   { JAMAAL MED Delivery:801004169}    Wound Care Documentation Worker signature: {Esignature:446286384}    PHYSICIAN SECTION    Prognosis: {Prognosis:8238098452}    Condition at Discharge: [address] Patient Condition:560744627}    Rehab Potential (if transferring to Rehab): {Prognosis:1534364056}    Recommended Labs or Other Treatments After Discharge: ***    Physician Certification: I certify the above information and transfer of Emil Goldberg  is necessary for the continuing treatment of the diagnosis listed and that he requires {Admit to Appropriate Level of Care:75661} for {GREATER/LESS:844915684} 30 days.      Update Admission H&P: {CHP DME Changes in MBDRZ:556067458}    PHYSICIAN SIGNATURE:  {Esignature:475185283}

## 2020-03-13 NOTE — PROGRESS NOTES
dextrose         PRN:  glucose, dextrose, glucagon (rDNA), dextrose, sodium chloride flush, acetaminophen **OR** acetaminophen, polyethylene glycol, promethazine **OR** ondansetron    Vitals:    03/13/20 0556 03/13/20 0558 03/13/20 0840 03/13/20 1226   BP: (!) 152/91  (!) 155/97 (!) 149/93   Pulse: 82  90 80   Resp: 16  16 16   Temp: 98.2 °F (36.8 °C)  97.2 °F (36.2 °C) 97.6 °F (36.4 °C)   TempSrc: Oral  Oral Oral   SpO2: 98%  99% 100%   Weight:  167 lb (75.8 kg)     Height:           Intake/Output Summary (Last 24 hours) at 3/13/2020 1600  Last data filed at 3/13/2020 1226  Gross per 24 hour   Intake 740 ml   Output 200 ml   Net 540 ml     I/O last 3 completed shifts: In: 860 [P.O.:860]  Out: 200 [Urine:200]  Wt Readings from Last 3 Encounters:   03/13/20 167 lb (75.8 kg)   02/20/20 174 lb (78.9 kg)   12/20/19 168 lb 9.6 oz (76.5 kg)       Admit Wt: Weight: 175 lb 11.3 oz (79.7 kg)   Todays Wt: Weight: 167 lb (75.8 kg)    TELEMETRY: Sinus     Physical Exam:         General Appearance:  Alert, cooperative, no distress, appears stated age Appropriate weight   Head:  Normocephalic, without obvious abnormality, atraumatic   Eyes:  PERRL, conjunctiva/corneas clear EOM intact  Ears normal   Throat no lesions       Nose: Nares normal, no drainage or sinus tenderness   Throat: Lips, mucosa, and tongue normal   Neck: Supple, symmetrical, trachea midline, no adenopathy, thyroid: not enlarged, symmetric, no tenderness/mass/nodules, no carotid bruit. Lungs:   Normal respiratory rate, lungs clear to auscultation without any wheezes, rubs or ronchi bilaterally. Chest Wall:  No tenderness or deformity   Heart:  Regular rhythm, rate is controlled, S1, S2 normal, there is no murmur, there is no rub or gallop, no jvd, no bilateral lower extremity edema   Abdomen:   Soft, non-tender, bowel sounds active all four quadrants,  no masses, no organomegaly       Extremities: Extremities normal, atraumatic, no cyanosis.

## 2020-03-14 ENCOUNTER — CARE COORDINATION (OUTPATIENT)
Dept: CASE MANAGEMENT | Age: 80
End: 2020-03-14

## 2020-03-14 NOTE — CARE COORDINATION
[address] Transitions Initial Follow Up Call    Call within 2 business days of discharge: Yes    Patient: Eveline Aase Patient : 1940   MRN: <[id number]>  Reason for Admission: Acute on Chronic CHF / Amyloidosis  Discharge Date: 3/13/20 RARS: Readmission Risk Score: 21      Last Discharge [facility]       Complaint Diagnosis Description Type Department Provider    3/11/20 Shortness of Breath Acute pulmonary edema ([address] . .. ED to Hosp-Admission (Discharged) (ADMITTED) [facility] Leesa Valenzuela MD; Nessa Turner. .. Spoke with: [facility] for initial Care Transition follow up. Identified self/role. Explained CTN process, verbalized understanding and agreeable to CTN calls. Facility: [address]    Non-face-to-face services provided:  Obtained and reviewed discharge summary and/or continuity of care documents  Education of patient/family/caregiver/guardian to support self-management-Reviewed signs/symptoms and chronic disease management. Assessment and support for treatment adherence and medication management-Reviewed medication changes. Declined complete review at this time.      Care Transitions 24 Hour Call    Do you have any ongoing symptoms?:  No  Do you have a copy of your discharge instructions?:  Yes  Do you have all of your prescriptions and are they filled?:  Yes  Have you been contacted by a [address]?:  No  Have you scheduled your follow up appointment?:  Yes  How are you going to get to your appointment?:  Car - family or friend to transport  Were you discharged with any Home Care or Post Acute Services:  No  Patient DME:  Straight cane, Walker  Do you have support at home?:  Partner/Spouse/SO  Do you feel like you have everything you need to keep you well at home?:  Yes  Are you an active caregiver in your home?:  No  Care Transitions Interventions  Disease Association:  Completed           Hospital Course per Discharge Summary: Mr. Eveline Aase is an 78 yo M with PMH of HFpEF who presented with SOB for 1 day. No fevers, chills, cough, WILFRED, leg swelling, orthopnea, PND or chest pain. No recent changes in meds and he endorsed following a low sodium diet. He was tachycardic and hypertensive on presentation. Pt's baseline weight increased to 175 from 164 (2/2020). Recent TTE revealed normal systolic function with LVEEF 50-55% and grade III diastolic dysfunction. He was found to have acute on chronic diastolic heart failure without systolic heart failure, possibly secondary to eating canned soup recently. He was given lasix IV with good subsequent urine output and resolution of SOB, then transitioned to his home regimen of PO lasix 40 mg daily which he should continue taking following discharge. It was discussed with him that he should take an additional dose of 40 mg lasix when he has excess salt intake, edema, SOB or severe HTN. 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 He should follow up with cardiology outpatient in 1 week to discuss this new diagnosis and treatment options.     Pt was found to have hypertensive urgency (/122) and tachycardia () on presentation. Pt endorsed taking his hydralazine only once per day and wished to only take medications once daily. Pt's hydralazine was discontinued, his lasix dose remained unchanged, metoprolol dose was doubled to 25 mg daily and amlodipine 10 mg was added with appropriate response in BP. He should continue this regimen on discharge.     On the day of discharge, pt's SOB had resolved and he felt back to his baseline. He denied fevers, chills, HA, cough, WILFRED, leg swelling, orthopnea, PND or chest pain abdominal pain, constipation, diarrhea, nausea, vomiting. His hypertensive urgency and tachycardia had resolved. Cardiology cleared pt for discharge and pt was determined to be medically stable to return home.  He should follow up with his PCP Transition Nurse  [phone]  21

## 2020-03-17 ENCOUNTER — CARE COORDINATION (OUTPATIENT)
Dept: CASE MANAGEMENT | Age: 80
End: 2020-03-17

## 2020-03-19 ENCOUNTER — CARE COORDINATION (OUTPATIENT)
Dept: CASE MANAGEMENT | Age: 80
End: 2020-03-19

## 2020-03-19 ENCOUNTER — TELEPHONE (OUTPATIENT)
Dept: INTERNAL MEDICINE CLINIC | Age: 80
End: 2020-03-19

## 2020-03-19 RX ORDER — POTASSIUM CHLORIDE 750 MG/1
10 TABLET, FILM COATED, EXTENDED RELEASE ORAL DAILY
Qty: 60 TABLET | Refills: 3 | Status: SHIPPED | OUTPATIENT
Start: 2020-03-19 | End: 2020-11-24 | Stop reason: SDUPTHER

## 2020-03-19 NOTE — CARE COORDINATION
[facility] Transitions Follow Up Call    3/19/2020    Patient: Kendal Mckeon  Patient : 1940   MRN: [id number]   Reason for Admission: CHF  Discharge Date: 3/13/20 RARS: Readmission Risk Score: 21         Spoke with: Spouse     Care Transitions Subsequent and Final Call    Schedule Follow Up Appointment with PCP:  Declined  Subsequent and Final Calls  Do you have any ongoing symptoms?:  No  Have your medications changed?:  No  Do you have any questions related to your medications?:  No  Do you currently have any active services?:  No  Do you have any needs or concerns that I can assist you with?:  No  Identified Barriers:  None  Care Transitions Interventions  Disease Association:  Completed      Other Interventions:          Summary  CTN spoke with patients spouse this am for follow up CTN call. Spouse reports patient is doing well, reports no complaints of any nausea, vomiting, fevers, chills, SOB or Cough. No difficulty with urination, BM's or Appetite, no LE Edema, chest pain or dizziness reported either. CTN encouraged spouse to make sure patient is resting as needed, getting daily weights, and taking all medications as prescribed. CTN also encouraged Spouse to make sure both herself and patient are both washing hands thoroughly, and staying in home, if possible. Spouse states 1 MD appointment was cancelled by MD.  No new or changed medications, no other issues or concerns at this time. CTN provided education on s/s that require medical attention and when to seek medical attention. CTN advised Pt of use of urgent care or physicians 24 hr access line if assistance is needed after hours or weekend. Pt denies any needs or concerns and is agreeable with additional calls.     Follow Up  Future Appointments   Date Time Provider Micheline Gaines   2020 10:00 AM MD Trevor Pelaez [facility]   2020 11:00 AM SELAM Moser MD [facility]   12/10/2020 10:00 AM Marjan Allen

## 2020-03-25 ENCOUNTER — CARE COORDINATION (OUTPATIENT)
Dept: CASE MANAGEMENT | Age: 80
End: 2020-03-25

## 2020-03-25 NOTE — CARE COORDINATION
[facility] Transitions Follow Up Call    3/25/2020    Patient: Mahesh Mandel  Patient : 1940   MRN: [id number]   Reason for Admission: CHF    Discharge Date: 3/13/20 RARS: Readmission Risk Score: 21         Spoke with: [address] [facility] Transitions Subsequent and Final Call    Schedule Follow Up Appointment with PCP:  Declined  Subsequent and Final Calls  Do you have any ongoing symptoms?:  No  Have your medications changed?:  No  Do you have any questions related to your medications?:  No  Do you currently have any active services?:  No  Do you have any needs or concerns that I can assist you with?:  No  Identified Barriers:  None  Care Transitions Interventions  No Identified Needs  Disease Association:  Completed      Other Interventions:          Summary  CTN spoke with patient this afternoon for follow up CTN call. Patient states he is not having any complaints of nausea, vomiting, fevers, chills, SOB or Cough. No LE Edema, chest pain or dizziness. Did not weigh himself this am, but states he does not have any weight gain. CTN instructed patient to continue to weigh himself daily, reporting any 3 lb weight gain overnight, or 5 lb weight gain in a week. No new or changed medications, no other issues or concerns at this time. CTN also encouraged patient to stay in home as much as possible, avoid crowds of people, and wash hands thoroughly. CTN provided education on s/s that require medical attention and when to seek medical attention. CTN advised Pt of use of urgent care or physicians 24 hr access line if assistance is needed after hours or weekend. Pt denies any needs or concerns and is agreeable with additional calls.     Follow Up  Future Appointments   Date Time Provider Micheline Gaines   2020 10:00 AM MD Trevor Rivas [facility]   2020 11:00 AM SELAM Hill MD [facility]   12/10/2020 10:00 AM KAYLYNN Diaz - CNP [facility]       Kenya Jin Domitila Reed

## 2020-03-31 ENCOUNTER — CARE COORDINATION (OUTPATIENT)
Dept: CASE MANAGEMENT | Age: 80
End: 2020-03-31

## 2020-03-31 NOTE — CARE COORDINATION
[facility] Transitions Follow Up Call    3/31/2020    Patient: Edmond Jean  Patient : 1940   MRN: [id number]   Reason for Admission: CHF  Discharge Date: 3/13/20 RARS: Readmission Risk Score: 21         Spoke with: [id number] Transitions Subsequent and Final Call    Schedule Follow Up Appointment with PCP:  Declined  Subsequent and Final Calls  Do you have any ongoing symptoms?:  No  Have your medications changed?:  No  Do you have any questions related to your medications?:  No  Do you currently have any active services?:  No  Do you have any needs or concerns that I can assist you with?:  No  Identified Barriers:  None  Care Transitions Interventions  No Identified Needs  Other Interventions:          Summary  CTN spoke with patient this am for follow up CTN call. Patient states he is doing well, reports no complaints of any nausea, vomiting, fevers, chills, SOB or Cough. No LE Edema, states he is not weighing himself daily, but knows he hasn't gained any weight. CTN encouraged patient to try and get in the routine of weighing himself daily, in am, before eating, after first void, and keeping a log. No difficulty with urination, BM's or appetite. Patient instructed to continue to stay in home, wash hands thoroughly, and avoid crowds. No new or changed medications, no other issues or concerns at this time. CTN provided education on s/s that require medical attention and when to seek medical attention. CTN advised Pt of use of urgent care or physicians 24 hr access line if assistance is needed after hours or weekend. Pt denies any needs or concerns and is agreeable with additional calls.     Follow Up  Future Appointments   Date Time Provider Micheline Gaines   2020 10:00 AM Lazarus Lever, MD [facility]   2020 11:00 AM SELAM Elizabeth MD [facility]   12/10/2020 10:00 AM KAYLYNN Malagon - WENDY House RN

## 2020-04-03 ENCOUNTER — CARE COORDINATION (OUTPATIENT)
Dept: CASE MANAGEMENT | Age: 80
End: 2020-04-03

## 2020-04-03 RX ORDER — SITAGLIPTIN 50 MG/1
TABLET, FILM COATED ORAL
Qty: 30 TABLET | Refills: 0 | Status: SHIPPED | OUTPATIENT
Start: 2020-04-03 | End: 2020-05-05

## 2020-04-03 RX ORDER — ISOSORBIDE MONONITRATE 30 MG/1
TABLET, EXTENDED RELEASE ORAL
Qty: 30 TABLET | Refills: 0 | Status: SHIPPED | OUTPATIENT
Start: 2020-04-03 | End: 2020-05-05

## 2020-04-03 RX ORDER — ATORVASTATIN CALCIUM 40 MG/1
TABLET, FILM COATED ORAL
Qty: 90 TABLET | Refills: 0 | Status: SHIPPED | OUTPATIENT
Start: 2020-04-03 | End: 2020-07-06

## 2020-04-03 NOTE — CARE COORDINATION
[facility] Transitions Follow Up Call    4/3/2020    Patient: Morena Keith  Patient : 1940   MRN: [id number]   Reason for Admission: CHF  Discharge Date: 3/13/20 RARS: Readmission Risk Score: 21         Spoke with: [id number] [facility] Transitions Subsequent and Final Call    Schedule Follow Up Appointment with PCP:  Declined  Subsequent and Final Calls  Do you have any ongoing symptoms?:  No  Have your medications changed?:  No  Do you have any questions related to your medications?:  No  Do you currently have any active services?:  No  Do you have any needs or concerns that I can assist you with?:  No  Identified Barriers:  None  Care Transitions Interventions  No Identified Needs  Other Interventions:          Summary  CTN spoke with patient this afternoon for follow up CTN call. Patient states he is doing well, reports no complaints of any nausea, vomiting, fevers, chills, SOB or Cough. No difficulty with urination, BM's or Appetite. Patient doesn't weigh daily, states he is well, no LE Edema, chest pain or dizziness. CTN encouraged patient to try getting in the habit of weighing himself daily, wash hands thoroughly, avoid crowds and stay indoors as much as possible. No new or changed medications, no other issues or concerns at this time. CTN provided education on s/s that require medical attention and when to seek medical attention. CTN advised Pt of use of urgent care or physicians 24 hr access line if assistance is needed after hours or weekend. Pt denies any needs or concerns and is agreeable with additional calls.     Follow Up  Future Appointments   Date Time Provider Micheline Gaines   2020 10:00 AM MD Trevor Mehta [facility]   2020 11:00 AM SELAM Theodore MD [facility]   12/10/2020 10:00 AM KAYLYNN Su - WENDY Otoole [facility]       Waldo Saldaña RN

## 2020-04-07 ENCOUNTER — CARE COORDINATION (OUTPATIENT)
Dept: CASE MANAGEMENT | Age: 80
End: 2020-04-07

## 2020-04-07 NOTE — CARE COORDINATION
[facility] Transitions Follow Up Call    2020    Patient: Lobo Kerns  Patient : 1940   MRN: [id number]   Reason for Admission: CHF  Discharge Date: 3/13/20 RARS: Readmission Risk Score: 21         Spoke with: [address] [facility] Transitions Subsequent and Final Call    Schedule Follow Up Appointment with PCP:  Declined  Subsequent and Final Calls  Do you have any ongoing symptoms?:  No  Have your medications changed?:  No  Do you have any questions related to your medications?:  No  Do you currently have any active services?:  No  Do you have any needs or concerns that I can assist you with?:  No  Identified Barriers:  None  Care Transitions Interventions  No Identified Needs  Disease Association:  Completed      Other Interventions:          Summary  CTN spoke with patient this afternoon for final follow up CTN call. Patient states he is doing well, reports no complaints of any n[place], vomiting, fevers, chills, SOB or Cough. No difficulty with urination, BM's or appetite. Patient states he has not been weighing himself daily, states he will try, no LE Edema, instructed to stay in doors, wash hands thoroughly, avoid crowds and continue to monitor himself for any of the above s/s, reporting any that may present to MD immediately. No new or changed medications, no other issues or concerns at this time. CTN provided education on s/s that require medical attention and when to seek medical attention. CTN advised Pt of use of urgent care or physicians 24 hr access line if assistance is needed after hours or weekend.       Follow Up  Future Appointments   Date Time Provider Micheline Gaines   2020 10:00 AM Gwyn Mcardle, MD [facility]   2020 11:00 AM SELAM Pendleton MD [facility]   12/10/2020 10:00 AM KAYLYNN Merrill - WENDY Hoover RN

## 2020-04-30 ASSESSMENT — ENCOUNTER SYMPTOMS
RESPIRATORY NEGATIVE: 1
EYES NEGATIVE: 1
SHORTNESS OF BREATH: 0
GASTROINTESTINAL NEGATIVE: 1

## 2020-04-30 NOTE — PROGRESS NOTES
[facility]   Congestive Heart Failure    Primary Care Doctor:  Francois Gan MD  Primary Cardiologist: Kali Bailey      Chief Complaint: CHF    History of Present Illness:  Sugar Phelps is a 78 y.o. male with PMH DM, HTN, NICM,  HFrEF (35-40% recovered to 50-55%)) who presents today for hosp f/u. He was admitted with CHF exacerbation 3/1-3/13/20. Hospital course: hypertensive urgency (/122) and tachycardia () on presentation. Pt endorsed taking his hydralazine only once per day and wished to only take medications once daily. Pt's hydralazine was discontinued, his lasix dose remained unchanged, metoprolol dose was doubled to 25 mg daily and amlodipine 10 mg was added with appropriate response in BP. He should continue this regimen on discharged    Today:  He feels well and denies SOB, CP, palpitations, edema, or orthopnea. He is only taking metoprolol 12.5mg vs 25 since discharge in March and his bottle does reflect this. His wt has increased 8lb on his scale since discharge    [facility] d/c wt: 167lb   Home d/c wt:  170  Today's home wt: 178  Admit BNP: 6484     Baseline Weight: 164-169lb    EF: 50-55%  Cardiac Imaging:   Echo 9/20/19:   Summary   Left ventricular cavity size is normal. There is severe concentric left   ventricular hypertrophy. Overall left ventricular systolic function appears   normal with an estimated ejection fraction of 50-55%. Diastolic filling   parameters suggest grade III diastolic dysfunction. Myocardial appearance   and the presence apical sparing on strain imaging suggests cardiac   amyloidosis.   Mitral annular calcification is present. Mitral valve leaflets appear   thickened. Mild mitral regurgitation is present.   The left atrium is severely dilated.   Aortic valve leaflets appear thickened at the leaflet tips. The aortic valve   appears tricuspid. Mild aortic regurgitation is present.   Mild tricuspid regurgitation.    IVC size is normal (<2.1 cm) but collapses Education: The patient has received education on the following topics: dietary sodium restriction, heart failure medications, the importance of physical activity, symptom management and weight monitoring     DARRION No       Past Medical History:   has a past medical history of Anemia, Arthritis, CHF (congestive heart failure) ([address] Hyperlipidemia, Hypertension, and Type II or unspecified type diabetes mellitus without mention of complication, not stated as uncontrolled. Surgical History:   has a past surgical history that includes hernia repair; hip surgery; and other surgical history (N/A, 12/15/2015). Social History:   reports that he has never smoked. He has never used smokeless tobacco. He reports that he does not drink alcohol or use drugs. Family History:   Family History   Problem Relation Age of Onset    High Blood Pressure Mother     High Blood Pressure Father     Heart Disease Father         cad    Cancer Sister        Home Medications:  Prior to Admission medications    Medication Sig Start Date End Date Taking?  Authorizing Provider   JANUVIA 50 MG tablet Take 1 tablet by mouth once daily 4/3/20   C Dena Whiteside MD   isosorbide mononitrate (IMDUR) 30 MG extended release tablet Take 1 tablet by mouth once daily 4/3/20   C Dena Whiteside MD   atorvastatin (LIPITOR) 40 MG tablet Take 1 tablet by mouth once daily 4/3/20   C Dena Whiteside MD   potassium chloride (KLOR-CON) 10 MEQ extended release tablet Take 1 tablet by mouth daily 3/19/20   Patricia Farias MD   metoprolol succinate (TOPROL XL) 25 MG extended release tablet Take 1 tablet by mouth daily 3/14/20   Patricia Farias MD   amLODIPine (NORVASC) 10 MG tablet Take 1 tablet by mouth daily 3/14/20   Patricia Farias MD   furosemide (LASIX) 40 MG tablet Take 1 tablet by mouth daily 12/10/19   C Dena Whiteside MD   glipiZIDE (GLUCOTROL) 5 MG tablet Take 1 tablet by mouth 2 times daily (before meals) 12/3/19   C Dena Whiteside MD   clopidogrel

## 2020-05-01 ENCOUNTER — OFFICE VISIT (OUTPATIENT)
Dept: CARDIOLOGY CLINIC | Age: 80
End: 2020-05-01
Payer: MEDICARE

## 2020-05-01 VITALS
WEIGHT: 181 LBS | DIASTOLIC BLOOD PRESSURE: 72 MMHG | HEART RATE: 90 BPM | SYSTOLIC BLOOD PRESSURE: 120 MMHG | BODY MASS INDEX: 25.24 KG/M2

## 2020-05-01 PROBLEM — R00.0 TACHYCARDIA: Status: ACTIVE | Noted: 2020-05-01

## 2020-05-01 PROBLEM — I50.31 ACUTE CONGESTIVE HEART FAILURE WITH LEFT VENTRICULAR DIASTOLIC DYSFUNCTION (HCC): Status: RESOLVED | Noted: 2020-03-12 | Resolved: 2020-05-01

## 2020-05-01 PROBLEM — E87.5 HYPERKALEMIA: Status: RESOLVED | Noted: 2019-09-05 | Resolved: 2020-05-01

## 2020-05-01 PROBLEM — I50.41 ACUTE COMBINED SYSTOLIC AND DIASTOLIC CONGESTIVE HEART FAILURE (HCC): Status: RESOLVED | Noted: 2019-06-28 | Resolved: 2020-05-01

## 2020-05-01 PROCEDURE — 99214 OFFICE O/P EST MOD 30 MIN: CPT | Performed by: NURSE PRACTITIONER

## 2020-05-01 PROCEDURE — 4040F PNEUMOC VAC/ADMIN/RCVD: CPT | Performed by: NURSE PRACTITIONER

## 2020-05-01 PROCEDURE — G8417 CALC BMI ABV UP PARAM F/U: HCPCS | Performed by: NURSE PRACTITIONER

## 2020-05-01 PROCEDURE — 1036F TOBACCO NON-USER: CPT | Performed by: NURSE PRACTITIONER

## 2020-05-01 PROCEDURE — 1123F ACP DISCUSS/DSCN MKR DOCD: CPT | Performed by: NURSE PRACTITIONER

## 2020-05-01 PROCEDURE — G8427 DOCREV CUR MEDS BY ELIG CLIN: HCPCS | Performed by: NURSE PRACTITIONER

## 2020-05-01 RX ORDER — METOPROLOL SUCCINATE 25 MG/1
25 TABLET, EXTENDED RELEASE ORAL DAILY
Qty: 90 TABLET | Refills: 3 | Status: SHIPPED | OUTPATIENT
Start: 2020-05-01 | End: 2021-05-24

## 2020-05-05 RX ORDER — SITAGLIPTIN 50 MG/1
TABLET, FILM COATED ORAL
Qty: 30 TABLET | Refills: 1 | Status: SHIPPED | OUTPATIENT
Start: 2020-05-05 | End: 2020-07-06

## 2020-05-05 RX ORDER — ISOSORBIDE MONONITRATE 30 MG/1
TABLET, EXTENDED RELEASE ORAL
Qty: 30 TABLET | Refills: 1 | Status: SHIPPED | OUTPATIENT
Start: 2020-05-05 | End: 2020-07-06

## 2020-05-05 RX ORDER — CLOPIDOGREL BISULFATE 75 MG/1
TABLET ORAL
Qty: 90 TABLET | Refills: 1 | Status: SHIPPED | OUTPATIENT
Start: 2020-05-05 | End: 2020-11-03

## 2020-05-13 RX ORDER — AMLODIPINE BESYLATE 10 MG/1
TABLET ORAL
Qty: 30 TABLET | Refills: 0 | Status: SHIPPED | OUTPATIENT
Start: 2020-05-13 | End: 2020-06-18 | Stop reason: SDUPTHER

## 2020-06-11 ENCOUNTER — OFFICE VISIT (OUTPATIENT)
Dept: FAMILY MEDICINE CLINIC | Age: 80
End: 2020-06-11
Payer: MEDICARE

## 2020-06-11 VITALS
DIASTOLIC BLOOD PRESSURE: 72 MMHG | OXYGEN SATURATION: 98 % | BODY MASS INDEX: 24.74 KG/M2 | RESPIRATION RATE: 18 BRPM | TEMPERATURE: 98.3 F | HEART RATE: 81 BPM | HEIGHT: 70 IN | WEIGHT: 172.8 LBS | SYSTOLIC BLOOD PRESSURE: 118 MMHG

## 2020-06-11 PROCEDURE — G8427 DOCREV CUR MEDS BY ELIG CLIN: HCPCS | Performed by: INTERNAL MEDICINE

## 2020-06-11 PROCEDURE — 1123F ACP DISCUSS/DSCN MKR DOCD: CPT | Performed by: INTERNAL MEDICINE

## 2020-06-11 PROCEDURE — G0439 PPPS, SUBSEQ VISIT: HCPCS | Performed by: INTERNAL MEDICINE

## 2020-06-11 PROCEDURE — 1036F TOBACCO NON-USER: CPT | Performed by: INTERNAL MEDICINE

## 2020-06-11 PROCEDURE — G8417 CALC BMI ABV UP PARAM F/U: HCPCS | Performed by: INTERNAL MEDICINE

## 2020-06-11 PROCEDURE — 4040F PNEUMOC VAC/ADMIN/RCVD: CPT | Performed by: INTERNAL MEDICINE

## 2020-06-11 ASSESSMENT — PATIENT HEALTH QUESTIONNAIRE - PHQ9
SUM OF ALL RESPONSES TO PHQ QUESTIONS 1-9: 1
SUM OF ALL RESPONSES TO PHQ QUESTIONS 1-9: 1

## 2020-06-11 ASSESSMENT — ENCOUNTER SYMPTOMS
GASTROINTESTINAL NEGATIVE: 1
EYES NEGATIVE: 1
ALLERGIC/IMMUNOLOGIC NEGATIVE: 1
RESPIRATORY NEGATIVE: 1

## 2020-06-11 ASSESSMENT — LIFESTYLE VARIABLES: HOW OFTEN DO YOU HAVE A DRINK CONTAINING ALCOHOL: 0

## 2020-06-11 NOTE — PROGRESS NOTES
EF 35-40%. Non-ischemic     Hyperlipidemia     Hypertension     Type II or unspecified type diabetes mellitus without mention of complication, not stated as uncontrolled      Current Outpatient Medications   Medication Sig Dispense Refill    amLODIPine (NORVASC) 10 MG tablet Take 1 tablet by mouth once daily 30 tablet 0    JANUVIA 50 MG tablet Take 1 tablet by mouth once daily 30 tablet 1    isosorbide mononitrate (IMDUR) 30 MG extended release tablet Take 1 tablet by mouth once daily 30 tablet 1    clopidogrel (PLAVIX) 75 MG tablet Take 1 tablet by mouth once daily 90 tablet 1    metoprolol succinate (TOPROL XL) 25 MG extended release tablet Take 1 tablet by mouth daily 90 tablet 3    atorvastatin (LIPITOR) 40 MG tablet Take 1 tablet by mouth once daily 90 tablet 0    potassium chloride (KLOR-CON) 10 MEQ extended release tablet Take 1 tablet by mouth daily 60 tablet 3    furosemide (LASIX) 40 MG tablet Take 1 tablet by mouth daily 30 tablet 5    glipiZIDE (GLUCOTROL) 5 MG tablet Take 1 tablet by mouth 2 times daily (before meals) 60 tablet 5    insulin glargine (LANTUS SOLOSTAR) 100 UNIT/ML injection pen Inject 15 Units into the skin nightly 5 pen 3    aspirin 81 MG EC tablet Take 1 tablet by mouth daily 30 tablet 3    Insulin Pen Needle (PEN NEEDLES 3/16\") 31G X 5 MM MISC 1 each by Does not apply route daily 100 each 3     No current facility-administered medications for this visit. No Known Allergies  Social History     Tobacco Use    Smoking status: Never Smoker    Smokeless tobacco: Never Used   Substance Use Topics    Alcohol use: No     Family History   Problem Relation Age of Onset    High Blood Pressure Mother     High Blood Pressure Father     Heart Disease Father         cad    Cancer Sister        Review of Systems   Constitutional: Positive for fatigue. Eyes: Negative. Respiratory: Negative. Cardiovascular: Negative. Gastrointestinal: Negative.     Endocrine: Negative. Genitourinary: Negative. Musculoskeletal: Positive for arthralgias. Skin: Negative. Allergic/Immunologic: Negative. Neurological: Negative. Hematological: Negative. Psychiatric/Behavioral: Positive for dysphoric mood (Son is ill.). Vitals:    06/11/20 1121 06/11/20 1137   BP: 106/62 118/72   Pulse: 81    Resp: 18    Temp: 98.3 °F (36.8 °C)    SpO2: 98%    Weight: 172 lb 12.8 oz (78.4 kg)    Height: 5' 9.5\" (1.765 m)      Objective:   Physical Exam  Constitutional:       General: He is not in acute distress. Appearance: Normal appearance. He is well-developed and normal weight. He is not ill-appearing, toxic-appearing or diaphoretic. HENT:      Head: Normocephalic and atraumatic. Mouth/Throat:      Mouth: Mucous membranes are moist.      Pharynx: Oropharynx is clear. No oropharyngeal exudate or posterior oropharyngeal erythema. Eyes:      Extraocular Movements: Extraocular movements intact. Conjunctiva/sclera: Conjunctivae normal.      Pupils: Pupils are equal, round, and reactive to light. Neck:      Musculoskeletal: Full passive range of motion without pain, normal range of motion and neck supple. No neck rigidity or muscular tenderness. Thyroid: No thyroid mass or thyromegaly. Vascular: Normal carotid pulses. No carotid bruit, hepatojugular reflux or JVD. Trachea: Trachea and phonation normal.   Cardiovascular:      Rate and Rhythm: Normal rate and regular rhythm. No extrasystoles are present. Chest Wall: PMI is not displaced. Pulses: Normal pulses. No decreased pulses. Carotid pulses are 2+ on the right side and 2+ on the left side. Radial pulses are 2+ on the right side and 2+ on the left side. Femoral pulses are 2+ on the right side and 2+ on the left side. Popliteal pulses are 2+ on the right side and 2+ on the left side. Dorsalis pedis pulses are 2+ on the right side and 2+ on the left side. Diabetes Mellitus With Stage 3 Chronic Kidney Disease, Without Long-Term Current Use of Insulin (Hcc). Good sugars, stable diet. Benign Essential Htn. Good w/ meds. Pure Hypercholesterolemia. Stable diet and no c/o med. Iron Deficiency Anemia Due to Chronic Blood Loss. No recent c/o. No blood loss. Chronic Systolic Chf (Congestive Heart Failure) (Hcc). No recent symptoms since hospital last yr. Dilated Cardiomyopathy (Hcc). Saw Cardiology recently. Changed meds and doing well. Plan:      All above problems reviewed and the found to be unchanged except for the following :     Chronic renal failure, stage 3 (moderate) ([facility] [address] Will do labs and adjust meds if needed. Call if new c/o. 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 Will do labs and adjust meds after results are evaluated. To continue to improve diet and lose weight. To follow Diabetic diet. If needs further help w/ Diabetic diet to call and will refer back to dietician. Home sugar checks. To call if sudden change up or down in sugars. To do regular foot checks. Call if new problems. Benign Essential Htn. Continue present meds. Home BP checks. Call if>140/90. Improve diet. Avoid caffeine and salt. Call if new c/o w/ meds. Pure Hypercholesterolemia. Will do labs and adjust meds if needed. To improve diet and exercise. Lose a little weight. Call if need further assistance. Call if new c/o w/ meds. Next lab is due today     Iron Deficiency Anemia Due to Chronic Blood Loss. Will do labs and call if need further evaluation. Chronic Systolic Chf (Congestive Heart Failure) (Hcc). continue present meds. Will do labs. Will call if need to change meds. Dilated Cardiomyopathy (Hcc). To Cardiology as scheduled.               Estella Swift MD

## 2020-06-11 NOTE — ASSESSMENT & PLAN NOTE
Colonoscopy 1/2017. Showed Diverticulosis and Hemorrhoids. No active bleeding. No increased Fatigue. Last labs improved.

## 2020-06-11 NOTE — PROGRESS NOTES
awv.    Diagnoses and all orders for this visit:    Controlled type 2 diabetes mellitus with stage 3 chronic kidney disease, without long-term current use of insulin (HCC)  -     CBC; Future  -     Renal Function Panel; Future  -     Hemoglobin A1C; Future  -      DIABETES FOOT EXAM    Benign essential HTN  -     Renal Function Panel; Future    Pure hypercholesterolemia  -     Lipid Panel; Future  -     Hepatic Function Panel; Future    Chronic systolic CHF (congestive heart failure) (HCC)  -     Brain Natriuretic Peptide; Future    Dilated cardiomyopathy (HCC)  -     Brain Natriuretic Peptide; Future    Chronic renal failure, stage 3 (moderate) (HCC)  -     Renal Function Panel; Future  -     Brain Natriuretic Peptide; Future    Iron deficiency anemia due to chronic blood loss  -     CBC; Future  -     Renal Function Panel;  Future    Medicare annual wellness visit, subsequent

## 2020-06-18 RX ORDER — AMLODIPINE BESYLATE 10 MG/1
10 TABLET ORAL DAILY
Qty: 30 TABLET | Refills: 5 | Status: SHIPPED | OUTPATIENT
Start: 2020-06-18 | End: 2020-12-21

## 2020-06-30 LAB
ALBUMIN SERPL-MCNC: 3.6 G/DL (ref 3.4–5)
ANION GAP SERPL CALCULATED.3IONS-SCNC: 12 MMOL/L (ref 3–16)
BUN BLDV-MCNC: 20 MG/DL (ref 7–20)
CALCIUM SERPL-MCNC: 9.6 MG/DL (ref 8.3–10.6)
CHLORIDE BLD-SCNC: 103 MMOL/L (ref 99–110)
CO2: 24 MMOL/L (ref 21–32)
CREAT SERPL-MCNC: 2.1 MG/DL (ref 0.8–1.3)
GFR AFRICAN AMERICAN: 37
GFR NON-AFRICAN AMERICAN: 30
GLUCOSE BLD-MCNC: 80 MG/DL (ref 70–99)
PARATHYROID HORMONE INTACT: 98 PG/ML (ref 14–72)
PHOSPHORUS: 3.4 MG/DL (ref 2.5–4.9)
POTASSIUM SERPL-SCNC: 4.1 MMOL/L (ref 3.5–5.1)
SODIUM BLD-SCNC: 139 MMOL/L (ref 136–145)
URIC ACID, SERUM: 6 MG/DL (ref 3.5–7.2)

## 2020-07-06 RX ORDER — SITAGLIPTIN 50 MG/1
TABLET, FILM COATED ORAL
Qty: 30 TABLET | Refills: 5 | Status: SHIPPED | OUTPATIENT
Start: 2020-07-06 | End: 2021-01-04

## 2020-07-06 RX ORDER — ATORVASTATIN CALCIUM 40 MG/1
TABLET, FILM COATED ORAL
Qty: 90 TABLET | Refills: 5 | Status: SHIPPED | OUTPATIENT
Start: 2020-07-06 | End: 2021-10-01

## 2020-07-06 RX ORDER — ISOSORBIDE MONONITRATE 30 MG/1
TABLET, EXTENDED RELEASE ORAL
Qty: 30 TABLET | Refills: 5 | Status: SHIPPED | OUTPATIENT
Start: 2020-07-06 | End: 2021-01-04

## 2020-07-15 ASSESSMENT — ENCOUNTER SYMPTOMS
GASTROINTESTINAL NEGATIVE: 1
SHORTNESS OF BREATH: 0
RESPIRATORY NEGATIVE: 1
EYES NEGATIVE: 1

## 2020-07-15 NOTE — PROGRESS NOTES
[address]   Congestive Heart Failure    Primary Care Doctor:  Eliza Pearson MD  Primary Cardiologist: Leon Perez      Chief Complaint: CHF    History of Present Illness:  Marjorie Canseco is a 78 y.o. male with PMH DM, HTN, NICM,  HFrEF (35-40% recovered to 50-55%) who presents today for CHF f/u. Hosp f/u visit in April metoprolol increased for tachy that is improved today. Today:  He tells me that his son  about a month ago and he has not been eating much, but otherwise he feels well and denies SOB, CP, palpitations, edema, or orthopnea. Christy Duarte His wt is back to baseline    Today's home wt: 165     Baseline Weight: 164-169lb    EF: 50-55%  Cardiac Imaging:   Echo 19:   Summary   Left ventricular cavity size is normal. There is severe concentric left   ventricular hypertrophy. Overall left ventricular systolic function appears   normal with an estimated ejection fraction of 50-55%. Diastolic filling   parameters suggest grade III diastolic dysfunction. Myocardial appearance   and the presence apical sparing on strain imaging suggests cardiac   amyloidosis.   Mitral annular calcification is present. Mitral valve leaflets appear   thickened. Mild mitral regurgitation is present.   The left atrium is severely dilated.   Aortic valve leaflets appear thickened at the leaflet tips. The aortic valve   appears tricuspid. Mild aortic regurgitation is present.   Mild tricuspid regurgitation.  IVC size is normal (<2.1 cm) but collapses < 50% with respiration consistent   with elevated RA pressure (8 mmHg). Estimated pulmonary artery systolic   pressure is at 26 mmHg assuming a right atrial pressure of 3 mmHg.     3. Stress Test 19:    Summary    Overall findings represent a intermediate to high risk scan.    LV is moderately dilated.     LVEF 39%    Moderate size fixed defect involving the mid to distal inferior wall along    with the apex.    ECG: Non-diagnostic EKG response due to failure to reach target heart rate.      4. Cath 7/26/19 (Dr. Damaso Solis): Angiographic Findings:  Right dominant system  Left Main:  Normal   Left Anterior Descending:  Mild irregular plaque; no stenosis over 30%   Circumflex:  Mild irregular plaque; no stenosis over 30%   Right Coronary:  Mild irregular plaque; no stenosis over 30%   Left Ventriculogram:  Not performed due to Cr   Femoral Angiogram:  No obstructive plaque   Hemodynamics (mm Hg):  Left Ventricular Pressure:  96/1, 5  Central Aortic Pressure:  102/68 (82)   Conclusions:  -No obstructive CAD  -Normal LVEDP    Echo 6/28/19   Left ventricular cavity size is normal. There is severe concentric left   ventricular hypertrophy.   Overall left ventricular systolic function appears reduced with an estimated   ejection fraction of 35-40%.   There is global hypokinesis of the left ventricle which appears to be more   marked in the inferior wall which is severely hypo-akinetic.   Diastolic function is indeterminate.   Mitral annular calcification is present.   Mild mitral, pulmonic and aortic regurgitation is present.   The right ventricle is normal in size with thickened walls.   TAPSE measures: 1.22 cm.   RV S velocity measures: 7.94 cm/s    Device: No       Activity: baseline  Can you walk 1-2 blocks or do a moderate amount of house/yard work? No  Cardiac Rehab Referral: No     NYHA Class: I     Sodium Restrictions: 2g  Fluid Restrictions: 48-64 oz/day  Sodium and fluid restriction compliance: fair    Pt Education: The patient has received education on the following topics: dietary sodium restriction, heart failure medications, the importance of physical activity, symptom management and weight monitoring     DARIRON No       Past Medical History:   has a past medical history of Anemia, Arthritis, CHF (congestive heart failure) ([address] Hyperlipidemia, Hypertension, and Type II or unspecified type diabetes mellitus without mention of complication, not stated as uncontrolled.   Surgical History:   has a past surgical history that includes hernia repair; hip surgery; and other surgical history (N/A, 12/15/2015). Social History:   reports that he has never smoked. He has never used smokeless tobacco. He reports that he does not drink alcohol or use drugs. Family History:   Family History   Problem Relation Age of Onset    High Blood Pressure Mother     High Blood Pressure Father     Heart Disease Father         cad    Cancer Sister        Home Medications:  Prior to Admission medications    Medication Sig Start Date End Date Taking? Authorizing Provider   atorvastatin (LIPITOR) 40 MG tablet Take 1 tablet by mouth once daily 7/6/20   C Karlee Lakhani MD   JANUVIA 50 MG tablet Take 1 tablet by mouth once daily 7/6/20   C Karlee Lakhani MD   isosorbide mononitrate (IMDUR) 30 MG extended release tablet Take 1 tablet by mouth once daily 7/6/20   C Karlee Lakhani MD   amLODIPine (NORVASC) 10 MG tablet Take 1 tablet by mouth daily 6/18/20   C Karlee Lakhani MD   clopidogrel (PLAVIX) 75 MG tablet Take 1 tablet by mouth once daily 5/5/20    Karlee Lakhani MD   metoprolol succinate (TOPROL XL) 25 MG extended release tablet Take 1 tablet by mouth daily 5/1/20   KAYLYNN Medina CNP   potassium chloride (KLOR-CON) 10 MEQ extended release tablet Take 1 tablet by mouth daily 3/19/20   Kenn Doe MD   furosemide (LASIX) 40 MG tablet Take 1 tablet by mouth daily 12/10/19   C Karlee Lakhani MD   glipiZIDE (GLUCOTROL) 5 MG tablet Take 1 tablet by mouth 2 times daily (before meals) 12/3/19   C Karlee Lakhani MD   insulin glargine (LANTUS SOLOSTAR) 100 UNIT/ML injection pen Inject 15 Units into the skin nightly 10/8/19   C Karlee Lakhani MD   Insulin Pen Needle (PEN NEEDLES 3/16\") 31G X 5 MM MISC 1 each by Does not apply route daily 8/20/19   Mariam Guerra MD   aspirin 81 MG EC tablet Take 1 tablet by mouth daily 7/1/19   Bam Kim MD        Allergies:  Patient has no known allergies.      ROS:   Review of Systems   Constitutional: Negative. HENT: Negative. Eyes: Negative. Respiratory: Negative. Negative for shortness of breath. Cardiovascular: Negative for chest pain, palpitations and leg swelling. Gastrointestinal: Negative. Endocrine: Negative. Genitourinary: Negative. Musculoskeletal: Negative. Skin: Negative. Neurological: Negative. Negative for dizziness, light-headedness and numbness. Hematological: Negative. Psychiatric/Behavioral: Negative. Physical Examination:    Vitals:    07/17/20 1055   BP: 122/64   Pulse: 72   Temp: 97.3 °F (36.3 °C)   Weight: 165 lb 3.2 oz (74.9 kg)           Physical Exam  Constitutional:       Appearance: He is well-developed. HENT:      Head: Normocephalic and atraumatic. Eyes:      Pupils: Pupils are equal, round, and reactive to light. Neck:      Musculoskeletal: Normal range of motion and neck supple. Cardiovascular:      Rate and Rhythm: Normal rate and regular rhythm. Heart sounds: Normal heart sounds. Pulmonary:      Effort: Pulmonary effort is normal.      Breath sounds: Normal breath sounds. Musculoskeletal: Normal range of motion. Skin:     General: Skin is warm and dry. Neurological:      Mental Status: He is alert and oriented to person, place, and time. Psychiatric:         Behavior: Behavior normal.         Thought Content:  Thought content normal.         Judgment: Judgment normal.         Lab Data:    CBC:   Lab Results   Component Value Date    WBC 7.5 03/12/2020    WBC 10.1 11/08/2019    WBC 5.9 09/09/2019    RBC 5.01 03/12/2020    RBC 4.36 11/08/2019    RBC 4.01 09/09/2019    HGB 12.6 03/12/2020    HGB 11.4 11/08/2019    HGB 10.1 09/09/2019    HCT 38.8 03/12/2020    HCT 35.3 11/08/2019    HCT 31.1 09/09/2019    MCV 77.5 03/12/2020    MCV 80.9 11/08/2019    MCV 77.5 09/09/2019    RDW 17.7 03/12/2020    RDW 18.2 11/08/2019    RDW 16.4 09/09/2019     03/12/2020     11/08/2019     09/09/2019     BMP:  Lab Results   Component Value Date     06/30/2020     05/01/2020     03/13/2020    K 4.1 06/30/2020    K 4.3 05/01/2020    K 3.1 03/13/2020    K 4.1 03/12/2020    K 4.2 11/08/2019    K 5.9 09/05/2019     06/30/2020    CL 99 05/01/2020     03/13/2020    CO2 24 06/30/2020    CO2 26 05/01/2020    CO2 26 03/13/2020    PHOS 3.4 06/30/2020    PHOS 2.9 09/09/2019    PHOS 3.1 09/08/2019    BUN 20 06/30/2020    BUN 24 05/01/2020    BUN 24 03/13/2020    CREATININE 2.1 06/30/2020    CREATININE 1.7 05/01/2020    CREATININE 1.8 03/13/2020     BNP:   Lab Results   Component Value Date    PROBNP 2,350 05/01/2020    PROBNP 6,484 03/12/2020    PROBNP 2,194 02/20/2020     Iron Studies:    Lab Results   Component Value Date    FERRITIN 871.2 04/07/2018    FERRITIN 1,130 01/31/2015    FERRITIN 1,313.4 02/12/2013     Iron Deficiency Anemia:  No    IV Iron Therapy:  No  2017 ACC/AHA HF Guidelines:   intravenous iron replacement in patients with New York Heart Association (NYHA) class II and III HF and iron deficiency(ferritin <100 ng/ml or 100-300 ng/ml if transferrin saturation <20%), to improve functional status and QoL. Assessment/Plan:    Encounter Diagnoses        Chronic systolic congestive heart failure (HCC) Compensated, no change in meds    Benign essential HTN Controlled on norvasc imdur, and BB    Dilated cardiomyopathy (HCC) On BB, nitrates only due to CKD    Tachycardia Improved with higher dose BB           Instructions:   1. Medications: no change   2. Labs:ordered per PCP  3. Lifestyle Recommendations: Weigh yourself every day in the morning after urination, call Angel Cramer if wt increases 2-3lb in one day or 5lb in one week, Limit sodium to 2000mg/day and fluids to 2L or 64oz/day. Add a fish oil supplement.    4. Follow up: as scheduled       [address] [phone]       I appreciate the opportunity of cooperating in the care of this individual.    Walt Brar CNP, 7/15/2020, 1:24 PM    QUALITY MEASURES  1. Tobacco Cessation Counseling: NA  2. Retake of BP if >140/90:   NA  3. Documentation to PCP/referring for new patient:  Sent to PCP at close of office visit  4. CAD patient on anti-platelet: Yes  5. CAD patient on STATIN therapy:  Yes  6. Patient with CHF and aFib on anticoagulation:  NA   7. Patient Education:  Yes   8. BB for LVSD :  Yes  9. ACE/ARB for LVSD:  No, CKD   10.  Left Ventricular Ejection Fraction (LVEF) Assessment:  Yes

## 2020-07-17 ENCOUNTER — OFFICE VISIT (OUTPATIENT)
Dept: CARDIOLOGY CLINIC | Age: 80
End: 2020-07-17
Payer: MEDICARE

## 2020-07-17 VITALS
SYSTOLIC BLOOD PRESSURE: 122 MMHG | BODY MASS INDEX: 24.05 KG/M2 | HEART RATE: 72 BPM | WEIGHT: 165.2 LBS | DIASTOLIC BLOOD PRESSURE: 64 MMHG | TEMPERATURE: 97.3 F

## 2020-07-17 PROCEDURE — 1123F ACP DISCUSS/DSCN MKR DOCD: CPT | Performed by: NURSE PRACTITIONER

## 2020-07-17 PROCEDURE — 1036F TOBACCO NON-USER: CPT | Performed by: NURSE PRACTITIONER

## 2020-07-17 PROCEDURE — G8420 CALC BMI NORM PARAMETERS: HCPCS | Performed by: NURSE PRACTITIONER

## 2020-07-17 PROCEDURE — 99213 OFFICE O/P EST LOW 20 MIN: CPT | Performed by: NURSE PRACTITIONER

## 2020-07-17 PROCEDURE — 4040F PNEUMOC VAC/ADMIN/RCVD: CPT | Performed by: NURSE PRACTITIONER

## 2020-07-17 PROCEDURE — G8427 DOCREV CUR MEDS BY ELIG CLIN: HCPCS | Performed by: NURSE PRACTITIONER

## 2020-07-17 NOTE — PATIENT INSTRUCTIONS
Instructions:   1. Medications: no change   2. Labs:ordered per PCP  3. Lifestyle Recommendations: Weigh yourself every day in the morning after urination, call Delories Didi if wt increases 2-3lb in one day or 5lb in one week, Limit sodium to 2000mg/day and fluids to 2L or 64oz/day. Add a fish oil supplement.    4. Follow up: as scheduled       [address] [phone]

## 2020-10-01 RX ORDER — PEN NEEDLE, DIABETIC 31 GX3/16"
NEEDLE, DISPOSABLE MISCELLANEOUS
Qty: 100 EACH | Refills: 0 | Status: SHIPPED | OUTPATIENT
Start: 2020-10-01 | End: 2021-01-12

## 2020-10-05 RX ORDER — FUROSEMIDE 40 MG/1
TABLET ORAL
Qty: 30 TABLET | Refills: 0 | Status: SHIPPED | OUTPATIENT
Start: 2020-10-05 | End: 2020-11-03

## 2020-10-19 ENCOUNTER — OFFICE VISIT (OUTPATIENT)
Dept: FAMILY MEDICINE CLINIC | Age: 80
End: 2020-10-19
Payer: MEDICARE

## 2020-10-19 VITALS
HEIGHT: 70 IN | BODY MASS INDEX: 25.2 KG/M2 | TEMPERATURE: 98 F | HEART RATE: 79 BPM | OXYGEN SATURATION: 100 % | RESPIRATION RATE: 16 BRPM | SYSTOLIC BLOOD PRESSURE: 122 MMHG | WEIGHT: 176 LBS | DIASTOLIC BLOOD PRESSURE: 76 MMHG

## 2020-10-19 LAB
ANION GAP SERPL CALCULATED.3IONS-SCNC: 12 MMOL/L (ref 3–16)
BASOPHILS ABSOLUTE: 0.1 K/UL (ref 0–0.2)
BASOPHILS RELATIVE PERCENT: 1 %
BUN BLDV-MCNC: 23 MG/DL (ref 7–20)
CALCIUM SERPL-MCNC: 10.3 MG/DL (ref 8.3–10.6)
CHLORIDE BLD-SCNC: 103 MMOL/L (ref 99–110)
CO2: 26 MMOL/L (ref 21–32)
CREAT SERPL-MCNC: 2 MG/DL (ref 0.8–1.3)
EOSINOPHILS ABSOLUTE: 0.1 K/UL (ref 0–0.6)
EOSINOPHILS RELATIVE PERCENT: 1.5 %
GFR AFRICAN AMERICAN: 39
GFR NON-AFRICAN AMERICAN: 32
GLUCOSE BLD-MCNC: 77 MG/DL (ref 70–99)
HCT VFR BLD CALC: 37 % (ref 40.5–52.5)
HEMOGLOBIN: 11.9 G/DL (ref 13.5–17.5)
LYMPHOCYTES ABSOLUTE: 1.7 K/UL (ref 1–5.1)
LYMPHOCYTES RELATIVE PERCENT: 25 %
MCH RBC QN AUTO: 25.3 PG (ref 26–34)
MCHC RBC AUTO-ENTMCNC: 32.2 G/DL (ref 31–36)
MCV RBC AUTO: 78.4 FL (ref 80–100)
MONOCYTES ABSOLUTE: 0.8 K/UL (ref 0–1.3)
MONOCYTES RELATIVE PERCENT: 11.7 %
NEUTROPHILS ABSOLUTE: 4.1 K/UL (ref 1.7–7.7)
NEUTROPHILS RELATIVE PERCENT: 60.8 %
PDW BLD-RTO: 16.3 % (ref 12.4–15.4)
PLATELET # BLD: 293 K/UL (ref 135–450)
PMV BLD AUTO: 8.4 FL (ref 5–10.5)
POTASSIUM SERPL-SCNC: 4.1 MMOL/L (ref 3.5–5.1)
RBC # BLD: 4.72 M/UL (ref 4.2–5.9)
SODIUM BLD-SCNC: 141 MMOL/L (ref 136–145)
WBC # BLD: 6.7 K/UL (ref 4–11)

## 2020-10-19 PROCEDURE — G8428 CUR MEDS NOT DOCUMENT: HCPCS | Performed by: INTERNAL MEDICINE

## 2020-10-19 PROCEDURE — 90694 VACC AIIV4 NO PRSRV 0.5ML IM: CPT | Performed by: INTERNAL MEDICINE

## 2020-10-19 PROCEDURE — 1036F TOBACCO NON-USER: CPT | Performed by: INTERNAL MEDICINE

## 2020-10-19 PROCEDURE — 36415 COLL VENOUS BLD VENIPUNCTURE: CPT | Performed by: INTERNAL MEDICINE

## 2020-10-19 PROCEDURE — 1123F ACP DISCUSS/DSCN MKR DOCD: CPT | Performed by: INTERNAL MEDICINE

## 2020-10-19 PROCEDURE — 99214 OFFICE O/P EST MOD 30 MIN: CPT | Performed by: INTERNAL MEDICINE

## 2020-10-19 PROCEDURE — G8484 FLU IMMUNIZE NO ADMIN: HCPCS | Performed by: INTERNAL MEDICINE

## 2020-10-19 PROCEDURE — G8417 CALC BMI ABV UP PARAM F/U: HCPCS | Performed by: INTERNAL MEDICINE

## 2020-10-19 PROCEDURE — 4040F PNEUMOC VAC/ADMIN/RCVD: CPT | Performed by: INTERNAL MEDICINE

## 2020-10-19 PROCEDURE — G0008 ADMIN INFLUENZA VIRUS VAC: HCPCS | Performed by: INTERNAL MEDICINE

## 2020-10-19 ASSESSMENT — ENCOUNTER SYMPTOMS
GASTROINTESTINAL NEGATIVE: 1
EYES NEGATIVE: 1
RESPIRATORY NEGATIVE: 1
ALLERGIC/IMMUNOLOGIC NEGATIVE: 1

## 2020-10-19 NOTE — PROGRESS NOTES
Vaccine Information Sheet, \"Influenza - Inactivated\"  given to Zayda Edgar, or parent/legal guardian of  Zayda Edgar and verbalized understanding. Patient responses:    Have you ever had a reaction to a flu vaccine? No  Do you have any current illness? No  Have you ever had Guillian Pearl Syndrome? No  Do you have a serious allergy to any of the follow: Neomycin, Polymyxin, Thimerosal, eggs or egg products? No    Flu vaccine given per order. Please see immunization tab. Risks and benefits explained. Current VIS given.

## 2020-10-19 NOTE — ASSESSMENT & PLAN NOTE
Stable BUN/Cr (1.6-2.0) since hospital 9/2019. No new c/o. Good DM and BP control. Past  episode of CHF(9/2019). No reoccurrence.

## 2020-10-19 NOTE — ASSESSMENT & PLAN NOTE
The most affected joints are knees . No new c/o of pain ,decrease ROM , or dysfunction of affect joints since last visit. . NO effusion or erythema since last visit. Gets stiff when cold and damp out.

## 2020-10-19 NOTE — PATIENT INSTRUCTIONS
ASSESSMENT/PLAN:  1. 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 2. Benign essential HTN  Continue present meds. Home BP checks. Call if>140/90. Improve diet. Avoid caffeine and salt. Call if new c/o w/ meds. 3. Pure hypercholesterolemia  To continue to improve diet and continue present med. Call if new c/o.    4. Iron deficiency anemia due to chronic blood loss  Will check labs. Will call if need further evaluation. 5. Primary osteoarthritis of both knees  OTC tylenol as needed. Call if new c/o.     6. Chronic renal failure, stage 3a  Will do labs and adjust med if needed. RTSM 4 months. Flu shot given today. An  electronic signature was used to authenticate this note.     --Rosa Ozuna MD on 10/19/2020 at 11:17 AM

## 2020-10-19 NOTE — PROGRESS NOTES
10/19/2020    Audrie Bernheim (:  1940) is a 78 y.o. male, here for evaluation of the following medical concerns:    HPI  Controlled type 2 diabetes mellitus with stage 3 chronic kidney disease, without long-term current use of insulin ([facility])  Sugars are , diet is stable, weight is unchanged, no reported neuropathy, no change in vision, no claudication, no foot ulcers, no new skin lesions. recent change in medications(Lantus 10 u at bed time). No c/o with meds. Last eye exam 3/2019. Has Retinopathy mild. Benign essential HTN  No change in meds, no c/o with meds, no chest pain, SOB, palpatations, or syncope. Home bp was 120-130s/70-80s. Pure hypercholesterolemia  Stable diet and wt. No new c/o w/ med. Iron deficiency anemia due to chronic blood loss   Colonoscopy 2017. Showed Diverticulosis and Hemorrhoids. No active bleeding. No increased Fatigue. Last labs improved. Primary osteoarthritis of both knees   The most affected joints are knees . No new c/o of pain ,decrease ROM , or dysfunction of affect joints since last visit. . NO effusion or erythema since last visit. Gets stiff when cold and damp out. Chronic renal failure, stage 3 (moderate) ([facility])  Stable BUN/Cr (1.6-2.0) since hospital 2019. No new c/o. Good DM and BP control. Past  episode of CHF(2019). No reoccurrence. Review of Systems   Constitutional: Negative. HENT: Negative. Eyes: Negative. Respiratory: Negative. Cardiovascular: Negative. Gastrointestinal: Negative. Endocrine: Negative. Genitourinary: Negative. Musculoskeletal: Negative. Skin: Negative. Allergic/Immunologic: Negative. Neurological: Negative. Hematological: Negative. Psychiatric/Behavioral: Negative. Prior to Visit Medications    Medication Sig Taking?  Authorizing Provider   furosemide (LASIX) 40 MG tablet Take 1 tablet by mouth once daily  SELAM Shipman MD   MM PEN NEEDLES 31G X 5 MM MISC USE Sinus: No maxillary sinus tenderness or frontal sinus tenderness. Left Sinus: No maxillary sinus tenderness or frontal sinus tenderness. Mouth/Throat:      Pharynx: Uvula midline. No oropharyngeal exudate. Eyes:      General: Lids are normal. No scleral icterus. Right eye: No discharge. Left eye: No discharge. Extraocular Movements: Extraocular movements intact. Conjunctiva/sclera: Conjunctivae normal.      Pupils: Pupils are equal, round, and reactive to light. Funduscopic exam:     Right eye: No hemorrhage, exudate, AV nicking, arteriolar narrowing or papilledema. Left eye: No hemorrhage, exudate, AV nicking, arteriolar narrowing or papilledema. Comments: S/p Bilateral Cataract surgery   Neck:      Musculoskeletal: Full passive range of motion without pain, normal range of motion and neck supple. No neck rigidity or muscular tenderness. Thyroid: No thyroid mass or thyromegaly. Vascular: Normal carotid pulses. No carotid bruit, hepatojugular reflux or JVD. Trachea: Trachea and phonation normal. No tracheal deviation. Cardiovascular:      Rate and Rhythm: Normal rate and regular rhythm. No extrasystoles are present. Chest Wall: PMI is not displaced. Pulses: Normal pulses. No decreased pulses. Carotid pulses are 2+ on the right side and 2+ on the left side. Radial pulses are 2+ on the right side and 2+ on the left side. Femoral pulses are 2+ on the right side and 2+ on the left side. Popliteal pulses are 2+ on the right side and 2+ on the left side. Dorsalis pedis pulses are 2+ on the right side and 2+ on the left side. Posterior tibial pulses are 2+ on the right side and 2+ on the left side. Heart sounds: Normal heart sounds. No murmur. No friction rub. No gallop.     Pulmonary:      Effort: Pulmonary effort is normal. No tachypnea, bradypnea, accessory muscle usage or respiratory distress. Breath sounds: Normal breath sounds. No stridor. No decreased breath sounds, wheezing, rhonchi or rales. Chest:      Chest wall: No tenderness. Abdominal:      General: Bowel sounds are normal. There is no distension or abdominal bruit. Palpations: Abdomen is soft. There is no shifting dullness, fluid wave, mass or pulsatile mass. Tenderness: There is no abdominal tenderness. There is no right CVA tenderness, left CVA tenderness, guarding or rebound. Hernia: No hernia is present. There is no hernia in the ventral area or left inguinal area. Comments: Huge L inguinal hernia now gone s/p repair. .   Genitourinary:     Penis: Normal. No tenderness. Scrotum/Testes: Normal. Cremasteric reflex is present. Prostate: Normal.      Rectum: Normal. Guaiac result negative. Musculoskeletal: Normal range of motion. General: No tenderness. Right lower leg: No edema. Left lower leg: No edema. Comments: OA knees moderate on L but severe on R. S/p R hip repair from fx/accident(1962). Lymphadenopathy:      Head:      Right side of head: No submental, submandibular, tonsillar, preauricular, posterior auricular or occipital adenopathy. Left side of head: No submental, submandibular, tonsillar, preauricular, posterior auricular or occipital adenopathy. Cervical: No cervical adenopathy. Upper Body:      Right upper body: No supraclavicular or epitrochlear adenopathy. Left upper body: No supraclavicular or epitrochlear adenopathy. Skin:     General: Skin is warm and dry. Capillary Refill: Capillary refill takes less than 2 seconds. Coloration: Skin is not jaundiced or pale. Findings: No abrasion, bruising, erythema, lesion or rash. Nails: There is no clubbing. Neurological:      General: No focal deficit present. Mental Status: He is alert and oriented to person, place, and time. Mental status is at baseline.  He is not disoriented. Cranial Nerves: No cranial nerve deficit. Sensory: No sensory deficit. Motor: No weakness, tremor, atrophy, abnormal muscle tone or seizure activity. Coordination: Coordination normal.      Gait: Gait normal.      Deep Tendon Reflexes: Reflexes are normal and symmetric. Reflexes normal. Babinski sign absent on the right side. Babinski sign absent on the left side. Reflex Scores:       Tricep reflexes are 2+ on the right side and 2+ on the left side. Bicep reflexes are 2+ on the right side and 2+ on the left side. Brachioradialis reflexes are 2+ on the right side and 2+ on the left side. Patellar reflexes are 2+ on the right side and 2+ on the left side. Achilles reflexes are 2+ on the right side and 2+ on the left side. Comments: Diabetic foot exam unchanged. 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 Has intact light touch and vibratory senses. Psychiatric:         Mood and Affect: Mood is depressed (son passed away. ). Speech: Speech normal.         Behavior: Behavior normal.         Thought Content: Thought content normal.         Judgment: Judgment normal.         ASSESSMENT/PLAN:  1. Controlled type 2 diabetes mellitus with stage 3 chronic kidney disease, without long-term current use of insulin ([facility] [address]  Will do labs and adjust med if needed. 2. Benign essential HTN  Continue present meds. Home BP checks. Call if>140/90. Improve diet. Avoid caffeine and salt. Call if new c/o w/ meds. 3. Pure hypercholesterolemia  To continue to improve diet and continue present med. Call if new c/o.    4. Iron deficiency anemia due to chronic blood loss  Will check labs. Will call if need further evaluation. 5. Primary osteoarthritis of both knees  OTC tylenol as needed. Call if new c/o.     6. Chronic renal failure, stage 3a  Will do labs and adjust med if needed. RTSM 4 months. Flu shot given today.     An  electronic signature was used to authenticate this note.     --Eugenia Bradford MD on 10/19/2020 at 11:17 AM

## 2020-10-20 LAB
ESTIMATED AVERAGE GLUCOSE: 134.1 MG/DL
HBA1C MFR BLD: 6.3 %

## 2020-10-27 DIAGNOSIS — I10 BENIGN ESSENTIAL HTN: ICD-10-CM

## 2020-10-27 DIAGNOSIS — E11.22 CONTROLLED TYPE 2 DIABETES MELLITUS WITH STAGE 3 CHRONIC KIDNEY DISEASE, WITHOUT LONG-TERM CURRENT USE OF INSULIN (HCC): ICD-10-CM

## 2020-10-27 DIAGNOSIS — N18.30 CHRONIC RENAL FAILURE, STAGE 3 (MODERATE) (HCC): ICD-10-CM

## 2020-10-27 DIAGNOSIS — I42.0 DILATED CARDIOMYOPATHY (HCC): ICD-10-CM

## 2020-10-27 DIAGNOSIS — I50.22 CHRONIC SYSTOLIC CHF (CONGESTIVE HEART FAILURE) (HCC): ICD-10-CM

## 2020-10-27 DIAGNOSIS — E78.00 PURE HYPERCHOLESTEROLEMIA: ICD-10-CM

## 2020-10-27 DIAGNOSIS — D50.0 IRON DEFICIENCY ANEMIA DUE TO CHRONIC BLOOD LOSS: ICD-10-CM

## 2020-10-27 DIAGNOSIS — N18.30 CONTROLLED TYPE 2 DIABETES MELLITUS WITH STAGE 3 CHRONIC KIDNEY DISEASE, WITHOUT LONG-TERM CURRENT USE OF INSULIN (HCC): ICD-10-CM

## 2020-10-27 LAB
ALBUMIN SERPL-MCNC: 3.8 G/DL (ref 3.4–5)
ALP BLD-CCNC: 158 U/L (ref 40–129)
ALT SERPL-CCNC: 39 U/L (ref 10–40)
ANION GAP SERPL CALCULATED.3IONS-SCNC: 13 MMOL/L (ref 3–16)
AST SERPL-CCNC: 35 U/L (ref 15–37)
BILIRUB SERPL-MCNC: 0.4 MG/DL (ref 0–1)
BILIRUBIN DIRECT: <0.2 MG/DL (ref 0–0.3)
BILIRUBIN, INDIRECT: ABNORMAL MG/DL (ref 0–1)
BUN BLDV-MCNC: 23 MG/DL (ref 7–20)
CALCIUM SERPL-MCNC: 9.7 MG/DL (ref 8.3–10.6)
CHLORIDE BLD-SCNC: 103 MMOL/L (ref 99–110)
CHOLESTEROL, TOTAL: 151 MG/DL (ref 0–199)
CO2: 25 MMOL/L (ref 21–32)
CREAT SERPL-MCNC: 2.1 MG/DL (ref 0.8–1.3)
GFR AFRICAN AMERICAN: 37
GFR NON-AFRICAN AMERICAN: 30
GLUCOSE BLD-MCNC: 75 MG/DL (ref 70–99)
HCT VFR BLD CALC: 36.6 % (ref 40.5–52.5)
HDLC SERPL-MCNC: 72 MG/DL (ref 40–60)
HEMOGLOBIN: 12.1 G/DL (ref 13.5–17.5)
IRON SATURATION: 21 % (ref 20–50)
IRON: 50 UG/DL (ref 59–158)
LDL CHOLESTEROL CALCULATED: 63 MG/DL
MCH RBC QN AUTO: 25.2 PG (ref 26–34)
MCHC RBC AUTO-ENTMCNC: 32.9 G/DL (ref 31–36)
MCV RBC AUTO: 76.6 FL (ref 80–100)
PDW BLD-RTO: 15.7 % (ref 12.4–15.4)
PHOSPHORUS: 3.7 MG/DL (ref 2.5–4.9)
PLATELET # BLD: 286 K/UL (ref 135–450)
PMV BLD AUTO: 8.3 FL (ref 5–10.5)
POTASSIUM SERPL-SCNC: 4.2 MMOL/L (ref 3.5–5.1)
PRO-BNP: 3316 PG/ML (ref 0–449)
RBC # BLD: 4.78 M/UL (ref 4.2–5.9)
SODIUM BLD-SCNC: 141 MMOL/L (ref 136–145)
TOTAL IRON BINDING CAPACITY: 241 UG/DL (ref 260–445)
TOTAL PROTEIN: 6.9 G/DL (ref 6.4–8.2)
TRIGL SERPL-MCNC: 81 MG/DL (ref 0–150)
VLDLC SERPL CALC-MCNC: 16 MG/DL
WBC # BLD: 6.3 K/UL (ref 4–11)

## 2020-10-28 LAB
ESTIMATED AVERAGE GLUCOSE: 134.1 MG/DL
HBA1C MFR BLD: 6.3 %

## 2020-11-03 RX ORDER — CLOPIDOGREL BISULFATE 75 MG/1
TABLET ORAL
Qty: 90 TABLET | Refills: 1 | Status: SHIPPED | OUTPATIENT
Start: 2020-11-03 | End: 2021-03-31 | Stop reason: ALTCHOICE

## 2020-11-03 RX ORDER — FUROSEMIDE 40 MG/1
TABLET ORAL
Qty: 30 TABLET | Refills: 5 | Status: SHIPPED | OUTPATIENT
Start: 2020-11-03 | End: 2021-07-06

## 2020-11-16 RX ORDER — GLIPIZIDE 5 MG/1
TABLET ORAL
Qty: 60 TABLET | Refills: 5 | Status: SHIPPED | OUTPATIENT
Start: 2020-11-16 | End: 2021-11-19

## 2020-11-24 RX ORDER — POTASSIUM CHLORIDE 750 MG/1
10 TABLET, FILM COATED, EXTENDED RELEASE ORAL DAILY
Qty: 60 TABLET | Refills: 3 | Status: SHIPPED | OUTPATIENT
Start: 2020-11-24 | End: 2021-07-26

## 2020-12-03 NOTE — PROGRESS NOTES
[address]   Electrophysiology  Office Visit  Date: 12/10/2020    Chief Complaint   Patient presents with    Bradycardia    Shortness of Breath    Cardiomyopathy    Congestive Heart Failure       Cardiac HX: Bryanna Jameson is a 78 y.o. man with a h/o HLD, HTN, DM, nonobstructive CAD, follows with Dr. Shanda Cano, [facility], chronic HFrEF - EF now 35-40% who p/w SOB, bradycardia, junctional rhythm and hyperkalemia in the setting of acute on chronic kidney injury. S/s and bradycardia resolved with correction of electrolytes. Interval History/HPI: Patient is here to follow-up for bradycardia, junctional arrhythmias and shortness of breath. 1 to 2-week Zio patch 10/8/2000 19-10 22,019 that showed a min HR 50, average 75, max 122, SB, NSR, junctional arrhythmia, 2 NSVT's with the fastest being 4 beats at 197 bpm and the longest being 8 beats at 140 bpm, 5 SVTs with the longest being 9 beats at 107 bpm.  No significant pauses. He denies any dizziness or lightheadedness, he has not fallen or passed out. He does note some SOB with exertion however he has not been as active since COVID. Weight 176 which is up for him. Denies PND or orthopnea, no lower extremity edema. No CP. States he is not exercising and eating more.       Home medications:   Current Outpatient Medications on File Prior to Visit   Medication Sig Dispense Refill    potassium chloride (KLOR-CON) 10 MEQ extended release tablet Take 1 tablet by mouth daily 60 tablet 3    glipiZIDE (GLUCOTROL) 5 MG tablet TAKE 1 TABLET BY MOUTH TWICE DAILY BEFORE MEAL(S) 60 tablet 5    furosemide (LASIX) 40 MG tablet Take 1 tablet by mouth once daily 30 tablet 5    clopidogrel (PLAVIX) 75 MG tablet Take 1 tablet by mouth once daily 90 tablet 1    MM PEN NEEDLES 31G X 5 MM MISC USE   SUBCUTANEOUSLY ONCE DAILY 100 each 0    atorvastatin (LIPITOR) 40 MG tablet Take 1 tablet by mouth once daily 90 tablet 5    JANUVIA 50 MG tablet Take 1 tablet by mouth once daily 30 tablet 5    isosorbide mononitrate (IMDUR) 30 MG extended release tablet Take 1 tablet by mouth once daily 30 tablet 5    amLODIPine (NORVASC) 10 MG tablet Take 1 tablet by mouth daily 30 tablet 5    metoprolol succinate (TOPROL XL) 25 MG extended release tablet Take 1 tablet by mouth daily 90 tablet 3    insulin glargine (LANTUS SOLOSTAR) 100 UNIT/ML injection pen Inject 15 Units into the skin nightly 5 pen 3    aspirin 81 MG EC tablet Take 1 tablet by mouth daily 30 tablet 3     No current facility-administered medications on file prior to visit. Past Medical History:   Diagnosis Date    Anemia     Arthritis     MILD    CHF (congestive heart failure) (HCC)     EF 35-40%. Non-ischemic     Hyperlipidemia     Hypertension     Type II or unspecified type diabetes mellitus without mention of complication, not stated as uncontrolled         Past Surgical History:   Procedure Laterality Date    HERNIA REPAIR      HIP SURGERY      OTHER SURGICAL HISTORY N/A 12/15/2015    LAPAROSCOPIC LEFT INGUINAL HERNIA REPAIR WITH MESH       No Known Allergies    Social History:  Reviewed. reports that he has never smoked. He has never used smokeless tobacco. He reports that he does not drink alcohol or use drugs. Family History:  Reviewed. family history includes Cancer in his sister; Heart Disease in his father; High Blood Pressure in his father and mother. Review of System:    · Constitutional: No fevers, chills. · Eyes: No visual changes or diplopia. No scleral icterus. · ENT: No Headaches. No mouth sores or sore throat. · Cardiovascular: No for chest pain, No for dyspnea on exertion, No for palpitations or No for loss of consciousness. No cough, hemoptysis, No for pleuritic pain, or phlebitis. · Respiratory: No for cough or wheezing. No hematemesis. · Gastrointestinal: No abdominal pain, blood in stools. · Genitourinary: No dysuria, or hematuria.   · Musculoskeletal: No gait disturbance, · Integumentary: No rash or pruritis. · Neurological: No headache, change in muscle strength, numbness or tingling. · Psychiatric: No anxiety, or depression. · Endocrine: No temperature intolerance. No excessive thirst, fluid intake, or urination. · Hem/Lymph: No abnormal bruising or bleeding, blood clots or swollen lymph nodes. · Allergic/Immunologic: No nasal congestion or hives. Physical Examination:  Vitals:    12/10/20 0959   BP: 132/78   Pulse: 77   Temp: 97.1 °F (36.2 °C)         Wt Readings from Last 3 Encounters:   12/10/20 176 lb 9.6 oz (80.1 kg)   10/19/20 176 lb (79.8 kg)   07/17/20 165 lb 3.2 oz (74.9 kg)       · Constitutional: Oriented. No distress. · Head: Normocephalic and atraumatic. · Mouth/Throat: Oropharynx is clear and moist.   · Eyes: Conjunctivae clear without jaunduice. PERRL. · Neck: Neck supple. No rigidity. No JVD present. · Cardiovascular: Normal rate, regular rhythm, S1&S2. · Pulmonary/Chest: Bilateral respiratory sounds. No wheezes, No rhonchi. · Abdominal: Soft. Bowel sounds present. No distension, No tenderness. · Musculoskeletal: No tenderness. No edema    · Lymphadenopathy: Has no cervical adenopathy. · Neurological: Alert and oriented. Cranial nerve appears intact, No Gross deficit   · Skin: Skin is warm and dry. No rash noted. · Psychiatric: Has a normal mood, affect and behavior     Labs:  Reviewed. No results for input(s): NA, K, CL, CO2, PHOS, BUN, CREATININE in the last 72 hours. Invalid input(s): CA,  TSH  No results for input(s): WBC, HGB, HCT, MCV, PLT in the last 72 hours.   Lab Results   Component Value Date    TROPONINI 0.07 03/12/2020     No results found for: BNP  Lab Results   Component Value Date    PROTIME 12.9 11/08/2019    PROTIME 12.9 09/05/2019    PROTIME 11.8 07/17/2019    INR 1.13 11/08/2019    INR 1.13 09/05/2019    INR 1.04 07/17/2019     Lab Results   Component Value Date    CHOL 151 10/27/2020    HDL 72 10/27/2020 HDL 56 01/13/2012    TRIG 81 10/27/2020       ECG: Personally reviewed: NSR, RBBB, HR 77, , , QTc 499    ECHO:  9/20/2019  Summary   Left ventricular cavity size is normal. There is severe concentric left   ventricular hypertrophy. Overall left ventricular systolic function appears   normal with an estimated ejection fraction of 50-55%. Diastolic filling   parameters suggest grade III diastolic dysfunction. Myocardial appearance   and the presence apical sparing on strain imaging suggests cardiac   amyloidosis. Mitral annular calcification is present. Mitral valve leaflets appear   thickened. Mild mitral regurgitation is present. The left atrium is severely dilated. Aortic valve leaflets appear thickened at the leaflet tips. The aortic valve   appears tricuspid. Mild aortic regurgitation is present. Mild tricuspid regurgitation. IVC size is normal (<2.1 cm) but collapses < 50% with respiration consistent   with elevated RA pressure (8 mmHg). Estimated pulmonary artery systolic   pressure is at 26 mmHg assuming a right atrial pressure of 3 mmHg.        Cardiac Angiography: 7/26/2019, Dr. Tejas Harp  Angiographic Findings:  Right dominant system  Left Main:  Normal   Left Anterior Descending:  Mild irregular plaque; no stenosis over 30%   Circumflex:  Mild irregular plaque; no stenosis over 30%   Right Coronary:  Mild irregular plaque; no stenosis over 30%   Left Ventriculogram:  Not performed due to Cr   Femoral Angiogram:  No obstructive plaque   Hemodynamics (mm Hg):  Left Ventricular Pressure:  96/1, 5  Central Aortic Pressure:  102/68 (82)   Conclusions:  -No obstructive CAD  -Normal LVEDP   Recommendations:  -Continue medical therapy.  -Followup in the CHF clinic    Problem List:   Patient Active Problem List    Diagnosis Date Noted    Right BBB/left ant fasc block 12/01/2014     Priority: High    Vitamin D deficiency 03/02/2011     Priority: High    Primary osteoarthritis of both knees 12/02/2010     Priority: High    Controlled type 2 diabetes mellitus with stage 3 chronic kidney disease, without long-term current use of insulin ([facility]      Priority: High    Benign essential HTN      Priority: High    Pure hypercholesterolemia      Priority: High    Iron deficiency anemia due to chronic blood loss      Priority: High    Tachycardia 05/01/2020    Severe left ventricular hypertrophy 10/25/2019    Acute renal failure (HCC)     Chronic systolic CHF (congestive heart failure) ([facility]     Chronic kidney disease     S/P coronary angiogram     Dilated cardiomyopathy ([facility] 07/05/2019    Chronic renal failure, stage 3 (moderate) ([facility]) 10/02/2017    Abnormal EKG 01/27/2015        Assessment:   1. Tachycardia    2. Sinus bradycardia    3. Junctional bradycardia    4. Right bundle branch block    5. Nonischemic cardiomyopathy ([facility]    6. Chronic diastolic CHF (congestive heart failure) ([facility]         78 y.o.  man with a h/o HLD, HTN, DM, nonobstructive CAD, follows with Dr. Natalia Villeda, [place], chronic HFrEF - EF now 35-40% who p/w SOB, bradycardia, junctional rhythm and hyperkalemia in the setting of acute on chronic kidney injury.      Junctional rhythm  - No s/s  - In NSR   - Reviewed 2 week Zio patch  - F/u with EP in 1 year  - ECG ordered and results personally reviewed    NICMP/chroic mixed d/s CHF  - EF 35-40%, now 50-55%  - NYHA class II/III  -  (RBBB)  - No ACE/ARB d/t CKD  - On Imdur 30 mg, off hydralazine  - On Toprol XL 25  - Daily weights. I&O  - Follows with Dr. Diane Arredondo - will arrange an appmt for Dr. Diane Arredondo in 3 months    HTN  - Well controlled  - On amlodipoine 10, Imdur 30 mg, Toprol XL 25    EF of 75-50%  No systolic HF  ASA and Statin for CAD  No known AF  No Tobacco use. All questions and concerns were addressed to the patient/family. Alternatives to my treatment were discussed. The note was completed using EMR.  Every effort was made to ensure accuracy; however, inadvertent computerized transcription errors may be present. Patient received education regarding their diagnosis, treatment and medications while in the office today.       Lesa [address]

## 2020-12-10 ENCOUNTER — OFFICE VISIT (OUTPATIENT)
Dept: CARDIOLOGY CLINIC | Age: 80
End: 2020-12-10
Payer: MEDICARE

## 2020-12-10 VITALS
DIASTOLIC BLOOD PRESSURE: 78 MMHG | WEIGHT: 176.6 LBS | HEIGHT: 69 IN | HEART RATE: 77 BPM | TEMPERATURE: 97.1 F | SYSTOLIC BLOOD PRESSURE: 132 MMHG | BODY MASS INDEX: 26.16 KG/M2

## 2020-12-10 PROCEDURE — 1123F ACP DISCUSS/DSCN MKR DOCD: CPT | Performed by: NURSE PRACTITIONER

## 2020-12-10 PROCEDURE — 4040F PNEUMOC VAC/ADMIN/RCVD: CPT | Performed by: NURSE PRACTITIONER

## 2020-12-10 PROCEDURE — 93000 ELECTROCARDIOGRAM COMPLETE: CPT | Performed by: NURSE PRACTITIONER

## 2020-12-10 PROCEDURE — G8427 DOCREV CUR MEDS BY ELIG CLIN: HCPCS | Performed by: NURSE PRACTITIONER

## 2020-12-10 PROCEDURE — G8417 CALC BMI ABV UP PARAM F/U: HCPCS | Performed by: NURSE PRACTITIONER

## 2020-12-10 PROCEDURE — G8484 FLU IMMUNIZE NO ADMIN: HCPCS | Performed by: NURSE PRACTITIONER

## 2020-12-10 PROCEDURE — 99214 OFFICE O/P EST MOD 30 MIN: CPT | Performed by: NURSE PRACTITIONER

## 2020-12-10 PROCEDURE — 1036F TOBACCO NON-USER: CPT | Performed by: NURSE PRACTITIONER

## 2020-12-21 RX ORDER — AMLODIPINE BESYLATE 10 MG/1
TABLET ORAL
Qty: 30 TABLET | Refills: 0 | Status: SHIPPED | OUTPATIENT
Start: 2020-12-21 | End: 2021-01-21

## 2020-12-21 NOTE — TELEPHONE ENCOUNTER
Refill Request amLODIPine Besylate 10 MG Oral Tablet    Last Seen: 10/19/2020     Last Written: 6/18/20 30 tablets with 5 refills    Next Appointment:   Future Appointments   Date Time Provider Micheline Gaines   2/23/2021 11:40 AM SELAM Adan MD [facility]   3/10/2021  9:30 AM Bishop Jessica MD [facility]             Requested Prescriptions     Pending Prescriptions Disp Refills    amLODIPine (NORVASC) 10 MG tablet [Pharmacy Med Name: amLODIPine Besylate 10 MG Oral Tablet] 30 tablet 0     Sig: Take 1 tablet by mouth once daily

## 2021-01-12 RX ORDER — PEN NEEDLE, DIABETIC 31 GX3/16"
NEEDLE, DISPOSABLE MISCELLANEOUS
Qty: 100 EACH | Refills: 0 | Status: SHIPPED | OUTPATIENT
Start: 2021-01-12 | End: 2021-05-04

## 2021-01-12 RX ORDER — INSULIN GLARGINE 100 [IU]/ML
INJECTION, SOLUTION SUBCUTANEOUS
Qty: 15 ML | Refills: 0 | Status: SHIPPED | OUTPATIENT
Start: 2021-01-12 | End: 2021-05-11

## 2021-01-12 NOTE — TELEPHONE ENCOUNTER
Refill Request Lantus SoloStar 100 UNIT/ML Subcutaneous Solution Pen-injector and MM PEN NEEDLES 31G X 5MM MIS    Last Seen: 10/19/2020    Last Written: 10/8/19 5 pens with 3 refills lantus  10/1/20 pen needles 100 with 0 refills    Next Appointment:   Future Appointments   Date Time Provider Micheline Gaines   2/23/2021 11:40 AM MD America Lees [facility]   3/10/2021  9:30 AM Ralf Rao MD [facility]         Requested Prescriptions     Pending Prescriptions Disp Refills    MM PEN NEEDLES 31G X 5 MM [facility] [Pharmacy Med Name: MM PEN NEEDLES 31G X 5MM MIS] 100 each 0     Sig: USE   SUBCUTANEOUSLY ONCE DAILY    LANTUS SOLOSTAR 100 UNIT/ML injection pen [Pharmacy Med Name: Lantus SoloStar 100 UNIT/ML Subcutaneous Solution Pen-injector] 15 mL 0     Sig: INJECT 15 UNITS SUBCUTANEOUSLY NIGHTLY

## 2021-01-21 RX ORDER — AMLODIPINE BESYLATE 10 MG/1
TABLET ORAL
Qty: 30 TABLET | Refills: 5 | Status: SHIPPED | OUTPATIENT
Start: 2021-01-21 | End: 2021-08-02

## 2021-03-31 ENCOUNTER — OFFICE VISIT (OUTPATIENT)
Dept: CARDIOLOGY CLINIC | Age: 81
End: 2021-03-31
Payer: MEDICARE

## 2021-03-31 VITALS
DIASTOLIC BLOOD PRESSURE: 62 MMHG | BODY MASS INDEX: 26.23 KG/M2 | WEIGHT: 177.6 LBS | SYSTOLIC BLOOD PRESSURE: 118 MMHG | HEART RATE: 84 BPM

## 2021-03-31 DIAGNOSIS — I43 CARDIAC AMYLOIDOSIS (HCC): ICD-10-CM

## 2021-03-31 DIAGNOSIS — E78.00 PURE HYPERCHOLESTEROLEMIA: ICD-10-CM

## 2021-03-31 DIAGNOSIS — I50.22 CHRONIC SYSTOLIC CHF (CONGESTIVE HEART FAILURE) (HCC): Primary | ICD-10-CM

## 2021-03-31 DIAGNOSIS — E85.4 CARDIAC AMYLOIDOSIS (HCC): ICD-10-CM

## 2021-03-31 DIAGNOSIS — I10 BENIGN ESSENTIAL HTN: ICD-10-CM

## 2021-03-31 PROCEDURE — G8484 FLU IMMUNIZE NO ADMIN: HCPCS | Performed by: INTERNAL MEDICINE

## 2021-03-31 PROCEDURE — G8427 DOCREV CUR MEDS BY ELIG CLIN: HCPCS | Performed by: INTERNAL MEDICINE

## 2021-03-31 PROCEDURE — 4040F PNEUMOC VAC/ADMIN/RCVD: CPT | Performed by: INTERNAL MEDICINE

## 2021-03-31 PROCEDURE — G8417 CALC BMI ABV UP PARAM F/U: HCPCS | Performed by: INTERNAL MEDICINE

## 2021-03-31 PROCEDURE — 99215 OFFICE O/P EST HI 40 MIN: CPT | Performed by: INTERNAL MEDICINE

## 2021-03-31 PROCEDURE — 1036F TOBACCO NON-USER: CPT | Performed by: INTERNAL MEDICINE

## 2021-03-31 PROCEDURE — 1123F ACP DISCUSS/DSCN MKR DOCD: CPT | Performed by: INTERNAL MEDICINE

## 2021-03-31 NOTE — PROGRESS NOTES
Cc: Cardiac amyloidosis, recovered HFrEF, CAD, NSVT    HPI:     Mr Tad Shearer is a 79 yo man, patient of Dr Leodan Soto h/o severe LVH, HFrEF, HTN, HLP, DM 2, CKD, mild CAD, NSTEMI, trifascicular block with RBBB, NSVT.     Echo 06/2019: severe LVH, EF 35-40%, mild RV dysfx, mild valve disease     Echo 2015: moderate LVH, normal EF.      Kathi 07/2019: mod LVd, EF 39%, mid to distal inferior and apical fixed defect.      LHC 07/2019: mild diffuse CAD, nl LVEDP.      ECG 9/6/19: NSR, 1st AVB, LAFB, RBBB (trifascicular block), possible inferior MI.      ECG 9/5/19: Sinus lenore 54 (thought to be junctional).       9/6/20 SPEP, UPEP and sFLC within normal limits for CKD (except for proteinuria).      Echo 9/20/19: severe LVH, LVEF 93-98%, diastolic III, apical sparing on speckle strain imaging suggestive of amyloidosis, severe LAE, mild MR.       Tc PYP scan 3/12/20: strongly positive for amyloidosis. 09/2019 kappa/labda ratio 1.69 (normal in the setting of CKD, Cr 1.9). There is no SPEP or 24 hour UPEP to r/o AL amyloidosis. Patient is here for follow-up. He is under a lot of stress at home, he lives with his wife, he lost his 2 sons, the last one passed away 2 weeks ago. She reports no symptoms. Histories     Past Medical History:   has a past medical history of Anemia, Arthritis, CHF (congestive heart failure) ([address] Hyperlipidemia, Hypertension, and Type II or unspecified type diabetes mellitus without mention of complication, not stated as uncontrolled. Surgical History:   has a past surgical history that includes hernia repair; hip surgery; and other surgical history (N/A, 12/15/2015). Social History:   reports that he has never smoked. He has never used smokeless tobacco. He reports that he does not drink alcohol or use drugs.      Family History:  No evidence for sudden cardiac death or premature CAD      Medications:     Home medications were reviewed and are listed below    Prior to Admission trouble voiding, or hematuria. MUSCULOSKELETAL:  No gait disturbance, weakness or joint complaints. NEUROLOGICAL: No headache, diplopia, change in muscle strength, numbness or tingling. No change in gait, balance, coordination, mood, affect, memory, mentation, behavior. PSHYCH: No anxiety, loss of interest, change in sexual behavior, feelings of self-harm, or confusion. ENDOCRINE: No excessive thirst, fluid intake, or urination. No tremor. HEMATOLOGIC: No abnormal bruising or bleeding. ALLERGY: No nasal congestion or hives.       Physical Examination:     Vitals:    03/31/21 0927   BP: 118/62   Pulse: 84   Weight: 177 lb 9.6 oz (80.6 kg)       Wt Readings from Last 3 Encounters:   03/31/21 177 lb 9.6 oz (80.6 kg)   12/10/20 176 lb 9.6 oz (80.1 kg)   10/19/20 176 lb (79.8 kg)         General Appearance:  Alert, cooperative, no distress, appears stated age Appropriate weight   Head:  Normocephalic, without obvious abnormality, atraumatic   Eyes:  PERRL, conjunctiva/corneas clear EOM intact  Ears normal   Throat no lesions       Nose: Nares normal, no drainage or sinus tenderness   Throat: Lips, mucosa, and tongue normal   Neck: Supple, symmetrical, trachea midline, no adenopathy, thyroid: not enlarged, symmetric, no tenderness/mass/nodules, no carotid bruit       Lungs:   Clear to auscultation bilaterally, respirations unlabored   Chest Wall:  No tenderness or deformity   Heart:  Regular rhythm, rate is controlled, S1, S2 normal, there is no murmur, there is no rub or gallop, no jvd, no bilateral lower extremity edema   Abdomen:   Soft, non-tender, bowel sounds active all four quadrants,  no masses, no organomegaly       Extremities: Extremities normal, atraumatic, no cyanosis   Pulses: 2+ and symmetric   Skin: Skin color, texture, turgor normal, no rashes or lesions   Pysch: Normal mood and affect   Neurologic: Normal gross motor and sensory exam.  Cranial nerves intact        Labs:     Lab Results   Component contribution by amyloidosis cannot be excluded. Patient is now euvolemic and hemo stable. His NYHA FC is II, stage C. 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    -I extensively discussed the diagnosis of cardiac amyloidosis with the patient. At this point AL amyloidosis has not been excluded (patient will need to be tested with SPEP and 24-hour UPEP). 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 Patient will discuss with his family and if interested will proceed with the additional labs to rule out AL amyloidosis. If ATTR amyloidosis is confirmed (by exclusion), will proceed with paperwork for [employer].     2. Mild CAD:   Has h/o NSTEMI but cath with only mild CAD.    -Agree with asa, bb, statin. We will stop Plavix.     3. HTN:    BP is now controlled with current medications.      Cw Toprol to 25 mg p.o. daily  Cw amlodipine 10 mg p.o. daily  C/w lasix 40 daily and add KDur 10 meq daily.      4. H/o NSVT:   No events.      -Cw Toprol to 25 daily.        5. HLP:   On statin.      6. ATTR amyloidosis:   Per #1           Return in about 6 months (around 9/30/2021). with Zack Perez NP      I have spent 40 minutes of face to face time with the patient with more than 50% spent counseling and coordinating care. I have personally reviewed the reports and images of labs, radiological studies, cardiac studies including ECG's and telemetry, current and old medical records. The note was completed using EMR and Dragon dictation system. Every effort was made to ensure accuracy; however, inadvertent computerized transcription errors may be present. All questions and concerns were addressed to the patient/family. Alternatives to my treatment were discussed. I would like to thank you for providing me the opportunity to participate in the care of your patient. If you have any questions, please do not hesitate to contact me.      Barbara Garzon MD, [place] [address]  [place] [address] Phone: [phone]  Heart Failure Hotline: [phone]  Fax: [phone]

## 2021-04-03 LAB
ALBUMIN SERPL-MCNC: 4 G/DL (ref 3.4–5)
ANION GAP SERPL CALCULATED.3IONS-SCNC: 10 MMOL/L (ref 3–16)
BUN BLDV-MCNC: 26 MG/DL (ref 7–20)
CALCIUM SERPL-MCNC: 9.7 MG/DL (ref 8.3–10.6)
CHLORIDE BLD-SCNC: 105 MMOL/L (ref 99–110)
CO2: 27 MMOL/L (ref 21–32)
CREAT SERPL-MCNC: 1.8 MG/DL (ref 0.8–1.3)
GFR AFRICAN AMERICAN: 44
GFR NON-AFRICAN AMERICAN: 36
GLUCOSE BLD-MCNC: 87 MG/DL (ref 70–99)
HCT VFR BLD CALC: 37.9 % (ref 40.5–52.5)
HEMOGLOBIN: 12.5 G/DL (ref 13.5–17.5)
MCH RBC QN AUTO: 25.3 PG (ref 26–34)
MCHC RBC AUTO-ENTMCNC: 33.1 G/DL (ref 31–36)
MCV RBC AUTO: 76.5 FL (ref 80–100)
PDW BLD-RTO: 17.3 % (ref 12.4–15.4)
PHOSPHORUS: 3.5 MG/DL (ref 2.5–4.9)
PLATELET # BLD: 275 K/UL (ref 135–450)
PMV BLD AUTO: 8.1 FL (ref 5–10.5)
POTASSIUM SERPL-SCNC: 3.9 MMOL/L (ref 3.5–5.1)
RBC # BLD: 4.95 M/UL (ref 4.2–5.9)
SODIUM BLD-SCNC: 142 MMOL/L (ref 136–145)
URIC ACID, SERUM: 6.8 MG/DL (ref 3.5–7.2)
WBC # BLD: 5.5 K/UL (ref 4–11)

## 2021-05-04 RX ORDER — PEN NEEDLE, DIABETIC 31 GX3/16"
NEEDLE, DISPOSABLE MISCELLANEOUS
Qty: 100 EACH | Refills: 5 | Status: SHIPPED | OUTPATIENT
Start: 2021-05-04

## 2021-05-11 RX ORDER — INSULIN GLARGINE 100 [IU]/ML
INJECTION, SOLUTION SUBCUTANEOUS
Qty: 15 ML | Refills: 0 | Status: SHIPPED | OUTPATIENT
Start: 2021-05-11 | End: 2021-08-18

## 2021-05-21 ENCOUNTER — OFFICE VISIT (OUTPATIENT)
Dept: FAMILY MEDICINE CLINIC | Age: 81
End: 2021-05-21
Payer: MEDICARE

## 2021-05-21 VITALS
HEART RATE: 74 BPM | RESPIRATION RATE: 16 BRPM | SYSTOLIC BLOOD PRESSURE: 132 MMHG | WEIGHT: 176.8 LBS | HEIGHT: 69 IN | BODY MASS INDEX: 26.19 KG/M2 | OXYGEN SATURATION: 99 % | DIASTOLIC BLOOD PRESSURE: 72 MMHG

## 2021-05-21 DIAGNOSIS — I43 CARDIAC AMYLOIDOSIS (HCC): ICD-10-CM

## 2021-05-21 DIAGNOSIS — I10 BENIGN ESSENTIAL HTN: ICD-10-CM

## 2021-05-21 DIAGNOSIS — N18.31 CHRONIC RENAL FAILURE, STAGE 3A (HCC): ICD-10-CM

## 2021-05-21 DIAGNOSIS — N18.30 CONTROLLED TYPE 2 DIABETES MELLITUS WITH STAGE 3 CHRONIC KIDNEY DISEASE, WITHOUT LONG-TERM CURRENT USE OF INSULIN (HCC): Primary | ICD-10-CM

## 2021-05-21 DIAGNOSIS — E85.4 CARDIAC AMYLOIDOSIS (HCC): ICD-10-CM

## 2021-05-21 DIAGNOSIS — E11.22 CONTROLLED TYPE 2 DIABETES MELLITUS WITH STAGE 3 CHRONIC KIDNEY DISEASE, WITHOUT LONG-TERM CURRENT USE OF INSULIN (HCC): Primary | ICD-10-CM

## 2021-05-21 LAB
ANION GAP SERPL CALCULATED.3IONS-SCNC: 15 MMOL/L (ref 3–16)
BUN BLDV-MCNC: 27 MG/DL (ref 7–20)
CALCIUM SERPL-MCNC: 9.8 MG/DL (ref 8.3–10.6)
CHLORIDE BLD-SCNC: 105 MMOL/L (ref 99–110)
CO2: 23 MMOL/L (ref 21–32)
CREAT SERPL-MCNC: 1.9 MG/DL (ref 0.8–1.3)
GFR AFRICAN AMERICAN: 41
GFR NON-AFRICAN AMERICAN: 34
GLUCOSE BLD-MCNC: 70 MG/DL (ref 70–99)
HBA1C MFR BLD: 6 %
POTASSIUM SERPL-SCNC: 4.1 MMOL/L (ref 3.5–5.1)
SODIUM BLD-SCNC: 143 MMOL/L (ref 136–145)

## 2021-05-21 PROCEDURE — 83036 HEMOGLOBIN GLYCOSYLATED A1C: CPT | Performed by: INTERNAL MEDICINE

## 2021-05-21 PROCEDURE — 4040F PNEUMOC VAC/ADMIN/RCVD: CPT | Performed by: INTERNAL MEDICINE

## 2021-05-21 PROCEDURE — G8427 DOCREV CUR MEDS BY ELIG CLIN: HCPCS | Performed by: INTERNAL MEDICINE

## 2021-05-21 PROCEDURE — 1036F TOBACCO NON-USER: CPT | Performed by: INTERNAL MEDICINE

## 2021-05-21 PROCEDURE — 99214 OFFICE O/P EST MOD 30 MIN: CPT | Performed by: INTERNAL MEDICINE

## 2021-05-21 PROCEDURE — 1123F ACP DISCUSS/DSCN MKR DOCD: CPT | Performed by: INTERNAL MEDICINE

## 2021-05-21 PROCEDURE — 36415 COLL VENOUS BLD VENIPUNCTURE: CPT | Performed by: INTERNAL MEDICINE

## 2021-05-21 PROCEDURE — G8417 CALC BMI ABV UP PARAM F/U: HCPCS | Performed by: INTERNAL MEDICINE

## 2021-05-21 ASSESSMENT — ENCOUNTER SYMPTOMS
RESPIRATORY NEGATIVE: 1
ALLERGIC/IMMUNOLOGIC NEGATIVE: 1
GASTROINTESTINAL NEGATIVE: 1

## 2021-05-21 ASSESSMENT — PATIENT HEALTH QUESTIONNAIRE - PHQ9: SUM OF ALL RESPONSES TO PHQ QUESTIONS 1-9: 0

## 2021-05-21 NOTE — PROGRESS NOTES
Subjective:      Patient ID: Hammad Cardona is a 78 y.o. male. HPI  Controlled type 2 diabetes mellitus with stage 3 chronic kidney disease, without long-term current use of insulin ([facility])  Sugars are (HbA1c was 6.0% today), diet is stable, weight is unchanged, no reported neuropathy, no change in vision, no claudication, no foot ulcers, no new skin lesions. recent change in medications(Lantus 10 u at bed time). No c/o with meds. Last eye exam 3/2019(appt soon at [facility]). Has Retinopathy mild. Benign essential HTN  No change in meds, no c/o with meds, no chest pain, SOB, palpatations, or syncope. Home bp was 120-130s/70-80s. Chronic renal failure, stage 3 (moderate) ([facility])  Stable BUN/Cr (1.6-2.0) since hospital 9/2019. No new c/o. Good DM and BP control. Past  episode of CHF(9/2019). No reoccurrence. Cardiac amyloidosis [facility])   Saw Cardiology 3/2021. ECHO had improved. No sign of CHF. ECHO consistent w/ possible Amyloidosis. Review of Systems   Constitutional: Positive for fatigue. Respiratory: Negative. Cardiovascular: Negative. Gastrointestinal: Negative. Endocrine: Negative. Genitourinary: Negative. Musculoskeletal: Positive for arthralgias. Skin: Negative. Allergic/Immunologic: Negative. Neurological: Negative. Hematological: Negative. Psychiatric/Behavioral: Negative. Objective:   Physical Exam  Constitutional:       General: He is not in acute distress. Appearance: Normal appearance. He is well-developed. He is not diaphoretic. HENT:      Head: Normocephalic and atraumatic. Neck:      Thyroid: No thyroid mass or thyromegaly. Vascular: Normal carotid pulses. No carotid bruit, hepatojugular reflux or JVD. Trachea: Trachea and phonation normal.   Cardiovascular:      Rate and Rhythm: Normal rate and regular rhythm. No extrasystoles are present. Chest Wall: PMI is not displaced. Pulses: Normal pulses. No decreased pulses. (moderate) ([facility] Will do Renal and SPEP. 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 HbA1c was 6.0%. continue present meds(Januvia and Glipizide). To continue to improve diet and lose weight. To follow Diabetic diet. If needs further help w/ Diabetic diet to call and will refer back to dietician. Home sugar checks. To call if sudden change up or down in sugars. To do regular foot checks. Call if new problems. Benign Essential Htn. Continue present meds. Home BP checks. Call if>140/90. Improve diet. Avoid caffeine and salt. Call if new c/o w/ meds. Cardiac Amyloidosis (Hcc). To f/u w/ Cardiology 9/2021. Call if new c/o.

## 2021-05-21 NOTE — ASSESSMENT & PLAN NOTE
Sugars are (HbA1c was 6.0% today), diet is stable, weight is unchanged, no reported neuropathy, no change in vision, no claudication, no foot ulcers, no new skin lesions. recent change in medications(Lantus 10 u at bed time). No c/o with meds. Last eye exam 3/2019(appt soon at [facility]). Has Retinopathy mild.

## 2021-05-21 NOTE — PATIENT INSTRUCTIONS
Chronic renal failure, stage 3 (moderate) ([facility] Will do Renal and SPEP. 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 HbA1c was 6.0%. continue present meds(Januvia and Glipizide). To continue to improve diet and lose weight. To follow Diabetic diet. If needs further help w/ Diabetic diet to call and will refer back to dietician. Home sugar checks. To call if sudden change up or down in sugars. To do regular foot checks. Call if new problems. Benign Essential Htn. Continue present meds. Home BP checks. Call if>140/90. Improve diet. Avoid caffeine and salt. Call if new c/o w/ meds. Cardiac Amyloidosis (Hcc). To f/u w/ Cardiology 9/2021. Call if new c/o.

## 2021-05-24 LAB
ALBUMIN SERPL-MCNC: 3.6 G/DL (ref 3.1–4.9)
ALPHA-1-GLOBULIN: 0.3 G/DL (ref 0.2–0.4)
ALPHA-2-GLOBULIN: 0.9 G/DL (ref 0.4–1.1)
BETA GLOBULIN: 0.9 G/DL (ref 0.9–1.6)
GAMMA GLOBULIN: 1.9 G/DL (ref 0.6–1.8)
SPE/IFE INTERPRETATION: NORMAL
TOTAL PROTEIN: 7.6 G/DL (ref 6.4–8.2)

## 2021-05-24 RX ORDER — METOPROLOL SUCCINATE 25 MG/1
TABLET, EXTENDED RELEASE ORAL
Qty: 90 TABLET | Refills: 0 | Status: SHIPPED | OUTPATIENT
Start: 2021-05-24 | End: 2021-08-23

## 2021-05-24 NOTE — TELEPHONE ENCOUNTER
Requested Prescriptions     Pending Prescriptions Disp Refills    metoprolol succinate (TOPROL XL) 25 MG extended release tablet [Pharmacy Med Name: Metoprolol Succinate ER 25 MG Oral Tablet Extended Release 24 Hour] 90 tablet 0     Sig: Take 1 tablet by mouth once daily          Number: 90    Refills: 3    Last Office Visit: 3/31/2021     Next Office Visit: 9/24/2021     Last Labs: [phone]

## 2021-06-28 RX ORDER — ISOSORBIDE MONONITRATE 30 MG/1
TABLET, EXTENDED RELEASE ORAL
Qty: 30 TABLET | Refills: 5 | Status: SHIPPED | OUTPATIENT
Start: 2021-06-28 | End: 2021-12-27

## 2021-06-28 RX ORDER — SITAGLIPTIN 50 MG/1
TABLET, FILM COATED ORAL
Qty: 30 TABLET | Refills: 5 | Status: SHIPPED | OUTPATIENT
Start: 2021-06-28 | End: 2022-01-03

## 2021-07-06 RX ORDER — FUROSEMIDE 40 MG/1
TABLET ORAL
Qty: 30 TABLET | Refills: 5 | Status: SHIPPED | OUTPATIENT
Start: 2021-07-06 | End: 2021-10-21

## 2021-07-26 RX ORDER — POTASSIUM CHLORIDE 750 MG/1
TABLET, FILM COATED, EXTENDED RELEASE ORAL
Qty: 60 TABLET | Refills: 5 | Status: SHIPPED | OUTPATIENT
Start: 2021-07-26 | End: 2022-07-29 | Stop reason: SDUPTHER

## 2021-08-02 RX ORDER — AMLODIPINE BESYLATE 10 MG/1
TABLET ORAL
Qty: 30 TABLET | Refills: 5 | Status: SHIPPED | OUTPATIENT
Start: 2021-08-02 | End: 2022-01-18 | Stop reason: SDUPTHER

## 2021-08-18 RX ORDER — INSULIN GLARGINE 100 [IU]/ML
INJECTION, SOLUTION SUBCUTANEOUS
Qty: 15 ML | Refills: 0 | Status: SHIPPED | OUTPATIENT
Start: 2021-08-18 | End: 2021-12-02

## 2021-08-23 NOTE — TELEPHONE ENCOUNTER
Requested Prescriptions     Pending Prescriptions Disp Refills    metoprolol succinate (TOPROL XL) 25 MG extended release tablet [Pharmacy Med Name: Metoprolol Succinate ER 25 MG Oral Tablet Extended Release 24 Hour] 90 tablet 0     Sig: Take 1 tablet by mouth once daily            Last Office Visit: 3/31/2021     Next Office Visit: 9/24/2021

## 2021-08-24 RX ORDER — METOPROLOL SUCCINATE 25 MG/1
TABLET, EXTENDED RELEASE ORAL
Qty: 90 TABLET | Refills: 3 | Status: SHIPPED | OUTPATIENT
Start: 2021-08-24 | End: 2022-01-28 | Stop reason: ALTCHOICE

## 2021-10-01 RX ORDER — ATORVASTATIN CALCIUM 40 MG/1
TABLET, FILM COATED ORAL
Qty: 90 TABLET | Refills: 0 | Status: SHIPPED | OUTPATIENT
Start: 2021-10-01 | End: 2021-12-27

## 2021-10-01 NOTE — TELEPHONE ENCOUNTER
Chief Complaint   Patient presents with    Cough     not able to sleep through the night due to coughing. Sinus pressure. HPI    Sohail Jones is a 77 y.o. female who complains of recent onset of nasal congestion, sore throat, ear fullness and lethargy. Patient also noted that OTC remedies did not help. ROS:    Denies  nausea, vomiting, diarrhea, weight loss, weight gain, hemoptysis, hematochezia or melena. Denies rash, frequency, dysuria, or joint pain. EXAM:   The patient appears well, alert, oriented x 3, in no distress. Visit Vitals    /79 (BP 1 Location: Left arm, BP Patient Position: Sitting)    Pulse 80    Temp 97.3 °F (36.3 °C) (Oral)    Resp 18    Ht 5' 5\" (1.651 m)    Wt 128 lb 12.8 oz (58.4 kg)    SpO2 98%    BMI 21.43 kg/m2     HEENT:  NC/AT PERRLA, EOMI, oropharynx is clear, oral mucosa is pink and moist  Neck: supple, without JVD, thyromegaly, mass or bruit  Lungs are clear,without wheezes, rales, rubs or ronchi   Cardiovascular: RRR without MGR  Abdomen is soft without tenderness, guarding, mass or organomegaly. Extremities: no cyanosis, clubbing or edema   Neurological:  fluent, ambulatory, CN 2-12 are grossly intact  Skin:  No rash    Assessment:    1.  URI:  Viral.  Explained to patient that this is most likely a viral infection and that antibiotics are not indicated at this time. If \"alarm\" sx such as high fever, confusion, SOB, occur, the patient understands that they will need reevaluation by Provider.         Plan:  Rest, Fluids and Tylenol      Marcell Cox MD Seen 5/21/21.  Appointment 10/21/21

## 2021-10-21 ENCOUNTER — OFFICE VISIT (OUTPATIENT)
Dept: FAMILY MEDICINE CLINIC | Age: 81
End: 2021-10-21
Payer: MEDICARE

## 2021-10-21 VITALS
BODY MASS INDEX: 27.55 KG/M2 | SYSTOLIC BLOOD PRESSURE: 118 MMHG | HEART RATE: 82 BPM | RESPIRATION RATE: 16 BRPM | WEIGHT: 186 LBS | OXYGEN SATURATION: 98 % | DIASTOLIC BLOOD PRESSURE: 78 MMHG | HEIGHT: 69 IN

## 2021-10-21 DIAGNOSIS — N18.30 CONTROLLED TYPE 2 DIABETES MELLITUS WITH STAGE 3 CHRONIC KIDNEY DISEASE, WITHOUT LONG-TERM CURRENT USE OF INSULIN (HCC): Primary | ICD-10-CM

## 2021-10-21 DIAGNOSIS — E78.00 PURE HYPERCHOLESTEROLEMIA: ICD-10-CM

## 2021-10-21 DIAGNOSIS — N18.31 CHRONIC RENAL FAILURE, STAGE 3A (HCC): ICD-10-CM

## 2021-10-21 DIAGNOSIS — I10 BENIGN ESSENTIAL HTN: ICD-10-CM

## 2021-10-21 DIAGNOSIS — I42.0 DILATED CARDIOMYOPATHY (HCC): ICD-10-CM

## 2021-10-21 DIAGNOSIS — I50.22 CHRONIC SYSTOLIC CHF (CONGESTIVE HEART FAILURE) (HCC): ICD-10-CM

## 2021-10-21 DIAGNOSIS — E11.22 CONTROLLED TYPE 2 DIABETES MELLITUS WITH STAGE 3 CHRONIC KIDNEY DISEASE, WITHOUT LONG-TERM CURRENT USE OF INSULIN (HCC): Primary | ICD-10-CM

## 2021-10-21 LAB
ALBUMIN SERPL-MCNC: 4.2 G/DL (ref 3.4–5)
ALP BLD-CCNC: 209 U/L (ref 40–129)
ALT SERPL-CCNC: 22 U/L (ref 10–40)
ANION GAP SERPL CALCULATED.3IONS-SCNC: 15 MMOL/L (ref 3–16)
AST SERPL-CCNC: 30 U/L (ref 15–37)
BILIRUB SERPL-MCNC: 0.9 MG/DL (ref 0–1)
BILIRUBIN DIRECT: 0.3 MG/DL (ref 0–0.3)
BILIRUBIN, INDIRECT: 0.6 MG/DL (ref 0–1)
BUN BLDV-MCNC: 30 MG/DL (ref 7–20)
CALCIUM SERPL-MCNC: 10.1 MG/DL (ref 8.3–10.6)
CHLORIDE BLD-SCNC: 106 MMOL/L (ref 99–110)
CHOLESTEROL, TOTAL: 148 MG/DL (ref 0–199)
CO2: 22 MMOL/L (ref 21–32)
CREAT SERPL-MCNC: 2.5 MG/DL (ref 0.8–1.3)
CREATININE URINE: 78.3 MG/DL (ref 39–259)
GFR AFRICAN AMERICAN: 30
GFR NON-AFRICAN AMERICAN: 25
GLUCOSE BLD-MCNC: 76 MG/DL (ref 70–99)
HDLC SERPL-MCNC: 75 MG/DL (ref 40–60)
LDL CHOLESTEROL CALCULATED: 58 MG/DL
MICROALBUMIN UR-MCNC: 20.7 MG/DL
MICROALBUMIN/CREAT UR-RTO: 264.4 MG/G (ref 0–30)
POTASSIUM SERPL-SCNC: 4 MMOL/L (ref 3.5–5.1)
SODIUM BLD-SCNC: 143 MMOL/L (ref 136–145)
TOTAL PROTEIN: 7.2 G/DL (ref 6.4–8.2)
TRIGL SERPL-MCNC: 75 MG/DL (ref 0–150)
VLDLC SERPL CALC-MCNC: 15 MG/DL

## 2021-10-21 PROCEDURE — G8417 CALC BMI ABV UP PARAM F/U: HCPCS | Performed by: INTERNAL MEDICINE

## 2021-10-21 PROCEDURE — G8484 FLU IMMUNIZE NO ADMIN: HCPCS | Performed by: INTERNAL MEDICINE

## 2021-10-21 PROCEDURE — 4040F PNEUMOC VAC/ADMIN/RCVD: CPT | Performed by: INTERNAL MEDICINE

## 2021-10-21 PROCEDURE — 1036F TOBACCO NON-USER: CPT | Performed by: INTERNAL MEDICINE

## 2021-10-21 PROCEDURE — G0008 ADMIN INFLUENZA VIRUS VAC: HCPCS | Performed by: INTERNAL MEDICINE

## 2021-10-21 PROCEDURE — 1123F ACP DISCUSS/DSCN MKR DOCD: CPT | Performed by: INTERNAL MEDICINE

## 2021-10-21 PROCEDURE — 36415 COLL VENOUS BLD VENIPUNCTURE: CPT | Performed by: INTERNAL MEDICINE

## 2021-10-21 PROCEDURE — 99214 OFFICE O/P EST MOD 30 MIN: CPT | Performed by: INTERNAL MEDICINE

## 2021-10-21 PROCEDURE — 3288F FALL RISK ASSESSMENT DOCD: CPT | Performed by: INTERNAL MEDICINE

## 2021-10-21 PROCEDURE — 90694 VACC AIIV4 NO PRSRV 0.5ML IM: CPT | Performed by: INTERNAL MEDICINE

## 2021-10-21 PROCEDURE — G8427 DOCREV CUR MEDS BY ELIG CLIN: HCPCS | Performed by: INTERNAL MEDICINE

## 2021-10-21 RX ORDER — FUROSEMIDE 40 MG/1
40 TABLET ORAL 2 TIMES DAILY
Qty: 60 TABLET | Refills: 5 | Status: SHIPPED | OUTPATIENT
Start: 2021-10-21 | End: 2022-01-05 | Stop reason: SDUPTHER

## 2021-10-21 ASSESSMENT — ENCOUNTER SYMPTOMS
GASTROINTESTINAL NEGATIVE: 1
RESPIRATORY NEGATIVE: 1

## 2021-10-21 NOTE — ASSESSMENT & PLAN NOTE
Sugars are (HbA1c was 6.0% in May), diet is stable, weight is unchanged, no reported neuropathy, no change in vision, no claudication, no foot ulcers, no new skin lesions. No recent change in medications(Lantus 10 u at bed time). No c/o with meds. Eye exam (next week [facility]). Has Retinopathy mild.

## 2021-10-21 NOTE — PATIENT INSTRUCTIONS
A/P:     Chronic renal failure, stage 3 (moderate) ([facility] [address] Will do labs. 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 Will do labs and adjust meds after results are evaluated. To continue to improve diet and lose weight. To follow Diabetic diet. If needs further help w/ Diabetic diet to call and will refer back to dietician. Home sugar checks. To call if sudden change up or down in sugars. To do regular foot checks. Call if new problems. Benign Essential Htn. Continue present meds. Home BP checks. Call if>140/90. Improve diet. Avoid caffeine and salt. Call if new c/o w/ meds. Pure Hypercholesterolemia. Will do labs and adjust med if needed. Chronic Systolic Chf (Congestive Heart Failure) (Hcc). Wear 20-30 mm knee high compression stockings, Increase Furosamide 40 mg to twice daily, take Amlodipine at bed time, daily weights. Should lose at least 5 lbs in 10 days. rechx Renal blood test in 2 weeks. Dilated Cardiomyopathy (Hcc). As above.         Nabila Pacheco MD

## 2021-10-21 NOTE — PROGRESS NOTES
Vaccine Information Sheet, \"Influenza - Inactivated\"  given to Destini Weinstein, or parent/legal guardian of  Destini Weinstein and verbalized understanding. Patient responses:    Have you ever had a reaction to a flu vaccine? No  Do you have any current illness? No  Have you ever had Guillian Burdick Syndrome? No  Do you have a serious allergy to any of the follow: Neomycin, Polymyxin, Thimerosal, eggs or egg products? No    Flu vaccine given per order. Please see immunization tab. Risks and benefits explained. Current VIS given.

## 2021-10-21 NOTE — ASSESSMENT & PLAN NOTE
Stable BUN/Cr (1.6-2.0) since hospital 9/2019. No new c/o. Good DM and BP control. Recent episode of CHF(9/2019). No reoccurrence. Bilateral edema and 10 lb wt gain.

## 2021-10-21 NOTE — ASSESSMENT & PLAN NOTE
Stable BUN/Cr (1.6-2.0) since hospital 9/2019. No new c/o. Good DM and BP control. Past  episode of CHF(9/2019). No reoccurrence. 3+ edema bilaterally w/ wt up 10 lbs.

## 2021-10-21 NOTE — PROGRESS NOTES
Nathanael Emerson presents today for   Chief Complaint   Patient presents with    Diabetes     not fasting        Controlled type 2 diabetes mellitus with stage 3 chronic kidney disease, without long-term current use of insulin ([facility])  Sugars are (HbA1c was 6.0% in May), diet is stable, weight is unchanged, no reported neuropathy, no change in vision, no claudication, no foot ulcers, no new skin lesions. No recent change in medications(Lantus 10 u at bed time). No c/o with meds. Eye exam (next week [facility]). Has Retinopathy mild. Benign essential HTN  No change in meds, no c/o with meds, no chest pain, SOB, palpatations, or syncope. Home bp was 120-130s/70-80s. Pure hypercholesterolemia  Stable diet and wt. No new c/o w/ med. Chronic renal failure, stage 3 (moderate) ([facility])  Stable BUN/Cr (1.6-2.0) since hospital 9/2019. No new c/o. Good DM and BP control. Past  episode of CHF(9/2019). No reoccurrence. 3+ edema bilaterally w/ wt up 10 lbs. Dilated cardiomyopathy [facility])  Saw Cardiology 3/2021. Stopped Hydralizine and increased Metoprolol. BP good. No new c/o. Chronic systolic CHF (congestive heart failure) ([facility])  Stable BUN/Cr (1.6-2.0) since hospital 9/2019. No new c/o. Good DM and BP control. Recent episode of CHF(9/2019). No reoccurrence. Bilateral edema and 10 lb wt gain. Review of Systems   Constitutional: Positive for fatigue. Respiratory: Negative. Cardiovascular: Positive for leg swelling. Gastrointestinal: Negative. Endocrine: Negative. Genitourinary: Negative. Musculoskeletal: Positive for arthralgias, gait problem and myalgias. Skin: Negative. Neurological: Positive for weakness. Hematological: Negative. Psychiatric/Behavioral: Negative. Vitals:    10/21/21 1043   BP: 118/78   Pulse: 82   Resp: 16   SpO2: 98%   Weight: 186 lb (84.4 kg)   Height: 5' 9\" (1.753 m)        Physical Exam  Constitutional:       General: He is not in acute distress. Systolic Chf (Congestive Heart Failure) (Hcc). Wear 20-30 mm knee high compression stockings, Increase Furosamide 40 mg to twice daily, take Amlodipine at bed time, daily weights. Should lose at least 5 lbs in 10 days. rechx Renal blood test in 2 weeks. Dilated Cardiomyopathy (Hcc). As above.         Alec Carter MD

## 2021-10-22 LAB
ESTIMATED AVERAGE GLUCOSE: 125.5 MG/DL
HBA1C MFR BLD: 6 %

## 2021-11-01 LAB
ALBUMIN SERPL-MCNC: 4.4 G/DL (ref 3.4–5)
ANION GAP SERPL CALCULATED.3IONS-SCNC: 13 MMOL/L (ref 3–16)
BUN BLDV-MCNC: 29 MG/DL (ref 7–20)
CALCIUM SERPL-MCNC: 10.3 MG/DL (ref 8.3–10.6)
CHLORIDE BLD-SCNC: 100 MMOL/L (ref 99–110)
CO2: 26 MMOL/L (ref 21–32)
CREAT SERPL-MCNC: 2.1 MG/DL (ref 0.8–1.3)
CREATININE URINE: 100.6 MG/DL (ref 39–259)
GFR AFRICAN AMERICAN: 37
GFR NON-AFRICAN AMERICAN: 30
GLUCOSE BLD-MCNC: 84 MG/DL (ref 70–99)
PHOSPHORUS: 3.5 MG/DL (ref 2.5–4.9)
POTASSIUM SERPL-SCNC: 3.7 MMOL/L (ref 3.5–5.1)
PROTEIN PROTEIN: 28 MG/DL
PROTEIN/CREAT RATIO: 0.3 MG/DL
SODIUM BLD-SCNC: 139 MMOL/L (ref 136–145)

## 2021-11-19 ENCOUNTER — OFFICE VISIT (OUTPATIENT)
Dept: CARDIOLOGY CLINIC | Age: 81
End: 2021-11-19
Payer: MEDICARE

## 2021-11-19 VITALS
HEART RATE: 76 BPM | SYSTOLIC BLOOD PRESSURE: 118 MMHG | BODY MASS INDEX: 26.64 KG/M2 | WEIGHT: 180.4 LBS | DIASTOLIC BLOOD PRESSURE: 70 MMHG

## 2021-11-19 DIAGNOSIS — I50.22 CHRONIC SYSTOLIC CHF (CONGESTIVE HEART FAILURE) (HCC): Primary | ICD-10-CM

## 2021-11-19 PROCEDURE — 1123F ACP DISCUSS/DSCN MKR DOCD: CPT | Performed by: INTERNAL MEDICINE

## 2021-11-19 PROCEDURE — G8427 DOCREV CUR MEDS BY ELIG CLIN: HCPCS | Performed by: INTERNAL MEDICINE

## 2021-11-19 PROCEDURE — 4040F PNEUMOC VAC/ADMIN/RCVD: CPT | Performed by: INTERNAL MEDICINE

## 2021-11-19 PROCEDURE — G8417 CALC BMI ABV UP PARAM F/U: HCPCS | Performed by: INTERNAL MEDICINE

## 2021-11-19 PROCEDURE — G8484 FLU IMMUNIZE NO ADMIN: HCPCS | Performed by: INTERNAL MEDICINE

## 2021-11-19 PROCEDURE — 99214 OFFICE O/P EST MOD 30 MIN: CPT | Performed by: INTERNAL MEDICINE

## 2021-11-19 PROCEDURE — 1036F TOBACCO NON-USER: CPT | Performed by: INTERNAL MEDICINE

## 2021-11-19 RX ORDER — GLIPIZIDE 5 MG/1
TABLET ORAL
Qty: 60 TABLET | Refills: 0 | Status: SHIPPED | OUTPATIENT
Start: 2021-11-19 | End: 2021-11-29

## 2021-11-19 NOTE — TELEPHONE ENCOUNTER
Lanx is requesting refill(s)   Last OV 10/21/2021 (pertaining to medication)  LR 11/16/2020 (per medication requested)  Next office visit scheduled or attempted none

## 2021-11-20 NOTE — PROGRESS NOTES
Cc: Cardiac amyloidosis, recovered HFrEF, CAD, NSVT    HPI:     Mr Sheron Miller is a 79 yo man, patient of Dr Ricardo Murray h/o severe LVH, HFrEF, HTN, HLP, DM 2, CKD, mild CAD, NSTEMI, trifascicular block with RBBB, NSVT.     Echo 06/2019: severe LVH, EF 35-40%, mild RV dysfx, mild valve disease     Echo 2015: moderate LVH, normal EF.      Kathi 07/2019: mod LVd, EF 39%, mid to distal inferior and apical fixed defect.      LHC 07/2019: mild diffuse CAD, nl LVEDP.      ECG 9/6/19: NSR, 1st AVB, LAFB, RBBB (trifascicular block), possible inferior MI.      ECG 9/5/19: Sinus lenore 54 (thought to be junctional).       9/6/20 SPEP, UPEP and sFLC within normal limits for CKD (except for proteinuria).      Echo 9/20/19: severe LVH, LVEF 17-17%, diastolic III, apical sparing on speckle strain imaging suggestive of amyloidosis, severe LAE, mild MR.       Tc PYP scan 3/12/20: strongly positive for amyloidosis.      09/2019 kappa/labda ratio 1.69 (normal in the setting of CKD, Cr 1.9). 24-hour UPEP normal; SPEP 05/2021 normal; hence no AL amyloidosis.      Patient is here for follow-up. He reports no complaints. He avoids salt. Histories     Past Medical History:   has a past medical history of Anemia, Arthritis, CHF (congestive heart failure) ([address] Hyperlipidemia, Hypertension, and Type II or unspecified type diabetes mellitus without mention of complication, not stated as uncontrolled. Surgical History:   has a past surgical history that includes hernia repair; hip surgery; and other surgical history (N/A, 12/15/2015). Social History:   reports that he has never smoked. He has never used smokeless tobacco. He reports that he does not drink alcohol and does not use drugs. Family History:  No evidence for sudden cardiac death or premature CAD      Medications:     Home medications were reviewed and are listed below    Prior to Admission medications    Medication Sig Start Date End Date Taking?  Authorizing Provider glipiZIDE (GLUCOTROL) 5 MG tablet TAKE 1 TABLET BY MOUTH TWICE DAILY BEFORE MEAL(S) 11/19/21  Yes SELAM Pena MD   furosemide (LASIX) 40 MG tablet Take 1 tablet by mouth 2 times daily 10/21/21  Yes Mary Jane Weinberg MD   atorvastatin (LIPITOR) 40 MG tablet Take 1 tablet by mouth once daily 10/1/21  Yes Mary Jane Weinberg MD   metoprolol succinate (TOPROL XL) 25 MG extended release tablet Take 1 tablet by mouth once daily 8/24/21  Yes Mera Rodriguez MD   LANTUS SOLOSTAR 100 UNIT/ML injection pen INJECT 15 UNITS SUBCUTANEOUSLY NIGHTLY 8/18/21  Yes Mary Jane Weinberg MD   amLODIPine (NORVASC) 10 MG tablet Take 1 tablet by mouth once daily 8/2/21  Yes Mary Jane Weinberg MD   potassium chloride (KLOR-CON) 10 MEQ extended release tablet Take 1 tablet by mouth once daily 7/26/21  Yes Mary Jane Weinberg MD   isosorbide mononitrate (IMDUR) 30 MG extended release tablet Take 1 tablet by mouth once daily 6/28/21  Yes Mary Jane Weinberg MD   JANUVIA 50 MG tablet Take 1 tablet by mouth once daily 6/28/21  Yes Mary Jane Weinberg MD   MM PEN NEEDLES 31G X 5 MM MISC USE   SUBCUTANEOUSLY ONCE DAILY 5/4/21  Yes Mary Jane Weinberg MD   aspirin 81 MG EC tablet Take 1 tablet by mouth daily 7/1/19  Yes Loulou Agustin MD          Allergy:     Patient has no known allergies. Review of Systems:     All 12 point review of symptoms completed. Pertinent positives identified in the HPI, all other review of symptoms negative as below. CONSTITUTIONAL: No fatigue  SKIN: No rash or pruritis. EYES: No visual changes or diplopia. No scleral icterus. ENT: No Headaches, hearing loss or vertigo. No mouth sores or sore throat. CARDIOVASCULAR: No chest pain/chest pressure/chest discomfort. No palpitations. No edema. RESPIRATORY: No dyspnea. No cough or wheezing, no sputum production. GASTROINTESTINAL: No N/V/D. No abdominal pain, appetite loss, blood in stools. GENITOURINARY: No dysuria, trouble voiding, or hematuria.   MUSCULOSKELETAL:  No gait disturbance, weakness or joint complaints. NEUROLOGICAL: No headache, diplopia, change in muscle strength, numbness or tingling. No change in gait, balance, coordination, mood, affect, memory, mentation, behavior. PSHYCH: No anxiety, loss of interest, change in sexual behavior, feelings of self-harm, or confusion. ENDOCRINE: No excessive thirst, fluid intake, or urination. No tremor. HEMATOLOGIC: No abnormal bruising or bleeding. ALLERGY: No nasal congestion or hives.       Physical Examination:     Vitals:    11/19/21 1414   BP: 118/70   Pulse: 76   Weight: 180 lb 6.4 oz (81.8 kg)       Wt Readings from Last 3 Encounters:   11/19/21 180 lb 6.4 oz (81.8 kg)   10/21/21 186 lb (84.4 kg)   05/21/21 176 lb 12.8 oz (80.2 kg)         General Appearance:  Alert, cooperative, no distress, appears stated age Appropriate weight   Head:  Normocephalic, without obvious abnormality, atraumatic   Eyes:  PERRL, conjunctiva/corneas clear EOM intact  Ears normal   Throat no lesions       Nose: Nares normal, no drainage or sinus tenderness   Throat: Lips, mucosa, and tongue normal   Neck: Supple, symmetrical, trachea midline, no adenopathy, thyroid: not enlarged, symmetric, no tenderness/mass/nodules, no carotid bruit       Lungs:   Clear to auscultation bilaterally, respirations unlabored   Chest Wall:  No tenderness or deformity   Heart:  Regular rhythm, rate is controlled, S1, S2 normal, there is no murmur, there is no rub or gallop, cannot assess jvd, 1+ bilateral lower extremity edema   Abdomen:   Soft, non-tender, bowel sounds active all four quadrants,  no masses, no organomegaly       Extremities: Extremities normal, atraumatic, no cyanosis   Pulses: 2+ and symmetric   Skin: Skin color, texture, turgor normal, no rashes or lesions   Pysch: Normal mood and affect   Neurologic: Normal gross motor and sensory exam.  Cranial nerves intact        Labs:     Lab Results   Component Value Date    WBC 5.5 04/03/2021    HGB 12.5 (L) 04/03/2021    HCT 37.9 (L) 04/03/2021    MCV 76.5 (L) 04/03/2021     04/03/2021     Lab Results   Component Value Date     11/01/2021    K 3.7 11/01/2021     11/01/2021    CO2 26 11/01/2021    BUN 29 (H) 11/01/2021    CREATININE 2.1 (H) 11/01/2021    GLUCOSE 84 11/01/2021    CALCIUM 10.3 11/01/2021    PROT 7.2 10/21/2021    LABALBU 4.4 11/01/2021    BILITOT 0.9 10/21/2021    ALKPHOS 209 (H) 10/21/2021    AST 30 10/21/2021    ALT 22 10/21/2021    LABGLOM 30 (A) 11/01/2021    GFRAA 37 (A) 11/01/2021    AGRATIO 1.4 07/17/2019    GLOB 3.2 07/17/2019         Lab Results   Component Value Date    CHOL 148 10/21/2021    CHOL 151 10/27/2020    CHOL 146 06/29/2019     Lab Results   Component Value Date    TRIG 75 10/21/2021    TRIG 81 10/27/2020    TRIG 83 06/29/2019     Lab Results   Component Value Date    HDL 75 (H) 10/21/2021    HDL 72 (H) 10/27/2020    HDL 65 (H) 06/29/2019     Lab Results   Component Value Date    LDLCALC 58 10/21/2021    LDLCALC 63 10/27/2020    LDLCALC 64 06/29/2019     Lab Results   Component Value Date    LABVLDL 15 10/21/2021    LABVLDL 16 10/27/2020    LABVLDL 17 06/29/2019     No results found for: [facility]    Lab Results   Component Value Date    INR 1.13 11/08/2019    INR 1.13 09/05/2019    INR 1.04 07/17/2019    PROTIME 12.9 11/08/2019    PROTIME 12.9 09/05/2019    PROTIME 11.8 07/17/2019       The ASCVD Risk score (Alva Paulino, et al., 2013) failed to calculate for the following reasons: The 2013 ASCVD risk score is only valid for ages 36 to 78      Assessment / Plan:      Diagnosis Orders   1. Chronic systolic CHF (congestive heart failure) (HCC)          1. Chronic HFrEF:   LVEF has recovered. It is most likely due to prior history of severe uncontrolled hypertension and contribution by amyloidosis.   Patient is now euvolemic and hemo stable. His NYHA FC is II, stage C. Echo is suggestive of cardiac amyloidosis.      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 -Will get in touch with patient and ask him if he would be interested in starting Vyndamax.      2. Mild CAD:   Has h/o NSTEMI but cath with only mild CAD.    -Agree with asa, bb, statin.     3. HTN:    BP is now controlled with current medications.      Cw Toprol 25 mg p.o. daily  Cw amlodipine 10 mg p.o. daily  C/w lasix 40 bid and KDur 10 meq daily.      4. H/o NSVT:   No events.      -Cw Toprol to 25 daily.        5. HLP:   On statin.      6. ATTR amyloidosis:   Per #1           Return in about 6 months (around 5/19/2022). I have spent 35 minutes of face to face time with the patient with more than 50% spent counseling and coordinating care. I have personally reviewed the reports and images of labs, radiological studies, cardiac studies including ECG's and telemetry, current and old medical records. The note was completed using EMR and Dragon dictation system. Every effort was made to ensure accuracy; however, inadvertent computerized transcription errors may be present. All questions and concerns were addressed to the patient/family. Alternatives to my treatment were discussed. I would like to thank you for providing me the opportunity to participate in the care of your patient. If you have any questions, please do not hesitate to contact me.      Lori Bull MD, [facility] [place] [address] Phone: [phone]  Heart Failure Hotline: [phone]  Fax: [phone]

## 2021-11-22 ENCOUNTER — TELEPHONE (OUTPATIENT)
Dept: CARDIOLOGY CLINIC | Age: 81
End: 2021-11-22

## 2021-11-22 NOTE — TELEPHONE ENCOUNTER
Called patient to possibly start Vyndamax. Patient does not want to start a new medication. Will follow up in May.

## 2021-11-29 RX ORDER — GLIPIZIDE 5 MG/1
TABLET ORAL
Qty: 60 TABLET | Refills: 0 | Status: SHIPPED | OUTPATIENT
Start: 2021-11-29 | End: 2022-01-04 | Stop reason: ALTCHOICE

## 2021-11-29 NOTE — TELEPHONE ENCOUNTER
Aleksander Stewart is requesting refill(s)   Last OV 10/21/2021 (pertaining to medication)  LR 11/19/2021 (per medication requested)  Next office visit scheduled or attempted none

## 2021-12-01 NOTE — TELEPHONE ENCOUNTER
Hansel Angeles is requesting refill(s)   Last OV 10/21/2021 (pertaining to medication)  LR 08/18/2021 (per medication requested)  Next office visit scheduled or attempted none

## 2021-12-02 RX ORDER — INSULIN GLARGINE 100 [IU]/ML
INJECTION, SOLUTION SUBCUTANEOUS
Qty: 15 ML | Refills: 0 | Status: SHIPPED | OUTPATIENT
Start: 2021-12-02 | End: 2022-03-23

## 2021-12-02 NOTE — TELEPHONE ENCOUNTER
Yohana called from [facility] regarding the Lantus Solostar medication. She received everything, but that prescription.     Calvin Jacobs [phone] (home)    is requesting refill(s) of medication Lantus Solorstar  to preferred pharmacy [facility], [address] [place], [address] 10-21-21 (pertaining to medication)   Last refill 08-18-21 (per medication requested)  Next office visit scheduled or attempted Yes  Date 01-04-22 with new provider  If No, reason made

## 2021-12-27 RX ORDER — ISOSORBIDE MONONITRATE 30 MG/1
TABLET, EXTENDED RELEASE ORAL
Qty: 30 TABLET | Refills: 0 | Status: SHIPPED | OUTPATIENT
Start: 2021-12-27 | End: 2022-01-28

## 2021-12-27 RX ORDER — ATORVASTATIN CALCIUM 40 MG/1
TABLET, FILM COATED ORAL
Qty: 90 TABLET | Refills: 0 | Status: SHIPPED | OUTPATIENT
Start: 2021-12-27 | End: 2022-01-03

## 2021-12-27 NOTE — TELEPHONE ENCOUNTER
Donn Aguilera is requesting refill(s)   Last OV 10/21/2021 (pertaining to medication)  LR 06/28/2021 and 10/01/2021 (per medication requested)

## 2022-01-03 RX ORDER — SITAGLIPTIN 50 MG/1
TABLET, FILM COATED ORAL
Qty: 30 TABLET | Refills: 0 | Status: SHIPPED | OUTPATIENT
Start: 2022-01-03 | End: 2022-02-08

## 2022-01-03 RX ORDER — ATORVASTATIN CALCIUM 40 MG/1
TABLET, FILM COATED ORAL
Qty: 90 TABLET | Refills: 0 | Status: SHIPPED | OUTPATIENT
Start: 2022-01-03 | End: 2022-06-29

## 2022-01-03 NOTE — TELEPHONE ENCOUNTER
[place] [facility] [place] [phone] ([place]    is requesting refill(s) of medication Januvia to preferred pharmacy [facility] 10/21/21 (pertaining to medication)   Last refill 6/28/21 (per medication requested)  Next office visit scheduled or attempted Yes  Date 1/4/21 Dr. Trevin Raygoza   If No, reason

## 2022-01-04 ENCOUNTER — APPOINTMENT (OUTPATIENT)
Dept: GENERAL RADIOLOGY | Age: 82
End: 2022-01-04
Payer: MEDICARE

## 2022-01-04 ENCOUNTER — OFFICE VISIT (OUTPATIENT)
Dept: FAMILY MEDICINE CLINIC | Age: 82
End: 2022-01-04
Payer: MEDICARE

## 2022-01-04 ENCOUNTER — HOSPITAL ENCOUNTER (EMERGENCY)
Age: 82
Discharge: HOME | End: 2022-01-04
Attending: EMERGENCY MEDICINE
Payer: MEDICARE

## 2022-01-04 VITALS
WEIGHT: 197 LBS | BODY MASS INDEX: 29.18 KG/M2 | SYSTOLIC BLOOD PRESSURE: 135 MMHG | DIASTOLIC BLOOD PRESSURE: 77 MMHG | HEIGHT: 69 IN | OXYGEN SATURATION: 97 % | HEART RATE: 87 BPM

## 2022-01-04 VITALS
SYSTOLIC BLOOD PRESSURE: 139 MMHG | HEART RATE: 78 BPM | BODY MASS INDEX: 29.33 KG/M2 | DIASTOLIC BLOOD PRESSURE: 79 MMHG | RESPIRATION RATE: 21 BRPM | HEIGHT: 69 IN | OXYGEN SATURATION: 96 % | TEMPERATURE: 98 F | WEIGHT: 198 LBS

## 2022-01-04 DIAGNOSIS — E11.22 CONTROLLED TYPE 2 DIABETES MELLITUS WITH STAGE 3 CHRONIC KIDNEY DISEASE, WITHOUT LONG-TERM CURRENT USE OF INSULIN (HCC): ICD-10-CM

## 2022-01-04 DIAGNOSIS — E85.82 WILD-TYPE TRANSTHYRETIN-RELATED (ATTR) AMYLOIDOSIS (HCC): ICD-10-CM

## 2022-01-04 DIAGNOSIS — I50.20 HFREF (HEART FAILURE WITH REDUCED EJECTION FRACTION) (HCC): Primary | ICD-10-CM

## 2022-01-04 DIAGNOSIS — I21.4 NSTEMI (NON-ST ELEVATED MYOCARDIAL INFARCTION) (HCC): ICD-10-CM

## 2022-01-04 DIAGNOSIS — N18.30 CONTROLLED TYPE 2 DIABETES MELLITUS WITH STAGE 3 CHRONIC KIDNEY DISEASE, WITHOUT LONG-TERM CURRENT USE OF INSULIN (HCC): ICD-10-CM

## 2022-01-04 DIAGNOSIS — I10 BENIGN ESSENTIAL HTN: ICD-10-CM

## 2022-01-04 DIAGNOSIS — J90 PLEURAL EFFUSION, BILATERAL: ICD-10-CM

## 2022-01-04 DIAGNOSIS — R79.89 ELEVATED BRAIN NATRIURETIC PEPTIDE (BNP) LEVEL: ICD-10-CM

## 2022-01-04 DIAGNOSIS — M79.89 SWELLING OF LOWER EXTREMITY: ICD-10-CM

## 2022-01-04 DIAGNOSIS — I50.9 ACUTE ON CHRONIC CONGESTIVE HEART FAILURE, UNSPECIFIED HEART FAILURE TYPE (HCC): Primary | ICD-10-CM

## 2022-01-04 DIAGNOSIS — N18.32 STAGE 3B CHRONIC KIDNEY DISEASE (HCC): ICD-10-CM

## 2022-01-04 LAB
A/G RATIO: 1.1 (ref 1.1–2.2)
ALBUMIN SERPL-MCNC: 4 G/DL (ref 3.4–5)
ALP BLD-CCNC: 249 U/L (ref 40–129)
ALT SERPL-CCNC: 23 U/L (ref 10–40)
ANION GAP SERPL CALCULATED.3IONS-SCNC: 18 MMOL/L (ref 3–16)
AST SERPL-CCNC: 33 U/L (ref 15–37)
BASOPHILS ABSOLUTE: 0 K/UL (ref 0–0.2)
BASOPHILS RELATIVE PERCENT: 0.4 %
BILIRUB SERPL-MCNC: 1.1 MG/DL (ref 0–1)
BILIRUBIN URINE: NEGATIVE
BLOOD, URINE: ABNORMAL
BUN BLDV-MCNC: 28 MG/DL (ref 7–20)
CALCIUM SERPL-MCNC: 10.3 MG/DL (ref 8.3–10.6)
CHLORIDE BLD-SCNC: 101 MMOL/L (ref 99–110)
CLARITY: CLEAR
CO2: 17 MMOL/L (ref 21–32)
COLOR: YELLOW
COMMENT UA: NORMAL
CREAT SERPL-MCNC: 2.2 MG/DL (ref 0.8–1.3)
EOSINOPHILS ABSOLUTE: 0 K/UL (ref 0–0.6)
EOSINOPHILS RELATIVE PERCENT: 0.4 %
EPITHELIAL CELLS, UA: 1 /HPF (ref 0–5)
GFR AFRICAN AMERICAN: 35
GFR NON-AFRICAN AMERICAN: 29
GLUCOSE BLD-MCNC: 130 MG/DL (ref 70–99)
GLUCOSE URINE: NEGATIVE MG/DL
HCT VFR BLD CALC: 38.4 % (ref 40.5–52.5)
HEMOGLOBIN: 12.5 G/DL (ref 13.5–17.5)
HYALINE CASTS: 2 /LPF (ref 0–8)
KETONES, URINE: NEGATIVE MG/DL
LEUKOCYTE ESTERASE, URINE: NEGATIVE
LYMPHOCYTES ABSOLUTE: 1 K/UL (ref 1–5.1)
LYMPHOCYTES RELATIVE PERCENT: 13.9 %
MCH RBC QN AUTO: 24.8 PG (ref 26–34)
MCHC RBC AUTO-ENTMCNC: 32.5 G/DL (ref 31–36)
MCV RBC AUTO: 76.3 FL (ref 80–100)
MICROSCOPIC EXAMINATION: YES
MONOCYTES ABSOLUTE: 0.9 K/UL (ref 0–1.3)
MONOCYTES RELATIVE PERCENT: 12.7 %
NEUTROPHILS ABSOLUTE: 5.4 K/UL (ref 1.7–7.7)
NEUTROPHILS RELATIVE PERCENT: 72.6 %
NITRITE, URINE: NEGATIVE
PDW BLD-RTO: 19.2 % (ref 12.4–15.4)
PH UA: 5 (ref 5–8)
PLATELET # BLD: 344 K/UL (ref 135–450)
PMV BLD AUTO: 7.5 FL (ref 5–10.5)
POTASSIUM REFLEX MAGNESIUM: 4.1 MMOL/L (ref 3.5–5.1)
PRO-BNP: ABNORMAL PG/ML (ref 0–449)
PROTEIN UA: 100 MG/DL
RBC # BLD: 5.03 M/UL (ref 4.2–5.9)
RBC UA: 0 /HPF (ref 0–4)
SODIUM BLD-SCNC: 136 MMOL/L (ref 136–145)
SPECIFIC GRAVITY UA: 1.01 (ref 1–1.03)
TOTAL PROTEIN: 7.6 G/DL (ref 6.4–8.2)
TROPONIN: 0.06 NG/ML
URINE REFLEX TO CULTURE: ABNORMAL
URINE TYPE: ABNORMAL
UROBILINOGEN, URINE: 1 E.U./DL
WBC # BLD: 7.4 K/UL (ref 4–11)
WBC UA: 3 /HPF (ref 0–5)

## 2022-01-04 PROCEDURE — G8417 CALC BMI ABV UP PARAM F/U: HCPCS | Performed by: STUDENT IN AN ORGANIZED HEALTH CARE EDUCATION/TRAINING PROGRAM

## 2022-01-04 PROCEDURE — 71045 X-RAY EXAM CHEST 1 VIEW: CPT

## 2022-01-04 PROCEDURE — 93005 ELECTROCARDIOGRAM TRACING: CPT | Performed by: EMERGENCY MEDICINE

## 2022-01-04 PROCEDURE — 84484 ASSAY OF TROPONIN QUANT: CPT

## 2022-01-04 PROCEDURE — G8427 DOCREV CUR MEDS BY ELIG CLIN: HCPCS | Performed by: STUDENT IN AN ORGANIZED HEALTH CARE EDUCATION/TRAINING PROGRAM

## 2022-01-04 PROCEDURE — G8484 FLU IMMUNIZE NO ADMIN: HCPCS | Performed by: STUDENT IN AN ORGANIZED HEALTH CARE EDUCATION/TRAINING PROGRAM

## 2022-01-04 PROCEDURE — 85025 COMPLETE CBC W/AUTO DIFF WBC: CPT

## 2022-01-04 PROCEDURE — 6360000002 HC RX W HCPCS: Performed by: NURSE PRACTITIONER

## 2022-01-04 PROCEDURE — 1036F TOBACCO NON-USER: CPT | Performed by: STUDENT IN AN ORGANIZED HEALTH CARE EDUCATION/TRAINING PROGRAM

## 2022-01-04 PROCEDURE — 83880 ASSAY OF NATRIURETIC PEPTIDE: CPT

## 2022-01-04 PROCEDURE — 99214 OFFICE O/P EST MOD 30 MIN: CPT | Performed by: STUDENT IN AN ORGANIZED HEALTH CARE EDUCATION/TRAINING PROGRAM

## 2022-01-04 PROCEDURE — 80053 COMPREHEN METABOLIC PANEL: CPT

## 2022-01-04 PROCEDURE — 1123F ACP DISCUSS/DSCN MKR DOCD: CPT | Performed by: STUDENT IN AN ORGANIZED HEALTH CARE EDUCATION/TRAINING PROGRAM

## 2022-01-04 PROCEDURE — 81001 URINALYSIS AUTO W/SCOPE: CPT

## 2022-01-04 PROCEDURE — 4040F PNEUMOC VAC/ADMIN/RCVD: CPT | Performed by: STUDENT IN AN ORGANIZED HEALTH CARE EDUCATION/TRAINING PROGRAM

## 2022-01-04 PROCEDURE — 99284 EMERGENCY DEPT VISIT MOD MDM: CPT

## 2022-01-04 PROCEDURE — 96374 THER/PROPH/DIAG INJ IV PUSH: CPT

## 2022-01-04 RX ORDER — FUROSEMIDE 10 MG/ML
40 INJECTION INTRAMUSCULAR; INTRAVENOUS ONCE
Status: COMPLETED | OUTPATIENT
Start: 2022-01-04 | End: 2022-01-04

## 2022-01-04 RX ORDER — LATANOPROST 50 UG/ML
SOLUTION/ DROPS OPHTHALMIC
COMMUNITY
Start: 2021-12-13

## 2022-01-04 RX ADMIN — FUROSEMIDE 40 MG: 10 INJECTION, SOLUTION INTRAMUSCULAR; INTRAVENOUS at 20:38

## 2022-01-04 SDOH — ECONOMIC STABILITY: FOOD INSECURITY: WITHIN THE PAST 12 MONTHS, THE FOOD YOU BOUGHT JUST DIDN'T LAST AND YOU DIDN'T HAVE MONEY TO GET MORE.: NEVER TRUE

## 2022-01-04 SDOH — ECONOMIC STABILITY: FOOD INSECURITY: WITHIN THE PAST 12 MONTHS, YOU WORRIED THAT YOUR FOOD WOULD RUN OUT BEFORE YOU GOT MONEY TO BUY MORE.: NEVER TRUE

## 2022-01-04 ASSESSMENT — SOCIAL DETERMINANTS OF HEALTH (SDOH): HOW HARD IS IT FOR YOU TO PAY FOR THE VERY BASICS LIKE FOOD, HOUSING, MEDICAL CARE, AND HEATING?: NOT HARD AT ALL

## 2022-01-04 NOTE — ED NOTES
Bed: 05  Expected date:   Expected time:   Means of arrival:   Comments:  [facility] Saurabh Jacobs RN  01/04/22 [id number]

## 2022-01-04 NOTE — ASSESSMENT & PLAN NOTE
Concern for heart failure exacerbation, referred patient to ED, called gonzalez Wright ahead of time  Per chart should be on lasix 40 BID, only on 40 once daily

## 2022-01-04 NOTE — ED PROVIDER NOTES
I independently performed a history and physical on [facility]. All diagnostic, treatment, and disposition decisions were made by myself in conjunction with the advanced practice provider. Briefly, this is a 78 y.o. male here for shortness of breath which has been ongoing for the past 2 weeks. Acutely worsened with exertion, relieved at rest.  Severity mild. Patient with history of CHF, was seen by his PCP earlier today, PCP was concerned about a 16 to 18 pound weight gain and extremity edema and thus sent the patient to the emergency department for further evaluation. Patient reports has been prescribed Lasix 40 mg twice daily, however he has been taking this only once daily. On exam, patient afebrile and nontoxic. No distress. Heart RRR. Lungs diminished at bases, no wheezes. Abdomen soft, nondistended, nontender to palpation in all quadrants. 3+ pitting edema symmetric LE's, calves nontender. EKG  EKG was reviewed by emergency department physician in the absence of a cardiologist    Wide complex sinus rhythm, rate 84, left axis deviation, prolonged VA and wide QRS intervals, normal Qtc, no ST elevations, 1mm ST depression V2, TWI V2-V4, impression sinus rhythm with first-degree AV block, nonspecific ST-T morphology and RBBB, no STEMI, VA lengthened otherwise no significant change from comparison 3/11/2020      Screenings            MDM    Patient afebrile and nontoxic. No distress. No hypoxia increased work of breathing. EKG is no STEMI, troponin is minimally elevated however flat at patient's baseline, no chest pain, very low suspicion for ACS. No malignant dysrhythmia. CXR with pleural effusions, atelectasis versus infiltrate. Patient has no fevers, no leukocytosis, no purulent cough and bacterial pneumonia felt clinically unlikely. Renal function at baseline.   BNP is significantly elevated, patient also with weight gain and pitting edema of lower extremities, presentation consistent with exacerbation of underlying congestive heart failure. Patient has not been taking his full dose of Lasix inadvertently, was given intravenous Lasix here to good effect. Patient was ambulatory in the emergency department without distress or development of hypoxia. Findings and plan were discussed with patient, he wishes to return to home and given that he is well-appearing and not hypoxic I also feel this is reasonable. Will set up for early follow-up tomorrow in the CHF clinic and patient was informed of his 1130 appointment. Discussed taking his Lasix twice a day until that time. Patient is agreeable with plan and feels comfortable returning to home. Strict return precautions were discussed. Patient Referrals:  Darion Boyce MD  [address] [facility]  [phone]    Call in 2 days  If symptoms worsen, As needed    [facility] Emergency Department  [address]  Go to   As needed    Richard Garvey MD  [address] [place] [address]  [phone]    Go in 1 day  at 11:30 as scheduled      Discharge Medications:  Discharge Medication List as of 1/4/2022  9:34 PM          FINAL IMPRESSION  1. Acute on chronic congestive heart failure, unspecified heart failure type ([facility] [address]    2. Pleural effusion, bilateral    3. Elevated brain natriuretic peptide (BNP) level        Blood pressure 139/79, pulse 78, temperature 98 °F (36.7 °C), temperature source Oral, resp. rate 21, height 5' 9\" (1.753 m), weight 198 lb (89.8 kg), SpO2 96 %. For further details of [facility] emergency department encounter, please see documentation by advanced practice provider, Greg Marsh NP.     Chris Villegas DO (electronically signed)  Attending Emergency Physician       Chris Villegas DO  01/05/22 2238

## 2022-01-04 NOTE — PROGRESS NOTES
[address] Note    Date: 1/4/2022      Assessment/Plan:     Concern for heart failure exacerbation today with weight gain, lower extremity swelling, and dyspnea on exertion. He has been taking Lasix 40 once daily and per chart review should be on twice daily. His cardiologist is out for the week. Will have patient go to the ER, called [facility] ER to let them know. 1. HFrEF (heart failure with reduced ejection fraction) ([facility] [address]  Assessment & Plan:  Concern for heart failure exacerbation, referred patient to ED, called gonzalez Wright ahead of time  Per chart should be on lasix 40 BID, only on 40 once daily  2. Swelling of lower extremity  3. Benign essential HTN  Assessment & Plan:  Controlled  On amlodipine which could contribute to lower extremity swelling  Continue current meds    4. NSTEMI (non-ST elevated myocardial infarction) [facility])  Assessment & Plan:   Follows w/ Dr. Malena Ballard cardiology, on asa/statin/bblocker  5. Wild-type transthyretin-related (ATTR) amyloidosis [facility])  Assessment & Plan:   Continue following with cardiology, Dr. Aureliano Strickland  6. Controlled type 2 diabetes mellitus with stage 3 chronic kidney disease, without long-term current use of insulin ([facility])  Assessment & Plan:  Very well controlled, A1c of 6  Stop glipizide, risk of hypoglycemia  Continue Lantus and Januvia  7. Stage 3b chronic kidney disease ([facility] [address]  Assessment & Plan:  Follows w/ mayelin john nephrology    No orders of the defined types were placed in this encounter. No orders of the defined types were placed in this encounter. Return for After ER visit. Discussed medication(s) risks, benefits, side effects, adverse reactions and interactions with patient. Patient voiced understanding.                                                Subjective/Objective:     Chief Complaint   Patient presents with    New Patient       HPI  Follows with cardiologist, last seen 11/19/2021, Dr. Malena Ballard, LVEF has recovered. Echo suggestive of cardiac amyloidosis. Taking lasix 40 once daily, per cardiologist note to take it twice dialy  Having shortness of breath with walking short distance, having a lot of lower extremity swelling, had a 17 lb weight gain since 11/19/21. On asa/statin/bblocker  On amlodipine    DM2 A1c of 6.0 on 10/21/21, taking glipizide twice daily, lantus 15 units nightly, januvia 50 mg daily    Hx of CKD, follows w/ nephro, mayelin john npehro, appointment 11/3/21    New PT-  Reviewed pmhx, medications, allergies, surgeries, family hx, social hx with patient and chart record    Discussed smoking, alcohol, drugs hx; see chart   /kids-  to wife, kids passed away  Occupation- [employer] retired, in [address] [place] [address] from Last 3 Encounters:   01/04/22 198 lb (89.8 kg)   01/04/22 197 lb (89.4 kg)   11/19/21 180 lb 6.4 oz (81.8 kg)     Body mass index is 29.09 kg/m². BP Readings from Last 3 Encounters:   01/04/22 136/77   01/04/22 135/77   11/19/21 118/70     The ASCVD Risk score (Remington De La Torre, et al., 2013) failed to calculate for the following reasons:     The 2013 ASCVD risk score is only valid for ages 36 to 78    The patient has a prior MI or stroke diagnosis    ROS: denies nausea/vomiting, fevers, chills, chest pain, shortness of breath, diarrhea, constipation, blood in the urine or stool         Patient Active Problem List   Diagnosis    Controlled type 2 diabetes mellitus with stage 3 chronic kidney disease, without long-term current use of insulin ([facility] [address]    Benign essential HTN    Pure hypercholesterolemia    Iron deficiency anemia due to chronic blood loss    Primary osteoarthritis of both knees    Vitamin D deficiency    Right BBB/left ant fasc block    Abnormal EKG    Chronic renal failure, stage 3 (moderate) (HCC)    Dilated cardiomyopathy (HCC)    Chronic systolic CHF (congestive heart failure) (HCC)    Chronic kidney disease    S/P coronary angiogram    Severe left ventricular hypertrophy    Tachycardia    HFrEF (heart failure with reduced ejection fraction) ([facility])    NSTEMI (non-ST elevated myocardial infarction) ([facility])    Wild-type transthyretin-related (ATTR) amyloidosis ([facility])     Past Medical History:   Diagnosis Date    Anemia     Arthritis     MILD    CHF (congestive heart failure) ([facility])     EF 35-40%. Non-ischemic     Hyperlipidemia     Hypertension     Type II or unspecified type diabetes mellitus without mention of complication, not stated as uncontrolled        Past Surgical History:   Procedure Laterality Date    HERNIA REPAIR      HIP SURGERY      OTHER SURGICAL HISTORY N/A 12/15/2015    LAPAROSCOPIC LEFT INGUINAL HERNIA REPAIR WITH MESH       Current Outpatient Medications   Medication Sig Dispense Refill    latanoprost (XALATAN) 0.005 % ophthalmic solution INSTILL 1 DROP INTO EACH EYE ONCE DAILY IN THE EVENING      JANUVIA 50 MG tablet Take 1 tablet by mouth once daily 30 tablet 0    atorvastatin (LIPITOR) 40 MG tablet Take 1 tablet by mouth once daily 90 tablet 0    isosorbide mononitrate (IMDUR) 30 MG extended release tablet Take 1 tablet by mouth once daily 30 tablet 0    LANTUS SOLOSTAR 100 UNIT/ML injection pen INJECT 15 UNITS SUBCUTANEOUSLY NIGHTLY 15 mL 0    furosemide (LASIX) 40 MG tablet Take 1 tablet by mouth 2 times daily 60 tablet 5    metoprolol succinate (TOPROL XL) 25 MG extended release tablet Take 1 tablet by mouth once daily 90 tablet 3    amLODIPine (NORVASC) 10 MG tablet Take 1 tablet by mouth once daily 30 tablet 5    potassium chloride (KLOR-CON) 10 MEQ extended release tablet Take 1 tablet by mouth once daily 60 tablet 5    MM PEN NEEDLES 31G X 5 MM MISC USE   SUBCUTANEOUSLY ONCE DAILY 100 each 5    aspirin 81 MG EC tablet Take 1 tablet by mouth daily 30 tablet 3     No current facility-administered medications for this visit.      No Known Allergies    Social History     Socioeconomic History    Marital status:      Spouse name: None    Number of children: None    Years of education: None    Highest education level: None   Occupational History    None   Tobacco Use    Smoking status: Never Smoker    Smokeless tobacco: Never Used   Vaping Use    Vaping Use: Never used   Substance and Sexual Activity    Alcohol use: No    Drug use: No    Sexual activity: Not Currently   Other Topics Concern    None   Social History Narrative    None     Social Determinants of Health     Financial Resource Strain: Low Risk     Difficulty of Paying Living Expenses: Not hard at all   Food Insecurity: No Food Insecurity    Worried About Running Out of Food in the Last Year: Never true    Quirino of Food in the Last Year: Never true   Transportation Needs:     Lack of Transportation (Medical): Not on file    Lack of Transportation (Non-Medical):  Not on file   Physical Activity:     Days of Exercise per Week: Not on file    Minutes of Exercise per Session: Not on file   Stress:     Feeling of Stress : Not on file   Social Connections:     Frequency of Communication with Friends and Family: Not on file    Frequency of Social Gatherings with Friends and Family: Not on file    Attends Denominational Services: Not on file    Active Member of [address] [place] [address] [employer] or Organizations: Not on file    Attends Club or Organization Meetings: Not on file    Marital Status: Not on file   Intimate Partner Violence:     Fear of Current or Ex-Partner: Not on file    Emotionally Abused: Not on file    Physically Abused: Not on file    Sexually Abused: Not on file   Housing Stability:     Unable to Pay for Housing in the Last Year: Not on file    Number of [place] in the Last Year: Not on file    Unstable Housing in the Last Year: Not on file     Family History   Problem Relation Age of Onset    High Blood Pressure Mother     High Blood Pressure Father     Heart Disease Father         cad    Cancer Sister Vitals:  /77   Pulse 87   Ht 5' 9\" (1.753 m)   Wt 197 lb (89.4 kg)   SpO2 97%   BMI 29.09 kg/m²     Physical Exam   General:  Well-appearing, no acute distress, alert, non-toxic  HEENT:  Normocephalic, atraumatic, without lymphadenopathy, EOMI, neck supple  Cardiovascular: normal heart rate, normal rhythm, no murmurs, rubs or gallops  Respiratory: normal breath sounds, diminished breath sounds, no respiratory distress, no wheezing, rales or rhonchi  GI: bowel sounds normal, soft, non-distended, no tenderness, no masses or peritoneal signs  Extremities: intact distal pulses, warm, dry, well perfused, without clubbing, cyanosis, 2+ pitting edema lower extremities bilaterally, normal movement of all extremities. No joint swelling, deformity or tenderness. Skin:  No rash, warm and dry  PSYCH:  alert and oriented x 3; normal affect  NEURO:  CN2-12 grossly intact, normal motor function, normal sensory function, normal speech, no gross focal deficits noted, gait within normal    Rashi Fournier MD    1/4/2022 4:49 PM    Documentation was done using voice recognition dragon software. Every effort was made to ensure accuracy; however, inadvertent, unintentional computerized transcription errors may be present.

## 2022-01-05 ENCOUNTER — OFFICE VISIT (OUTPATIENT)
Dept: CARDIOLOGY CLINIC | Age: 82
End: 2022-01-05
Payer: MEDICARE

## 2022-01-05 ENCOUNTER — TELEPHONE (OUTPATIENT)
Dept: CARDIOLOGY CLINIC | Age: 82
End: 2022-01-05

## 2022-01-05 VITALS
OXYGEN SATURATION: 97 % | SYSTOLIC BLOOD PRESSURE: 132 MMHG | HEART RATE: 89 BPM | DIASTOLIC BLOOD PRESSURE: 84 MMHG | BODY MASS INDEX: 28.11 KG/M2 | HEIGHT: 69 IN | WEIGHT: 189.8 LBS

## 2022-01-05 DIAGNOSIS — I50.31 ACUTE DIASTOLIC CONGESTIVE HEART FAILURE (HCC): Primary | ICD-10-CM

## 2022-01-05 DIAGNOSIS — R60.0 LOCALIZED EDEMA: ICD-10-CM

## 2022-01-05 DIAGNOSIS — I10 BENIGN ESSENTIAL HTN: ICD-10-CM

## 2022-01-05 DIAGNOSIS — R06.02 SOB (SHORTNESS OF BREATH): ICD-10-CM

## 2022-01-05 DIAGNOSIS — E85.82 WILD-TYPE TRANSTHYRETIN-RELATED (ATTR) AMYLOIDOSIS (HCC): ICD-10-CM

## 2022-01-05 LAB
EKG ATRIAL RATE: 84 BPM
EKG DIAGNOSIS: NORMAL
EKG P AXIS: 70 DEGREES
EKG P-R INTERVAL: 230 MS
EKG Q-T INTERVAL: 482 MS
EKG QRS DURATION: 160 MS
EKG QTC CALCULATION (BAZETT): 569 MS
EKG R AXIS: 258 DEGREES
EKG T AXIS: 44 DEGREES
EKG VENTRICULAR RATE: 84 BPM

## 2022-01-05 PROCEDURE — 1123F ACP DISCUSS/DSCN MKR DOCD: CPT | Performed by: NURSE PRACTITIONER

## 2022-01-05 PROCEDURE — 1036F TOBACCO NON-USER: CPT | Performed by: NURSE PRACTITIONER

## 2022-01-05 PROCEDURE — 4040F PNEUMOC VAC/ADMIN/RCVD: CPT | Performed by: NURSE PRACTITIONER

## 2022-01-05 PROCEDURE — G8484 FLU IMMUNIZE NO ADMIN: HCPCS | Performed by: NURSE PRACTITIONER

## 2022-01-05 PROCEDURE — 1111F DSCHRG MED/CURRENT MED MERGE: CPT | Performed by: NURSE PRACTITIONER

## 2022-01-05 PROCEDURE — G8417 CALC BMI ABV UP PARAM F/U: HCPCS | Performed by: NURSE PRACTITIONER

## 2022-01-05 PROCEDURE — 99214 OFFICE O/P EST MOD 30 MIN: CPT | Performed by: NURSE PRACTITIONER

## 2022-01-05 PROCEDURE — G8427 DOCREV CUR MEDS BY ELIG CLIN: HCPCS | Performed by: NURSE PRACTITIONER

## 2022-01-05 PROCEDURE — 93010 ELECTROCARDIOGRAM REPORT: CPT | Performed by: INTERNAL MEDICINE

## 2022-01-05 RX ORDER — FUROSEMIDE 40 MG/1
40 TABLET ORAL 2 TIMES DAILY
Qty: 60 TABLET | Refills: 5 | Status: SHIPPED | OUTPATIENT
Start: 2022-01-05 | End: 2022-01-18 | Stop reason: SDUPTHER

## 2022-01-05 ASSESSMENT — ENCOUNTER SYMPTOMS
GASTROINTESTINAL NEGATIVE: 1
SHORTNESS OF BREATH: 1
EYES NEGATIVE: 1

## 2022-01-05 NOTE — ED NOTES
Pt ambulated with pulse ox. O2 sats dropped as low as 94% during ambulation.       [facility]  01/04/22 2019

## 2022-01-05 NOTE — PATIENT INSTRUCTIONS
Instructions:   1. Medications: take furosemide one pill twice a day on an empty stomach in the morning before breakfast and between lunch and dinner. Weigh every day and call if your weight does not come back down closer to 170lb or if it goes up at all. 2. Labs:one week  3. Lifestyle Recommendations: Weigh yourself every day in the morning after urination, call Kaylee Puente if wt increases 2-3lb in one day or 5lb in one week, Limit sodium to 2000mg/day and fluids to 2L or 64oz/day.    4. Follow up: 2-3 weeks with Dr. Micheal Teran

## 2022-01-05 NOTE — ED NOTES
PT ambulated on pulse ox at this time by [facility], ED tech. Oxygen saturation did not drop below 94%.      Bartolo Gunn RN  01/04/22 2053

## 2022-01-05 NOTE — PROGRESS NOTES
[address]   Congestive Heart Failure    Primary Care Doctor:  Mor Horn MD  Primary Cardiologist: Teodoro Whalen      Chief Complaint: CHF    History of Present Illness:  Rock De Oliveira is a 78 y.o. male with PMH DM, HTN, anemia, NICM,  HFrEF (35-40% recovered to 50-55%). , ATTR amyloidosis who presents today for ED f/u.    ED visit 1/4/22:presents to the ED sent over from his PCP office for fluid overload. Patient has been complaining of swelling to his bilateral lower extremities for at least the past week. He was instructed by cardiology to increase his Lasix, although he has not yet done this. He does believe he is had approximately an 18 pound weight gain over the past 1 to 2 weeks. He does have a prior history of CHF, although has never had symptoms this bad. He does note the symptoms seem worse with activity and better with rest BNP 12K, lasix 40mg IVP  Today:  He tells me DRISCOLL some better today, still with edema- no change but denies no dizziness, CP, palpitations, edema, or orthopnea. Virgil Padilla His wt is down about 9lb since yesterday. He has lost 2 sons and his brother within the past 18months. We discussed cardiac amyloid today and he is now agreeable to treatment. Baseline wt: 170  Wt Readings from Last 3 Encounters:   01/05/22 189 lb 12.8 oz (86.1 kg)   01/04/22 198 lb (89.8 kg)   01/04/22 197 lb (89.4 kg)       EF: 50-55%  Cardiac Imaging:   Echo 9/20/19:   Summary   Left ventricular cavity size is normal. There is severe concentric left   ventricular hypertrophy. Overall left ventricular systolic function appears   normal with an estimated ejection fraction of 50-55%. Diastolic filling   parameters suggest grade III diastolic dysfunction. Myocardial appearance   and the presence apical sparing on strain imaging suggests cardiac   amyloidosis.   Mitral annular calcification is present. Mitral valve leaflets appear   thickened.  Mild mitral regurgitation is present.   The left atrium is severely dilated.   Aortic valve leaflets appear thickened at the leaflet tips. The aortic valve   appears tricuspid. Mild aortic regurgitation is present.   Mild tricuspid regurgitation.  IVC size is normal (<2.1 cm) but collapses < 50% with respiration consistent   with elevated RA pressure (8 mmHg). Estimated pulmonary artery systolic   pressure is at 26 mmHg assuming a right atrial pressure of 3 mmHg.       Tc PYP scan 3/12/20: strongly positive for amyloidosis.      Stress Test 7/12/19:    Summary    Overall findings represent a intermediate to high risk scan.    LV is moderately dilated.  LVEF 39%    Moderate size fixed defect involving the mid to distal inferior wall along    with the apex.    ECG: Non-diagnostic EKG response due to failure to reach target heart rate.      4. Cath 7/26/19 (Dr. Jose Raul Byers):    Angiographic Findings:  Right dominant system  Left Main:  Normal   Left Anterior Descending:  Mild irregular plaque; no stenosis over 30%   Circumflex:  Mild irregular plaque; no stenosis over 30%   Right Coronary:  Mild irregular plaque; no stenosis over 30%   Left Ventriculogram:  Not performed due to Cr   Femoral Angiogram:  No obstructive plaque   Hemodynamics (mm Hg):  Left Ventricular Pressure:  96/1, 5  Central Aortic Pressure:  102/68 (82)   Conclusions:  -No obstructive CAD  -Normal LVEDP    Echo 6/28/19   Left ventricular cavity size is normal. There is severe concentric left   ventricular hypertrophy.   Overall left ventricular systolic function appears reduced with an estimated   ejection fraction of 35-40%.   There is global hypokinesis of the left ventricle which appears to be more   marked in the inferior wall which is severely hypo-akinetic.   Diastolic function is indeterminate.   Mitral annular calcification is present.   Mild mitral, pulmonic and aortic regurgitation is present.   The right ventricle is normal in size with thickened walls.   TAPSE measures: 1.22 cm.   RV S velocity measures: 7.94 cm/s    Device: No       Activity: below baseline  Can you walk 1-2 blocks or do a moderate amount of house/yard work? No       NYHA Class: I I    Sodium Restrictions: 2g  Fluid Restrictions: 48-64 oz/day  Sodium and fluid restriction compliance: fair    Pt Education: The patient has received education on the following topics: dietary sodium restriction, heart failure medications, the importance of physical activity, symptom management and weight monitoring     DARRION No       Past Medical History:   has a past medical history of Anemia, Arthritis, CHF (congestive heart failure) ([address] Hyperlipidemia, Hypertension, and Type II or unspecified type diabetes mellitus without mention of complication, not stated as uncontrolled. Surgical History:   has a past surgical history that includes hernia repair; hip surgery; and other surgical history (N/A, 12/15/2015). Social History:   reports that he has never smoked. He has never used smokeless tobacco. He reports that he does not drink alcohol and does not use drugs. Family History:   Family History   Problem Relation Age of Onset    High Blood Pressure Mother     High Blood Pressure Father     Heart Disease Father         cad    Cancer Sister        Home Medications:  Prior to Admission medications    Medication Sig Start Date End Date Taking?  Authorizing Provider   latanoprost (XALATAN) 0.005 % ophthalmic solution INSTILL 1 DROP INTO EACH EYE ONCE DAILY IN THE EVENING 12/13/21   Historical Provider, MD   JANUVIA 50 MG tablet Take 1 tablet by mouth once daily 1/3/22   CHUNG Robin   atorvastatin (LIPITOR) 40 MG tablet Take 1 tablet by mouth once daily 1/3/22   CHUNG Robin   isosorbide mononitrate (IMDUR) 30 MG extended release tablet Take 1 tablet by mouth once daily 12/27/21   SELAM Norman MD   LANDONNAUS SOLOSTAR 100 UNIT/ML injection pen INJECT 15 UNITS SUBCUTANEOUSLY NIGHTLY 12/2/21   SELAM Norman MD   furosemide (LASIX) 40 MG tablet Take 1 tablet by mouth 2 times daily 10/21/21   SELAM Rondon MD   metoprolol succinate (TOPROL XL) 25 MG extended release tablet Take 1 tablet by mouth once daily 8/24/21   Odilia Almodovar MD   amLODIPine (NORVASC) 10 MG tablet Take 1 tablet by mouth once daily 8/2/21   SELAM Rondon MD   potassium chloride (KLOR-CON) 10 MEQ extended release tablet Take 1 tablet by mouth once daily 7/26/21   SELAM Rondon MD   MM PEN NEEDLES 31G X 5 MM MISC USE   SUBCUTANEOUSLY ONCE DAILY 5/4/21   SELAM Rondon MD   aspirin 81 MG EC tablet Take 1 tablet by mouth daily 7/1/19   Iza Freitas MD        Allergies:  Patient has no known allergies. ROS:   Review of Systems   Constitutional: Negative. HENT: Negative. Eyes: Negative. Respiratory: Positive for shortness of breath. Cardiovascular: Positive for leg swelling. Negative for chest pain and palpitations. Gastrointestinal: Negative. Endocrine: Negative. Genitourinary: Negative. Musculoskeletal: Negative. Skin: Negative. Neurological: Negative. Hematological: Negative. Psychiatric/Behavioral: Negative. Physical Examination:    Vitals:    01/05/22 1138   BP: 132/84   Site: Left Upper Arm   Position: Sitting   Cuff Size: Medium Adult   Pulse: 89   SpO2: 97%   Weight: 189 lb 12.8 oz (86.1 kg)   Height: 5' 9\" (1.753 m)           Physical Exam  Constitutional:       Appearance: He is well-developed. HENT:      Head: Normocephalic and atraumatic. Eyes:      Pupils: Pupils are equal, round, and reactive to light. Cardiovascular:      Rate and Rhythm: Normal rate and regular rhythm. Heart sounds: Normal heart sounds. Pulmonary:      Effort: Pulmonary effort is normal.      Breath sounds: Normal breath sounds. Musculoskeletal:         General: Normal range of motion. Cervical back: Normal range of motion and neck supple. Right lower leg: Edema present. Left lower leg: Edema present.       Comments: 2+ bilateral   Skin:     General: Skin is warm and dry. Neurological:      Mental Status: He is alert and oriented to person, place, and time. Psychiatric:         Behavior: Behavior normal.         Thought Content:  Thought content normal.         Judgment: Judgment normal.         Lab Data:    CBC:   Lab Results   Component Value Date    WBC 7.4 01/04/2022    WBC 5.5 04/03/2021    WBC 6.3 10/27/2020    RBC 5.03 01/04/2022    RBC 4.95 04/03/2021    RBC 4.78 10/27/2020    HGB 12.5 01/04/2022    HGB 12.5 04/03/2021    HGB 12.1 10/27/2020    HCT 38.4 01/04/2022    HCT 37.9 04/03/2021    HCT 36.6 10/27/2020    MCV 76.3 01/04/2022    MCV 76.5 04/03/2021    MCV 76.6 10/27/2020    RDW 19.2 01/04/2022    RDW 17.3 04/03/2021    RDW 15.7 10/27/2020     01/04/2022     04/03/2021     10/27/2020     BMP:  Lab Results   Component Value Date     01/04/2022     11/01/2021     10/21/2021    K 4.1 01/04/2022    K 3.7 11/01/2021    K 4.0 10/21/2021    K 4.2 08/14/2021    K 3.1 03/13/2020    K 4.2 11/08/2019     01/04/2022     11/01/2021     10/21/2021    CO2 17 01/04/2022    CO2 26 11/01/2021    CO2 22 10/21/2021    PHOS 3.5 11/01/2021    PHOS 3.6 08/14/2021    PHOS 3.5 04/03/2021    BUN 28 01/04/2022    BUN 29 11/01/2021    BUN 30 10/21/2021    CREATININE 2.2 01/04/2022    CREATININE 2.1 11/01/2021    CREATININE 2.5 10/21/2021     BNP:   Lab Results   Component Value Date    PROBNP 12,077 01/04/2022    PROBNP 3,316 10/27/2020    PROBNP 2,350 05/01/2020     Iron Studies:    Lab Results   Component Value Date    FERRITIN 871.2 04/07/2018    FERRITIN 1,130 01/31/2015    FERRITIN 1,313.4 02/12/2013     Iron Deficiency Anemia:  No    IV Iron Therapy:  No  2017 ACC/AHA HF Guidelines:   intravenous iron replacement in patients with New York Heart Association (NYHA) class II and III HF and iron deficiency(ferritin <100 ng/ml or 100-300 ng/ml if transferrin saturation <20%), to improve functional status and QoL. Assessment/Plan:    Encounter Diagnoses        Acute diastolic congestive heart failure (HCC) Continue higher dose lasix, labs in one week    Benign essential HTN controlled    Wild-type transthyretin-related (ATTR) amyloidosis ([address] Start PA for vyndamax    Localized edema Continue diuresis    SOB (shortness of breath) diurese           Instructions:   1. Medications: take furosemide one pill twice a day on an empty stomach in the morning before breakfast and between lunch and dinner. Weigh every day and call if your weight does not come back down closer to 170lb or if it goes up at all. 2. Labs:one week  3. Lifestyle Recommendations: Weigh yourself every day in the morning after urination, call Mahsa Leung if wt increases 2-3lb in one day or 5lb in one week, Limit sodium to 2000mg/day and fluids to 2L or 64oz/day. 4. Follow up: 2-3 weeks with Dr. Wade Davis           I appreciate the opportunity of cooperating in the care of this individual.    KAYLYNN Richmond - CNP, CNP, 1/5/2022, 10:15 AM    QUALITY MEASURES  1. Tobacco Cessation Counseling: NA  2. Retake of BP if >140/90:   NA  3. Documentation to PCP/referring for new patient:  Sent to PCP at close of office visit  4. CAD patient on anti-platelet: Yes  5. CAD patient on STATIN therapy:  Yes  6. Patient with CHF and aFib on anticoagulation:  NA   7. Patient Education:  Yes   8. BB for LVSD :  Yes  9. ACE/ARB for LVSD:  No, CKD   10.  Left Ventricular Ejection Fraction (LVEF) Assessment:  Yes

## 2022-01-05 NOTE — Clinical Note
Hi, I saw Mr. Opal Waite today for ED f/u - volume overload. He got Iasix  IVP in ED and po increased. He is agreeable to start Vyndamax and I told him you office would call him for f/u in 2-3 weeks to check on CHF and get process started.  Chantale Andrews

## 2022-01-18 ENCOUNTER — OFFICE VISIT (OUTPATIENT)
Dept: FAMILY MEDICINE CLINIC | Age: 82
End: 2022-01-18
Payer: MEDICARE

## 2022-01-18 VITALS
HEART RATE: 80 BPM | SYSTOLIC BLOOD PRESSURE: 117 MMHG | BODY MASS INDEX: 26.6 KG/M2 | DIASTOLIC BLOOD PRESSURE: 75 MMHG | WEIGHT: 190 LBS | HEIGHT: 71 IN

## 2022-01-18 DIAGNOSIS — I50.20 HFREF (HEART FAILURE WITH REDUCED EJECTION FRACTION) (HCC): ICD-10-CM

## 2022-01-18 DIAGNOSIS — E11.22 CONTROLLED TYPE 2 DIABETES MELLITUS WITH STAGE 3 CHRONIC KIDNEY DISEASE, WITHOUT LONG-TERM CURRENT USE OF INSULIN (HCC): ICD-10-CM

## 2022-01-18 DIAGNOSIS — Z09 HOSPITAL DISCHARGE FOLLOW-UP: Primary | ICD-10-CM

## 2022-01-18 DIAGNOSIS — R60.0 LOCALIZED EDEMA: ICD-10-CM

## 2022-01-18 DIAGNOSIS — N18.30 CONTROLLED TYPE 2 DIABETES MELLITUS WITH STAGE 3 CHRONIC KIDNEY DISEASE, WITHOUT LONG-TERM CURRENT USE OF INSULIN (HCC): ICD-10-CM

## 2022-01-18 PROCEDURE — 4040F PNEUMOC VAC/ADMIN/RCVD: CPT | Performed by: STUDENT IN AN ORGANIZED HEALTH CARE EDUCATION/TRAINING PROGRAM

## 2022-01-18 PROCEDURE — 1036F TOBACCO NON-USER: CPT | Performed by: STUDENT IN AN ORGANIZED HEALTH CARE EDUCATION/TRAINING PROGRAM

## 2022-01-18 PROCEDURE — 1123F ACP DISCUSS/DSCN MKR DOCD: CPT | Performed by: STUDENT IN AN ORGANIZED HEALTH CARE EDUCATION/TRAINING PROGRAM

## 2022-01-18 PROCEDURE — 99213 OFFICE O/P EST LOW 20 MIN: CPT | Performed by: STUDENT IN AN ORGANIZED HEALTH CARE EDUCATION/TRAINING PROGRAM

## 2022-01-18 PROCEDURE — G8427 DOCREV CUR MEDS BY ELIG CLIN: HCPCS | Performed by: STUDENT IN AN ORGANIZED HEALTH CARE EDUCATION/TRAINING PROGRAM

## 2022-01-18 PROCEDURE — G8417 CALC BMI ABV UP PARAM F/U: HCPCS | Performed by: STUDENT IN AN ORGANIZED HEALTH CARE EDUCATION/TRAINING PROGRAM

## 2022-01-18 PROCEDURE — G8484 FLU IMMUNIZE NO ADMIN: HCPCS | Performed by: STUDENT IN AN ORGANIZED HEALTH CARE EDUCATION/TRAINING PROGRAM

## 2022-01-18 RX ORDER — FUROSEMIDE 40 MG/1
40 TABLET ORAL 2 TIMES DAILY
Qty: 60 TABLET | Refills: 1 | Status: SHIPPED | OUTPATIENT
Start: 2022-01-18 | End: 2022-04-14

## 2022-01-18 RX ORDER — AMLODIPINE BESYLATE 10 MG/1
10 TABLET ORAL DAILY
Qty: 90 TABLET | Refills: 1 | Status: SHIPPED | OUTPATIENT
Start: 2022-01-18 | End: 2022-01-28 | Stop reason: ALTCHOICE

## 2022-01-18 NOTE — ASSESSMENT & PLAN NOTE
Bilateral legs  Recommend compression stockings  Think from heart failure  Amlodipine could also be contributing

## 2022-01-18 NOTE — PATIENT INSTRUCTIONS
Stop glipizide    Follow up with Dr. Mayo Adjutant for furosemide (lasix)  --- ask about if need to increase this dose/take an extra pill  If any changes in weight, please call Dr. Jeanie Hatfield office     Start wearing compression stockings  Elevate legs 3 times a day

## 2022-01-18 NOTE — PROGRESS NOTES
Admitted 2/17-2/22. Saw pulm 2-3 weeks ago. Cough began 2 weeks ago, worse now at night. Coughing up blood. Coughs in the heat. Swelling in feet past 3 days. No known increased salt. To get lung bx when able. Visit Vitals  BP 90/54   Pulse 94   Temp 99.6 °F (37.6 °C) (Oral)   Resp 16   Ht 6' 2\" (1.88 m)   Wt 237 lb (107.5 kg)   SpO2 93% Comment: 4L/min per NC   BMI 30.43 kg/m²     Patient alert and cooperative. Feet with bilateral pitting. Assessment:  1. Peripheral edema. Plan:  1. Take Lasix in morning, increase dose from 20 to 40 mg for a few days till swelling goes down, then back to the 20 mg tab. 2. Follow up with pulmonary regarding the worsening cough and bringing up blood. 3. Recheck here otherwise prn. [address] Note    Date: 1/18/2022      Assessment/Plan:     1. Hospital discharge follow-up  2. Controlled type 2 diabetes mellitus with stage 3 chronic kidney disease, without long-term current use of insulin ([facility])  Assessment & Plan:  Controlled, stop glipizide, does not need and risk of hypoglycemia  3. HFrEF (heart failure with reduced ejection fraction) ([facility])  Assessment & Plan:  Lasix 40 BID  Goal weight is in 170s per heart failure clinic / cardiologist  He is 190, still has some water weight to lose  To follow up with Dr. Celso Prasad, cardiologist, has appointment 1/28/22, to ask about lasix dosing  Overall symptoms stable  Still having lower extremity swelling  To get labs done per heart failure NP eval on 1/5/22  4. Localized edema  Assessment & Plan:  Bilateral legs  Recommend compression stockings  Think from heart failure  Amlodipine could also be contributing  Elevate legs 3 times a day  If any changes in weight, please call Dr. Oren Hughes office     No orders of the defined types were placed in this encounter. Orders Placed This Encounter   Medications    furosemide (LASIX) 40 MG tablet     Sig: Take 1 tablet by mouth 2 times daily     Dispense:  60 tablet     Refill:  1    amLODIPine (NORVASC) 10 MG tablet     Sig: Take 1 tablet by mouth daily     Dispense:  90 tablet     Refill:  1       Return in about 3 months (around 4/18/2022), or if symptoms worsen or fail to improve. Discussed medication(s) risks, benefits, side effects, adverse reactions and interactions with patient. Patient voiced understanding.                                                Subjective/Objective:     Chief Complaint   Patient presents with    Follow-Up from Hospital       HPI     1/28/21 will see Dr. Celso Prasad, cardiology    ED for heart failure exacerbation, given IV lasix and lost about 10 lbs, symptoms fine so discharged and told to take lasix 40 BID, was doing lasix 40 daily    Has lower extremity swelling still  No orthopnea or PND  Does get short of breath when walking distancs  No chest pain  No trouble breathing when laying flat  Weight stable    -- plan per follow up with heart failure clinic  1. Medications: take furosemide one pill twice a day on an empty stomach in the morning before breakfast and between lunch and dinner. Weigh every day and call if your weight does not come back down closer to 170lb or if it goes up at all. 2. Labs:one week  3. Lifestyle Recommendations: Weigh yourself every day in the morning after urination, call [place] if wt increases 2-3lb in one day or 5lb in one week, Limit sodium to 2000mg/day and fluids to 2L or 64oz/day. 4. Follow up: 2-3 weeks with Dr. Jovani Jimenes    See Assessment/Plan for further HPI info    Wt Readings from Last 3 Encounters:   01/18/22 190 lb (86.2 kg)   01/05/22 189 lb 12.8 oz (86.1 kg)   01/04/22 198 lb (89.8 kg)     Body mass index is 26.5 kg/m². BP Readings from Last 3 Encounters:   01/18/22 117/75   01/05/22 132/84   01/04/22 139/79     The ASCVD Risk score (Dylan Billy, et al., 2013) failed to calculate for the following reasons:     The 2013 ASCVD risk score is only valid for ages 36 to 78    The patient has a prior MI or stroke diagnosis    ROS: denies nausea/vomiting, fevers, chills, chest pain, shortness of breath, diarrhea, constipation, blood in the urine or stool         Patient Active Problem List   Diagnosis    Controlled type 2 diabetes mellitus with stage 3 chronic kidney disease, without long-term current use of insulin ([facility] [address]    Benign essential HTN    Pure hypercholesterolemia    Iron deficiency anemia due to chronic blood loss    Primary osteoarthritis of both knees    Vitamin D deficiency    Right BBB/left ant fasc block    Abnormal EKG    Chronic renal failure, stage 3 (moderate) (HCC)    Dilated cardiomyopathy (HCC)    Chronic systolic CHF (congestive heart failure) (HCC)    Chronic kidney disease    S/P coronary angiogram    Severe left ventricular hypertrophy    Tachycardia    HFrEF (heart failure with reduced ejection fraction) ([facility])    NSTEMI (non-ST elevated myocardial infarction) ([facility])    Wild-type transthyretin-related (ATTR) amyloidosis ([facility])    Localized edema    SOB (shortness of breath)     Past Medical History:   Diagnosis Date    Anemia     Arthritis     MILD    CHF (congestive heart failure) ([facility])     EF 35-40%. Non-ischemic     Hyperlipidemia     Hypertension     Type II or unspecified type diabetes mellitus without mention of complication, not stated as uncontrolled        Past Surgical History:   Procedure Laterality Date    HERNIA REPAIR      HIP SURGERY      OTHER SURGICAL HISTORY N/A 12/15/2015    LAPAROSCOPIC LEFT INGUINAL HERNIA REPAIR WITH MESH       Current Outpatient Medications   Medication Sig Dispense Refill    furosemide (LASIX) 40 MG tablet Take 1 tablet by mouth 2 times daily 60 tablet 1    amLODIPine (NORVASC) 10 MG tablet Take 1 tablet by mouth daily 90 tablet 1    latanoprost (XALATAN) 0.005 % ophthalmic solution INSTILL 1 DROP INTO EACH EYE ONCE DAILY IN THE EVENING      JANUVIA 50 MG tablet Take 1 tablet by mouth once daily 30 tablet 0    atorvastatin (LIPITOR) 40 MG tablet Take 1 tablet by mouth once daily 90 tablet 0    isosorbide mononitrate (IMDUR) 30 MG extended release tablet Take 1 tablet by mouth once daily 30 tablet 0    LANTUS SOLOSTAR 100 UNIT/ML injection pen INJECT 15 UNITS SUBCUTANEOUSLY NIGHTLY 15 mL 0    metoprolol succinate (TOPROL XL) 25 MG extended release tablet Take 1 tablet by mouth once daily 90 tablet 3    potassium chloride (KLOR-CON) 10 MEQ extended release tablet Take 1 tablet by mouth once daily 60 tablet 5    MM PEN NEEDLES 31G X 5 MM MISC USE   SUBCUTANEOUSLY ONCE DAILY 100 each 5    aspirin 81 MG EC tablet Take 1 tablet by mouth daily 30 tablet 3     No current facility-administered medications for this visit.      No Known Allergies    Social History     Socioeconomic History    Marital status:      Spouse name: None    Number of children: None    Years of education: None    Highest education level: None   Occupational History    None   Tobacco Use    Smoking status: Never Smoker    Smokeless tobacco: Never Used   Vaping Use    Vaping Use: Never used   Substance and Sexual Activity    Alcohol use: No    Drug use: No    Sexual activity: Not Currently   Other Topics Concern    None   Social History Narrative    None     Social Determinants of Health     Financial Resource Strain: Low Risk     Difficulty of Paying Living Expenses: Not hard at all   Food Insecurity: No Food Insecurity    Worried About Running Out of Food in the Last Year: Never true    Quirino of Food in the Last Year: Never true   Transportation Needs:     Lack of Transportation (Medical): Not on file    Lack of Transportation (Non-Medical):  Not on file   Physical Activity:     Days of Exercise per Week: Not on file    Minutes of Exercise per Session: Not on file   Stress:     Feeling of Stress : Not on file   Social Connections:     Frequency of Communication with Friends and Family: Not on file    Frequency of Social Gatherings with Friends and Family: Not on file    Attends Sabianist Services: Not on file    Active Member of [address] [place] [address] or Organizations: Not on file    Attends Club or Organization Meetings: Not on file    Marital Status: Not on file   Intimate Partner Violence:     Fear of Current or Ex-Partner: Not on file    Emotionally Abused: Not on file    Physically Abused: Not on file    Sexually Abused: Not on file   Housing Stability:     Unable to Pay for Housing in the Last Year: Not on file    Number of [place] in the Last Year: Not on file    Unstable Housing in the Last Year: Not on file     Family History   Problem Relation Age of Onset    High Blood Pressure Mother     High Blood Pressure Father     Heart Disease Father         cad    Cancer Sister          Vitals:  /75   Pulse 80   Ht 5' 11\" (1.803 m)   Wt 190 lb (86.2 kg)   BMI 26.50 kg/m²     Physical Exam   General:  Well-appearing, no acute distress, alert, non-toxic  HEENT:  Normocephalic, atraumatic, without lymphadenopathy, EOMI, neck supple  Cardiovascular: normal heart rate, normal rhythm, no murmurs, rubs or gallops  Respiratory: normal breath sounds, good air movement, no respiratory distress, no wheezing, rales or rhonchi  GI: bowel sounds normal, soft, non-distended, no tenderness, no masses or peritoneal signs  Extremities: intact distal pulses, warm, dry, well perfused, without clubbing, cyanosis, 2+ pitting edema in bilateral lower legs, normal movement of all extremities. No joint swelling, deformity or tenderness. Skin:  No rash, warm and dry  PSYCH:  alert and oriented x 3; normal affect  NEURO:  CN2-12 grossly intact, normal motor function, normal sensory function, normal speech, no gross focal deficits noted, gait within normal    Abhijeet Johnson MD    1/18/2022 5:03 PM    Documentation was done using voice recognition dragon software. Every effort was made to ensure accuracy; however, inadvertent, unintentional computerized transcription errors may be present.

## 2022-01-19 DIAGNOSIS — I50.31 ACUTE DIASTOLIC CONGESTIVE HEART FAILURE (HCC): ICD-10-CM

## 2022-01-19 LAB
ANION GAP SERPL CALCULATED.3IONS-SCNC: 16 MMOL/L (ref 3–16)
BUN BLDV-MCNC: 25 MG/DL (ref 7–20)
CALCIUM SERPL-MCNC: 10.1 MG/DL (ref 8.3–10.6)
CHLORIDE BLD-SCNC: 104 MMOL/L (ref 99–110)
CO2: 23 MMOL/L (ref 21–32)
CREAT SERPL-MCNC: 2.1 MG/DL (ref 0.8–1.3)
GFR AFRICAN AMERICAN: 37
GFR NON-AFRICAN AMERICAN: 30
GLUCOSE BLD-MCNC: 97 MG/DL (ref 70–99)
POTASSIUM SERPL-SCNC: 4 MMOL/L (ref 3.5–5.1)
PRO-BNP: 6209 PG/ML (ref 0–449)
SODIUM BLD-SCNC: 143 MMOL/L (ref 136–145)

## 2022-01-28 ENCOUNTER — OFFICE VISIT (OUTPATIENT)
Dept: CARDIOLOGY CLINIC | Age: 82
End: 2022-01-28
Payer: MEDICARE

## 2022-01-28 VITALS
SYSTOLIC BLOOD PRESSURE: 110 MMHG | WEIGHT: 182.2 LBS | DIASTOLIC BLOOD PRESSURE: 66 MMHG | BODY MASS INDEX: 25.51 KG/M2 | HEIGHT: 71 IN | HEART RATE: 86 BPM

## 2022-01-28 DIAGNOSIS — E85.82 WILD-TYPE TRANSTHYRETIN-RELATED (ATTR) AMYLOIDOSIS (HCC): ICD-10-CM

## 2022-01-28 DIAGNOSIS — I50.20 HFREF (HEART FAILURE WITH REDUCED EJECTION FRACTION) (HCC): Primary | ICD-10-CM

## 2022-01-28 PROCEDURE — G8484 FLU IMMUNIZE NO ADMIN: HCPCS | Performed by: INTERNAL MEDICINE

## 2022-01-28 PROCEDURE — 1123F ACP DISCUSS/DSCN MKR DOCD: CPT | Performed by: INTERNAL MEDICINE

## 2022-01-28 PROCEDURE — G8427 DOCREV CUR MEDS BY ELIG CLIN: HCPCS | Performed by: INTERNAL MEDICINE

## 2022-01-28 PROCEDURE — 99214 OFFICE O/P EST MOD 30 MIN: CPT | Performed by: INTERNAL MEDICINE

## 2022-01-28 PROCEDURE — 1036F TOBACCO NON-USER: CPT | Performed by: INTERNAL MEDICINE

## 2022-01-28 PROCEDURE — G8417 CALC BMI ABV UP PARAM F/U: HCPCS | Performed by: INTERNAL MEDICINE

## 2022-01-28 PROCEDURE — 4040F PNEUMOC VAC/ADMIN/RCVD: CPT | Performed by: INTERNAL MEDICINE

## 2022-01-28 RX ORDER — CARVEDILOL 6.25 MG/1
6.25 TABLET ORAL 2 TIMES DAILY
Qty: 180 TABLET | Refills: 3 | Status: SHIPPED | OUTPATIENT
Start: 2022-01-28 | End: 2022-05-13

## 2022-01-28 RX ORDER — ISOSORBIDE MONONITRATE 30 MG/1
TABLET, EXTENDED RELEASE ORAL
Qty: 30 TABLET | Refills: 0 | Status: SHIPPED | OUTPATIENT
Start: 2022-01-28 | End: 2022-03-11

## 2022-01-28 NOTE — PROGRESS NOTES
Cc: Cardiac amyloidosis, recovered HFrEF, CAD, NSVT    HPI:     Mr Opal Waite is a 77 yo man, patient of Dr Bella Rizvi h/o severe LVH, HFrEF, HTN, HLP, DM 2, CKD, mild CAD, NSTEMI, trifascicular block with RBBB, NSVT.     Echo 06/2019: severe LVH, EF 35-40%, mild RV dysfx, mild valve disease     Echo 2015: moderate LVH, normal EF.      Kathi 07/2019: mod LVd, EF 39%, mid to distal inferior and apical fixed defect.      LHC 07/2019: mild diffuse CAD, nl LVEDP.      ECG 9/6/19: NSR, 1st AVB, LAFB, RBBB (trifascicular block), possible inferior MI.      ECG 9/5/19: Sinus lenore 54 (thought to be junctional).       9/6/20 SPEP, UPEP and sFLC within normal limits for CKD (except for proteinuria).      Echo 9/20/19: severe LVH, LVEF 11-80%, diastolic III, apical sparing on speckle strain imaging suggestive of amyloidosis, severe LAE, mild MR.       Tc PYP scan 3/12/20: strongly positive for amyloidosis.       09/2019 kappa/labda ratio 1.69 (normal in the setting of CKD, Cr 1.9). 24-hour UPEP normal; SPEP 05/2021 normal; hence no AL amyloidosis.       Patient is here for follow-up. He reports mildly increased fatigue and dyspnea with walking long distance. Histories     Past Medical History:   has a past medical history of Anemia, Arthritis, CHF (congestive heart failure) ([address] Hyperlipidemia, Hypertension, and Type II or unspecified type diabetes mellitus without mention of complication, not stated as uncontrolled. Surgical History:   has a past surgical history that includes hernia repair; hip surgery; and other surgical history (N/A, 12/15/2015). Social History:   reports that he has never smoked. He has never used smokeless tobacco. He reports that he does not drink alcohol and does not use drugs.      Family History:  No evidence for sudden cardiac death or premature CAD      Medications:     Home medications were reviewed and are listed below    Prior to Admission medications    Medication Sig Start Date End Date Taking? Authorizing Provider   isosorbide mononitrate (IMDUR) 30 MG extended release tablet Take 1 tablet by mouth once daily 1/28/22  Yes Joleen Jaimes MD   carvedilol (COREG) 6.25 MG tablet Take 1 tablet by mouth 2 times daily 1/28/22  Yes Judi Bruce MD   furosemide (LASIX) 40 MG tablet Take 1 tablet by mouth 2 times daily 1/18/22  Yes Joleen Jaimes MD   latanoprost (XALATAN) 0.005 % ophthalmic solution INSTILL 1 DROP INTO EACH EYE ONCE DAILY IN THE EVENING 12/13/21  Yes Historical Provider, MD   JANUVIA 50 MG tablet Take 1 tablet by mouth once daily 1/3/22  Yes CHUNG Davis   atorvastatin (LIPITOR) 40 MG tablet Take 1 tablet by mouth once daily 1/3/22  Yes Maru NELSON [place], [place]   LANTUS SOLOSTAR 100 UNIT/ML injection pen INJECT 15 UNITS SUBCUTANEOUSLY NIGHTLY 12/2/21  Yes Ganga Marie MD   potassium chloride (KLOR-CON) 10 MEQ extended release tablet Take 1 tablet by mouth once daily 7/26/21  Yes Ganga Marie MD   MM PEN NEEDLES 31G X 5 MM MISC USE   SUBCUTANEOUSLY ONCE DAILY 5/4/21  Yes Ganga Marie MD   aspirin 81 MG EC tablet Take 1 tablet by mouth daily 7/1/19  Yes Eliseo Rodriguez MD          Allergy:     Patient has no known allergies. Review of Systems:     All 12 point review of symptoms completed. Pertinent positives identified in the HPI, all other review of symptoms negative as below. CONSTITUTIONAL: No fatigue  SKIN: No rash or pruritis. EYES: No visual changes or diplopia. No scleral icterus. ENT: No Headaches, hearing loss or vertigo. No mouth sores or sore throat. CARDIOVASCULAR: No chest pain/chest pressure/chest discomfort. No palpitations. No edema. RESPIRATORY: No dyspnea. No cough or wheezing, no sputum production. GASTROINTESTINAL: No N/V/D. No abdominal pain, appetite loss, blood in stools. GENITOURINARY: No dysuria, trouble voiding, or hematuria. MUSCULOSKELETAL:  No gait disturbance, weakness or joint complaints.   NEUROLOGICAL: No headache, diplopia, change in muscle strength, numbness or tingling. No change in gait, balance, coordination, mood, affect, memory, mentation, behavior. PSHYCH: No anxiety, loss of interest, change in sexual behavior, feelings of self-harm, or confusion. ENDOCRINE: No excessive thirst, fluid intake, or urination. No tremor. HEMATOLOGIC: No abnormal bruising or bleeding. ALLERGY: No nasal congestion or hives.       Physical Examination:     Vitals:    01/28/22 1322   BP: 110/66   Pulse: 86   Weight: 182 lb 3.2 oz (82.6 kg)   Height: 5' 11\" (1.803 m)       Wt Readings from Last 3 Encounters:   01/28/22 182 lb 3.2 oz (82.6 kg)   01/18/22 190 lb (86.2 kg)   01/05/22 189 lb 12.8 oz (86.1 kg)         General Appearance:  Alert, cooperative, no distress, appears stated age Appropriate weight   Head:  Normocephalic, without obvious abnormality, atraumatic   Eyes:  PERRL, conjunctiva/corneas clear EOM intact  Ears normal   Throat no lesions       Nose: Nares normal, no drainage or sinus tenderness   Throat: Lips, mucosa, and tongue normal   Neck: Supple, symmetrical, trachea midline, no adenopathy, thyroid: not enlarged, symmetric, no tenderness/mass/nodules, no carotid bruit       Lungs:   Clear to auscultation bilaterally, respirations unlabored   Chest Wall:  No tenderness or deformity   Heart:  Regular rhythm, rate is controlled, S1, S2 normal, there is no murmur, there is no rub or gallop, cannot assess jvd, 1-2+ bilateral lower extremity edema   Abdomen:   Soft, non-tender, bowel sounds active all four quadrants,  no masses, no organomegaly       Extremities: Extremities normal, atraumatic, no cyanosis   Pulses: 2+ and symmetric   Skin: Skin color, texture, turgor normal, no rashes or lesions   Pysch: Normal mood and affect   Neurologic: Normal gross motor and sensory exam.  Cranial nerves intact        Labs:     Lab Results   Component Value Date    WBC 7.4 01/04/2022    HGB 12.5 (L) 01/04/2022    HCT 38.4 (L) Echo is suggestive of cardiac amyloidosis.      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 Will start Vyndamax 61 daily.   -Will start coreg  - Unable to start ACEI, ARB, spironolactone, Entresto or SGLT2 due to CKD and/or hypotension.      2. Mild CAD:   Has h/o NSTEMI but cath with only mild CAD.    -Agree with asa, bb, statin.     3.  HTN:    BP is now controlled with current medications.      Will stop amlodipine 10 mg p.o. daily (due to possible contribution to edema) and Toprol 25 daily and start coreg 6.25 bid. C/w lasix 40 bid and KDur 10 meq daily.      4. H/o NSVT:   No events.      -Cw Toprol to 25 daily.        5. HLP:   On statin.      6. ATTR amyloidosis:   Per #1        Return in about 4 weeks (around 2/25/2022). I have spent 35 minutes of face to face time with the patient with more than 50% spent counseling and coordinating care. I have personally reviewed the reports and images of labs, radiological studies, cardiac studies including ECG's and telemetry, current and old medical records. The note was completed using EMR and Dragon dictation system. Every effort was made to ensure accuracy; however, inadvertent computerized transcription errors may be present. All questions and concerns were addressed to the patient/family. Alternatives to my treatment were discussed. I would like to thank you for providing me the opportunity to participate in the care of your patient. If you have any questions, please do not hesitate to contact me.      Sera Weir MD, [facility] [id number] [address] Phone: [phone]  Heart Failure Hotline: [phone]  Fax: [phone]

## 2022-03-02 ENCOUNTER — OFFICE VISIT (OUTPATIENT)
Dept: CARDIOLOGY CLINIC | Age: 82
End: 2022-03-02
Payer: MEDICARE

## 2022-03-02 VITALS
HEART RATE: 92 BPM | BODY MASS INDEX: 25.56 KG/M2 | HEIGHT: 71 IN | WEIGHT: 182.6 LBS | DIASTOLIC BLOOD PRESSURE: 70 MMHG | OXYGEN SATURATION: 98 % | SYSTOLIC BLOOD PRESSURE: 118 MMHG

## 2022-03-02 DIAGNOSIS — I50.22 CHRONIC SYSTOLIC CHF (CONGESTIVE HEART FAILURE) (HCC): Primary | ICD-10-CM

## 2022-03-02 PROCEDURE — 1036F TOBACCO NON-USER: CPT | Performed by: INTERNAL MEDICINE

## 2022-03-02 PROCEDURE — G8484 FLU IMMUNIZE NO ADMIN: HCPCS | Performed by: INTERNAL MEDICINE

## 2022-03-02 PROCEDURE — G8417 CALC BMI ABV UP PARAM F/U: HCPCS | Performed by: INTERNAL MEDICINE

## 2022-03-02 PROCEDURE — 99214 OFFICE O/P EST MOD 30 MIN: CPT | Performed by: INTERNAL MEDICINE

## 2022-03-02 PROCEDURE — 4040F PNEUMOC VAC/ADMIN/RCVD: CPT | Performed by: INTERNAL MEDICINE

## 2022-03-02 PROCEDURE — 1123F ACP DISCUSS/DSCN MKR DOCD: CPT | Performed by: INTERNAL MEDICINE

## 2022-03-02 PROCEDURE — G8427 DOCREV CUR MEDS BY ELIG CLIN: HCPCS | Performed by: INTERNAL MEDICINE

## 2022-03-02 RX ORDER — DORZOLAMIDE HYDROCHLORIDE AND TIMOLOL MALEATE 20; 5 MG/ML; MG/ML
SOLUTION/ DROPS OPHTHALMIC
COMMUNITY
Start: 2022-01-31

## 2022-03-02 NOTE — PROGRESS NOTES
Cc: Cardiac amyloidosis, recovered HFrEF, CAD, NSVT    HPI:     Mr John Freed is a 77 yo man, patient of Dr Aminata Dukes h/o severe LVH, HFrEF, HTN, HLP, DM 2, CKD, mild CAD, NSTEMI, trifascicular block with RBBB, NSVT.     Echo 06/2019: severe LVH, EF 35-40%, mild RV dysfx, mild valve disease     Echo 2015: moderate LVH, normal EF.      Kathi 07/2019: mod LVd, EF 39%, mid to distal inferior and apical fixed defect.      LHC 07/2019: mild diffuse CAD, nl LVEDP.      ECG 9/6/19: NSR, 1st AVB, LAFB, RBBB (trifascicular block), possible inferior MI.      ECG 9/5/19: Sinus lenore 54 (thought to be junctional).       9/6/20 SPEP, UPEP and sFLC within normal limits for CKD (except for proteinuria).      Echo 9/20/19: severe LVH, LVEF 21-75%, diastolic III, apical sparing on speckle strain imaging suggestive of amyloidosis, severe LAE, mild MR.       Tc PYP scan 3/12/20: strongly positive for amyloidosis.       09/2019 kappa/labda ratio 1.69 (normal in the setting of CKD, Cr 1.9).  24-hour UPEP normal; SPEP 05/2021 normal; hence no AL amyloidosis.       Patient is here for follow-up. He has mild and chronic exertional dyspnea on and off; otherwise, has no other symptoms. He is in the process of obtaining the [employer] from company. Histories     Past Medical History:   has a past medical history of Anemia, Arthritis, CHF (congestive heart failure) ([address] Hyperlipidemia, Hypertension, and Type II or unspecified type diabetes mellitus without mention of complication, not stated as uncontrolled. Surgical History:   has a past surgical history that includes hernia repair; hip surgery; and other surgical history (N/A, 12/15/2015). Social History:   reports that he has never smoked. He has never used smokeless tobacco. He reports that he does not drink alcohol and does not use drugs.      Family History:  No evidence for sudden cardiac death or premature CAD      Medications:     Home medications were reviewed and are listed below    Prior to Admission medications    Medication Sig Start Date End Date Taking? Authorizing Provider   JANUVIA 50 MG tablet Take 1 tablet by mouth once daily 2/8/22  Yes Adriana Santana MD   isosorbide mononitrate (IMDUR) 30 MG extended release tablet Take 1 tablet by mouth once daily 1/28/22  Yes Adriana Santana MD   carvedilol (COREG) 6.25 MG tablet Take 1 tablet by mouth 2 times daily 1/28/22  Yes Baldomero Cho MD   furosemide (LASIX) 40 MG tablet Take 1 tablet by mouth 2 times daily 1/18/22  Yes Adriana Santana MD   atorvastatin (LIPITOR) 40 MG tablet Take 1 tablet by mouth once daily 1/3/22  Yes Maru NELSON [place], [place]   LANTUS SOLOSTAR 100 UNIT/ML injection pen INJECT 15 UNITS SUBCUTANEOUSLY NIGHTLY 12/2/21  Yes Mia Keyes MD   potassium chloride (KLOR-CON) 10 MEQ extended release tablet Take 1 tablet by mouth once daily 7/26/21  Yes Mia Keyes MD   MM PEN NEEDLES 31G X 5 MM MISC USE   SUBCUTANEOUSLY ONCE DAILY 5/4/21  Yes Mia Keyes MD   aspirin 81 MG EC tablet Take 1 tablet by mouth daily 7/1/19  Yes Althea Moura MD   dorzolamide-timolol (COSOPT) 22.3-6.8 MG/ML ophthalmic solution INSTILL 1 DROP INTO EACH EYE EVERY 12 HOURS  Patient not taking: Reported on 3/2/2022 1/31/22   Historical Provider, MD   latanoprost (XALATAN) 0.005 % ophthalmic solution INSTILL 1 DROP INTO EACH EYE ONCE DAILY IN THE EVENING  Patient not taking: Reported on 3/2/2022 12/13/21   Historical Provider, MD          Allergy:     Patient has no known allergies. Review of Systems:     All 12 point review of symptoms completed. Pertinent positives identified in the HPI, all other review of symptoms negative as below. CONSTITUTIONAL: No fatigue  SKIN: No rash or pruritis. EYES: No visual changes or diplopia. No scleral icterus. ENT: No Headaches, hearing loss or vertigo. No mouth sores or sore throat. CARDIOVASCULAR: No chest pain/chest pressure/chest discomfort. No palpitations.  No edema.   RESPIRATORY: No dyspnea. No cough or wheezing, no sputum production. GASTROINTESTINAL: No N/V/D. No abdominal pain, appetite loss, blood in stools. GENITOURINARY: No dysuria, trouble voiding, or hematuria. MUSCULOSKELETAL:  No gait disturbance, weakness or joint complaints. NEUROLOGICAL: No headache, diplopia, change in muscle strength, numbness or tingling. No change in gait, balance, coordination, mood, affect, memory, mentation, behavior. PSHYCH: No anxiety, loss of interest, change in sexual behavior, feelings of self-harm, or confusion. ENDOCRINE: No excessive thirst, fluid intake, or urination. No tremor. HEMATOLOGIC: No abnormal bruising or bleeding. ALLERGY: No nasal congestion or hives.       Physical Examination:     Vitals:    03/02/22 1526   BP: 118/70   Site: Left Upper Arm   Position: Sitting   Pulse: 92   SpO2: 98%   Weight: 182 lb 9.6 oz (82.8 kg)   Height: 5' 11\" (1.803 m)       Wt Readings from Last 3 Encounters:   03/02/22 182 lb 9.6 oz (82.8 kg)   01/28/22 182 lb 3.2 oz (82.6 kg)   01/18/22 190 lb (86.2 kg)         General Appearance:  Alert, cooperative, no distress, appears stated age Appropriate weight   Head:  Normocephalic, without obvious abnormality, atraumatic   Eyes:  PERRL, conjunctiva/corneas clear EOM intact  Ears normal   Throat no lesions       Nose: Nares normal, no drainage or sinus tenderness   Throat: Lips, mucosa, and tongue normal   Neck: Supple, symmetrical, trachea midline, no adenopathy, thyroid: not enlarged, symmetric, no tenderness/mass/nodules, no carotid bruit       Lungs:   Clear to auscultation bilaterally, respirations unlabored   Chest Wall:  No tenderness or deformity   Heart:  Regular rhythm, rate is controlled, S1, S2 normal, there is no murmur, there is no rub or gallop, cannot assess jvd, no bilateral lower extremity edema   Abdomen:   Soft, non-tender, bowel sounds active all four quadrants,  no masses, no organomegaly       Extremities: Extremities normal, atraumatic, no cyanosis   Pulses: 2+ and symmetric   Skin: Skin color, texture, turgor normal, no rashes or lesions   Pysch: Normal mood and affect   Neurologic: Normal gross motor and sensory exam.  Cranial nerves intact        Labs:     Lab Results   Component Value Date    WBC 7.4 01/04/2022    HGB 12.5 (L) 01/04/2022    HCT 38.4 (L) 01/04/2022    MCV 76.3 (L) 01/04/2022     01/04/2022     Lab Results   Component Value Date     01/19/2022    K 4.0 01/19/2022     01/19/2022    CO2 23 01/19/2022    BUN 25 (H) 01/19/2022    CREATININE 2.1 (H) 01/19/2022    GLUCOSE 97 01/19/2022    CALCIUM 10.1 01/19/2022    PROT 7.6 01/04/2022    LABALBU 4.0 01/04/2022    BILITOT 1.1 (H) 01/04/2022    ALKPHOS 249 (H) 01/04/2022    AST 33 01/04/2022    ALT 23 01/04/2022    LABGLOM 30 (A) 01/19/2022    GFRAA 37 (A) 01/19/2022    AGRATIO 1.1 01/04/2022    GLOB 3.2 07/17/2019         Lab Results   Component Value Date    CHOL 148 10/21/2021    CHOL 151 10/27/2020    CHOL 146 06/29/2019     Lab Results   Component Value Date    TRIG 75 10/21/2021    TRIG 81 10/27/2020    TRIG 83 06/29/2019     Lab Results   Component Value Date    HDL 75 (H) 10/21/2021    HDL 72 (H) 10/27/2020    HDL 65 (H) 06/29/2019     Lab Results   Component Value Date    LDLCALC 58 10/21/2021    LDLCALC 63 10/27/2020    LDLCALC 64 06/29/2019     Lab Results   Component Value Date    LABVLDL 15 10/21/2021    LABVLDL 16 10/27/2020    LABVLDL 17 06/29/2019     No results found for: [facility]    Lab Results   Component Value Date    INR 1.13 11/08/2019    INR 1.13 09/05/2019    INR 1.04 07/17/2019    PROTIME 12.9 11/08/2019    PROTIME 12.9 09/05/2019    PROTIME 11.8 07/17/2019       The ASCVD Risk score (Anthony Birch, et al., 2013) failed to calculate for the following reasons:     The 2013 ASCVD risk score is only valid for ages 36 to 78    The patient has a prior MI or stroke diagnosis      Assessment / Plan:      Diagnosis

## 2022-03-04 ENCOUNTER — HOSPITAL ENCOUNTER (EMERGENCY)
Age: 82
Discharge: HOME | End: 2022-03-04
Attending: EMERGENCY MEDICINE
Payer: MEDICARE

## 2022-03-04 ENCOUNTER — APPOINTMENT (OUTPATIENT)
Dept: CT IMAGING | Age: 82
End: 2022-03-04
Payer: MEDICARE

## 2022-03-04 VITALS
DIASTOLIC BLOOD PRESSURE: 88 MMHG | OXYGEN SATURATION: 98 % | RESPIRATION RATE: 18 BRPM | SYSTOLIC BLOOD PRESSURE: 158 MMHG | HEART RATE: 72 BPM | TEMPERATURE: 97.3 F

## 2022-03-04 DIAGNOSIS — K40.90 NON-RECURRENT INGUINAL HERNIA WITHOUT OBSTRUCTION OR GANGRENE, UNSPECIFIED LATERALITY: Primary | ICD-10-CM

## 2022-03-04 LAB
A/G RATIO: 1.1 (ref 1.1–2.2)
ALBUMIN SERPL-MCNC: 4 G/DL (ref 3.4–5)
ALP BLD-CCNC: 291 U/L (ref 40–129)
ALT SERPL-CCNC: 25 U/L (ref 10–40)
ANION GAP SERPL CALCULATED.3IONS-SCNC: 12 MMOL/L (ref 3–16)
AST SERPL-CCNC: 38 U/L (ref 15–37)
BACTERIA: ABNORMAL /HPF
BASOPHILS ABSOLUTE: 0.1 K/UL (ref 0–0.2)
BASOPHILS RELATIVE PERCENT: 0.8 %
BILIRUB SERPL-MCNC: 1.4 MG/DL (ref 0–1)
BILIRUBIN URINE: NEGATIVE
BLOOD, URINE: ABNORMAL
BUN BLDV-MCNC: 24 MG/DL (ref 7–20)
CALCIUM SERPL-MCNC: 10.5 MG/DL (ref 8.3–10.6)
CHLORIDE BLD-SCNC: 101 MMOL/L (ref 99–110)
CLARITY: CLEAR
CO2: 26 MMOL/L (ref 21–32)
COLOR: YELLOW
CREAT SERPL-MCNC: 2.2 MG/DL (ref 0.8–1.3)
EOSINOPHILS ABSOLUTE: 0 K/UL (ref 0–0.6)
EOSINOPHILS RELATIVE PERCENT: 0.1 %
EPITHELIAL CELLS, UA: ABNORMAL /HPF (ref 0–5)
GFR AFRICAN AMERICAN: 35
GFR NON-AFRICAN AMERICAN: 29
GLUCOSE BLD-MCNC: 139 MG/DL (ref 70–99)
GLUCOSE URINE: NEGATIVE MG/DL
HCT VFR BLD CALC: 40.2 % (ref 40.5–52.5)
HEMOGLOBIN: 13 G/DL (ref 13.5–17.5)
KETONES, URINE: NEGATIVE MG/DL
LEUKOCYTE ESTERASE, URINE: NEGATIVE
LIPASE: 33 U/L (ref 13–60)
LYMPHOCYTES ABSOLUTE: 0.9 K/UL (ref 1–5.1)
LYMPHOCYTES RELATIVE PERCENT: 12.3 %
MCH RBC QN AUTO: 25.2 PG (ref 26–34)
MCHC RBC AUTO-ENTMCNC: 32.3 G/DL (ref 31–36)
MCV RBC AUTO: 78 FL (ref 80–100)
MICROSCOPIC EXAMINATION: YES
MONOCYTES ABSOLUTE: 0.5 K/UL (ref 0–1.3)
MONOCYTES RELATIVE PERCENT: 7.1 %
NEUTROPHILS ABSOLUTE: 6 K/UL (ref 1.7–7.7)
NEUTROPHILS RELATIVE PERCENT: 79.7 %
NITRITE, URINE: POSITIVE
PDW BLD-RTO: 19.7 % (ref 12.4–15.4)
PH UA: 6 (ref 5–8)
PLATELET # BLD: 284 K/UL (ref 135–450)
PMV BLD AUTO: 8.3 FL (ref 5–10.5)
POTASSIUM REFLEX MAGNESIUM: 4.8 MMOL/L (ref 3.5–5.1)
PROTEIN UA: 100 MG/DL
RBC # BLD: 5.15 M/UL (ref 4.2–5.9)
RBC UA: ABNORMAL /HPF (ref 0–4)
SODIUM BLD-SCNC: 139 MMOL/L (ref 136–145)
SPECIFIC GRAVITY UA: >=1.03 (ref 1–1.03)
TOTAL PROTEIN: 7.6 G/DL (ref 6.4–8.2)
URINE TYPE: ABNORMAL
UROBILINOGEN, URINE: 1 E.U./DL
WBC # BLD: 7.5 K/UL (ref 4–11)
WBC UA: ABNORMAL /HPF (ref 0–5)

## 2022-03-04 PROCEDURE — 74177 CT ABD & PELVIS W/CONTRAST: CPT

## 2022-03-04 PROCEDURE — 99283 EMERGENCY DEPT VISIT LOW MDM: CPT

## 2022-03-04 PROCEDURE — 80053 COMPREHEN METABOLIC PANEL: CPT

## 2022-03-04 PROCEDURE — 36415 COLL VENOUS BLD VENIPUNCTURE: CPT

## 2022-03-04 PROCEDURE — 83690 ASSAY OF LIPASE: CPT

## 2022-03-04 PROCEDURE — 81001 URINALYSIS AUTO W/SCOPE: CPT

## 2022-03-04 PROCEDURE — 6360000004 HC RX CONTRAST MEDICATION: Performed by: PHYSICIAN ASSISTANT

## 2022-03-04 PROCEDURE — 85025 COMPLETE CBC W/AUTO DIFF WBC: CPT

## 2022-03-04 RX ADMIN — IOPAMIDOL 80 ML: 755 INJECTION, SOLUTION INTRAVENOUS at 16:08

## 2022-03-04 ASSESSMENT — PAIN DESCRIPTION - PAIN TYPE: TYPE: ACUTE PAIN

## 2022-03-04 ASSESSMENT — ENCOUNTER SYMPTOMS
VOMITING: 0
DIARRHEA: 0
CONSTIPATION: 0
RESPIRATORY NEGATIVE: 1
ABDOMINAL PAIN: 1

## 2022-03-04 ASSESSMENT — PAIN DESCRIPTION - LOCATION: LOCATION: ABDOMEN

## 2022-03-04 ASSESSMENT — PAIN DESCRIPTION - PROGRESSION: CLINICAL_PROGRESSION: NOT CHANGED

## 2022-03-04 ASSESSMENT — PAIN SCALES - GENERAL: PAINLEVEL_OUTOF10: 10

## 2022-03-04 ASSESSMENT — PAIN - FUNCTIONAL ASSESSMENT: PAIN_FUNCTIONAL_ASSESSMENT: 0-10

## 2022-03-04 ASSESSMENT — PAIN DESCRIPTION - ONSET: ONSET: SUDDEN

## 2022-03-04 ASSESSMENT — PAIN DESCRIPTION - FREQUENCY: FREQUENCY: CONTINUOUS

## 2022-03-04 ASSESSMENT — PAIN DESCRIPTION - ORIENTATION: ORIENTATION: MID

## 2022-03-04 ASSESSMENT — PAIN DESCRIPTION - DESCRIPTORS: DESCRIPTORS: SHARP

## 2022-03-04 NOTE — ED PROVIDER NOTES
[address] [facility] ENCOUNTER          PHYSICIAN ASSISTANT NOTE       Date of evaluation: 3/4/2022    Chief Complaint     Abdominal Pain (started this AM )      History of Present Illness     Rock De Oliveira is a 78 y.o. male who presents for abdominal pain. Patient presents for abdominal pain since this morning. Patient reports he has a \"bump\" on his abdomen that hurts. Patient reports he felt fine yesterday. Patient has not had much to eat today but no vomiting. No difficulty with urinating or with bowel movements. Patient reports history of inguinal hernia repair, no other abdominal surgeries. No fevers. Otherwise well. Review of Systems     Review of Systems   Constitutional: Negative. Respiratory: Negative. Cardiovascular: Negative. Gastrointestinal: Positive for abdominal pain. Negative for constipation, diarrhea and vomiting. Genitourinary: Negative. Negative for difficulty urinating. Musculoskeletal: Negative. Skin: Negative. Neurological: Negative. All other systems reviewed and are negative. Past Medical, Surgical, Family, and Social History     He has a past medical history of Anemia, Arthritis, CHF (congestive heart failure) ([address] Hyperlipidemia, Hypertension, and Type II or unspecified type diabetes mellitus without mention of complication, not stated as uncontrolled. He has a past surgical history that includes hernia repair; hip surgery; and other surgical history (N/A, 12/15/2015). His family history includes Cancer in his sister; Heart Disease in his father; High Blood Pressure in his father and mother. He reports that he has never smoked. He has never used smokeless tobacco. He reports that he does not drink alcohol and does not use drugs.     Medications     Previous Medications    ASPIRIN 81 MG EC TABLET    Take 1 tablet by mouth daily    ATORVASTATIN (LIPITOR) 40 MG TABLET    Take 1 tablet by mouth once daily    CARVEDILOL (COREG) 6.25 MG TABLET    Take 1 tablet by mouth 2 times daily    DORZOLAMIDE-TIMOLOL (COSOPT) 22.3-6.8 MG/ML OPHTHALMIC SOLUTION    INSTILL 1 DROP INTO EACH EYE EVERY 12 HOURS    FUROSEMIDE (LASIX) 40 MG TABLET    Take 1 tablet by mouth 2 times daily    ISOSORBIDE MONONITRATE (IMDUR) 30 MG EXTENDED RELEASE TABLET    Take 1 tablet by mouth once daily    JANUVIA 50 MG TABLET    Take 1 tablet by mouth once daily    LANTUS SOLOSTAR 100 UNIT/ML INJECTION PEN    INJECT 15 UNITS SUBCUTANEOUSLY NIGHTLY    LATANOPROST (XALATAN) 0.005 % OPHTHALMIC SOLUTION    INSTILL 1 DROP INTO EACH EYE ONCE DAILY IN THE EVENING    MM PEN NEEDLES 31G X 5 MM MISC    USE   SUBCUTANEOUSLY ONCE DAILY    POTASSIUM CHLORIDE (KLOR-CON) 10 MEQ EXTENDED RELEASE TABLET    Take 1 tablet by mouth once daily       Allergies     He has No Known Allergies. Physical Exam     INITIAL VITALS: BP: (!) 178/95, Temp: 97.3 °F (36.3 °C), Pulse: 73, Resp: 18, SpO2: 97 %  Physical Exam  Vitals and nursing note reviewed. Constitutional:       General: He is not in acute distress. Appearance: He is not ill-appearing. HENT:      Head: Normocephalic and atraumatic. Cardiovascular:      Rate and Rhythm: Normal rate and regular rhythm. Pulmonary:      Effort: Pulmonary effort is normal.      Breath sounds: Normal breath sounds. Abdominal:      General: Abdomen is flat. Palpations: Abdomen is soft. Tenderness: There is abdominal tenderness in the periumbilical area. There is no guarding or rebound. Hernia: A hernia is present. Hernia is present in the ventral area. Comments: No discoloration   Skin:     General: Skin is warm and dry. Neurological:      General: No focal deficit present. Mental Status: He is alert. Psychiatric:         Mood and Affect: Mood normal.         Behavior: Behavior normal.         Diagnostic Results         RADIOLOGY:  CT ABDOMEN PELVIS W IV CONTRAST Additional Contrast? None   Final Result   1.  Cardiomegaly with pericardial effusion and bilateral pleural effusions, right greater than left   2. Diffuse subcutis edema, anasarca with a small amount of abdominal pelvic ascites   3. Hiatal hernia is present   4. Umbilical hernia containing fat and now fluid in the presence of ascites   5. Prostatic enlargement with lateral wall thickening, chronic bladder outlet obstruction with right posterior diverticulum. 6. Sigmoid diverticulosis without diverticulitis   7. Abnormal thickening of the rectal wall which may be secondary to patient's venous congestion.  Endoscopy is suggested to exclude rectal neoplasm          LABS:   Results for orders placed or performed during the hospital encounter of 03/04/22   CBC with Auto Differential   Result Value Ref Range    WBC 7.5 4.0 - 11.0 K/uL    RBC 5.15 4.20 - 5.90 M/uL    Hemoglobin 13.0 (L) 13.5 - 17.5 g/dL    Hematocrit 40.2 (L) 40.5 - 52.5 %    MCV 78.0 (L) 80.0 - 100.0 fL    MCH 25.2 (L) 26.0 - 34.0 pg    MCHC 32.3 31.0 - 36.0 g/dL    RDW 19.7 (H) 12.4 - 15.4 %    Platelets 279 822 - 894 K/uL    MPV 8.3 5.0 - 10.5 fL    Neutrophils % 79.7 %    Lymphocytes % 12.3 %    Monocytes % 7.1 %    Eosinophils % 0.1 %    Basophils % 0.8 %    Neutrophils Absolute 6.0 1.7 - 7.7 K/uL    Lymphocytes Absolute 0.9 (L) 1.0 - 5.1 K/uL    Monocytes Absolute 0.5 0.0 - 1.3 K/uL    Eosinophils Absolute 0.0 0.0 - 0.6 K/uL    Basophils Absolute 0.1 0.0 - 0.2 K/uL   Comprehensive Metabolic Panel w/ Reflex to MG   Result Value Ref Range    Sodium 139 136 - 145 mmol/L    Potassium reflex Magnesium 4.8 3.5 - 5.1 mmol/L    Chloride 101 99 - 110 mmol/L    CO2 26 21 - 32 mmol/L    Anion Gap 12 3 - 16    Glucose 139 (H) 70 - 99 mg/dL    BUN 24 (H) 7 - 20 mg/dL    CREATININE 2.2 (H) 0.8 - 1.3 mg/dL    GFR Non-African American 29 (A) >60    GFR  35 (A) >60    Calcium 10.5 8.3 - 10.6 mg/dL    Total Protein 7.6 6.4 - 8.2 g/dL    Albumin 4.0 3.4 - 5.0 g/dL    Albumin/Globulin Ratio 1.1 1.1 - 2.2    Total Bilirubin 1.4 (H) 0.0 - 1.0 mg/dL    Alkaline Phosphatase 291 (H) 40 - 129 U/L    ALT 25 10 - 40 U/L    AST 38 (H) 15 - 37 U/L   Lipase   Result Value Ref Range    Lipase 33.0 13.0 - 60.0 U/L   Urinalysis with Microscopic   Result Value Ref Range    Color, UA Yellow Straw/Yellow    Clarity, UA Clear Clear    Glucose, Ur Negative Negative mg/dL    Bilirubin Urine Negative Negative    Ketones, Urine Negative Negative mg/dL    Specific Gravity, UA >=1.030 1.005 - 1.030    Blood, Urine SMALL (A) Negative    pH, UA 6.0 5.0 - 8.0    Protein,  (A) Negative mg/dL    Urobilinogen, Urine 1.0 <2.0 E.U./dL    Nitrite, Urine POSITIVE (A) Negative    Leukocyte Esterase, Urine Negative Negative    Microscopic Examination YES     Urine Type Voided     WBC, UA 3-5 0 - 5 /HPF    RBC, UA None seen 0 - 4 /HPF    Epithelial Cells, UA 2-5 0 - 5 /HPF    Bacteria, UA 4+ (A) None Seen /HPF       ED BEDSIDE ULTRASOUND:      RECENT VITALS:  BP: (!) 178/95, Temp: 97.3 °F (36.3 °C), Pulse: 73, Resp: 18, SpO2: 97 %     Procedures         ED Course     Nursing Notes, Past Medical Hx,Past Surgical Hx, Social Hx, Allergies, and Family Hx were reviewed. The patient was given the following medications:  Orders Placed This Encounter   Medications    iopamidol (ISOVUE-370) 76 % injection 80 mL       CONSULTS:  None    MEDICAL DECISION MAKING / ASSESSMENT / PLAN     Perla Rosario is a 78 y.o. male with abdominal pain. Patient is well-appearing and in no acute distress. Vitals normal with exception of hypertension. On physical exam the patient has a small ventral hernia with no overlying discoloration. The hernia was easily reduced at bedside. Afterwards the patient is unsure if his pain is improved and continues to have periumbilical tenderness. Labs and CT of the abdomen ordered. Patient is stable blood counts and renal function. LFTs baseline. Lipase normal.  CT of the abdomen with no acute intra-abdominal processes.   CT the abdomen does show several incidental findings. In terms of his pericardial fluid and pleural effusions, patient just saw cardiology a couple days ago and has remained stable with no complaints of chest pain or difficulty breathing and is stable on room air. Patient was also found to have thickening of his rectum and endoscopy was recommended. Patient and wife were informed of the incidental findings and encouraged to follow-up with primary care for direction on further evaluation of these findings. Patient reports about 30 minutes after reduction of the hernia his abdominal pain resolved. Patient instructed to follow-up with general surgery to discuss options regarding his hernia. Return precautions discussed. This patient was also evaluated by the attending physician. All care plans were discussed and agreed upon. Clinical Impression     1.  Non-recurrent inguinal hernia without obstruction or gangrene, unspecified laterality        Disposition     PATIENT REFERRED TO:  Joselyn Dalton MD  [address] [place] [address]  [phone]    Schedule an appointment as soon as possible for a visit       [address]  [place] [address] [place] [address]  [phone]    Schedule an appointment as soon as possible for a visit       [facility] Emergency Department  [address]  [address]  [phone]    As needed, If symptoms worsen      DISCHARGE MEDICATIONS:  New Prescriptions    No medications on file       DISPOSITION  discharge        Ana Recio PA-C  03/04/22 [id number]

## 2022-03-04 NOTE — ED PROVIDER NOTES
ED Attending Attestation Note     Date of evaluation: 3/4/2022    This patient was seen by the advance practice provider. I have seen and examined the patient, agree with the workup, evaluation, management and diagnosis. The care plan has been discussed. My assessment reveals an overall well-appearing elderly gentleman, pleasantly conversational, in no acute distress. The patient presented to the emergency department complaining of sudden onset of abdominal pain just above the umbilicus, associated with nausea and vomiting. At the time of the PAs examination, a palpable hernia was noted superior to the umbilicus, which was reduced at that time. At the time of my examination, the patient now states that his abdominal pain and nausea have resolved. A ventral defect is palpable superior to the umbilicus approximately in the midline, with no hernia contents at the time of my examination.          Farhad Rodriguez MD  03/04/22 [id number]

## 2022-03-04 NOTE — ED NOTES
Benefits outweigh risks of obtaining CT scan. This was discussed with the  Radiologist per request of the CT tech.      Brittaney Escoto PA-C  03/04/22 Amanda 1823 Atif Carmona PA-C  03/04/22 1420

## 2022-03-11 RX ORDER — ISOSORBIDE MONONITRATE 30 MG/1
TABLET, EXTENDED RELEASE ORAL
Qty: 90 TABLET | Refills: 1 | Status: SHIPPED | OUTPATIENT
Start: 2022-03-11 | End: 2022-05-13 | Stop reason: ALTCHOICE

## 2022-03-15 ENCOUNTER — OFFICE VISIT (OUTPATIENT)
Dept: FAMILY MEDICINE CLINIC | Age: 82
End: 2022-03-15
Payer: MEDICARE

## 2022-03-15 VITALS
OXYGEN SATURATION: 95 % | DIASTOLIC BLOOD PRESSURE: 74 MMHG | SYSTOLIC BLOOD PRESSURE: 120 MMHG | WEIGHT: 181 LBS | HEART RATE: 83 BPM | BODY MASS INDEX: 25.24 KG/M2

## 2022-03-15 DIAGNOSIS — R93.89 ABNORMAL CAT SCAN: ICD-10-CM

## 2022-03-15 DIAGNOSIS — K40.90 NON-RECURRENT UNILATERAL INGUINAL HERNIA WITHOUT OBSTRUCTION OR GANGRENE: Primary | ICD-10-CM

## 2022-03-15 DIAGNOSIS — Z09 HOSPITAL DISCHARGE FOLLOW-UP: ICD-10-CM

## 2022-03-15 PROBLEM — R60.0 LOCALIZED EDEMA: Status: RESOLVED | Noted: 2022-01-05 | Resolved: 2022-03-15

## 2022-03-15 PROBLEM — R06.02 SOB (SHORTNESS OF BREATH): Status: RESOLVED | Noted: 2022-01-05 | Resolved: 2022-03-15

## 2022-03-15 PROBLEM — R00.0 TACHYCARDIA: Status: RESOLVED | Noted: 2020-05-01 | Resolved: 2022-03-15

## 2022-03-15 PROCEDURE — 1123F ACP DISCUSS/DSCN MKR DOCD: CPT | Performed by: STUDENT IN AN ORGANIZED HEALTH CARE EDUCATION/TRAINING PROGRAM

## 2022-03-15 PROCEDURE — G8417 CALC BMI ABV UP PARAM F/U: HCPCS | Performed by: STUDENT IN AN ORGANIZED HEALTH CARE EDUCATION/TRAINING PROGRAM

## 2022-03-15 PROCEDURE — G8427 DOCREV CUR MEDS BY ELIG CLIN: HCPCS | Performed by: STUDENT IN AN ORGANIZED HEALTH CARE EDUCATION/TRAINING PROGRAM

## 2022-03-15 PROCEDURE — 4040F PNEUMOC VAC/ADMIN/RCVD: CPT | Performed by: STUDENT IN AN ORGANIZED HEALTH CARE EDUCATION/TRAINING PROGRAM

## 2022-03-15 PROCEDURE — 99213 OFFICE O/P EST LOW 20 MIN: CPT | Performed by: STUDENT IN AN ORGANIZED HEALTH CARE EDUCATION/TRAINING PROGRAM

## 2022-03-15 PROCEDURE — 1036F TOBACCO NON-USER: CPT | Performed by: STUDENT IN AN ORGANIZED HEALTH CARE EDUCATION/TRAINING PROGRAM

## 2022-03-15 PROCEDURE — G8484 FLU IMMUNIZE NO ADMIN: HCPCS | Performed by: STUDENT IN AN ORGANIZED HEALTH CARE EDUCATION/TRAINING PROGRAM

## 2022-03-15 NOTE — PATIENT INSTRUCTIONS
Repeat CT in a couple months    Call [phone] ([phone]) to schedule      Talk with hernia doctor about needing a pre op exam and labs/paperwork and come back for pre op exam

## 2022-03-15 NOTE — PROGRESS NOTES
Preoperative Consultation      Ira Gamez  YOB: 1940    Date of Service:  3/15/2022    Vitals:    03/15/22 1101   BP: (!) 150/95   Pulse: 83   SpO2: 95%   Weight: 181 lb (82.1 kg)      Wt Readings from Last 2 Encounters:   03/15/22 181 lb (82.1 kg)   03/02/22 182 lb 9.6 oz (82.8 kg)     BP Readings from Last 3 Encounters:   03/15/22 (!) 150/95   03/04/22 (!) 158/88   03/02/22 118/70        Chief Complaint   Patient presents with   Cristo Floyd Other     ER follow up     No Known Allergies  Outpatient Medications Marked as Taking for the 3/15/22 encounter (Office Visit) with Fady Stewart MD   Medication Sig Dispense Refill    isosorbide mononitrate (IMDUR) 30 MG extended release tablet Take 1 tablet by mouth once daily 90 tablet 1    dorzolamide-timolol (COSOPT) 22.3-6.8 MG/ML ophthalmic solution INSTILL 1 DROP INTO EACH EYE EVERY 12 HOURS      JANUVIA 50 MG tablet Take 1 tablet by mouth once daily 90 tablet 1    carvedilol (COREG) 6.25 MG tablet Take 1 tablet by mouth 2 times daily 180 tablet 3    furosemide (LASIX) 40 MG tablet Take 1 tablet by mouth 2 times daily 60 tablet 1    latanoprost (XALATAN) 0.005 % ophthalmic solution INSTILL 1 DROP INTO EACH EYE ONCE DAILY IN THE EVENING      atorvastatin (LIPITOR) 40 MG tablet Take 1 tablet by mouth once daily 90 tablet 0    LANTUS SOLOSTAR 100 UNIT/ML injection pen INJECT 15 UNITS SUBCUTANEOUSLY NIGHTLY 15 mL 0    potassium chloride (KLOR-CON) 10 MEQ extended release tablet Take 1 tablet by mouth once daily 60 tablet 5    MM PEN NEEDLES 31G X 5 MM MISC USE   SUBCUTANEOUSLY ONCE DAILY 100 each 5    aspirin 81 MG EC tablet Take 1 tablet by mouth daily 30 tablet 3       This patient presents to the office today for a preoperative consultation at the request of surgeon,  ***, who plans on performing inguinal hernia repair on {Time; month:10108} {Numbers; 0-31:40349} at [facility]:19898}.   The current problem began {NUMBERS 1-12:10} {DAY, DAYS, WEEK, WEEKS, MONTH, MONTHS, YEAR, YEARS:66778} ago, and symptoms have been {Desc; course:93765} with time. Conservative therapy: {desc; effective/ineffective:37860}. Planned anesthesia: {Procedures; anesthesia:812}   Known anesthesia problems: {ANESTHESIA PROBLEMS:20006}   Bleeding risk: {BLEEDING RISK:20010}  Personal or FH of DVT/PE: {NO/YES:8449509276::\"No\"}    Patient objection to receiving blood products: {NO/YES:9547578300::\"No\"}    Patient Active Problem List   Diagnosis    Controlled type 2 diabetes mellitus with stage 3 chronic kidney disease, without long-term current use of insulin ([facility])    Benign essential HTN    Pure hypercholesterolemia    Iron deficiency anemia due to chronic blood loss    Primary osteoarthritis of both knees    Vitamin D deficiency    Right BBB/left ant fasc block    Abnormal EKG    Chronic renal failure, stage 3 (moderate) ([facility])    Dilated cardiomyopathy ([facility])    Chronic systolic CHF (congestive heart failure) ([facility])    Chronic kidney disease    S/P coronary angiogram    Severe left ventricular hypertrophy    Tachycardia    HFrEF (heart failure with reduced ejection fraction) [facility] [facility]    NSTEMI (non-ST elevated myocardial infarction) ([facility] [address] [facility]    Wild-type transthyretin-related (ATTR) amyloidosis ([facility])    Localized edema    SOB (shortness of breath)    Non-recurrent unilateral inguinal hernia without obstruction or gangrene       Past Medical History:   Diagnosis Date    Anemia     Arthritis     MILD    CHF (congestive heart failure) ([facility])     EF 35-40%.  Non-ischemic     Hyperlipidemia     Hypertension     Type II or unspecified type diabetes mellitus without mention of complication, not stated as uncontrolled      Past Surgical History:   Procedure Laterality Date    HERNIA REPAIR      HIP SURGERY      OTHER SURGICAL HISTORY N/A 12/15/2015    LAPAROSCOPIC LEFT INGUINAL HERNIA REPAIR WITH MESH     Family History   Problem Relation Age of Onset    High Blood Pressure Mother     High Blood Pressure Father     Heart Disease Father         cad    Cancer Sister      Social History     Socioeconomic History    Marital status:      Spouse name: Not on file    Number of children: Not on file    Years of education: Not on file    Highest education level: Not on file   Occupational History    Not on file   Tobacco Use    Smoking status: Never Smoker    Smokeless tobacco: Never Used   Vaping Use    Vaping Use: Never used   Substance and Sexual Activity    Alcohol use: No    Drug use: No    Sexual activity: Not Currently   Other Topics Concern    Not on file   Social History Narrative    Not on file     Social Determinants of Health     Financial Resource Strain: Low Risk     Difficulty of Paying Living Expenses: Not hard at all   Food Insecurity: No Food Insecurity    Worried About [address] in the Last Year: Never true    [address] [employer] in the Last Year: Never true   Transportation Needs:     Lack of Transportation (Medical): Not on file    Lack of Transportation (Non-Medical):  Not on file   Physical Activity:     Days of Exercise per Week: Not on file    Minutes of Exercise per Session: Not on file   Stress:     Feeling of Stress : Not on file   Social Connections:     Frequency of Communication with Friends and Family: Not on file    Frequency of Social Gatherings with Friends and Family: Not on file    Attends Adventism Services: Not on file    Active Member of Clubs or Organizations: Not on file    Attends Club or Organization Meetings: Not on file    Marital Status: Not on file   Intimate Partner Violence:     Fear of Current or Ex-Partner: Not on file    Emotionally Abused: Not on file    Physically Abused: Not on file    Sexually Abused: Not on file   Housing Stability:     Unable to Pay for Housing in the Last Year: Not on file    Number of [place] in the Last Year: Not on file    Unstable Housing in the Last Year: Not on file       Review of Systems  {ROS Comprehensive:27073::\"A comprehensive review of systems was negative except for what was noted in the HPI. \"}     Physical Exam   Constitutional: He is oriented to person, place, and time. He appears well-developed and well-nourished. No distress. HENT:   Head: Normocephalic and atraumatic. Mouth/Throat: Uvula is midline, oropharynx is clear and moist and mucous membranes are normal.   Eyes: Conjunctivae and EOM are normal. Pupils are equal, round, and reactive to light. Neck: Trachea normal and normal range of motion. Neck supple. No JVD present. Carotid bruit is not present. No mass and no thyromegaly present. Cardiovascular: Normal rate, regular rhythm, normal heart sounds and intact distal pulses. Exam reveals no gallop and no friction rub. No murmur heard. Pulmonary/Chest: Effort normal and breath sounds normal. No respiratory distress. He has no wheezes. He has no rales. Abdominal: Soft. Normal aorta and bowel sounds are normal. He exhibits no distension and no mass. There is no hepatosplenomegaly. No tenderness. Musculoskeletal: He exhibits no edema and no tenderness. Neurological: He is alert and oriented to person, place, and time. He has normal strength. No cranial nerve deficit or sensory deficit. Coordination and gait normal.   Skin: Skin is warm and dry. No rash noted. No erythema. Psychiatric: He has a normal mood and affect. His behavior is normal.     EKG Interpretation:  {ekg findings:278748::\"normal EKG, normal sinus rhythm\",\"unchanged from previous tracings\"}. Lab Review {Recent NNQ::\"LVS applicable\"}        Assessment:       78 y.o. patient with planned surgery as above. Known risk factors for perioperative complications: {PERIOPERATIVE RISK FACTORS:}  Current medications which may produce withdrawal symptoms if withheld perioperatively: {NONE DEFAULTED:32343::\"none\"}      Plan:     1. Preoperative workup as follows: {STUDIES; PRE-OP S/A:54233}  2. Change in medication regimen before surgery: {MEDICATION REGIMEN BEFORE SURGERY:20007}  3. Prophylaxis for cardiac events with perioperative beta-blockers: {PROPHYLAXIS CARDIAC EVENTS W PERIOPERATIVE BETA-BLOCKERS:20011}  ACC/AHA indications for pre-operative beta-blocker use:    · Vascular surgery with history of postitive stress test  · Intermediate or high risk surgery with history of CAD   · Intermediate or high risk surgery with multiple clinical predictors of CAD- 2 of the following: history of compensated or prior heart failure, history of cerebrovascular disease, DM, or renal insufficiency    Routine administration of higher-dose, long-acting metoprolol in beta-blockernaïve patients on the day of surgery, and in the absence of dose titration is associated with an overall increase in mortality.   Beta-blockers should be started days to weeks prior to surgery and titrated to pulse < 70.  4. Deep vein thrombosis prophylaxis: {plan; dvt prophylaxis:36076}  5. {Preop assessment:86449}

## 2022-03-15 NOTE — ASSESSMENT & PLAN NOTE
Has appointment w/ Dr. Jose Raul Armstrong 4/14/22  Planning to get inguinal hernia repair  If needs preop exam before this to follow up for preop and touch base w/ cardiology about cards clearance

## 2022-03-15 NOTE — PROGRESS NOTES
[address] [facility] Note    Date: 3/15/2022      Assessment/Plan:     1. Non-recurrent unilateral inguinal hernia without obstruction or gangrene    2. Abnormal CAT scan    3. Hospital discharge follow-up        Orders Placed This Encounter   Procedures    CT ABDOMEN PELVIS WO CONTRAST Additional Contrast? Radiologist Recommendation   Angelica Costello MD, Gastroenterology, [facility]     No orders of the defined types were placed in this encounter. Non-recurrent unilateral inguinal hernia without obstruction or gangrene  Has appointment w/ Dr. Asael Singletary 4/14/22  Planning to get inguinal hernia repair  If needs preop exam before this to follow up for preop and touch base w/ cardiology about cards clearance    Abnormal CAT scan  Recommend following up with GI for rectal thickening seen on CT scan-- GI referral placed, he wants to think about this, at this time he wants to wait on GI and repeat the CT scan to see if still there as it may have been due to venous congestion. Return in about 3 months (around 6/15/2022). Discussed medication(s) risks, benefits, side effects, adverse reactions and interactions with patient. Patient voiced understanding. Subjective/Objective:     Chief Complaint   Patient presents with    Other     ER follow up       HPI  See Assessment/Plan for further HPI info    Seen at ED for inguinal hernia that was reducible on right side, had left inguinal hernia repair before  No abdominal pain/nausea/vomiting and having regular BMs now. Feels better now. Incidental findings on CT abdomen of rectal thickening  To get colonoscopy per imaging w/ rectum thickening    Swelling of lower extremities improved    Wt Readings from Last 3 Encounters:   03/15/22 181 lb (82.1 kg)   03/02/22 182 lb 9.6 oz (82.8 kg)   01/28/22 182 lb 3.2 oz (82.6 kg)     Body mass index is 25.24 kg/m².     BP Readings from Last 3 Encounters: 03/15/22 120/74   03/04/22 (!) 158/88   03/02/22 118/70     The ASCVD Risk score (Dylan Billy, et al., 2013) failed to calculate for the following reasons: The 2013 ASCVD risk score is only valid for ages 36 to 78    The patient has a prior MI or stroke diagnosis    ROS: denies nausea/vomiting, fevers, chills, chest pain, shortness of breath, diarrhea, constipation, blood in the urine or stool         Patient Active Problem List   Diagnosis    Controlled type 2 diabetes mellitus with stage 3 chronic kidney disease, without long-term current use of insulin (HCC)    Benign essential HTN    Pure hypercholesterolemia    Iron deficiency anemia due to chronic blood loss    Primary osteoarthritis of both knees    Vitamin D deficiency    Right BBB/left ant fasc block    Abnormal EKG    Chronic renal failure, stage 3 (moderate) (HCC)    Dilated cardiomyopathy (HCC)    Chronic systolic CH[facility] (congestive heart failure) [facility]    Chronic kidney disease    S/P coronary angiogram    Severe left ventricular hypertrophy    HFrEF (heart failure with reduced ejection fraction) [facility] [address]    NSTEMI (non-ST elevated myocardial infarction) ([facility] [address]    Wild-type transthyretin-related (ATTR) amyloidosis ([facility] [address]    Non-recurrent unilateral inguinal hernia without obstruction or gangrene    Abnormal CAT scan     Past Medical History:   Diagnosis Date    Anemia     Arthritis     MILD    CHF (congestive heart failure) ([facility])     EF 35-40%.  Non-ischemic     Hyperlipidemia     Hypertension     Type II or unspecified type diabetes mellitus without mention of complication, not stated as uncontrolled        Past Surgical History:   Procedure Laterality Date    HERNIA REPAIR      HIP SURGERY      OTHER SURGICAL HISTORY N/A 12/15/2015    LAPAROSCOPIC LEFT INGUINAL HERNIA REPAIR WITH MESH       Current Outpatient Medications   Medication Sig Dispense Refill    isosorbide mononitrate (IMDUR) 30 MG extended release tablet Take 1 tablet by mouth once daily 90 tablet 1    dorzolamide-timolol (COSOPT) 22.3-6.8 MG/ML ophthalmic solution INSTILL 1 DROP INTO EACH EYE EVERY 12 HOURS      JANUVIA 50 MG tablet Take 1 tablet by mouth once daily 90 tablet 1    carvedilol (COREG) 6.25 MG tablet Take 1 tablet by mouth 2 times daily 180 tablet 3    furosemide (LASIX) 40 MG tablet Take 1 tablet by mouth 2 times daily 60 tablet 1    latanoprost (XALATAN) 0.005 % ophthalmic solution INSTILL 1 DROP INTO EACH EYE ONCE DAILY IN THE EVENING      atorvastatin (LIPITOR) 40 MG tablet Take 1 tablet by mouth once daily 90 tablet 0    LANTUS SOLOSTAR 100 UNIT/ML injection pen INJECT 15 UNITS SUBCUTANEOUSLY NIGHTLY 15 mL 0    potassium chloride (KLOR-CON) 10 MEQ extended release tablet Take 1 tablet by mouth once daily 60 tablet 5    MM PEN NEEDLES 31G X 5 MM MISC USE   SUBCUTANEOUSLY ONCE DAILY 100 each 5    aspirin 81 MG EC tablet Take 1 tablet by mouth daily 30 tablet 3     No current facility-administered medications for this visit. No Known Allergies    Social History     Socioeconomic History    Marital status:      Spouse name: None    Number of children: None    Years of education: None    Highest education level: None   Occupational History    None   Tobacco Use    Smoking status: Never Smoker    Smokeless tobacco: Never Used   Vaping Use    Vaping Use: Never used   Substance and Sexual Activity    Alcohol use: No    Drug use: No    Sexual activity: Not Currently   Other Topics Concern    None   Social History Narrative    None     Social Determinants of Health     Financial Resource Strain: Low Risk     Difficulty of Paying Living Expenses: Not hard at all   Food Insecurity: No Food Insecurity    Worried About Running Out of Food in the Last Year: Never true    [place] of Food in the Last Year: Never true   Transportation Needs:     Lack of Transportation (Medical):  Not on file    Lack of Transportation (Non-Medical): Not on file   Physical Activity:     Days of Exercise per Week: Not on file    Minutes of Exercise per Session: Not on file   Stress:     Feeling of Stress : Not on file   Social Connections:     Frequency of Communication with Friends and Family: Not on file    Frequency of Social Gatherings with Friends and Family: Not on file    Attends Mandaen Services: Not on file    Active Member of [address] [place] [address] or Organizations: Not on file    Attends Club or Organization Meetings: Not on file    Marital Status: Not on file   Intimate Partner Violence:     Fear of Current or Ex-Partner: Not on file    Emotionally Abused: Not on file    Physically Abused: Not on file    Sexually Abused: Not on file   Housing Stability:     Unable to Pay for Housing in the Last Year: Not on file    Number of [place] in the Last Year: Not on file    Unstable Housing in the Last Year: Not on file     Family History   Problem Relation Age of Onset    High Blood Pressure Mother     High Blood Pressure Father     Heart Disease Father         cad    Cancer Sister          Vitals:  /74   Pulse 83   Wt 181 lb (82.1 kg)   SpO2 95%   BMI 25.24 kg/m²     Physical Exam   General:  Well-appearing, no acute distress, alert, non-toxic  HEENT:  Normocephalic, atraumatic, without lymphadenopathy, EOMI, neck supple  Cardiovascular: normal heart rate, normal rhythm, no murmurs, rubs or gallops  Respiratory: normal breath sounds, good air movement, no respiratory distress, no wheezing, rales or rhonchi  GI: bowel sounds normal, soft, non-distended, no tenderness, no masses or peritoneal signs  Extremities: intact distal pulses, warm, dry, well perfused, without clubbing, cyanosis or edema, normal movement of all extremities. No joint swelling, deformity or tenderness.   Skin:  No rash, warm and dry  PSYCH:  alert and oriented x 3; normal affect  NEURO:  CN2-12 grossly intact, normal motor function, normal sensory function, normal speech, no gross focal deficits noted, gait within normal    Brennon Cartwright MD    3/15/2022 5:12 PM    Documentation was done using voice recognition dragon software. Every effort was made to ensure accuracy; however, inadvertent, unintentional computerized transcription errors may be present.

## 2022-03-15 NOTE — ASSESSMENT & PLAN NOTE
Recommend following up with GI for rectal thickening seen on CT scan-- GI referral placed, he wants to think about this, at this time he wants to wait on GI and repeat the CT scan to see if still there as it may have been due to venous congestion.

## 2022-03-23 RX ORDER — INSULIN GLARGINE 100 [IU]/ML
INJECTION, SOLUTION SUBCUTANEOUS
Qty: 15 ML | Refills: 0 | Status: SHIPPED | OUTPATIENT
Start: 2022-03-23 | End: 2022-06-29 | Stop reason: SDUPTHER

## 2022-03-23 NOTE — TELEPHONE ENCOUNTER
Medication:   Requested Prescriptions     Pending Prescriptions Disp Refills    LANTUS SOLOSTAR 100 UNIT/ML injection pen [Pharmacy Med Name: Lantus SoloStar 100 UNIT/ML Subcutaneous Solution Pen-injector] 15 mL 0     Sig: INJECT 15 UNITS SUBCUTANEOUSLY NIGHTLY       Last Filled:  12/2/2021    Patient Phone Number: [phone] (home)     Last appt: 3/15/2022   Next appt: 4/19/2022    Last Labs DM:   Lab Results   Component Value Date    LABA1C 6.0 10/21/2021

## 2022-04-14 ENCOUNTER — OFFICE VISIT (OUTPATIENT)
Dept: BARIATRICS/WEIGHT MGMT | Age: 82
End: 2022-04-14
Payer: MEDICARE

## 2022-04-14 VITALS
HEART RATE: 89 BPM | SYSTOLIC BLOOD PRESSURE: 139 MMHG | BODY MASS INDEX: 25.34 KG/M2 | WEIGHT: 181 LBS | HEIGHT: 71 IN | DIASTOLIC BLOOD PRESSURE: 90 MMHG

## 2022-04-14 DIAGNOSIS — K43.9 VENTRAL HERNIA WITHOUT OBSTRUCTION OR GANGRENE: Primary | ICD-10-CM

## 2022-04-14 DIAGNOSIS — I50.43 ACUTE ON CHRONIC COMBINED SYSTOLIC AND DIASTOLIC HEART FAILURE (HCC): ICD-10-CM

## 2022-04-14 PROCEDURE — 1123F ACP DISCUSS/DSCN MKR DOCD: CPT | Performed by: SURGERY

## 2022-04-14 PROCEDURE — 4040F PNEUMOC VAC/ADMIN/RCVD: CPT | Performed by: SURGERY

## 2022-04-14 PROCEDURE — 99203 OFFICE O/P NEW LOW 30 MIN: CPT | Performed by: SURGERY

## 2022-04-14 PROCEDURE — 1036F TOBACCO NON-USER: CPT | Performed by: SURGERY

## 2022-04-14 PROCEDURE — G8427 DOCREV CUR MEDS BY ELIG CLIN: HCPCS | Performed by: SURGERY

## 2022-04-14 PROCEDURE — G8417 CALC BMI ABV UP PARAM F/U: HCPCS | Performed by: SURGERY

## 2022-04-14 RX ORDER — FUROSEMIDE 40 MG/1
TABLET ORAL
Qty: 60 TABLET | Refills: 0 | Status: SHIPPED | OUTPATIENT
Start: 2022-04-14 | End: 2022-04-26 | Stop reason: SDUPTHER

## 2022-04-14 NOTE — TELEPHONE ENCOUNTER
Medication:   Requested Prescriptions     Pending Prescriptions Disp Refills    furosemide (LASIX) 40 MG tablet [Pharmacy Med Name: Furosemide 40 MG Oral Tablet] 60 tablet 0     Sig: Take 1 tablet by mouth twice daily       Last Filled:  1/18/2022    Patient Phone Number: [phone] (home)     Last appt: 3/15/2022   Next appt: 4/19/2022    Lab Results   Component Value Date     03/04/2022    K 4.8 03/04/2022     03/04/2022    CO2 26 03/04/2022    BUN 24 (H) 03/04/2022    CREATININE 2.2 (H) 03/04/2022    GLUCOSE 139 (H) 03/04/2022    CALCIUM 10.5 03/04/2022    PROT 7.6 03/04/2022    LABALBU 4.0 03/04/2022    BILITOT 1.4 (H) 03/04/2022    ALKPHOS 291 (H) 03/04/2022    AST 38 (H) 03/04/2022    ALT 25 03/04/2022    LABGLOM 29 (A) 03/04/2022    GFRAA 35 (A) 03/04/2022    AGRATIO 1.1 03/04/2022    GLOB 3.2 07/17/2019

## 2022-04-16 ENCOUNTER — APPOINTMENT (OUTPATIENT)
Dept: GENERAL RADIOLOGY | Age: 82
DRG: 291 | End: 2022-04-16
Payer: MEDICARE

## 2022-04-16 ENCOUNTER — HOSPITAL ENCOUNTER (INPATIENT)
Age: 82
LOS: 3 days | Discharge: HOME | DRG: 291 | End: 2022-04-19
Attending: EMERGENCY MEDICINE | Admitting: INTERNAL MEDICINE
Payer: MEDICARE

## 2022-04-16 DIAGNOSIS — I50.43 ACUTE ON CHRONIC COMBINED SYSTOLIC AND DIASTOLIC HEART FAILURE (HCC): ICD-10-CM

## 2022-04-16 DIAGNOSIS — I50.20 HFREF (HEART FAILURE WITH REDUCED EJECTION FRACTION) (HCC): ICD-10-CM

## 2022-04-16 DIAGNOSIS — I50.9 ACUTE ON CHRONIC CONGESTIVE HEART FAILURE, UNSPECIFIED HEART FAILURE TYPE (HCC): Primary | ICD-10-CM

## 2022-04-16 LAB
ANION GAP SERPL CALCULATED.3IONS-SCNC: 11 MMOL/L (ref 3–16)
BASE EXCESS VENOUS: -0.3 MMOL/L (ref -2–3)
BASOPHILS ABSOLUTE: 0 K/UL (ref 0–0.2)
BASOPHILS RELATIVE PERCENT: 0.9 %
BUN BLDV-MCNC: 24 MG/DL (ref 7–20)
CALCIUM SERPL-MCNC: 10 MG/DL (ref 8.3–10.6)
CARBOXYHEMOGLOBIN: 1.9 % (ref 0–1.5)
CHLORIDE BLD-SCNC: 102 MMOL/L (ref 99–110)
CO2: 23 MMOL/L (ref 21–32)
CREAT SERPL-MCNC: 1.9 MG/DL (ref 0.8–1.3)
EKG ATRIAL RATE: 80 BPM
EKG DIAGNOSIS: NORMAL
EKG P AXIS: 69 DEGREES
EKG P-R INTERVAL: 246 MS
EKG Q-T INTERVAL: 476 MS
EKG QRS DURATION: 164 MS
EKG QTC CALCULATION (BAZETT): 548 MS
EKG R AXIS: -85 DEGREES
EKG T AXIS: 78 DEGREES
EKG VENTRICULAR RATE: 80 BPM
EOSINOPHILS ABSOLUTE: 0 K/UL (ref 0–0.6)
EOSINOPHILS RELATIVE PERCENT: 0.3 %
GFR AFRICAN AMERICAN: 41
GFR NON-AFRICAN AMERICAN: 34
GLUCOSE BLD-MCNC: 125 MG/DL (ref 70–99)
GLUCOSE BLD-MCNC: 145 MG/DL (ref 70–99)
GLUCOSE BLD-MCNC: 82 MG/DL (ref 70–99)
HCO3 VENOUS: 24.7 MMOL/L (ref 24–28)
HCT VFR BLD CALC: 39 % (ref 40.5–52.5)
HEMOGLOBIN, VEN, REDUCED: 45 %
HEMOGLOBIN: 12.6 G/DL (ref 13.5–17.5)
LYMPHOCYTES ABSOLUTE: 0.9 K/UL (ref 1–5.1)
LYMPHOCYTES RELATIVE PERCENT: 15.9 %
MCH RBC QN AUTO: 24.9 PG (ref 26–34)
MCHC RBC AUTO-ENTMCNC: 32.3 G/DL (ref 31–36)
MCV RBC AUTO: 77.3 FL (ref 80–100)
METHEMOGLOBIN VENOUS: 0.3 % (ref 0–1.5)
MONOCYTES ABSOLUTE: 0.6 K/UL (ref 0–1.3)
MONOCYTES RELATIVE PERCENT: 11.7 %
NEUTROPHILS ABSOLUTE: 3.9 K/UL (ref 1.7–7.7)
NEUTROPHILS RELATIVE PERCENT: 71.2 %
O2 SAT, VEN: 54 %
PCO2, VEN: 41 MMHG (ref 41–51)
PDW BLD-RTO: 20.5 % (ref 12.4–15.4)
PERFORMED ON: ABNORMAL
PERFORMED ON: NORMAL
PH VENOUS: 7.39 (ref 7.35–7.45)
PLATELET # BLD: 266 K/UL (ref 135–450)
PLATELET SLIDE REVIEW: ADEQUATE
PMV BLD AUTO: 9.3 FL (ref 5–10.5)
PO2, VEN: 31 MMHG (ref 25–40)
POTASSIUM REFLEX MAGNESIUM: 3.9 MMOL/L (ref 3.5–5.1)
PRO-BNP: ABNORMAL PG/ML (ref 0–449)
RBC # BLD: 5.05 M/UL (ref 4.2–5.9)
SLIDE REVIEW: ABNORMAL
SODIUM BLD-SCNC: 136 MMOL/L (ref 136–145)
TCO2 CALC VENOUS: 26 MMOL/L
TROPONIN: 0.08 NG/ML
TROPONIN: 0.09 NG/ML
TROPONIN: 0.1 NG/ML
TSH REFLEX: 3.27 UIU/ML (ref 0.27–4.2)
WBC # BLD: 5.4 K/UL (ref 4–11)

## 2022-04-16 PROCEDURE — 85025 COMPLETE CBC W/AUTO DIFF WBC: CPT

## 2022-04-16 PROCEDURE — 6370000000 HC RX 637 (ALT 250 FOR IP): Performed by: STUDENT IN AN ORGANIZED HEALTH CARE EDUCATION/TRAINING PROGRAM

## 2022-04-16 PROCEDURE — 1200000000 HC SEMI PRIVATE

## 2022-04-16 PROCEDURE — 96374 THER/PROPH/DIAG INJ IV PUSH: CPT

## 2022-04-16 PROCEDURE — 80048 BASIC METABOLIC PNL TOTAL CA: CPT

## 2022-04-16 PROCEDURE — 36415 COLL VENOUS BLD VENIPUNCTURE: CPT

## 2022-04-16 PROCEDURE — 99285 EMERGENCY DEPT VISIT HI MDM: CPT

## 2022-04-16 PROCEDURE — 6360000002 HC RX W HCPCS: Performed by: STUDENT IN AN ORGANIZED HEALTH CARE EDUCATION/TRAINING PROGRAM

## 2022-04-16 PROCEDURE — 84484 ASSAY OF TROPONIN QUANT: CPT

## 2022-04-16 PROCEDURE — 93005 ELECTROCARDIOGRAM TRACING: CPT | Performed by: STUDENT IN AN ORGANIZED HEALTH CARE EDUCATION/TRAINING PROGRAM

## 2022-04-16 PROCEDURE — 82803 BLOOD GASES ANY COMBINATION: CPT

## 2022-04-16 PROCEDURE — 71046 X-RAY EXAM CHEST 2 VIEWS: CPT

## 2022-04-16 PROCEDURE — 83880 ASSAY OF NATRIURETIC PEPTIDE: CPT

## 2022-04-16 PROCEDURE — 2580000003 HC RX 258: Performed by: STUDENT IN AN ORGANIZED HEALTH CARE EDUCATION/TRAINING PROGRAM

## 2022-04-16 PROCEDURE — 84443 ASSAY THYROID STIM HORMONE: CPT

## 2022-04-16 RX ORDER — ISOSORBIDE MONONITRATE 30 MG/1
30 TABLET, EXTENDED RELEASE ORAL DAILY
Status: DISCONTINUED | OUTPATIENT
Start: 2022-04-17 | End: 2022-04-18

## 2022-04-16 RX ORDER — ATORVASTATIN CALCIUM 40 MG/1
40 TABLET, FILM COATED ORAL NIGHTLY
Status: DISCONTINUED | OUTPATIENT
Start: 2022-04-16 | End: 2022-04-19 | Stop reason: HOSPADM

## 2022-04-16 RX ORDER — FUROSEMIDE 10 MG/ML
80 INJECTION INTRAMUSCULAR; INTRAVENOUS ONCE
Status: COMPLETED | OUTPATIENT
Start: 2022-04-16 | End: 2022-04-16

## 2022-04-16 RX ORDER — SODIUM CHLORIDE 0.9 % (FLUSH) 0.9 %
5-40 SYRINGE (ML) INJECTION EVERY 12 HOURS SCHEDULED
Status: DISCONTINUED | OUTPATIENT
Start: 2022-04-16 | End: 2022-04-19 | Stop reason: HOSPADM

## 2022-04-16 RX ORDER — INSULIN LISPRO 100 [IU]/ML
0-3 INJECTION, SOLUTION INTRAVENOUS; SUBCUTANEOUS NIGHTLY
Status: DISCONTINUED | OUTPATIENT
Start: 2022-04-16 | End: 2022-04-19 | Stop reason: HOSPADM

## 2022-04-16 RX ORDER — DEXTROSE MONOHYDRATE 25 G/50ML
12.5 INJECTION, SOLUTION INTRAVENOUS PRN
Status: DISCONTINUED | OUTPATIENT
Start: 2022-04-16 | End: 2022-04-16 | Stop reason: RX

## 2022-04-16 RX ORDER — ASPIRIN 81 MG/1
81 TABLET ORAL DAILY
Status: DISCONTINUED | OUTPATIENT
Start: 2022-04-16 | End: 2022-04-19 | Stop reason: HOSPADM

## 2022-04-16 RX ORDER — FUROSEMIDE 10 MG/ML
40 INJECTION INTRAMUSCULAR; INTRAVENOUS 2 TIMES DAILY
Status: DISCONTINUED | OUTPATIENT
Start: 2022-04-17 | End: 2022-04-19

## 2022-04-16 RX ORDER — SODIUM CHLORIDE 0.9 % (FLUSH) 0.9 %
5-40 SYRINGE (ML) INJECTION PRN
Status: DISCONTINUED | OUTPATIENT
Start: 2022-04-16 | End: 2022-04-19 | Stop reason: HOSPADM

## 2022-04-16 RX ORDER — DEXTROSE MONOHYDRATE 50 MG/ML
100 INJECTION, SOLUTION INTRAVENOUS PRN
Status: DISCONTINUED | OUTPATIENT
Start: 2022-04-16 | End: 2022-04-19 | Stop reason: HOSPADM

## 2022-04-16 RX ORDER — NICOTINE POLACRILEX 4 MG
15 LOZENGE BUCCAL PRN
Status: DISCONTINUED | OUTPATIENT
Start: 2022-04-16 | End: 2022-04-16 | Stop reason: RX

## 2022-04-16 RX ORDER — POLYETHYLENE GLYCOL 3350 17 G/17G
17 POWDER, FOR SOLUTION ORAL DAILY PRN
Status: DISCONTINUED | OUTPATIENT
Start: 2022-04-16 | End: 2022-04-18

## 2022-04-16 RX ORDER — ACETAMINOPHEN 650 MG/1
650 SUPPOSITORY RECTAL EVERY 6 HOURS PRN
Status: DISCONTINUED | OUTPATIENT
Start: 2022-04-16 | End: 2022-04-19 | Stop reason: HOSPADM

## 2022-04-16 RX ORDER — HEPARIN SODIUM 5000 [USP'U]/ML
5000 INJECTION, SOLUTION INTRAVENOUS; SUBCUTANEOUS EVERY 8 HOURS SCHEDULED
Status: DISCONTINUED | OUTPATIENT
Start: 2022-04-16 | End: 2022-04-19 | Stop reason: HOSPADM

## 2022-04-16 RX ORDER — LATANOPROST 50 UG/ML
1 SOLUTION/ DROPS OPHTHALMIC NIGHTLY
Status: DISCONTINUED | OUTPATIENT
Start: 2022-04-16 | End: 2022-04-19 | Stop reason: HOSPADM

## 2022-04-16 RX ORDER — ACETAMINOPHEN 325 MG/1
650 TABLET ORAL EVERY 6 HOURS PRN
Status: DISCONTINUED | OUTPATIENT
Start: 2022-04-16 | End: 2022-04-19 | Stop reason: HOSPADM

## 2022-04-16 RX ORDER — CARVEDILOL 6.25 MG/1
6.25 TABLET ORAL 2 TIMES DAILY
Status: DISCONTINUED | OUTPATIENT
Start: 2022-04-16 | End: 2022-04-19 | Stop reason: HOSPADM

## 2022-04-16 RX ORDER — DORZOLAMIDE HYDROCHLORIDE AND TIMOLOL MALEATE 20; 5 MG/ML; MG/ML
1 SOLUTION/ DROPS OPHTHALMIC EVERY 12 HOURS SCHEDULED
Status: DISCONTINUED | OUTPATIENT
Start: 2022-04-16 | End: 2022-04-19 | Stop reason: HOSPADM

## 2022-04-16 RX ORDER — ONDANSETRON 4 MG/1
4 TABLET, ORALLY DISINTEGRATING ORAL EVERY 8 HOURS PRN
Status: DISCONTINUED | OUTPATIENT
Start: 2022-04-16 | End: 2022-04-19 | Stop reason: HOSPADM

## 2022-04-16 RX ORDER — SODIUM CHLORIDE 9 MG/ML
INJECTION, SOLUTION INTRAVENOUS PRN
Status: DISCONTINUED | OUTPATIENT
Start: 2022-04-16 | End: 2022-04-19 | Stop reason: HOSPADM

## 2022-04-16 RX ORDER — ONDANSETRON 2 MG/ML
4 INJECTION INTRAMUSCULAR; INTRAVENOUS EVERY 6 HOURS PRN
Status: DISCONTINUED | OUTPATIENT
Start: 2022-04-16 | End: 2022-04-19 | Stop reason: HOSPADM

## 2022-04-16 RX ORDER — INSULIN LISPRO 100 [IU]/ML
0-6 INJECTION, SOLUTION INTRAVENOUS; SUBCUTANEOUS
Status: DISCONTINUED | OUTPATIENT
Start: 2022-04-16 | End: 2022-04-19 | Stop reason: HOSPADM

## 2022-04-16 RX ADMIN — CARVEDILOL 6.25 MG: 6.25 TABLET, FILM COATED ORAL at 16:59

## 2022-04-16 RX ADMIN — FUROSEMIDE 80 MG: 10 INJECTION, SOLUTION INTRAMUSCULAR; INTRAVENOUS at 14:32

## 2022-04-16 RX ADMIN — SODIUM CHLORIDE, PRESERVATIVE FREE 10 ML: 5 INJECTION INTRAVENOUS at 22:01

## 2022-04-16 RX ADMIN — ASPIRIN 81 MG: 81 TABLET, COATED ORAL at 16:59

## 2022-04-16 RX ADMIN — INSULIN GLARGINE 10 UNITS: 100 INJECTION, SOLUTION SUBCUTANEOUS at 21:47

## 2022-04-16 RX ADMIN — HEPARIN SODIUM 5000 UNITS: 5000 INJECTION INTRAVENOUS; SUBCUTANEOUS at 16:59

## 2022-04-16 RX ADMIN — ATORVASTATIN CALCIUM 40 MG: 40 TABLET, FILM COATED ORAL at 21:46

## 2022-04-16 RX ADMIN — INSULIN LISPRO 1 UNITS: 100 INJECTION, SOLUTION INTRAVENOUS; SUBCUTANEOUS at 21:46

## 2022-04-16 RX ADMIN — HEPARIN SODIUM 5000 UNITS: 5000 INJECTION INTRAVENOUS; SUBCUTANEOUS at 21:47

## 2022-04-16 ASSESSMENT — ENCOUNTER SYMPTOMS
SINUS PRESSURE: 0
CONSTIPATION: 0
DIARRHEA: 0
EYE PAIN: 0
BACK PAIN: 0
TROUBLE SWALLOWING: 0
SORE THROAT: 0
SHORTNESS OF BREATH: 1
STRIDOR: 0
SINUS PAIN: 0
BLOOD IN STOOL: 0
NAUSEA: 0
VOMITING: 0
CHEST TIGHTNESS: 0
APNEA: 0
WHEEZING: 0
RHINORRHEA: 0
ANAL BLEEDING: 0
EYE REDNESS: 0
ABDOMINAL DISTENTION: 1
ABDOMINAL PAIN: 0
COUGH: 0
COLOR CHANGE: 0
CHOKING: 0

## 2022-04-16 NOTE — ED PROVIDER NOTES
[id number] [facility] RESIDENT NOTE       Date of evaluation: 4/16/2022    Chief Complaint     Shortness of Breath (with exertion) and Fatigue      History of Present Illness     Manjeet Mascorro is a 78 y.o. male who presents to the emergency department for shortness of breath and dyspnea on exertion. Patient has a history of CHF and ejection fraction from 2019 of 50 to 55%. Patient has noticed over the last 2 to 3 weeks that he has had progressive swelling of the lower extremities now extending to the level of at least the scrotum as well as inability to walk long distances without becoming short of breath. He also endorses episodes of PND. Has been adherent to his medication regimen including his water pill but notes that he is not urinating as much as he normally does. He has had no recent infectious symptoms and no chest pain. Patient also has a history of CKD and is concerned that this may be attributing to his symptoms. He has noticed that he has gained significant weight even from his last visit with his primary physician. Review of Systems     Review of Systems   Constitutional: Positive for unexpected weight change. Negative for chills, fatigue and fever. HENT: Negative for congestion, ear pain, rhinorrhea, sore throat and trouble swallowing. Eyes: Negative for pain and redness. Respiratory: Positive for shortness of breath. Negative for cough, chest tightness and wheezing. No hemoptysis  Denies orthopnea but endorses paroxysmal nocturnal dyspnea and dyspnea on exertion. Cardiovascular: Positive for leg swelling. Negative for chest pain and palpitations. Gastrointestinal: Positive for abdominal distention. Negative for abdominal pain, blood in stool, diarrhea, nausea and vomiting. Genitourinary: Negative for dysuria and hematuria. Musculoskeletal: Negative for back pain and myalgias. Skin: Negative for rash and wound. Neurological: Negative for dizziness, weakness and headaches. No sensation changes   Psychiatric/Behavioral: Negative for dysphoric mood and suicidal ideas. All other systems reviewed and are negative. Past Medical, Surgical, Family, and Social History     He has a past medical history of Abdominal hernia, Anemia, Arthritis, CHF (congestive heart failure) ([address] Hyperlipidemia, Hypertension, and Type II or unspecified type diabetes mellitus without mention of complication, not stated as uncontrolled. He has a past surgical history that includes hernia repair; hip surgery; and other surgical history (N/A, 12/15/2015). His family history includes Cancer in his sister; Heart Disease in his father; High Blood Pressure in his father and mother. He reports that he has never smoked. He has never used smokeless tobacco. He reports that he does not drink alcohol and does not use drugs.     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 CAPS Take 61 mg by mouth daily, Disp-30 capsule, R-11Print      isosorbide mononitrate (IMDUR) 30 MG extended release tablet Take 1 tablet by mouth once daily, Disp-90 tablet, R-1Normal      dorzolamide-timolol (COSOPT) 22.3-6.8 MG/ML ophthalmic solution INSTILL 1 DROP INTO EACH EYE EVERY 12 HOURSHistorical Med      JANUVIA 50 MG tablet Take 1 tablet by mouth once daily, Disp-90 tablet, R-1Normal      carvedilol (COREG) 6.25 MG tablet Take 1 tablet by mouth 2 times daily, Disp-180 tablet, R-3Normal      latanoprost (XALATAN) 0.005 % ophthalmic solution INSTILL 1 DROP INTO EACH EYE ONCE DAILY IN THE EVENINGHistorical Med      atorvastatin (LIPITOR) 40 MG tablet Take 1 tablet by mouth once daily, Disp-90 tablet, R-0Normal      potassium chloride (KLOR-CON) 10 MEQ extended release tablet Take 1 tablet by mouth once daily, Disp-60 tablet, R-5Normal      MM PEN NEEDLES 31G X 5 MM MISC USE   SUBCUTANEOUSLY ONCE DAILY, Disp-100 each, R-5Normal      aspirin 81 MG EC tablet Take 1 tablet by mouth daily, Disp-30 tablet, R-3Print             Allergies     He has No Known Allergies. Physical Exam     INITIAL VITALS: BP: (!) 135/95, Temp: 97.6 °F (36.4 °C), Pulse: 82, Resp: 20, SpO2: 100 %   Physical Exam  Vitals and nursing note reviewed. Exam conducted with a chaperone present. Constitutional:       General: He is not in acute distress. Appearance: Normal appearance. He is not ill-appearing. HENT:      Head: Normocephalic and atraumatic. Nose: Nose normal.      Mouth/Throat:      Mouth: Mucous membranes are moist.      Pharynx: Oropharynx is clear. Eyes:      Extraocular Movements: Extraocular movements intact. Conjunctiva/sclera: Conjunctivae normal.      Pupils: Pupils are equal, round, and reactive to light. Cardiovascular:      Rate and Rhythm: Normal rate and regular rhythm. Pulses: Normal pulses. Heart sounds: Normal heart sounds. No murmur heard. No friction rub. No gallop. Comments: Pitting edema to the groin. Pulmonary:      Effort: Pulmonary effort is normal. No respiratory distress. Breath sounds: Examination of the right-lower field reveals decreased breath sounds. Examination of the left-lower field reveals decreased breath sounds. Decreased breath sounds present. No wheezing, rhonchi or rales. Abdominal:      General: Abdomen is flat. Bowel sounds are normal. There is distension. Palpations: Abdomen is soft. Tenderness: There is no abdominal tenderness. There is no guarding or rebound. Musculoskeletal:         General: No swelling, tenderness, deformity or signs of injury. Normal range of motion.       Cervical back: Normal range of motion and neck supple. No muscular tenderness. Right lower le+ Edema present. Left lower le+ Edema present. Skin:     General: Skin is warm and dry. Capillary Refill: Capillary refill takes less than 2 seconds. Findings: No rash. Neurological:      General: No focal deficit present. Mental Status: He is alert and oriented to person, place, and time. Mental status is at baseline. Cranial Nerves: No cranial nerve deficit. Sensory: No sensory deficit. Motor: No weakness. Psychiatric:         Mood and Affect: Mood normal.         Behavior: Behavior normal.         Thought Content: Thought content normal.         DiagnosticResults     EKG   Interpreted in conjunction with emergencydepartment physician Kash Juárez, *  Rhythm: 1 degree AV block  Rate: normal  Axis: left  Ectopy: none  Conduction: left bundle branch block (complete)  ST Segments: no change  T Waves:inversion in  I, II and III, AVF  Q Waves: none  Clinical Impression: abnormal EKG with LBBB and ST segment abnormalities seen on past EKG   Comparison:  22    RADIOLOGY:  VL Extremity Venous Bilateral   Final Result      XR CHEST (2 VW)   Final Result      Minimal right basilar atelectatic change. Stable cardiomegaly.              LABS:   Results for orders placed or performed during the hospital encounter of    Basic Metabolic Panel w/ Reflex to MG   Result Value Ref Range    Sodium 136 136 - 145 mmol/L    Potassium reflex Magnesium 3.9 3.5 - 5.1 mmol/L    Chloride 102 99 - 110 mmol/L    CO2 23 21 - 32 mmol/L    Anion Gap 11 3 - 16    Glucose 125 (H) 70 - 99 mg/dL    BUN 24 (H) 7 - 20 mg/dL    CREATININE 1.9 (H) 0.8 - 1.3 mg/dL    GFR Non- 34 (A) >60    GFR  41 (A) >60    Calcium 10.0 8.3 - 10.6 mg/dL   CBC with Auto Differential   Result Value Ref Range    WBC 5.4 4.0 - 11.0 K/uL    RBC 5.05 4.20 - 5.90 M/uL    Hemoglobin 12.6 (L) 13.5 - 17.5 g/dL    Hematocrit 39.0 (L) 40.5 - 52.5 %    MCV 77.3 (L) 80.0 - 100.0 fL    MCH 24.9 (L) 26.0 - 34.0 pg    MCHC 32.3 31.0 - 36.0 g/dL    RDW 20.5 (H) 12.4 - 15.4 %    Platelets 827 171 - 401 K/uL    MPV 9.3 5.0 - 10.5 fL    PLATELET SLIDE REVIEW Adequate     SLIDE REVIEW see below     Neutrophils % 71.2 %    Lymphocytes % 15.9 %    Monocytes % 11.7 %    Eosinophils % 0.3 %    Basophils % 0.9 %    Neutrophils Absolute 3.9 1.7 - 7.7 K/uL    Lymphocytes Absolute 0.9 (L) 1.0 - 5.1 K/uL    Monocytes Absolute 0.6 0.0 - 1.3 K/uL    Eosinophils Absolute 0.0 0.0 - 0.6 K/uL    Basophils Absolute 0.0 0.0 - 0.2 K/uL   Brain Natriuretic Peptide   Result Value Ref Range    Pro-BNP 11,403 (H) 0 - 449 pg/mL   Troponin   Result Value Ref Range    Troponin 0.10 (H) <0.01 ng/mL   Blood Gas, Venous   Result Value Ref Range    pH, Estrada 7.388 7.350 - 7.450    pCO2, Estrada 41.0 41.0 - 51.0 mmHg    pO2, Estrada 31.0 25.0 - 40.0 mmHg    HCO3, Venous 24.7 24.0 - 28.0 mmol/L    Base Excess, Estrada -0.3 -2.0 - 3.0 mmol/L    O2 Sat, Estrada 54 Not established %    Carboxyhemoglobin 1.9 (H) 0.0 - 1.5 %    MetHgb, Estrada 0.3 0.0 - 1.5 %    TC02 (Calc), Estrada 26 mmol/L    Hemoglobin, Estrada, Reduced 45.00 %   Troponin   Result Value Ref Range    Troponin 0.08 (H) <0.01 ng/mL   TSH with Reflex   Result Value Ref Range    TSH 3.27 0.27 - 4.20 uIU/mL   Troponin   Result Value Ref Range    Troponin 0.09 (H) <0.01 ng/mL   Basic Metabolic Panel   Result Value Ref Range    Sodium 141 136 - 145 mmol/L    Potassium 3.5 3.5 - 5.1 mmol/L    Chloride 105 99 - 110 mmol/L    CO2 22 21 - 32 mmol/L    Anion Gap 14 3 - 16    Glucose 85 70 - 99 mg/dL    BUN 23 (H) 7 - 20 mg/dL    CREATININE 1.8 (H) 0.8 - 1.3 mg/dL    GFR Non- 36 (A) >60    GFR  44 (A) >60    Calcium 9.6 8.3 - 10.6 mg/dL   Magnesium   Result Value Ref Range    Magnesium 2.00 1.80 - 2.40 mg/dL   Lipid panel - fasting   Result Value Ref Range    Cholesterol, Total 130 0 - 199 mg/dL    Triglycerides 67 0 - 150 mg/dL    HDL 67 (H) 40 - 60 mg/dL    LDL Calculated 50 <100 mg/dL    VLDL Cholesterol Calculated 13 Not Established mg/dL   CBC with Auto Differential   Result Value Ref Range    WBC 5.0 4.0 - 11.0 K/uL    RBC 5.00 4.20 - 5.90 M/uL    Hemoglobin 12.4 (L) 13.5 - 17.5 g/dL    Hematocrit 39.0 (L) 40.5 - 52.5 %    MCV 77.9 (L) 80.0 - 100.0 fL    MCH 24.8 (L) 26.0 - 34.0 pg    MCHC 31.9 31.0 - 36.0 g/dL    RDW 20.5 (H) 12.4 - 15.4 %    Platelets 787 929 - 585 K/uL    MPV 9.2 5.0 - 10.5 fL    Neutrophils % 65.0 %    Lymphocytes % 19.6 %    Monocytes % 14.0 %    Eosinophils % 1.0 %    Basophils % 0.4 %    Neutrophils Absolute 3.3 1.7 - 7.7 K/uL    Lymphocytes Absolute 1.0 1.0 - 5.1 K/uL    Monocytes Absolute 0.7 0.0 - 1.3 K/uL    Eosinophils Absolute 0.1 0.0 - 0.6 K/uL    Basophils Absolute 0.0 0.0 - 0.2 K/uL   Troponin   Result Value Ref Range    Troponin 0.09 (H) <0.01 ng/mL   Basic Metabolic Panel   Result Value Ref Range    Sodium 139 136 - 145 mmol/L    Potassium 3.1 (L) 3.5 - 5.1 mmol/L    Chloride 104 99 - 110 mmol/L    CO2 25 21 - 32 mmol/L    Anion Gap 10 3 - 16    Glucose 68 (L) 70 - 99 mg/dL    BUN 21 (H) 7 - 20 mg/dL    CREATININE 1.7 (H) 0.8 - 1.3 mg/dL    GFR Non-African American 39 (A) >60    GFR  47 (A) >60    Calcium 9.4 8.3 - 10.6 mg/dL   Magnesium   Result Value Ref Range    Magnesium 1.90 1.80 - 2.40 mg/dL   CBC with Auto Differential   Result Value Ref Range    WBC 4.5 4.0 - 11.0 K/uL    RBC 4.60 4.20 - 5.90 M/uL    Hemoglobin 11.4 (L) 13.5 - 17.5 g/dL    Hematocrit 36.0 (L) 40.5 - 52.5 %    MCV 78.1 (L) 80.0 - 100.0 fL    MCH 24.8 (L) 26.0 - 34.0 pg    MCHC 31.8 31.0 - 36.0 g/dL    RDW 20.1 (H) 12.4 - 15.4 %    Platelets 870 373 - 342 K/uL    MPV 9.1 5.0 - 10.5 fL    Neutrophils % 62.4 %    Lymphocytes % 20.8 %    Monocytes % 14.9 %    Eosinophils % 1.3 %    Basophils % 0.6 %    Neutrophils Absolute 2.8 1.7 - 7.7 K/uL    Lymphocytes Absolute 0.9 (L) 1.0 - 5.1 K/uL    Monocytes Absolute 0.7 0.0 - 1.3 K/uL    Eosinophils Absolute 0.1 0.0 - 0.6 K/uL    Basophils Absolute 0.0 0.0 - 0.2 K/uL   Iron and TIBC   Result Value Ref Range    Iron 56 (L) 59 - 158 ug/dL    TIBC 220 (L) 260 - 445 ug/dL    Iron Saturation 25 20 - 50 %   Ferritin   Result Value Ref Range    Ferritin 125.4 30.0 - 400.0 ng/mL   Protein / Creatinine Ratio, Urine   Result Value Ref Range    Protein, Ur 8.00 <12 mg/dL    Creatinine, Ur 16.2 (L) 39.0 - 259.0 mg/dL    Protein/Creat Ratio 0.5 mg/dL   Basic Metabolic Panel   Result Value Ref Range    Sodium 141 136 - 145 mmol/L    Potassium 3.4 (L) 3.5 - 5.1 mmol/L    Chloride 101 99 - 110 mmol/L    CO2 30 21 - 32 mmol/L    Anion Gap 10 3 - 16    Glucose 104 (H) 70 - 99 mg/dL    BUN 20 7 - 20 mg/dL    CREATININE 1.9 (H) 0.8 - 1.3 mg/dL    GFR Non- 34 (A) >60    GFR  41 (A) >60    Calcium 10.0 8.3 - 10.6 mg/dL   Magnesium   Result Value Ref Range    Magnesium 2.00 1.80 - 2.40 mg/dL   Brain Natriuretic Peptide   Result Value Ref Range    Pro-BNP 8,822 (H) 0 - 449 pg/mL   CBC with Auto Differential   Result Value Ref Range    WBC 5.0 4.0 - 11.0 K/uL    RBC 5.14 4.20 - 5.90 M/uL    Hemoglobin 12.9 (L) 13.5 - 17.5 g/dL    Hematocrit 39.1 (L) 40.5 - 52.5 %    MCV 76.1 (L) 80.0 - 100.0 fL    MCH 25.2 (L) 26.0 - 34.0 pg    MCHC 33.0 31.0 - 36.0 g/dL    RDW 20.1 (H) 12.4 - 15.4 %    Platelets 167 555 - 042 K/uL    MPV 9.0 5.0 - 10.5 fL    PLATELET SLIDE REVIEW Adequate     Neutrophils % 69.0 %    Lymphocytes % 19.0 %    Monocytes % 10.0 %    Eosinophils % 1.0 %    Basophils % 1.0 %    Neutrophils Absolute 3.5 1.7 - 7.7 K/uL    Lymphocytes Absolute 1.0 1.0 - 5.1 K/uL    Monocytes Absolute 0.5 0.0 - 1.3 K/uL    Eosinophils Absolute 0.1 0.0 - 0.6 K/uL    Basophils Absolute 0.1 0.0 - 0.2 K/uL    Anisocytosis 1+ (A)     Poikilocytes 1+ (A)     Schistocytes Occasional (A)     Acanthocytes 1+ (A)     Target Cells 1+ (A)    POCT Glucose Result Value Ref Range    POC Glucose 82 70 - 99 mg/dl    Performed on ACCU-CHEK    POCT Glucose   Result Value Ref Range    POC Glucose 145 (H) 70 - 99 mg/dl    Performed on ACCU-CHEK    POCT Glucose   Result Value Ref Range    POC Glucose 126 (H) 70 - 99 mg/dl    Performed on ACCU-CHEK    POCT Glucose   Result Value Ref Range    POC Glucose 148 (H) 70 - 99 mg/dl    Performed on ACCU-CHEK    POCT Glucose   Result Value Ref Range    POC Glucose 155 (H) 70 - 99 mg/dl    Performed on ACCU-CHEK    POCT Glucose   Result Value Ref Range    POC Glucose 76 70 - 99 mg/dl    Performed on ACCU-CHEK    POCT Glucose   Result Value Ref Range    POC Glucose 153 (H) 70 - 99 mg/dl    Performed on ACCU-CHEK    POCT Glucose   Result Value Ref Range    POC Glucose 127 (H) 70 - 99 mg/dl    Performed on ACCU-CHEK    POCT Glucose   Result Value Ref Range    POC Glucose 179 (H) 70 - 99 mg/dl    Performed on ACCU-CHEK    POCT Glucose   Result Value Ref Range    POC Glucose 103 (H) 70 - 99 mg/dl    Performed on ACCU-CHEK    POCT Glucose   Result Value Ref Range    POC Glucose 125 (H) 70 - 99 mg/dl    Performed on ACCU-CHEK    POCT Glucose   Result Value Ref Range    POC Glucose 164 (H) 70 - 99 mg/dl    Performed on ACCU-CHEK    EKG 12 Lead   Result Value Ref Range    Ventricular Rate 80 BPM    Atrial Rate 80 BPM    P-R Interval 246 ms    QRS Duration 164 ms    Q-T Interval 476 ms    QTc Calculation (Bazett) 548 ms    P Axis 69 degrees    R Axis -85 degrees    T Axis 78 degrees    Diagnosis       EKG performed in ER and to be interpreted by ER physician. Confirmed by MD, ER ([id number]),  Jeffrey Maxine ([phone]) on 4/16/2022 11:06:37 AM       ED BEDSIDE ULTRASOUND:  None     RECENT VITALS:  BP: 124/77, Temp: 97.8 °F (36.6 °C), Pulse: 72,Resp: 18, SpO2: 99 %     Procedures     None     ED Course     Nursing Notes, Past Medical Hx, Past Surgical Hx, Social Hx, Allergies, and Family Hx were reviewed.     The patient was given the followingmedications:  Orders Placed This Encounter   Medications    furosemide (LASIX) injection 80 mg    DISCONTD: aspirin EC tablet 81 mg    DISCONTD: atorvastatin (LIPITOR) tablet 40 mg    DISCONTD: dorzolamide-timolol (COSOPT) 22.3-6.8 MG/ML ophthalmic solution 1 drop     Order Specific Question:   Please select a reason the therapeutic interchange was not accepted:      Answer:   [facility] to Substitute with Components    DISCONTD: furosemide (LASIX) injection 40 mg    DISCONTD: isosorbide mononitrate (IMDUR) extended release tablet 30 mg    DISCONTD: insulin glargine (LANTUS;BASAGLAR) injection pen 10 Units    DISCONTD: insulin lispro (1 Unit Dial) 0-6 Units    DISCONTD: insulin lispro (1 Unit Dial) 0-3 Units    DISCONTD: glucose (GLUTOSE) 40 % oral gel 15 g    DISCONTD: dextrose 50 % IV solution    DISCONTD: glucagon (rDNA) injection 1 mg    DISCONTD: dextrose 5 % solution    DISCONTD: latanoprost (XALATAN) 0.005 % ophthalmic solution 1 drop    DISCONTD: Tafamidis CAPS 61 mg    DISCONTD: sodium chloride flush 0.9 % injection 5-40 mL    DISCONTD: sodium chloride flush 0.9 % injection 5-40 mL    DISCONTD: 0.9 % sodium chloride infusion    DISCONTD: ondansetron (ZOFRAN-ODT) disintegrating tablet 4 mg    DISCONTD: ondansetron (ZOFRAN) injection 4 mg    DISCONTD: polyethylene glycol (GLYCOLAX) packet 17 g    DISCONTD: acetaminophen (TYLENOL) tablet 650 mg    DISCONTD: acetaminophen (TYLENOL) suppository 650 mg    DISCONTD: perflutren lipid microspheres (DEFINITY) injection 1.65 mg    DISCONTD: heparin (porcine) injection 5,000 Units    DISCONTD: carvedilol (COREG) tablet 6.25 mg    DISCONTD: glucose chewable tablet 4 each    DISCONTD: dextrose bolus (hypoglycemia) 10% 125 mL    DISCONTD: dextrose bolus (hypoglycemia) 10% 250 mL    DISCONTD: melatonin disintegrating tablet 5 mg    potassium chloride (KLOR-CON M) extended release tablet 40 mEq    potassium chloride 10 mEq/100 mL IVPB (Peripheral Line)    magnesium sulfate 1000 mg in dextrose 5% 100 mL IVPB    DISCONTD: insulin glargine (LANTUS;BASAGLAR) injection pen 8 Units    DISCONTD: polyethylene glycol (GLYCOLAX) packet 17 g    DISCONTD: sennosides-docusate sodium (SENOKOT-S) 8.6-50 MG tablet 2 tablet    metOLazone (ZAROXOLYN) tablet 10 mg    DISCONTD: sacubitril-valsartan (ENTRESTO) 24-26 MG per tablet 0.5 tablet    DISCONTD: melatonin tablet 6 mg    DISCONTD: melatonin tablet 6 mg    DISCONTD: furosemide (LASIX) tablet 40 mg    potassium chloride (KLOR-CON M) extended release tablet 40 mEq    sacubitril-valsartan (ENTRESTO) 24-26 MG per tablet     Sig: Take 0.5 tablets by mouth 2 times daily     Dispense:  60 tablet     Refill:  0       CONSULTS:  IP CONSULT TO HOSPITALIST  IP CONSULT TO HEART FAILURE NURSE/COORDINATOR  IP CONSULT TO DIETITIAN  IP CONSULT TO CARDIOLOGY  IP CONSULT TO NEPHROLOGY  IP CONSULT TO CARDIOLOGY    MEDICAL DECISION MAKING / ASSESSMENT / Jayce Valdo is a 78 y.o. male Presented to the emergency department for fatigue and leg swelling. Patient reports that over the last 2 weeks he has had weight gain, increased swelling in his lower extremities that now extends to his genitals and to his abdomen, shortness of breath, and dyspnea on exertion. On exam, he has anasarca to the groin as well as abdominal distention diminished lung sounds at the bases. EKG notable for left bundle branch block as well as T wave inversions that are preserved from previous. Patient is noted to have increased weight from when he was weighed previously in addition to the obvious fluid retention. Troponin mildly elevated and patient has a significantly elevated BNP even relative to the last times it was checked for him.   Given these findings and the patient's reports that despite being adherent to his twice daily Lasix regimen he is having decreased urine output, he was treated with IV Lasix in the ED and admitted to medicine for further treatment of his CHF exacerbation. This patient was also evaluated by the attending physician. All care plans werediscussed and agreed upon. Clinical Impression     1. Acute on chronic congestive heart failure, unspecified heart failure type (HCC)    2. HFrEF (heart failure with reduced ejection fraction) ([facility] [address]    3.  Acute on chronic combined systolic and diastolic heart failure [facility])        Disposition     DISCHARGE MEDICATIONS:    DISPOSITION Admitted 04/16/2022 02:34:13 PM       Jimmie Riedel, MD  04/22/22 0900

## 2022-04-16 NOTE — H&P
Internal Medicine  PGY 1  History & Physical      CC SOB, LE swelling    History Obtained From:  patient, electronic medical record    HISTORY OF PRESENT ILLNESS:  Vignesh Lau is an 78year old M with a PmHx of CHFrEF (initially 35-40% now improved to 50%, non-ischemic), ATTR cardiac amyloidosis  pseudophakia, CKD stage 3b, T2DM, HTN who presents to the ED with SOB and fatigue. Over the last 2-3 weeks patient has experienced progressive LE swelling that he says has progressed to the scrotum. He cannot walk long distances without becoming SOB. He denies orthopnea saying he can sleep flat. No recent infection or changes to his medications or diet. He reports he has been compliant with home medications and diet is largely unchanged other than for increased water intake. Increase intake but he reports decreased urine output. He endorses weight gain: 190 on admission, weight was 182 on 3/2/2022. Past Medical History:        Diagnosis Date    Abdominal hernia     Anemia     Arthritis     MILD    CHF (congestive heart failure) (HCC)     EF 35-40%. Non-ischemic     Hyperlipidemia     Hypertension     Type II or unspecified type diabetes mellitus without mention of complication, not stated as uncontrolled    ·     Past Surgical History:        Procedure Laterality Date    HERNIA REPAIR      HIP SURGERY      OTHER SURGICAL HISTORY N/A 12/15/2015    LAPAROSCOPIC LEFT INGUINAL HERNIA REPAIR WITH MESH   ·     Medications Priorto Admission:    · Not in a hospital admission. Allergies:  Patient has no known allergies. Social History:   · TOBACCO:   reports that he has never smoked. He has never used smokeless tobacco.  · ETOH:   reports no history of alcohol use.   · DRUGS : no reported use  · Patient currently lives at home with wife  ·   Family History:       Problem Relation Age of Onset    High Blood Pressure Mother     High Blood Pressure Father     Heart Disease Father         cad    Cancer Sister friction rub. No gallop. Pulmonary:      Effort: Pulmonary effort is normal. No respiratory distress. Breath sounds: Rales present. Comments: Breath sounds mildly diminished throughout with some crackles in the R lung base  Abdominal:      General: Bowel sounds are normal. There is distension. Palpations: Abdomen is soft. There is no mass. Tenderness: There is no abdominal tenderness. There is no guarding. Musculoskeletal:         General: No tenderness. Right lower leg: Edema present. Left lower leg: Edema present. Comments: 2+ pitting edema in b/l LE   Skin:     General: Skin is warm. Coloration: Skin is not jaundiced. Findings: No bruising or lesion. Neurological:      Mental Status: He is alert and oriented to person, place, and time. Mental status is at baseline. Cranial Nerves: No cranial nerve deficit. Sensory: No sensory deficit. Motor: No weakness. Gait: Gait normal.   Psychiatric:         Mood and Affect: Mood normal.         Behavior: Behavior normal.     Patient also has symmetric scrotal swelling    Physical exam:       Vitals:    04/16/22 1058   BP: 134/83   Pulse: 79   Resp: 24   Temp:    SpO2: 100%       DATA:    Labs:  CBC:   Recent Labs     04/16/22  1044   WBC 5.4   HGB 12.6*   HCT 39.0*          BMP:   Recent Labs     04/16/22  1044      K 3.9      CO2 23   BUN 24*   CREATININE 1.9*   GLUCOSE 125*     LFT's: No results for input(s): AST, ALT, ALB, BILITOT, ALKPHOS in the last 72 hours. Troponin:   Recent Labs     04/16/22  1044   TROPONINI 0.10*     BNP:No results for input(s): BNP in the last 72 hours. ABGs: No results for input(s): PHART, RFN8WUH, PO2ART in the last 72 hours. INR: No results for input(s): INR in the last 72 hours.     U/A:No results for input(s): NITRITE, COLORU, PHUR, LABCAST, WBCUA, RBCUA, MUCUS, TRICHOMONAS, YEAST, BACTERIA, CLARITYU, SPECGRAV, LEUKOCYTESUR, UROBILINOGEN, BILIRUBINUR, BLOODU, GLUCOSEU, AMORPHOUS in the last 72 hours. Invalid input(s): KETONESU    XR CHEST (2 VW)   Final Result      Minimal right basilar atelectatic change. Stable cardiomegaly. ASSESSMENT AND PLAN:    AoC HF with diastolic dysfunction and hx of reduced EF  Cardiac Amyloidosis  Likely 2/2 hx of severe uncontrolled HTN and amyloidosis. His NYHA FC is II, stage C. Echo is suggestive of cardiac amyloidosis. Unable to start ACEI, ARB, spironolactone, Entresto or SGLT2 due to CKD and/or hypotension. BNP elevated to >11,000. Last ECHO in 2019 showed EF improved to 50% with grade 3 diastolic dysfunction. - Cardiology consulted, appreciate recs  - On lasix 40 mg BID at home, started 40 mg IV BID tomorrow  - coreg 6.25 bid and imdur 30 daily  - Continue Tafamidis 61 daily, was started on it 1.5 months ago  - repeat ECHO  - TSH with reflex and lipid panel  - strict I/O with standing weights  - trend BNP  - urine showed 4+ bacteria, with no acute symptoms, ordered urine culture    CAD  No hx of NSTEMI but cath with only mild CAD  - continue aspirin, bb, statin    CKD Stage 3b  Baseline creatinine 2.1  - Monitor BMP  - Not in acute NISHI    Acute on Chronic troponemia  Troponin elevated to 0. 10. Appears to be elevated to 0.06 at baseline likely due to CKD. Likely 2/2 CHF exacerbation. - trend troponin    Pseudophakia  - Continue home eyedrop medications    HTN  - Carvedilol 6.25 mg BID  - Imdur    T2DM  On lantus 15 units nightly at home.   - Lantus 10 units  - LDSSI  - Hypoglycemia protocol and POC glucose    HLD  - Lipitor    Will discuss with attending physician Dr. Raymundo Reagan    Code Status: Limited x4  FEN: Cardiac and diabetic diet  PPX: SubQ heparin  DISPO: Tra Kaur MD  4/16/2022,  2:38 PM

## 2022-04-16 NOTE — ED PROVIDER NOTES
ED Attending Attestation Note     Date of evaluation: 4/16/2022    This patient was seen by the resident. I have seen and examined the patient, agree with the workup, evaluation, management and diagnosis. The care plan has been discussed. I have reviewed the ECG and concur with the resident's interpretation. My assessment reveals adult male in no acute distress who presents with edema, worsening heart failure type symptoms unresponsive to the diuretics has been taking at home. He does have significant edema essentially from his ankles up to groin. No acute respiratory stress, does not appear able to adequately diurese himself at home.        Edison Mcgee MD  04/16/22 7546

## 2022-04-17 LAB
ANION GAP SERPL CALCULATED.3IONS-SCNC: 14 MMOL/L (ref 3–16)
BASOPHILS ABSOLUTE: 0 K/UL (ref 0–0.2)
BASOPHILS RELATIVE PERCENT: 0.4 %
BUN BLDV-MCNC: 23 MG/DL (ref 7–20)
CALCIUM SERPL-MCNC: 9.6 MG/DL (ref 8.3–10.6)
CHLORIDE BLD-SCNC: 105 MMOL/L (ref 99–110)
CHOLESTEROL, TOTAL: 130 MG/DL (ref 0–199)
CO2: 22 MMOL/L (ref 21–32)
CREAT SERPL-MCNC: 1.8 MG/DL (ref 0.8–1.3)
EOSINOPHILS ABSOLUTE: 0.1 K/UL (ref 0–0.6)
EOSINOPHILS RELATIVE PERCENT: 1 %
GFR AFRICAN AMERICAN: 44
GFR NON-AFRICAN AMERICAN: 36
GLUCOSE BLD-MCNC: 126 MG/DL (ref 70–99)
GLUCOSE BLD-MCNC: 148 MG/DL (ref 70–99)
GLUCOSE BLD-MCNC: 155 MG/DL (ref 70–99)
GLUCOSE BLD-MCNC: 85 MG/DL (ref 70–99)
HCT VFR BLD CALC: 39 % (ref 40.5–52.5)
HDLC SERPL-MCNC: 67 MG/DL (ref 40–60)
HEMOGLOBIN: 12.4 G/DL (ref 13.5–17.5)
LDL CHOLESTEROL CALCULATED: 50 MG/DL
LYMPHOCYTES ABSOLUTE: 1 K/UL (ref 1–5.1)
LYMPHOCYTES RELATIVE PERCENT: 19.6 %
MAGNESIUM: 2 MG/DL (ref 1.8–2.4)
MCH RBC QN AUTO: 24.8 PG (ref 26–34)
MCHC RBC AUTO-ENTMCNC: 31.9 G/DL (ref 31–36)
MCV RBC AUTO: 77.9 FL (ref 80–100)
MONOCYTES ABSOLUTE: 0.7 K/UL (ref 0–1.3)
MONOCYTES RELATIVE PERCENT: 14 %
NEUTROPHILS ABSOLUTE: 3.3 K/UL (ref 1.7–7.7)
NEUTROPHILS RELATIVE PERCENT: 65 %
PDW BLD-RTO: 20.5 % (ref 12.4–15.4)
PERFORMED ON: ABNORMAL
PLATELET # BLD: 244 K/UL (ref 135–450)
PMV BLD AUTO: 9.2 FL (ref 5–10.5)
POTASSIUM SERPL-SCNC: 3.5 MMOL/L (ref 3.5–5.1)
RBC # BLD: 5 M/UL (ref 4.2–5.9)
SODIUM BLD-SCNC: 141 MMOL/L (ref 136–145)
TRIGL SERPL-MCNC: 67 MG/DL (ref 0–150)
TROPONIN: 0.09 NG/ML
VLDLC SERPL CALC-MCNC: 13 MG/DL
WBC # BLD: 5 K/UL (ref 4–11)

## 2022-04-17 PROCEDURE — 36415 COLL VENOUS BLD VENIPUNCTURE: CPT

## 2022-04-17 PROCEDURE — 80048 BASIC METABOLIC PNL TOTAL CA: CPT

## 2022-04-17 PROCEDURE — 2580000003 HC RX 258: Performed by: STUDENT IN AN ORGANIZED HEALTH CARE EDUCATION/TRAINING PROGRAM

## 2022-04-17 PROCEDURE — 99223 1ST HOSP IP/OBS HIGH 75: CPT | Performed by: INTERNAL MEDICINE

## 2022-04-17 PROCEDURE — 1200000000 HC SEMI PRIVATE

## 2022-04-17 PROCEDURE — 6360000002 HC RX W HCPCS: Performed by: STUDENT IN AN ORGANIZED HEALTH CARE EDUCATION/TRAINING PROGRAM

## 2022-04-17 PROCEDURE — 85025 COMPLETE CBC W/AUTO DIFF WBC: CPT

## 2022-04-17 PROCEDURE — 6370000000 HC RX 637 (ALT 250 FOR IP): Performed by: STUDENT IN AN ORGANIZED HEALTH CARE EDUCATION/TRAINING PROGRAM

## 2022-04-17 PROCEDURE — 80061 LIPID PANEL: CPT

## 2022-04-17 PROCEDURE — 83735 ASSAY OF MAGNESIUM: CPT

## 2022-04-17 RX ORDER — MECOBALAMIN 5000 MCG
5 TABLET,DISINTEGRATING ORAL NIGHTLY
Status: DISCONTINUED | OUTPATIENT
Start: 2022-04-17 | End: 2022-04-19 | Stop reason: HOSPADM

## 2022-04-17 RX ADMIN — FUROSEMIDE 40 MG: 10 INJECTION, SOLUTION INTRAMUSCULAR; INTRAVENOUS at 18:32

## 2022-04-17 RX ADMIN — FUROSEMIDE 40 MG: 10 INJECTION, SOLUTION INTRAMUSCULAR; INTRAVENOUS at 09:35

## 2022-04-17 RX ADMIN — CARVEDILOL 6.25 MG: 6.25 TABLET, FILM COATED ORAL at 18:32

## 2022-04-17 RX ADMIN — HEPARIN SODIUM 5000 UNITS: 5000 INJECTION INTRAVENOUS; SUBCUTANEOUS at 21:16

## 2022-04-17 RX ADMIN — SODIUM CHLORIDE, PRESERVATIVE FREE 10 ML: 5 INJECTION INTRAVENOUS at 09:35

## 2022-04-17 RX ADMIN — HEPARIN SODIUM 5000 UNITS: 5000 INJECTION INTRAVENOUS; SUBCUTANEOUS at 06:14

## 2022-04-17 RX ADMIN — ASPIRIN 81 MG: 81 TABLET, COATED ORAL at 09:36

## 2022-04-17 RX ADMIN — DORZOLAMIDE HYDROCHLORIDE AND TIMOLOL MALEATE 1 DROP: 20; 5 SOLUTION/ DROPS OPHTHALMIC at 21:16

## 2022-04-17 RX ADMIN — POLYETHYLENE GLYCOL 3350 17 G: 17 POWDER, FOR SOLUTION ORAL at 06:19

## 2022-04-17 RX ADMIN — INSULIN LISPRO 1 UNITS: 100 INJECTION, SOLUTION INTRAVENOUS; SUBCUTANEOUS at 18:01

## 2022-04-17 RX ADMIN — SODIUM CHLORIDE, PRESERVATIVE FREE 10 ML: 5 INJECTION INTRAVENOUS at 21:16

## 2022-04-17 RX ADMIN — DORZOLAMIDE HYDROCHLORIDE AND TIMOLOL MALEATE 1 DROP: 20; 5 SOLUTION/ DROPS OPHTHALMIC at 15:39

## 2022-04-17 RX ADMIN — ISOSORBIDE MONONITRATE 30 MG: 30 TABLET, EXTENDED RELEASE ORAL at 09:35

## 2022-04-17 RX ADMIN — INSULIN GLARGINE 10 UNITS: 100 INJECTION, SOLUTION SUBCUTANEOUS at 21:19

## 2022-04-17 RX ADMIN — INSULIN LISPRO 1 UNITS: 100 INJECTION, SOLUTION INTRAVENOUS; SUBCUTANEOUS at 21:19

## 2022-04-17 RX ADMIN — HEPARIN SODIUM 5000 UNITS: 5000 INJECTION INTRAVENOUS; SUBCUTANEOUS at 15:39

## 2022-04-17 RX ADMIN — ATORVASTATIN CALCIUM 40 MG: 40 TABLET, FILM COATED ORAL at 21:16

## 2022-04-17 RX ADMIN — CARVEDILOL 6.25 MG: 6.25 TABLET, FILM COATED ORAL at 09:35

## 2022-04-17 ASSESSMENT — PAIN SCALES - GENERAL
PAINLEVEL_OUTOF10: 0
PAINLEVEL_OUTOF10: 0

## 2022-04-17 NOTE — PROGRESS NOTES
Internal Medicine Progress Note    Admit Date: 4/16/2022  Day: 1  Diet: ADULT DIET; Regular; 3 carb choices (45 gm/meal); Low Fat/Low Chol/High Fiber/2 gm Na; Low Sodium (2 gm); 1500 ml    CC: SOB    Interval history: Pt seen and examined this morning. Resting comfortably with no new complaints. SOB improving, enjoying breakfast. Good UoP. Will need to verify the cause of his acute heart failure. 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 Denies any fevers, chills, chest pain, palpitations, leg swelling, cough, shortness of breath, difficulty breathing, wheezing, abdominal pain, nausea, vomiting, diarrhea.     Medications:     Scheduled Meds:   aspirin  81 mg Oral Daily    atorvastatin  40 mg Oral Nightly    dorzolamide-timolol  1 drop Both Eyes 2 times per day    furosemide  40 mg IntraVENous BID    isosorbide mononitrate  30 mg Oral Daily    insulin glargine  10 Units SubCUTAneous Nightly    insulin lispro  0-6 Units SubCUTAneous TID WC    insulin lispro  0-3 Units SubCUTAneous Nightly    latanoprost  1 drop Both Eyes Nightly    Tafamidis  61 mg Oral Daily    sodium chloride flush  5-40 mL IntraVENous 2 times per day    heparin (porcine)  5,000 Units SubCUTAneous 3 times per day    carvedilol  6.25 mg Oral BID     Continuous Infusions:   dextrose      sodium chloride       PRN Meds:glucagon (rDNA), dextrose, sodium chloride flush, sodium chloride, ondansetron **OR** ondansetron, polyethylene glycol, acetaminophen **OR** acetaminophen, perflutren lipid microspheres, glucose, dextrose bolus (hypoglycemia) **OR** dextrose bolus (hypoglycemia)    Objective:   Vitals:   T-max:  Patient Vitals for the past 8 hrs:   BP Temp Temp src Pulse Resp SpO2   04/17/22 0743 (!) 146/92 97.4 °F (36.3 °C) Oral 71 16 99 %       Intake/Output Summary (Last 24 hours) at 4/17/2022 1105  Last data filed at 4/17/2022 0935  Gross per 24 hour   Intake 130 ml   Output 2450 ml   Net -2320 ml       Physical Exam  Vitals and nursing note reviewed. Constitutional:       General: He is not in acute distress. Appearance: Normal appearance. He is not ill-appearing or toxic-appearing. HENT:      Head: Normocephalic and atraumatic. Right Ear: External ear normal.      Left Ear: External ear normal.      Nose: Nose normal.      Mouth/Throat:      Mouth: Mucous membranes are moist.      Pharynx: Oropharynx is clear. Eyes:      General: No scleral icterus. Conjunctiva/sclera: Conjunctivae normal.      Pupils: Pupils are equal, round, and reactive to light. Cardiovascular:      Rate and Rhythm: Normal rate and regular rhythm. Pulses: Normal pulses. Heart sounds: No murmur heard. Pulmonary:      Effort: Pulmonary effort is normal. No respiratory distress. Breath sounds: Normal breath sounds. No stridor. No wheezing, rhonchi or rales. Abdominal:      General: Abdomen is flat. Bowel sounds are normal. There is no distension. Palpations: Abdomen is soft. Tenderness: There is no abdominal tenderness. There is no guarding or rebound. Musculoskeletal:         General: No swelling. Normal range of motion. Cervical back: Normal range of motion and neck supple. No rigidity. Right lower leg: Edema (2+ pitting) present. Left lower leg: Edema (2+ pitting) present. Lymphadenopathy:      Cervical: No cervical adenopathy. Skin:     General: Skin is warm and dry. Capillary Refill: Capillary refill takes less than 2 seconds. Coloration: Skin is not jaundiced or pale. Findings: No bruising. Neurological:      General: No focal deficit present. Mental Status: He is alert and oriented to person, place, and time. Cranial Nerves: No cranial nerve deficit. Sensory: No sensory deficit. Motor: No weakness.       Gait: Gait normal.   Psychiatric:         Mood and Affect: Mood normal.         Behavior: Behavior normal.         Thought Content: Thought content normal.         Judgment: Judgment normal.       LABS:    CBC:   Recent Labs     04/16/22  1044 04/17/22  0641   WBC 5.4 5.0   HGB 12.6* 12.4*   HCT 39.0* 39.0*    244   MCV 77.3* 77.9*     Renal:    Recent Labs     04/16/22  1044 04/17/22  0641    141   K 3.9 3.5    105   CO2 23 22   BUN 24* 23*   CREATININE 1.9* 1.8*   GLUCOSE 125* 85   CALCIUM 10.0 9.6   MG  --  2.00   ANIONGAP 11 14     Hepatic: No results for input(s): AST, ALT, BILITOT, BILIDIR, PROT, LABALBU, ALKPHOS in the last 72 hours. Troponin:   Recent Labs     04/16/22  1443 04/16/22  1813 04/16/22  2353   TROPONINI 0.08* 0.09* 0.09*     BNP: No results for input(s): BNP in the last 72 hours. Lipids: No results for input(s): CHOL, HDL in the last 72 hours. Invalid input(s): LDLCALCU, TRIGLYCERIDE  ABGs:  No results for input(s): PHART, RYY5YTD, PO2ART, DHR4CFC, BEART, THGBART, M4UYGYZN, LSF5MYQ in the last 72 hours. INR: No results for input(s): INR in the last 72 hours. Lactate: No results for input(s): LACTATE in the last 72 hours. Cultures:  -----------------------------------------------------------------  RAD:   XR CHEST (2 VW)   Final Result      Minimal right basilar atelectatic change. Stable cardiomegaly. Assessment/Plan:   Mr. Mart Lombard is an 79yo male with PMH ATTR Cardiac Amyloidosis, CM, Systolic CHF (EF 31-72% --> 50%), CAD, CKD 3b, HTN, IDDM, and HLD who presents with dyspnea on exertion and leg swelling.      AoC Systolic and Diastolic CHF  Amyloid Cardiomyopathy  - Continue diuresis, good UoP  - strict Is and Os, daily weights, low Na diet  - Cardiology following, appreciate recs   - cont home Tafamidis 61mg PO daily  - Echo pending    CAD  - cont daily ASA, statin    HTN  - Coreg 6.25mg BID  - imdur 30mg daily    IDDM  - Lantus 10U HS  - Low dose SSI  - POC Glucose AC HS  - hypoglycemia protocol    CKD3  - daily labs    HLD  - lipitor 40mg daily    Code Status:Limited  FEN: ADULT DIET; Regular; 3 carb choices (45 gm/meal); Low Fat/Low Chol/High Fiber/2 gm Na;  Low Sodium (2 gm); 1500 ml  PPX:  SQH  DISPO: BRI Mendez DO, PGY-3  04/17/22  11:05 AM    This patient will be staffed and discussed with Shauna Lawrence DO.

## 2022-04-17 NOTE — CONSULTS
[address]   Cardiology Consultation   Date: 4/17/2022  Admit Date:  4/16/2022  Reason for Consultation: Heart failure  Consult Requesting Physician: Linda Garcia DO     Chief Complaint   Patient presents with    Shortness of Breath     with exertion    Fatigue     HPI: Syed Webber is a 78 y.o. gentleman with a past medical history significant for cardiac amyloidosis, hypertension, hyperlipidemia, type 2 diabetes mellitus, CKD, heart failure with recovered LV ejection fraction, on tafamidis as an outpatient, is admitted to the hospital secondary to progressively worsening pedal edema and scrotal edema for the past few days. Cardiology was consulted for evaluation and management of heart failure. He was diuresed yesterday and since then, he is feeling better. He is sitting comfortably in his bed. Past Medical History:   Diagnosis Date    Abdominal hernia     Anemia     Arthritis     MILD    CHF (congestive heart failure) (HCC)     EF 35-40%. Non-ischemic     Hyperlipidemia     Hypertension     Type II or unspecified type diabetes mellitus without mention of complication, not stated as uncontrolled         Past Surgical History:   Procedure Laterality Date    HERNIA REPAIR      HIP SURGERY      OTHER SURGICAL HISTORY N/A 12/15/2015    LAPAROSCOPIC LEFT INGUINAL HERNIA REPAIR WITH MESH       No Known Allergies    Social History:  Reviewed. reports that he has never smoked. He has never used smokeless tobacco. He reports that he does not drink alcohol and does not use drugs. Family History:  Reviewed. family history includes Cancer in his sister; Heart Disease in his father; High Blood Pressure in his father and mother. No premature CAD. Review of System:  All other systems reviewed except for that noted above.  Pertinent negatives and positives are:     · General: negative for fever, chills   · Ophthalmic ROS: negative for - eye pain or loss of vision  · ENT ROS: negative for - headaches, sore throat   · Respiratory: negative for - cough, sputum  · Cardiovascular: Reviewed in HPI  · Gastrointestinal: negative for - abdominal pain, diarrhea, N/V  · Hematology: negative for - bleeding, blood clots, bruising or jaundice  · Genito-Urinary:  negative for - Dysuria or incontinence  · Musculoskeletal: negative for - Joint swelling, muscle pain  · Neurological: negative for - confusion, dizziness, headaches   · Psychiatric: No anxiety, no depression. · Dermatological: negative for - rash    Physical Examination:  Vitals:    22 0743   BP: (!) 146/92   Pulse: 71   Resp: 16   Temp: 97.4 °F (36.3 °C)   SpO2: 99%        Intake/Output Summary (Last 24 hours) at 2022 1212  Last data filed at 2022 0935  Gross per 24 hour   Intake 130 ml   Output 2450 ml   Net -2320 ml     In: 130 [P.O.:120; I.V.:10]  Out: 2450    Wt Readings from Last 3 Encounters:   22 190 lb (86.2 kg)   22 181 lb (82.1 kg)   03/15/22 181 lb (82.1 kg)     Temp  Av.4 °F (36.3 °C)  Min: 97.1 °F (36.2 °C)  Max: 97.6 °F (36.4 °C)  Pulse  Av.8  Min: 71  Max: 82  BP  Min: 121/71  Max: 147/91  SpO2  Av.8 %  Min: 92 %  Max: 100 %    · Telemetry: Sinus rhythm   · Constitutional: Alert. Oriented to person, place, and time. No distress. · Head: Normocephalic and atraumatic. · Mouth/Throat: Lips appear moist. Oropharynx is clear and moist.  · Eyes: Conjunctivae normal. EOM are normal.   · Neck: Neck supple. No lymphadenopathy. No rigidity. No JVD present. · Cardiovascular: Normal rate, regular rhythm. Normal S1&S2. Carotid pulse 2+ bilaterally. · Pulmonary/Chest: Bilateral respiratory sounds present. No respiratory accessory muscle use. No wheezes, No rhonchi. · Abdominal: Soft. Normal bowel sounds present. No distension, No tenderness. No splenomegaly. No hernia. · Musculoskeletal: No tenderness. 2+ edema    · Lymphadenopathy: Has no cervical adenopathy.    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    Vitamin D deficiency 03/02/2011    Primary osteoarthritis of both knees 12/02/2010    Controlled type 2 diabetes mellitus with stage 3 chronic kidney disease, without long-term current use of insulin (HCC)     Benign essential HTN     Pure hypercholesterolemia     Iron deficiency anemia due to chronic blood loss     Heart failure ([facility] 04/16/2022    Non-recurrent unilateral inguinal hernia without obstruction or gangrene 03/15/2022    Abnormal CAT scan 03/15/2022    HFrEF (heart failure with reduced ejection fraction) ([facility] 01/04/2022    NSTEMI (non-ST elevated myocardial infarction) ([facility] 01/04/2022    Wild-type transthyretin-related (ATTR) amyloidosis ([facility] 01/04/2022    Severe left ventricular hypertrophy 10/25/2019    Chronic systolic [facility]congestive heart failure) [facility]     Chronic kidney disease     S/P coronary angiogram     Dilated cardiomyopathy ([facility] 07/05/2019    Chronic renal failure, stage 3 (moderate) ([facility] 10/02/2017    Abnormal EKG 01/27/2015      Active Hospital Problems    Diagnosis Date Noted    Heart failure ([facility] [I50.9] 04/16/2022     Assessment:    1. Acute on chronic heart failure preserved LV ejection fraction  2. Cardiac amyloidosis  3. Poorly controlled hypertension  4. CKD    Continue him on IV diuresis, with Lasix 40 mg IV twice daily  Monitor his renal functions  Let us continue him on carvedilol, tafamidis and Imdur  Historically, his blood pressure is very soft and not able to start him on ACE/ARB/ARN I.  He does have stage IV CKD. Patients with cardiac amyloidosis have very fine balance of hemodynamics and hence, reluctant to start him on further heart failure medications for now.   If the blood pressure continues to be stable, then it is reasonable to start him on hydralazine 25 mg 3 times daily. Lets get a transthoracic echocardiogram to evaluate his LV ejection fraction    Dr. Aydin Burrell to follow from tomorrow    Thank you for allowing me to participate in the care of [facility] . If you have any questions/comments, please do not hesitate to contact us. Salma Iyer MD   Cardiac Electrophysiology  [facility]  Office [phone]    For any EP related issues after 5 PM, contact [facility] on call cardiology through .

## 2022-04-18 ENCOUNTER — APPOINTMENT (OUTPATIENT)
Dept: VASCULAR LAB | Age: 82
DRG: 291 | End: 2022-04-18
Payer: MEDICARE

## 2022-04-18 LAB
ANION GAP SERPL CALCULATED.3IONS-SCNC: 10 MMOL/L (ref 3–16)
BASOPHILS ABSOLUTE: 0 K/UL (ref 0–0.2)
BASOPHILS RELATIVE PERCENT: 0.6 %
BUN BLDV-MCNC: 21 MG/DL (ref 7–20)
CALCIUM SERPL-MCNC: 9.4 MG/DL (ref 8.3–10.6)
CHLORIDE BLD-SCNC: 104 MMOL/L (ref 99–110)
CO2: 25 MMOL/L (ref 21–32)
CREAT SERPL-MCNC: 1.7 MG/DL (ref 0.8–1.3)
CREATININE URINE: 16.2 MG/DL (ref 39–259)
EOSINOPHILS ABSOLUTE: 0.1 K/UL (ref 0–0.6)
EOSINOPHILS RELATIVE PERCENT: 1.3 %
FERRITIN: 125.4 NG/ML (ref 30–400)
GFR AFRICAN AMERICAN: 47
GFR NON-AFRICAN AMERICAN: 39
GLUCOSE BLD-MCNC: 127 MG/DL (ref 70–99)
GLUCOSE BLD-MCNC: 153 MG/DL (ref 70–99)
GLUCOSE BLD-MCNC: 179 MG/DL (ref 70–99)
GLUCOSE BLD-MCNC: 68 MG/DL (ref 70–99)
GLUCOSE BLD-MCNC: 76 MG/DL (ref 70–99)
HCT VFR BLD CALC: 36 % (ref 40.5–52.5)
HEMOGLOBIN: 11.4 G/DL (ref 13.5–17.5)
IRON SATURATION: 25 % (ref 20–50)
IRON: 56 UG/DL (ref 59–158)
LV EF: 23 %
LVEF MODALITY: NORMAL
LYMPHOCYTES ABSOLUTE: 0.9 K/UL (ref 1–5.1)
LYMPHOCYTES RELATIVE PERCENT: 20.8 %
MAGNESIUM: 1.9 MG/DL (ref 1.8–2.4)
MCH RBC QN AUTO: 24.8 PG (ref 26–34)
MCHC RBC AUTO-ENTMCNC: 31.8 G/DL (ref 31–36)
MCV RBC AUTO: 78.1 FL (ref 80–100)
MONOCYTES ABSOLUTE: 0.7 K/UL (ref 0–1.3)
MONOCYTES RELATIVE PERCENT: 14.9 %
NEUTROPHILS ABSOLUTE: 2.8 K/UL (ref 1.7–7.7)
NEUTROPHILS RELATIVE PERCENT: 62.4 %
PDW BLD-RTO: 20.1 % (ref 12.4–15.4)
PERFORMED ON: ABNORMAL
PERFORMED ON: NORMAL
PLATELET # BLD: 232 K/UL (ref 135–450)
PMV BLD AUTO: 9.1 FL (ref 5–10.5)
POTASSIUM SERPL-SCNC: 3.1 MMOL/L (ref 3.5–5.1)
PROTEIN PROTEIN: 8 MG/DL
PROTEIN/CREAT RATIO: 0.5 MG/DL
RBC # BLD: 4.6 M/UL (ref 4.2–5.9)
SODIUM BLD-SCNC: 139 MMOL/L (ref 136–145)
TOTAL IRON BINDING CAPACITY: 220 UG/DL (ref 260–445)
WBC # BLD: 4.5 K/UL (ref 4–11)

## 2022-04-18 PROCEDURE — 1200000000 HC SEMI PRIVATE

## 2022-04-18 PROCEDURE — 99223 1ST HOSP IP/OBS HIGH 75: CPT | Performed by: INTERNAL MEDICINE

## 2022-04-18 PROCEDURE — 6360000002 HC RX W HCPCS: Performed by: STUDENT IN AN ORGANIZED HEALTH CARE EDUCATION/TRAINING PROGRAM

## 2022-04-18 PROCEDURE — 2580000003 HC RX 258: Performed by: STUDENT IN AN ORGANIZED HEALTH CARE EDUCATION/TRAINING PROGRAM

## 2022-04-18 PROCEDURE — 80048 BASIC METABOLIC PNL TOTAL CA: CPT

## 2022-04-18 PROCEDURE — 36415 COLL VENOUS BLD VENIPUNCTURE: CPT

## 2022-04-18 PROCEDURE — 6370000000 HC RX 637 (ALT 250 FOR IP): Performed by: INTERNAL MEDICINE

## 2022-04-18 PROCEDURE — 82728 ASSAY OF FERRITIN: CPT

## 2022-04-18 PROCEDURE — 84156 ASSAY OF PROTEIN URINE: CPT

## 2022-04-18 PROCEDURE — 6370000000 HC RX 637 (ALT 250 FOR IP): Performed by: STUDENT IN AN ORGANIZED HEALTH CARE EDUCATION/TRAINING PROGRAM

## 2022-04-18 PROCEDURE — 82570 ASSAY OF URINE CREATININE: CPT

## 2022-04-18 PROCEDURE — 83540 ASSAY OF IRON: CPT

## 2022-04-18 PROCEDURE — 93306 TTE W/DOPPLER COMPLETE: CPT

## 2022-04-18 PROCEDURE — 83550 IRON BINDING TEST: CPT

## 2022-04-18 PROCEDURE — 93970 EXTREMITY STUDY: CPT

## 2022-04-18 PROCEDURE — 83735 ASSAY OF MAGNESIUM: CPT

## 2022-04-18 PROCEDURE — 85025 COMPLETE CBC W/AUTO DIFF WBC: CPT

## 2022-04-18 RX ORDER — MAGNESIUM SULFATE 1 G/100ML
1000 INJECTION INTRAVENOUS ONCE
Status: COMPLETED | OUTPATIENT
Start: 2022-04-18 | End: 2022-04-18

## 2022-04-18 RX ORDER — SENNA AND DOCUSATE SODIUM 50; 8.6 MG/1; MG/1
2 TABLET, FILM COATED ORAL DAILY
Status: DISCONTINUED | OUTPATIENT
Start: 2022-04-18 | End: 2022-04-19 | Stop reason: HOSPADM

## 2022-04-18 RX ORDER — POLYETHYLENE GLYCOL 3350 17 G/17G
17 POWDER, FOR SOLUTION ORAL DAILY
Status: DISCONTINUED | OUTPATIENT
Start: 2022-04-18 | End: 2022-04-19 | Stop reason: HOSPADM

## 2022-04-18 RX ORDER — LANOLIN ALCOHOL/MO/W.PET/CERES
6 CREAM (GRAM) TOPICAL NIGHTLY PRN
Status: DISCONTINUED | OUTPATIENT
Start: 2022-04-18 | End: 2022-04-19 | Stop reason: HOSPADM

## 2022-04-18 RX ORDER — LANOLIN ALCOHOL/MO/W.PET/CERES
6 CREAM (GRAM) TOPICAL NIGHTLY PRN
Status: DISCONTINUED | OUTPATIENT
Start: 2022-04-19 | End: 2022-04-18

## 2022-04-18 RX ORDER — METOLAZONE 5 MG/1
10 TABLET ORAL ONCE
Status: COMPLETED | OUTPATIENT
Start: 2022-04-18 | End: 2022-04-18

## 2022-04-18 RX ORDER — POTASSIUM CHLORIDE 7.45 MG/ML
10 INJECTION INTRAVENOUS
Status: DISPENSED | OUTPATIENT
Start: 2022-04-18 | End: 2022-04-18

## 2022-04-18 RX ORDER — POTASSIUM CHLORIDE 20 MEQ/1
40 TABLET, EXTENDED RELEASE ORAL EVERY 4 HOURS
Status: COMPLETED | OUTPATIENT
Start: 2022-04-18 | End: 2022-04-18

## 2022-04-18 RX ADMIN — HEPARIN SODIUM 5000 UNITS: 5000 INJECTION INTRAVENOUS; SUBCUTANEOUS at 20:55

## 2022-04-18 RX ADMIN — SACUBITRIL AND VALSARTAN 0.5 TABLET: 24; 26 TABLET, FILM COATED ORAL at 20:55

## 2022-04-18 RX ADMIN — DORZOLAMIDE HYDROCHLORIDE AND TIMOLOL MALEATE 1 DROP: 20; 5 SOLUTION/ DROPS OPHTHALMIC at 10:26

## 2022-04-18 RX ADMIN — CARVEDILOL 6.25 MG: 6.25 TABLET, FILM COATED ORAL at 09:02

## 2022-04-18 RX ADMIN — ATORVASTATIN CALCIUM 40 MG: 40 TABLET, FILM COATED ORAL at 20:55

## 2022-04-18 RX ADMIN — LATANOPROST 1 DROP: 50 SOLUTION OPHTHALMIC at 20:56

## 2022-04-18 RX ADMIN — MAGNESIUM SULFATE HEPTAHYDRATE 1000 MG: 1 INJECTION, SOLUTION INTRAVENOUS at 10:29

## 2022-04-18 RX ADMIN — SACUBITRIL AND VALSARTAN 0.5 TABLET: 24; 26 TABLET, FILM COATED ORAL at 13:18

## 2022-04-18 RX ADMIN — CARVEDILOL 6.25 MG: 6.25 TABLET, FILM COATED ORAL at 17:33

## 2022-04-18 RX ADMIN — POTASSIUM CHLORIDE 10 MEQ: 2 INJECTION, SOLUTION, CONCENTRATE INTRAVENOUS at 10:25

## 2022-04-18 RX ADMIN — POTASSIUM CHLORIDE 40 MEQ: 20 TABLET, EXTENDED RELEASE ORAL at 09:02

## 2022-04-18 RX ADMIN — SODIUM CHLORIDE, PRESERVATIVE FREE 10 ML: 5 INJECTION INTRAVENOUS at 09:00

## 2022-04-18 RX ADMIN — INSULIN LISPRO 1 UNITS: 100 INJECTION, SOLUTION INTRAVENOUS; SUBCUTANEOUS at 12:18

## 2022-04-18 RX ADMIN — Medication 5 MG: at 01:10

## 2022-04-18 RX ADMIN — DORZOLAMIDE HYDROCHLORIDE AND TIMOLOL MALEATE 1 DROP: 20; 5 SOLUTION/ DROPS OPHTHALMIC at 20:57

## 2022-04-18 RX ADMIN — HEPARIN SODIUM 5000 UNITS: 5000 INJECTION INTRAVENOUS; SUBCUTANEOUS at 06:29

## 2022-04-18 RX ADMIN — INSULIN GLARGINE 8 UNITS: 100 INJECTION, SOLUTION SUBCUTANEOUS at 20:58

## 2022-04-18 RX ADMIN — ISOSORBIDE MONONITRATE 30 MG: 30 TABLET, EXTENDED RELEASE ORAL at 09:02

## 2022-04-18 RX ADMIN — FUROSEMIDE 40 MG: 10 INJECTION, SOLUTION INTRAMUSCULAR; INTRAVENOUS at 17:33

## 2022-04-18 RX ADMIN — SODIUM CHLORIDE, PRESERVATIVE FREE 10 ML: 5 INJECTION INTRAVENOUS at 21:03

## 2022-04-18 RX ADMIN — METOLAZONE 10 MG: 5 TABLET ORAL at 11:53

## 2022-04-18 RX ADMIN — FUROSEMIDE 40 MG: 10 INJECTION, SOLUTION INTRAMUSCULAR; INTRAVENOUS at 09:02

## 2022-04-18 RX ADMIN — POTASSIUM CHLORIDE 40 MEQ: 20 TABLET, EXTENDED RELEASE ORAL at 11:51

## 2022-04-18 RX ADMIN — INSULIN LISPRO 1 UNITS: 100 INJECTION, SOLUTION INTRAVENOUS; SUBCUTANEOUS at 20:58

## 2022-04-18 RX ADMIN — POLYETHYLENE GLYCOL 3350 17 G: 17 POWDER, FOR SOLUTION ORAL at 10:44

## 2022-04-18 RX ADMIN — ASPIRIN 81 MG: 81 TABLET, COATED ORAL at 09:02

## 2022-04-18 RX ADMIN — HEPARIN SODIUM 5000 UNITS: 5000 INJECTION INTRAVENOUS; SUBCUTANEOUS at 13:18

## 2022-04-18 RX ADMIN — Medication 6 MG: at 22:47

## 2022-04-18 RX ADMIN — SENNOSIDES AND DOCUSATE SODIUM 2 TABLET: 50; 8.6 TABLET ORAL at 10:44

## 2022-04-18 ASSESSMENT — PAIN SCALES - GENERAL
PAINLEVEL_OUTOF10: 0

## 2022-04-18 NOTE — CONSULTS
Nutrition Note     RD consulted to provide CHF education. Pt reports that his wife does all of the food prep/cooking for him and she is not here at this time. Pt shares that his wife does not cook with salt and he does not add salt to his meals. He states that he takes his weight as least every other day - RD encouraged daily weights and to write them down daily. Educational handout left with RD phone number for pt/pts wife to call with any further questions or concerns. Instructed the Patient on:   [x] Low Sodium foods  [x] Sodium free  [x] Fluids   [] Other     Educational Materials Provided:   [] [facility]) Heart Failure Education folder- Nutrition Therapy   [x] Nutrition Care Manual Heart Failure Nutrition Therapy   [] Other     Time Spent with Patient (minutes): 15 min    The patient will still be monitored per nutrition standards of care. Consult dietitian if nutrition interventions essential to patient care is needed.      [place], [address], [address] [place] [address] Drive: [phone]  Office:  [phone]

## 2022-04-18 NOTE — PLAN OF CARE
Pt rested well overnight. I&O as charted. Patient at risk for falls. Patient resting quietly in bed. Side rails up x 2. Bed locked in lowest position. Bed alarm on. Bedside table and call light within reach. Patient instructed to call for assistance. Patient verbalized understanding. Will continue to monitor. Problem: FLUID AND ELECTROLYTE IMBALANCE  Goal: Fluid and electrolyte balance are achieved/maintained  Outcome: Ongoing  CHF exacerbation, IV lasix. Monitoring I&O, daily weights. Problem: ACTIVITY INTOLERANCE/IMPAIRED MOBILITY  Goal: Mobility/activity is maintained at optimum level for patient  Outcome: Ongoing  Pt able to walk to restroom with cane and contact guard assist this shift.       Problem: OXYGENATION/RESPIRATORY FUNCTION  Goal: Patient will maintain patent airway  Outcome: Ongoing

## 2022-04-18 NOTE — CONSULTS
Cardiology Consultation                                                                    Pt Name: Hank Lopez  Age: 78 y.o. Sex: male  : 1940  Location: 6326/6326-01    Referring Physician: Hudson Rollins MD  Primary cardiologist: Dr Danielle Garner      Reason for Consult:     Reason for Consultation/Chief Complaint: AHF    HPI:     Mr Jesus Sahu is a 77 yo man, patient of Dr Pat Cabrera h/o severe LVH, HFrEF, HTN, HLP, DM 2, CKD (baseline creatinine 2.1), mild CAD, NSTEMI, trifascicular block with RBBB, NSVT.     Echo 2019: severe LVH, EF 35-40%, mild RV dysfx, mild valve disease     Echo : moderate LVH, normal EF.      Kathi 2019: mod LVd, EF 39%, mid to distal inferior and apical fixed defect.      LHC 2019: mild diffuse CAD, nl LVEDP.      ECG 19: NSR, 1st AVB, LAFB, RBBB (trifascicular block), possible inferior MI.      ECG 19: Sinus lenore 54 (thought to be junctional).       20 SPEP, UPEP and sFLC within normal limits for CKD (except for proteinuria).      Echo 19: severe LVH, LVEF 71-31%, diastolic III, apical sparing on speckle strain imaging suggestive of amyloidosis, severe LAE, mild MR.       Tc PYP scan 3/12/20: strongly positive for amyloidosis.       2019 kappa/labda ratio 1.69 (normal in the setting of CKD, Cr 1.9).  24-hour UPEP normal; SPEP 2021 normal; hence no AL amyloidosis.     Patient presented to the emergency room on 2022 with progressively worse edema and shortness of breath over the last 2 to 3 weeks. Patient was started on tafamidis about a week ago (4/10/2020.). He has been compliant with his medications and low-salt diet. He reports weight gain (190 pounds on admission, weight was 182 pounds on 3/2). Patient was admitted for acute heart failure, was started on Lasix 40 mg IV twice daily. I's and O's -3.8 L since admission, weight down 10 pounds.   K3.1 today, creatinine down to 1.7 from 1.9.       Histories     Past Medical History:   has a past medical history of Abdominal hernia, Anemia, Arthritis, CHF (congestive heart failure) ([address] Hyperlipidemia, Hypertension, and Type II or unspecified type diabetes mellitus without mention of complication, not stated as uncontrolled. Surgical History:   has a past surgical history that includes hernia repair; hip surgery; and other surgical history (N/A, 12/15/2015). Social History:   reports that he has never smoked. He has never used smokeless tobacco. He reports that he does not drink alcohol and does not use drugs. Family History:  No evidence for sudden cardiac death or premature CAD      Medications:       Home Medications  Were reviewed and are listed in nursing record. and/or listed below  Prior to Admission medications    Medication Sig Start Date End Date Taking?  Authorizing Provider   furosemide (LASIX) 40 MG tablet Take 1 tablet by mouth twice daily 4/14/22   Dena Alcocer MD   LANTUS SOLOSTAR 100 UNIT/ML injection pen INJECT 15 UNITS SUBCUTANEOUSLY NIGHTLY 3/23/22   Dena Alcocer MD   Tafamidis 61 MG CAPS Take 61 mg by mouth daily 3/16/22   Myriam Goldstein MD   isosorbide mononitrate (IMDUR) 30 MG extended release tablet Take 1 tablet by mouth once daily 3/11/22   Dena Alcocer MD   dorzolamide-timolol (COSOPT) 22.3-6.8 MG/ML ophthalmic solution INSTILL 1 DROP INTO EACH EYE EVERY 12 HOURS 1/31/22   Historical Provider, MD   JANUVIA 50 MG tablet Take 1 tablet by mouth once daily 2/8/22   Dena Alcocer MD   carvedilol (COREG) 6.25 MG tablet Take 1 tablet by mouth 2 times daily 1/28/22   Myriam Goldstein MD   latanoprost (XALATAN) 0.005 % ophthalmic solution INSTILL 1 DROP INTO EACH EYE ONCE DAILY IN THE EVENING 12/13/21   Historical Provider, MD   atorvastatin (LIPITOR) 40 MG tablet Take 1 tablet by mouth once daily 1/3/22   CHUNG Briones   potassium chloride (KLOR-CON) 10 MEQ extended release tablet Take 1 tablet by mouth once daily 7/26/21   C Ayaan Rahman MD   MM PEN NEEDLES 31G X 5 MM MISC USE   SUBCUTANEOUSLY ONCE DAILY 5/4/21   C Ayaan Rahman MD   aspirin 81 MG EC tablet Take 1 tablet by mouth daily 7/1/19   Moody Mendez MD          Inpatient Medications:   insulin glargine  8 Units SubCUTAneous Nightly    polyethylene glycol  17 g Oral Daily    sennosides-docusate sodium  2 tablet Oral Daily    melatonin  5 mg Oral Nightly    aspirin  81 mg Oral Daily    atorvastatin  40 mg Oral Nightly    dorzolamide-timolol  1 drop Both Eyes 2 times per day    furosemide  40 mg IntraVENous BID    isosorbide mononitrate  30 mg Oral Daily    insulin lispro  0-6 Units SubCUTAneous TID WC    insulin lispro  0-3 Units SubCUTAneous Nightly    latanoprost  1 drop Both Eyes Nightly    Tafamidis  61 mg Oral Daily    sodium chloride flush  5-40 mL IntraVENous 2 times per day    heparin (porcine)  5,000 Units SubCUTAneous 3 times per day    carvedilol  6.25 mg Oral BID       IV drips:   dextrose      sodium chloride         PRN:  glucagon (rDNA), dextrose, sodium chloride flush, sodium chloride, ondansetron **OR** ondansetron, acetaminophen **OR** acetaminophen, perflutren lipid microspheres, glucose, dextrose bolus (hypoglycemia) **OR** dextrose bolus (hypoglycemia)    Allergy:     Patient has no known allergies. Review of Systems:     All 12 point review of symptoms completed. Pertinent positives identified in the HPI, all other review of symptoms negative as below. CONSTITUTIONAL: No fatigue  SKIN: No rash or pruritis. EYES: No visual changes or diplopia. No scleral icterus. ENT: No Headaches, hearing loss or vertigo. No mouth sores or sore throat. CARDIOVASCULAR: No chest pain/chest pressure/chest discomfort. No palpitations. + edema. RESPIRATORY: + dyspnea. No cough or wheezing, no sputum production. GASTROINTESTINAL: No N/V/D. No abdominal pain, appetite loss, blood in stools.   GENITOURINARY: No dysuria, trouble voiding, or hematuria. MUSCULOSKELETAL:  No gait disturbance, weakness or joint complaints. NEUROLOGICAL: No headache, diplopia, change in muscle strength, numbness or tingling. No change in gait, balance, coordination, mood, affect, memory, mentation, behavior. ENDOCRINE: No excessive thirst, fluid intake, or urination. No tremor. HEMATOLOGIC: No abnormal bruising or bleeding. ALLERGY: No nasal congestion or hives. Physical Examination:     Vitals:    04/18/22 0453 04/18/22 0629 04/18/22 0901 04/18/22 1141   BP: (!) 102/58  136/82 (!) 143/85   Pulse: 66  73 67   Resp: 16  18 18   Temp: 97.9 °F (36.6 °C)   97.5 °F (36.4 °C)   TempSrc: Oral      SpO2: 98%   95%   Weight:  180 lb 1.9 oz (81.7 kg)     Height:           Wt Readings from Last 3 Encounters:   04/18/22 180 lb 1.9 oz (81.7 kg)   04/14/22 181 lb (82.1 kg)   03/15/22 181 lb (82.1 kg)         General Appearance:  Alert, cooperative, no distress, appears stated age Appropriate weight   Head:  Normocephalic, without obvious abnormality, atraumatic   Eyes:  PERRL, conjunctiva/corneas clear EOM intact  Ears normal   Throat no lesions       Nose: Nares normal, no drainage or sinus tenderness   Throat: Lips, mucosa, and tongue normal   Neck: Supple, symmetrical, trachea midline, no adenopathy, thyroid: not enlarged, symmetric, no tenderness/mass/nodules, no carotid bruit. Lungs:   Respirations unlabored, basilar ronchi. Chest Wall:  No tenderness or deformity   Heart:  Regular rhythm, rate is controlled, S1, S2 normal, there is no murmur, there is no rub or gallop, cannot assess jvd, 2+ bilateral lower extremity edema   Abdomen:   Soft, non-tender, bowel sounds active all four quadrants,  no masses, no organomegaly       Extremities: Extremities normal, atraumatic, no cyanosis.     Pulses: 2+ and symmetric   Skin: Skin color, texture, turgor normal, no rashes or lesions   Pysch: Normal mood and affect   Neurologic: Normal gross motor and sensory exam. Cranial nerves intact        Labs:     Recent Labs     04/16/22  1044 04/17/22  0641 04/18/22  0600    141 139   K 3.9 3.5 3.1*   BUN 24* 23* 21*   CREATININE 1.9* 1.8* 1.7*    105 104   CO2 23 22 25   GLUCOSE 125* 85 68*   CALCIUM 10.0 9.6 9.4   MG  --  2.00 1.90     Recent Labs     04/16/22  1044 04/16/22  1044 04/17/22  0641 04/17/22  0641 04/18/22  0600   WBC 5.4  --  5.0  --  4.5   HGB 12.6*  --  12.4*  --  11.4*   HCT 39.0*  --  39.0*  --  36.0*     --  244  --  232   MCV 77.3*   < > 77.9*   < > 78.1*    < > = values in this interval not displayed. Recent Labs     04/17/22  0641   TRIG 67   HDL 67*     No results for input(s): PTT, INR in the last 72 hours. Invalid input(s): PT  Recent Labs     04/16/22  1044 04/16/22  1443 04/16/22  1813 04/16/22  2353   TROPONINI 0.10* 0.08* 0.09* 0.09*     No results for input(s): BNP in the last 72 hours. No results for input(s): TSH in the last 72 hours. Recent Labs     04/17/22  0641   CHOL 130   HDL 67*   LDLCALC 50   TRIG 67   ]    Lab Results   Component Value Date    TROPONINI 0.09 (H) 04/16/2022         Imaging:     Telemetry personally reviewed:  NSR    I have personally reviewed patient's ECG which shows NSR, 1st degree, AVB, RBBB, LAFB, nonspecific changes. Assessment / Plan:     1. Acute on chronic HFrEF:   Patient presented volume overloaded but hemodynamically stable. She had systolic heart failure due to nonischemic cardiomyopathy. It is most likely due to prior history of severe uncontrolled hypertension and contribution by amyloidosis. LVEF has now recovered.     -Cw Vyndamax(tafamidis) 61 daily (there is a possibility tafamidis may have precipitated acute heart failure; it was started a week ago; clinical trial did suggest worsening symptoms when patients have NYHA FC III - we will monitor for now).    -Cw coreg 6.25 bid   -Will attempt to start Entresto half a tab of 24/26 mg bid (watch Cr and K).    -Stop imdur to avoid hypotension.   -Cw lasix 40 mg iv bid  - Strict I's and O's every shift and standing weights if possible, low-salt diet and daily BMP with reflex to Mg, wean supplemental oxygen to off for sats greater than 94%. -Correct lytes.      2. Mild CAD:   Has h/o NSTEMI but cath with only mild CAD.    -Agree with asa, bb, statin.     3.  HTN:    BP is now controlled with current medications.    -Cw meds     4. H/o NSVT:   No events.      -Cw coreg       5. HLP:   On statin.      6. ATTR amyloidosis:   Per #1    I have personally reviewed the reports and images of labs, radiological studies, cardiac studies including ECG's and telemetry, current and old medical records. The note was completed using EMR and Dragon dictation system. Every effort was made to ensure accuracy; however, inadvertent computerized transcription errors may be present. All questions and concerns were addressed to the patient/family. Alternatives to my treatment were discussed. I would like to thank you for providing me the opportunity to participate in the care of your patient. If you have any questions, please do not hesitate to contact me.      Helena Greenfield MD, [facility], [address]  The [address]  [address] [address] [place] [address]  Ph: [phone]  Fax: [phone]

## 2022-04-18 NOTE — PROGRESS NOTES
Internal Medicine Progress Note    Admit Date: 4/16/2022  Day: 3  Diet: ADULT DIET; Regular; 3 carb choices (45 gm/meal); Low Fat/Low Chol/High Fiber/2 gm Na; Low Sodium (2 gm); 1500 ml    CC: SOB    Interval history:   NORMA  Pt seen and examined this morning. Resting comfortably with no new complaints. SOB and leg swelling improved. Good UoP. Heart failure could be due to poor kidney functioning vs. Worsening cardiac amyloidosis  ECHO to be done today  Denies fevers, chills, N/V, SOB, dizziness, dysuria, light-headedness, palpitations.       Medications:     Scheduled Meds:   potassium chloride  40 mEq Oral Q4H    potassium chloride  10 mEq IntraVENous Q1H    magnesium sulfate  1,000 mg IntraVENous Once    insulin glargine  8 Units SubCUTAneous Nightly    melatonin  5 mg Oral Nightly    aspirin  81 mg Oral Daily    atorvastatin  40 mg Oral Nightly    dorzolamide-timolol  1 drop Both Eyes 2 times per day    furosemide  40 mg IntraVENous BID    isosorbide mononitrate  30 mg Oral Daily    insulin lispro  0-6 Units SubCUTAneous TID WC    insulin lispro  0-3 Units SubCUTAneous Nightly    latanoprost  1 drop Both Eyes Nightly    Tafamidis  61 mg Oral Daily    sodium chloride flush  5-40 mL IntraVENous 2 times per day    heparin (porcine)  5,000 Units SubCUTAneous 3 times per day    carvedilol  6.25 mg Oral BID     Continuous Infusions:   dextrose      sodium chloride       PRN Meds:glucagon (rDNA), dextrose, sodium chloride flush, sodium chloride, ondansetron **OR** ondansetron, polyethylene glycol, acetaminophen **OR** acetaminophen, perflutren lipid microspheres, glucose, dextrose bolus (hypoglycemia) **OR** dextrose bolus (hypoglycemia)    Objective:   Vitals:   T-max:  Patient Vitals for the past 8 hrs:   BP Temp Temp src Pulse Resp SpO2 Weight   04/18/22 0901 136/82 -- -- 73 (!) 148 -- --   04/18/22 0629 -- -- -- -- -- -- 180 lb 1.9 oz (81.7 kg)   04/18/22 0453 (!) 102/58 97.9 °F (36.6 °C) Oral 66 16 98 % --       Intake/Output Summary (Last 24 hours) at 4/18/2022 0906  Last data filed at 4/18/2022 0727  Gross per 24 hour   Intake 300 ml   Output 1850 ml   Net -1550 ml       Physical Exam  Vitals and nursing note reviewed. Constitutional:       General: He is not in acute distress. Appearance: Normal appearance. He is not ill-appearing or toxic-appearing. HENT:      Head: Normocephalic and atraumatic. Right Ear: External ear normal.      Left Ear: External ear normal.      Nose: Nose normal.      Mouth/Throat:      Mouth: Mucous membranes are moist.      Pharynx: Oropharynx is clear. Eyes:      General: No scleral icterus. Conjunctiva/sclera: Conjunctivae normal.      Pupils: Pupils are equal, round, and reactive to light. Cardiovascular:      Rate and Rhythm: Normal rate and regular rhythm. Pulses: Normal pulses. Heart sounds: No murmur heard. Pulmonary:      Effort: Pulmonary effort is normal. No respiratory distress. Breath sounds: No stridor. No wheezing or rhonchi. Comments: Slight crackles at b/l lung bases  Abdominal:      General: Abdomen is flat. Bowel sounds are normal. There is no distension. Palpations: Abdomen is soft. Tenderness: There is no abdominal tenderness. There is no guarding or rebound. Musculoskeletal:         General: No swelling. Normal range of motion. Cervical back: Normal range of motion and neck supple. No rigidity. Right lower leg: Edema (1+ pitting) present. Left lower leg: Edema (2+ pitting) present. Lymphadenopathy:      Cervical: No cervical adenopathy. Skin:     General: Skin is warm and dry. Capillary Refill: Capillary refill takes less than 2 seconds. Coloration: Skin is not jaundiced or pale. Findings: No bruising. Neurological:      General: No focal deficit present. Mental Status: He is alert and oriented to person, place, and time.       Cranial Nerves: No cranial nerve deficit. Sensory: No sensory deficit. Motor: No weakness. Gait: Gait normal.   Psychiatric:         Mood and Affect: Mood normal.         Behavior: Behavior normal.         Thought Content: Thought content normal.         Judgment: Judgment normal.       LABS:    CBC:   Recent Labs     04/16/22  1044 04/17/22  0641 04/18/22  0600   WBC 5.4 5.0 4.5   HGB 12.6* 12.4* 11.4*   HCT 39.0* 39.0* 36.0*    244 232   MCV 77.3* 77.9* 78.1*     Renal:    Recent Labs     04/16/22  1044 04/17/22  0641 04/18/22  0600    141 139   K 3.9 3.5 3.1*    105 104   CO2 23 22 25   BUN 24* 23* 21*   CREATININE 1.9* 1.8* 1.7*   GLUCOSE 125* 85 68*   CALCIUM 10.0 9.6 9.4   MG  --  2.00 1.90   ANIONGAP 11 14 10     Hepatic: No results for input(s): AST, ALT, BILITOT, BILIDIR, PROT, LABALBU, ALKPHOS in the last 72 hours. Troponin:   Recent Labs     04/16/22  1443 04/16/22  1813 04/16/22  2353   TROPONINI 0.08* 0.09* 0.09*     BNP: No results for input(s): BNP in the last 72 hours. Lipids:   Recent Labs     04/17/22  0641   CHOL 130   HDL 67*     ABGs:  No results for input(s): PHART, TJH4FSU, PO2ART, EZJ0KFU, BEART, THGBART, Y7PMLISG, JQG3OIJ in the last 72 hours. INR: No results for input(s): INR in the last 72 hours. Lactate: No results for input(s): LACTATE in the last 72 hours. Cultures:  -----------------------------------------------------------------  RAD:   XR CHEST (2 VW)   Final Result      Minimal right basilar atelectatic change. Stable cardiomegaly. VL Extremity Venous Bilateral    (Results Pending)       Assessment/Plan:   Mr. Roula Jacobs is an 77yo male with PMH ATTR Cardiac Amyloidosis, CM, Systolic CHF (EF 37-56% --> 50%), CAD, CKD 3b, HTN, IDDM, and HLD who presents with dyspnea on exertion and leg swelling. AoC Systolic and Diastolic CHF  Amyloid Cardiomyopathy  Likely 2/2 hx of severe uncontrolled HTN and amyloidosis.  His NYHA FC is II, stage C. Echo is suggestive of cardiac amyloidosis. Unable to start ACEI, ARB, spironolactone, Entresto or SGLT2 due to CKD and/or hypotension. BNP elevated to >11,000. Last ECHO in 2019 showed EF improved to 50% with grade 3 diastolic dysfunction.  - Continue diuresis with Lasix 40 mg IV BID  - strict Is and Os, daily weights, low Na diet  - Cardiology following, appreciate recs   - cont home Tafamidis 61mg PO daily  - Echo ordered  - LE dopplers ordered    CAD  - cont daily ASA, statin    HTN  - Coreg 6.25mg BID  - imdur 30mg daily    IDDM  - Lantus 10 -> 8U  - Low dose SSI  - POC Glucose AC HS  - hypoglycemia protocol    CKD stage IV  - daily labs    HLD  - lipitor 40mg daily    Code Status:Limited  FEN: ADULT DIET; Regular; 3 carb choices (45 gm/meal); Low Fat/Low Chol/High Fiber/2 gm Na;  Low Sodium (2 gm); 1500 ml  PPX:  SQH  DISPO: GMF    Jhon Cano MD, PGY-1  04/18/22  9:06 AM    This patient will be staffed and discussed with Jalyn Lepe MD.

## 2022-04-18 NOTE — DISCHARGE SUMMARY
INTERNAL MEDICINE DEPARTMENT AT [address] [place] [address]  DISCHARGE SUMMARY    Patient ID: Melchor Kent                                             Discharge Date: 4/19/2022   Patient's PCP: Rachel Alcala MD                                          Discharge Physician: Tali Malin MD MD  Admit Date: 4/16/2022   Admitting Physician: Luz Maria Willett MD    PROBLEMS DURING HOSPITALIZATION:  Patient Active Problem List   Diagnosis    Controlled type 2 diabetes mellitus with stage 3 chronic kidney disease, without long-term current use of insulin ([facility]    Benign essential HTN    Pure hypercholesterolemia    Iron deficiency anemia due to chronic blood loss    Primary osteoarthritis of both knees    Vitamin D deficiency    Right BBB/left ant fasc block    Abnormal EKG    Chronic renal failure, stage 3 (moderate) (HCC)    Dilated cardiomyopathy (HCC)    Chronic systolic CHF (congestive heart failure) (HCC)    Chronic kidney disease    S/P coronary angiogram    Severe left ventricular hypertrophy    HFrEF (heart failure with reduced ejection fraction) ([facility]    NSTEMI (non-ST elevated myocardial infarction) ([facility]    Wild-type transthyretin-related (ATTR) amyloidosis ([facility]    Non-recurrent unilateral inguinal hernia without obstruction or gangrene    Abnormal CAT scan    Heart failure [facility])     Hospital Course:  Melchor Kent is an 78year old M with a PmHx of CHFrEF (initially 35-40% now improved to 50%, non-ischemic), ATTR cardiac amyloidosis  pseudophakia, CKD stage 3b, T2DM, HTN who presents to the ED with SOB and fatigue. Over the last 2-3 weeks patient has experienced progressive LE swelling that he says has progressed to the scrotum. He cannot walk long distances without becoming SOB. He denies orthopnea saying he can sleep flat. No recent infection or changes to his medications or diet.  He reports he has been compliant with home medications and diet is largely unchanged other than for increased water intake. Increase intake but he reports decreased urine output. He endorses weight gain: 190 on admission, weight was 182 on 3/2/2022. During this hospitalization he was adequately diuresed with lasix with improvement in SOB and leg swelling. Electrolytes replenished. Repeat ECHO showed severe LVH, EF 20-25%, global hypokinesis of LV, and grade 3 diastolic dysfunction. HF exacerbation was likely due to worsening cardiac amyloidosis vs. Non-compliance with home lasix. Home Lasix regimen was continued lasix 40 mg BID with follow up with cardiology after being started on entresto. At time of discharge patient's SOB and edema have improved, he is HDS, with good I/O. Physical Exam:  /82   Pulse 66   Temp 97.6 °F (36.4 °C)   Resp 18   Ht 5' 11\" (1.803 m)   Wt 170 lb 9.6 oz (77.4 kg)   SpO2 98%   BMI 23.79 kg/m²   Constitutional:       General: He is not in acute distress. Appearance: He is normal weight. He is ill-appearing. HENT:      Head: Normocephalic. Right Ear: External ear normal.      Left Ear: External ear normal.      Nose: Nose normal.      Mouth/Throat:      Pharynx: Oropharynx is clear. No oropharyngeal exudate. Eyes:      General:         Right eye: No discharge. Left eye: No discharge. Conjunctiva/sclera: Conjunctivae normal.      Pupils: Pupils are equal, round, and reactive to light. Cardiovascular:      Rate and Rhythm: Normal rate and regular rhythm. Pulses: Normal pulses. Heart sounds: Normal heart sounds. No murmur heard. No friction rub. No gallop. Pulmonary:      Effort: Pulmonary effort is normal. No respiratory distress. Breath sounds: Rales present. Comments: Breath sounds mildly diminished throughout with some crackles in the R lung base  Abdominal:      General: Bowel sounds are normal. There is distension. Palpations: Abdomen is soft. There is no mass. Tenderness: There is no abdominal tenderness.  There is

## 2022-04-18 NOTE — CARE COORDINATION
Case Management Assessment           Initial Evaluation                Date / Time of Evaluation: 4/18/2022 12:40 PM                 Assessment Completed by: JR Toure, JILW    Patient Name: Allegra Recinos     YOB: 1940  Diagnosis: Heart failure ([facility] [address] [I50.9]  Acute on chronic congestive heart failure, unspecified heart failure type [facility]) [I50.9]     Date / Time: 4/16/2022 10:21 AM    Patient Admission Status: Inpatient    If patient is discharged prior to next notation, then this note serves as note for discharge by case management. Current PCP: Dena Alcocer MD  Clinic Patient: No    Chart Reviewed: Yes  Patient/ Family Interviewed: Yes    Initial assessment completed at bedside with: patient    Hospitalization in the last 30 days: No    Emergency Contacts:  Extended Emergency Contact Information  Primary Emergency Contact: Batsheva Becerra  Address: [address] Phone: [phone]  Mobile Phone: [phone]  Relation: Spouse    Advance Directives:   Code Status: [address]: No    Financial  Payor: Justina Francis / Plan: [place] / Product Type: *No Product type* /     Pre-cert required for SNF: Yes    Pharmacy    [address] [facility],[address], [facility] [phone] Frances Hartman [phone]  [address] [place] [address] [address]  Phone: [phone] Fax: [phone]      Potential assistance Purchasing Medications: Potential Assistance Purchasing Medications: No  Does Patient want to participate in local refill/ meds to beds program?: Yes    Meds To Beds General Rules:  1. Can ONLY be done Monday- Friday between 8:30am-5pm  2. Prescription(s) must be in pharmacy by 3pm to be filled same day  3. Copy of patient's insurance/ prescription drug card and patient face sheet must be sent along with the prescription(s)  4.  Cost of Rx cannot be added to hospital bill. If financial assistance is needed, please contact unit  or ;  or  CANNOT provide pharmacy voucher for patients co-pays  5. Patients can then  the prescription on their way out of the hospital at discharge, or pharmacy can deliver to the bedside if staff is available. (payment due at time of pick-up or delivery - cash, check, or card accepted)     Able to afford home medications/ co-pay costs: Yes    ADLS  Support Systems: Spouse/Significant Other    PT AM-PAC:     OT AM-PAC:       [place]: home  Steps: 3 outside, none inside    Plans to RETURN to current housing: Yes  Barriers to RETURNING to current housin [address]  Currently ACTIVE with [employer] [address]: No    Currently ACTIVE with [employer] on Aging: No      Durable Medical Equipment  Equipment:     Home Oxygen and [address] [place] prior to admission: No    Dialysis  Active with HD/PD prior to admission: No    DISCHARGE PLAN:  Disposition: Home- No Services Needed    Transportation PLAN for discharge: family     Factors facilitating achievement of predicted outcomes: Family support    Barriers to discharge: Medical complications    Additional Case Management Notes:  Pt is from home with spouse, plans to return at DC. No anticipated needs at DC; CM will continue to follow for DC needs and recs. The Plan for Transition of Care is related to the following treatment goals of Heart failure ([address] [I50.9]  Acute on chronic congestive heart failure, unspecified heart failure type ([address] [I50.9]    The Patient and/or patient representative Nicole Will and his family were provided with a choice of provider and agrees with the discharge plan Yes    Freedom of choice list was provided with basic dialogue that supports the patient's individualized plan of care/goals and shares the quality data associated with the providers. Yes    Care Transition patient: Yes    JR Logan, [facility]  Case Management Department  Ph: [phone]   Fax: [phone]

## 2022-04-18 NOTE — CONSULTS
Assessment/Plan :     CKD 3a  Cr at baseline  - will check P/C ratio    AoC Systolic and Diastolic CHF  Amyloid Cardiomyopathy  Last ECHO 9/2019, [facility] LVH, grade 3 diastolic dysfunction. Will repeat  - pt on tafamidis  - continue diuresis with Lasix 40 bid IV for one more day  - will add 10 mg metolazone  - consider starting Entresto if blood pressure tolerates    HTN  - pt on imdur and carvedilol    Chronic microcytic Anemia  At baseline, MCV 78.1  Will get iron studies    CAD  - cont daily ASA, statin    DM2  Insulin dependent  - Lantus 10      HLD  - lipitor 40mg daily    [facility]phrology would like to thank Claude Dates, MD   for opportunity to serve this patient      Please call with questions at-   24 Hrs Answering service [phone] or  7 am- 5 pm via Perfect serve or cell phone  Nohemy Moe MD      CC/reason for consult :     CKD 3A     HPI :     Chalino Wheeler is a 78 y.o. male presented to   the hospital on 4/16/2022 with 2-3 days of SOB and inc leg swelling. He cannot walk long distances without becoming SOB. He denied orthopnea. No recent infections or changes in diet and medications. He states that he has gained weight. Weight in March 2, 2022 was 182, PCP told him his weight should stay in the 170's. On admission weight was 190lb. ROS:       positives in bold   Constitutional:  fever, chills, weakness, weight change, fatigue  Skin:  rash, pruritus, hair loss, bruising, dry skin, petechiae  Head, Face, Neck   headaches, swelling,  cervical adenopathy  Respiratory: shortness of breath, cough, or wheezing  Cardiovascular: chest pain, palpitations, dizzy, edema  Gastrointestinal: nausea, vomiting, diarrhea, constipation,belly pain    Yellow skin, blood in stool  Musculoskeletal:  back pain, muscle weakness, gait problems,       joint pain or swelling.   Genitourinary:  dysuria, poor urine flow, flank pain, blood in urine  Neurologic: vertigo, TIA'S, syncope, seizures, focal weakness  Psychosocial:  insomnia, anxiety, or depression. Additional positive findings:                    All other remaining systems are negative or unable to obtain        PMH/PSH/SH/Family History:     Past Medical History:   Diagnosis Date    Abdominal hernia     Anemia     Arthritis     MILD    CHF (congestive heart failure) (HCC)     EF 35-40%. Non-ischemic     Hyperlipidemia     Hypertension     Type II or unspecified type diabetes mellitus without mention of complication, not stated as uncontrolled        Past Surgical History:   Procedure Laterality Date    HERNIA REPAIR      HIP SURGERY      OTHER SURGICAL HISTORY N/A 12/15/2015    LAPAROSCOPIC LEFT INGUINAL HERNIA REPAIR WITH MESH        reports that he has never smoked. He has never used smokeless tobacco. He reports that he does not drink alcohol and does not use drugs. family history includes Cancer in his sister; Heart Disease in his father; High Blood Pressure in his father and mother.          Medication:     Current Facility-Administered Medications: potassium chloride (KLOR-CON M) extended release tablet 40 mEq, 40 mEq, Oral, Q4H  potassium chloride 10 mEq/100 mL IVPB (Peripheral Line), 10 mEq, IntraVENous, Q1H  magnesium sulfate 1000 mg in dextrose 5% 100 mL IVPB, 1,000 mg, IntraVENous, Once  melatonin disintegrating tablet 5 mg, 5 mg, Oral, Nightly  aspirin EC tablet 81 mg, 81 mg, Oral, Daily  atorvastatin (LIPITOR) tablet 40 mg, 40 mg, Oral, Nightly  dorzolamide-timolol (COSOPT) 22.3-6.8 MG/ML ophthalmic solution 1 drop, 1 drop, Both Eyes, 2 times per day  furosemide (LASIX) injection 40 mg, 40 mg, IntraVENous, BID  isosorbide mononitrate (IMDUR) extended release tablet 30 mg, 30 mg, Oral, Daily  insulin glargine (LANTUS;BASAGLAR) injection pen 10 Units, 10 Units, SubCUTAneous, Nightly  insulin lispro (1 Unit Dial) 0-6 Units, 0-6 Units, SubCUTAneous, TID WC  insulin lispro (1 Unit Dial) 0-3 Units, 0-3 Units, SubCUTAneous, Nightly  glucagon (rDNA) injection 1 mg, 1 mg, IntraMUSCular, PRN  dextrose 5 % solution, 100 mL/hr, IntraVENous, PRN  latanoprost (XALATAN) 0.005 % ophthalmic solution 1 drop, 1 drop, Both Eyes, Nightly  Tafamidis CAPS 61 mg, 61 mg, Oral, Daily  sodium chloride flush 0.9 % injection 5-40 mL, 5-40 mL, IntraVENous, 2 times per day  sodium chloride flush 0.9 % injection 5-40 mL, 5-40 mL, IntraVENous, PRN  0.9 % sodium chloride infusion, , IntraVENous, PRN  ondansetron (ZOFRAN-ODT) disintegrating tablet 4 mg, 4 mg, Oral, Q8H PRN **OR** ondansetron (ZOFRAN) injection 4 mg, 4 mg, IntraVENous, Q6H PRN  polyethylene glycol (GLYCOLAX) packet 17 g, 17 g, Oral, Daily PRN  acetaminophen (TYLENOL) tablet 650 mg, 650 mg, Oral, Q6H PRN **OR** acetaminophen (TYLENOL) suppository 650 mg, 650 mg, Rectal, Q6H PRN  perflutren lipid microspheres (DEFINITY) injection 1.65 mg, 1.5 mL, IntraVENous, ONCE PRN  heparin (porcine) injection 5,000 Units, 5,000 Units, SubCUTAneous, 3 times per day  carvedilol (COREG) tablet 6.25 mg, 6.25 mg, Oral, BID  glucose chewable tablet 4 each, 4 each, Oral, PRN  dextrose bolus (hypoglycemia) 10% 125 mL, 125 mL, IntraVENous, PRN **OR** dextrose bolus (hypoglycemia) 10% 250 mL, 250 mL, IntraVENous, PRN       Vitals :     Vitals:    04/18/22 0453   BP: (!) 102/58   Pulse: 66   Resp: 16   Temp: 97.9 °F (36.6 °C)   SpO2: 98%       I & O :       Intake/Output Summary (Last 24 hours) at 4/18/2022 0825  Last data filed at 4/18/2022 0138  Gross per 24 hour   Intake 300 ml   Output 1850 ml   Net -1550 ml        Physical Examination :     General appearance: Anxious- no, distressed- no, in good spirits-  HEENT: Lips- normal, teeth- ok , oral mucosa- moist  Neck : Mass- no, appears symmetrical, JVD- not visible, veins slightly engorged   Respiratory: Respiratory effort-  normal, wheeze- no, crackles -   Cardiovascular:  Ausculation- No M/R/G, Edema 1+   Abdomen: visible mass- no, distention- no, scar- no, tenderness- no                            hepatosplenomegaly-  no  Musculoskeletal:  clubbing no,cyanosis- no , digital ischemia- no                           muscle strength- grossly normal , tone - grossly normal, 1+ pitting edema to above the ankle  Skin: rashes- no , ulcers- no, induration- no, tightening - no  Psychiatric:  Judgement and insight- normal           AAO X 3  Additional finding:      LABS:     Recent Labs     04/16/22  1044 04/17/22  0641 04/18/22  0600   WBC 5.4 5.0 4.5   HGB 12.6* 12.4* 11.4*   HCT 39.0* 39.0* 36.0*    244 232     Recent Labs     04/16/22  1044 04/17/22  0641 04/18/22  0600    141 139   K 3.9 3.5 3.1*    105 104   CO2 23 22 25   BUN 24* 23* 21*   CREATININE 1.9* 1.8* 1.7*   GLUCOSE 125* 85 68*   MG  --  2.00 1.90        Patient was seen and examined and the case was discussed with the resident. He acted as my scribe. I agree with the assessment and plan.     Thanks  Nephrology  [address]  Office: [phone]  Cell: [phone]  Fax: [phone]

## 2022-04-19 VITALS
TEMPERATURE: 97.8 F | RESPIRATION RATE: 18 BRPM | SYSTOLIC BLOOD PRESSURE: 124 MMHG | DIASTOLIC BLOOD PRESSURE: 77 MMHG | OXYGEN SATURATION: 99 % | BODY MASS INDEX: 23.88 KG/M2 | HEIGHT: 71 IN | WEIGHT: 170.6 LBS | HEART RATE: 72 BPM

## 2022-04-19 DIAGNOSIS — I50.22 CHRONIC SYSTOLIC CHF (CONGESTIVE HEART FAILURE) (HCC): ICD-10-CM

## 2022-04-19 DIAGNOSIS — I42.0 DILATED CARDIOMYOPATHY (HCC): ICD-10-CM

## 2022-04-19 DIAGNOSIS — I50.20 HFREF (HEART FAILURE WITH REDUCED EJECTION FRACTION) (HCC): Primary | ICD-10-CM

## 2022-04-19 LAB
ACANTHOCYTES: ABNORMAL
ANION GAP SERPL CALCULATED.3IONS-SCNC: 10 MMOL/L (ref 3–16)
ANISOCYTOSIS: ABNORMAL
BASOPHILS ABSOLUTE: 0.1 K/UL (ref 0–0.2)
BASOPHILS RELATIVE PERCENT: 1 %
BUN BLDV-MCNC: 20 MG/DL (ref 7–20)
CALCIUM SERPL-MCNC: 10 MG/DL (ref 8.3–10.6)
CHLORIDE BLD-SCNC: 101 MMOL/L (ref 99–110)
CO2: 30 MMOL/L (ref 21–32)
CREAT SERPL-MCNC: 1.9 MG/DL (ref 0.8–1.3)
EOSINOPHILS ABSOLUTE: 0.1 K/UL (ref 0–0.6)
EOSINOPHILS RELATIVE PERCENT: 1 %
GFR AFRICAN AMERICAN: 41
GFR NON-AFRICAN AMERICAN: 34
GLUCOSE BLD-MCNC: 103 MG/DL (ref 70–99)
GLUCOSE BLD-MCNC: 104 MG/DL (ref 70–99)
GLUCOSE BLD-MCNC: 125 MG/DL (ref 70–99)
GLUCOSE BLD-MCNC: 164 MG/DL (ref 70–99)
HCT VFR BLD CALC: 39.1 % (ref 40.5–52.5)
HEMOGLOBIN: 12.9 G/DL (ref 13.5–17.5)
LYMPHOCYTES ABSOLUTE: 1 K/UL (ref 1–5.1)
LYMPHOCYTES RELATIVE PERCENT: 19 %
MAGNESIUM: 2 MG/DL (ref 1.8–2.4)
MCH RBC QN AUTO: 25.2 PG (ref 26–34)
MCHC RBC AUTO-ENTMCNC: 33 G/DL (ref 31–36)
MCV RBC AUTO: 76.1 FL (ref 80–100)
MONOCYTES ABSOLUTE: 0.5 K/UL (ref 0–1.3)
MONOCYTES RELATIVE PERCENT: 10 %
NEUTROPHILS ABSOLUTE: 3.5 K/UL (ref 1.7–7.7)
NEUTROPHILS RELATIVE PERCENT: 69 %
PDW BLD-RTO: 20.1 % (ref 12.4–15.4)
PERFORMED ON: ABNORMAL
PLATELET # BLD: 261 K/UL (ref 135–450)
PLATELET SLIDE REVIEW: ADEQUATE
PMV BLD AUTO: 9 FL (ref 5–10.5)
POIKILOCYTES: ABNORMAL
POTASSIUM SERPL-SCNC: 3.4 MMOL/L (ref 3.5–5.1)
PRO-BNP: 8822 PG/ML (ref 0–449)
RBC # BLD: 5.14 M/UL (ref 4.2–5.9)
SCHISTOCYTES: ABNORMAL
SODIUM BLD-SCNC: 141 MMOL/L (ref 136–145)
TARGET CELLS: ABNORMAL
WBC # BLD: 5 K/UL (ref 4–11)

## 2022-04-19 PROCEDURE — 85025 COMPLETE CBC W/AUTO DIFF WBC: CPT

## 2022-04-19 PROCEDURE — 6370000000 HC RX 637 (ALT 250 FOR IP): Performed by: STUDENT IN AN ORGANIZED HEALTH CARE EDUCATION/TRAINING PROGRAM

## 2022-04-19 PROCEDURE — 83880 ASSAY OF NATRIURETIC PEPTIDE: CPT

## 2022-04-19 PROCEDURE — 99233 SBSQ HOSP IP/OBS HIGH 50: CPT | Performed by: INTERNAL MEDICINE

## 2022-04-19 PROCEDURE — 83735 ASSAY OF MAGNESIUM: CPT

## 2022-04-19 PROCEDURE — 2580000003 HC RX 258: Performed by: STUDENT IN AN ORGANIZED HEALTH CARE EDUCATION/TRAINING PROGRAM

## 2022-04-19 PROCEDURE — 80048 BASIC METABOLIC PNL TOTAL CA: CPT

## 2022-04-19 PROCEDURE — 6370000000 HC RX 637 (ALT 250 FOR IP): Performed by: INTERNAL MEDICINE

## 2022-04-19 PROCEDURE — 6360000002 HC RX W HCPCS: Performed by: STUDENT IN AN ORGANIZED HEALTH CARE EDUCATION/TRAINING PROGRAM

## 2022-04-19 PROCEDURE — 36415 COLL VENOUS BLD VENIPUNCTURE: CPT

## 2022-04-19 RX ORDER — POTASSIUM CHLORIDE 20 MEQ/1
40 TABLET, EXTENDED RELEASE ORAL ONCE
Status: COMPLETED | OUTPATIENT
Start: 2022-04-19 | End: 2022-04-19

## 2022-04-19 RX ORDER — FUROSEMIDE 40 MG/1
40 TABLET ORAL 2 TIMES DAILY
Status: DISCONTINUED | OUTPATIENT
Start: 2022-04-19 | End: 2022-04-19 | Stop reason: HOSPADM

## 2022-04-19 RX ADMIN — FUROSEMIDE 40 MG: 40 TABLET ORAL at 07:55

## 2022-04-19 RX ADMIN — CARVEDILOL 6.25 MG: 6.25 TABLET, FILM COATED ORAL at 07:55

## 2022-04-19 RX ADMIN — HEPARIN SODIUM 5000 UNITS: 5000 INJECTION INTRAVENOUS; SUBCUTANEOUS at 05:52

## 2022-04-19 RX ADMIN — POTASSIUM CHLORIDE 40 MEQ: 1500 TABLET, EXTENDED RELEASE ORAL at 11:57

## 2022-04-19 RX ADMIN — SODIUM CHLORIDE, PRESERVATIVE FREE 10 ML: 5 INJECTION INTRAVENOUS at 09:00

## 2022-04-19 RX ADMIN — POLYETHYLENE GLYCOL 3350 17 G: 17 POWDER, FOR SOLUTION ORAL at 07:55

## 2022-04-19 RX ADMIN — SENNOSIDES AND DOCUSATE SODIUM 2 TABLET: 50; 8.6 TABLET ORAL at 07:55

## 2022-04-19 RX ADMIN — SACUBITRIL AND VALSARTAN 0.5 TABLET: 24; 26 TABLET, FILM COATED ORAL at 07:55

## 2022-04-19 RX ADMIN — ASPIRIN 81 MG: 81 TABLET, COATED ORAL at 07:55

## 2022-04-19 ASSESSMENT — PAIN SCALES - GENERAL
PAINLEVEL_OUTOF10: 0
PAINLEVEL_OUTOF10: 0

## 2022-04-19 NOTE — PLAN OF CARE
Problem: OXYGENATION/RESPIRATORY FUNCTION  Goal: Patient will maintain patent airway  Outcome: Ongoing     Problem: HEMODYNAMIC STATUS  Goal: Patient has stable vital signs and fluid balance  Outcome: Ongoing     Problem: ACTIVITY INTOLERANCE/IMPAIRED MOBILITY  Goal: Mobility/activity is maintained at optimum level for patient  Outcome: Ongoing

## 2022-04-19 NOTE — PROGRESS NOTES
Patient discharged home. AVS instructions reviewed with patient at bedside. Patient alert and oriented at time of discharge education. Denies any questions or concerns. Spoke with Dary Spencer with Cardiology whom states that rep to apply [address] will come out to patient's home to apply vest within 24 hours. Ok to discharge patient home. IV's and monitor removed from patient and Patient escorted to main entrance

## 2022-04-19 NOTE — CARE COORDINATION
Case Management Assessment            Discharge Note                    Date / Time of Note: 4/19/2022 12:20 PM                  Discharge Note Completed by: JR Stuart, JILW    Patient Name: Kay Escamilla   YOB: 1940  Diagnosis: Heart failure ([facility] [address] [I50.9]  Acute on chronic congestive heart failure, unspecified heart failure type [facility]) [I50.9]   Date / Time: 4/16/2022 10:21 AM    Current PCP: Fili Andrews MD  Clinic patient: No    Hospitalization in the last 30 days: No    Advance Directives:  Code Status: Limited  [place] DNR form completed and on chart: Yes    Financial:  Payor: Henrique Gomezas / Plan: [place] / Product Type: *No Product type* /      Pharmacy:    [address] [facility],[address], [facility] [place]  [phone] [facility] [phone]  [address] [place] [address] [address]  Phone: [phone] Fax: [phone]      Assistance purchasing medications?: Potential Assistance Purchasing Medications: No  Assistance provided by Case Management: None at this time    Does patient want to participate in local refill/ meds to beds program?: Yes    Meds To Beds General Rules:  1. Can ONLY be done Monday- Friday between 8:30am-5pm  2. Prescription(s) must be in pharmacy by 3pm to be filled same day  3. Copy of patient's insurance/ prescription drug card and patient face sheet must be sent along with the prescription(s)  4. Cost of Rx cannot be added to hospital bill. If financial assistance is needed, please contact unit  or ;  or  CANNOT provide pharmacy voucher for patients co-pays  5.  Patients can then  the prescription on their way out of the hospital at discharge, or pharmacy can deliver to the bedside if staff is available. (payment due at time of pick-up or delivery - cash, check, or card accepted)     Able to afford home medications/ co-pay costs: Yes    ADLS:  Current PT AM-PAC Score:   /24  Current OT AM-PAC Score:   /24      DISCHARGE Disposition: Home- No Services Needed    LOC at discharge: Not Applicable  JAMAAL Completed: Not Indicated    Notification completed in HENS/PAS?:  Not Applicable    IMM Completed:   Not Indicated    Transportation:  Transportation PLAN for discharge: family   Mode of Transport: [address] ordered at discharge: No    Durable Medical Equipment:  Equipment obtained during hospitalization: NA    Home Oxygen and Respiratory Equipment:  Oxygen needed at discharge?: No    Dialysis:  Dialysis patient: No      Referrals made at [facility] for outpatient continued care:  Not Applicable    Additional CM Notes:  Pt will DC home today, family will transport. Pt confirmed no safety concerns, no current DME or HHC needs. No other needs at this time. The Plan for Transition of Care is related to the following treatment goals of Heart failure ([address] [I50.9]  Acute on chronic congestive heart failure, unspecified heart failure type ([address] [I50.9]    The Patient and/or patient representative Mekhi Cardona and his family were provided with a choice of provider and agrees with the discharge plan Yes    Freedom of choice list was provided with basic dialogue that supports the patient's individualized plan of care/goals and shares the quality data associated with the providers.  Yes    Care Transitions patient: Yes    JR Haddad, [facility]  Case Management Department  Ph: [phone]  Fax: [phone]

## 2022-04-19 NOTE — CARE COORDINATION
Pt's RN notified CM that pt will need life vest before [address] [facility] home today. HUC spoke with Cardiology to set up for today.         Electronically signed by JR Smith, LSW on 4/19/2022 at 3:12 PM

## 2022-04-19 NOTE — PROGRESS NOTES
Cardiology Consult Service  Daily Progress Note        Admit Date:  4/16/2022  Primary cardiologist: Dr Pedro Oro    Reason for Consultation/Chief Complaint: AHF    Subjective:     Mr Neville Jimenez is a 77 yo man, patient of Dr Nitin Armenta h/o severe LVH, HFrEF, HTN, HLP, DM 2, CKD (baseline creatinine 2.1), mild CAD, NSTEMI, trifascicular block with RBBB, NSVT.     Echo 06/2019: severe LVH, EF 35-40%, mild RV dysfx, mild valve disease     Echo 2015: moderate LVH, normal EF.      Kathi 07/2019: mod LVd, EF 39%, mid to distal inferior and apical fixed defect.      LHC 07/2019: mild diffuse CAD, nl LVEDP.      ECG 9/6/19: NSR, 1st AVB, LAFB, RBBB (trifascicular block), possible inferior MI.      ECG 9/5/19: Sinus lenore 54 (thought to be junctional).       9/6/20 SPEP, UPEP and sFLC within normal limits for CKD (except for proteinuria).      Echo 9/20/19: severe LVH, LVEF 76-40%, diastolic III, apical sparing on speckle strain imaging suggestive of amyloidosis, severe LAE, mild MR.       Tc PYP scan 3/12/20: strongly positive for amyloidosis.       09/2019 kappa/labda ratio 1.69 (normal in the setting of CKD, Cr 1.9).  24-hour UPEP normal; SPEP 05/2021 normal; hence no AL amyloidosis.       Patient presented to the emergency room on 4/16/2022 with progressively worse edema and shortness of breath over the last 2 to 3 weeks. Patient was started on tafamidis about a week ago (4/10/2020.). He has been compliant with his medications and low-salt diet (however he admits to taking Lasix 40 mg p.o. only once a day instead of twice daily). He reports weight gain (190 pounds on admission, weight was 182 pounds on 3/2). Patient was admitted for acute heart failure, was started on Lasix 40 mg IV twice daily. I's and O's -3.8 L since admission, weight down 10 pounds.   K3.1 today, creatinine down to 1.7 from 1.9.      Echo 4/18/2022: Severe LVH, consider cardiac amyloidosis, LVEF 20-25% with global hypokinesis, diastolic grade 3, increased LVEDP, normal RV size with severe hypokinesis, no significant valvular abnormalities. Interval history:  Patient reports significant improvement with his dyspnea. I's and O's -2.9 L, -2.8 L on Lasix 40 IV twice daily. Creatinine 1.9 relatively stable (baseline 2.1). K3.4. Patient started Entresto half a tab of 24/26 mg p.o. twice daily on 4/18/2022. Objective:     Medications:   furosemide  40 mg Oral BID    insulin glargine  8 Units SubCUTAneous Nightly    polyethylene glycol  17 g Oral Daily    sennosides-docusate sodium  2 tablet Oral Daily    sacubitril-valsartan  0.5 tablet Oral BID    melatonin  5 mg Oral Nightly    aspirin  81 mg Oral Daily    atorvastatin  40 mg Oral Nightly    dorzolamide-timolol  1 drop Both Eyes 2 times per day    insulin lispro  0-6 Units SubCUTAneous TID WC    insulin lispro  0-3 Units SubCUTAneous Nightly    latanoprost  1 drop Both Eyes Nightly    Tafamidis  61 mg Oral Daily    sodium chloride flush  5-40 mL IntraVENous 2 times per day    heparin (porcine)  5,000 Units SubCUTAneous 3 times per day    carvedilol  6.25 mg Oral BID       IV drips:   dextrose      sodium chloride         PRN:  melatonin, glucagon (rDNA), dextrose, sodium chloride flush, sodium chloride, ondansetron **OR** ondansetron, acetaminophen **OR** acetaminophen, perflutren lipid microspheres, glucose, dextrose bolus (hypoglycemia) **OR** dextrose bolus (hypoglycemia)    Vitals:    04/19/22 0416 04/19/22 0600 04/19/22 0753 04/19/22 1159   BP: 107/68  136/82 124/77   Pulse: 72  66 72   Resp: 16 18 18   Temp: 98.3 °F (36.8 °C)  97.6 °F (36.4 °C) 97.8 °F (36.6 °C)   TempSrc: Oral  Oral Oral   SpO2: 99%  98% 99%   Weight:  170 lb 9.6 oz (77.4 kg)     Height:           Intake/Output Summary (Last 24 hours) at 4/19/2022 1440  Last data filed at 4/19/2022 1049  Gross per 24 hour   Intake 1020 ml   Output 6570 ml   Net -5550 ml     I/O last 3 completed shifts:   In: 1430 [P.O.:1430]  Out: 2936 [Urine:6745]  Wt Readings from Last 3 Encounters:   04/19/22 170 lb 9.6 oz (77.4 kg)   04/14/22 181 lb (82.1 kg)   03/15/22 181 lb (82.1 kg)       Admit Wt: Weight: 190 lb (86.2 kg)   Todays Wt: Weight: 170 lb 9.6 oz (77.4 kg)    TELEMETRY personally reviewed: Sinus     Physical Exam:         General Appearance:  Alert, cooperative, no distress, appears stated age Appropriate weight   Head:  Normocephalic, without obvious abnormality, atraumatic   Eyes:  PERRL, conjunctiva/corneas clear EOM intact  Ears normal   Throat no lesions       Nose: Nares normal, no drainage or sinus tenderness   Throat: Lips, mucosa, and tongue normal   Neck: Supple, symmetrical, trachea midline, no adenopathy, thyroid: not enlarged, symmetric, no tenderness/mass/nodules, no carotid bruit. Lungs:   Normal respiratory rate, lungs clear to auscultation without any wheezes, rubs or ronchi bilaterally. Chest Wall:  No tenderness or deformity   Heart:  Regular rhythm, rate is controlled, S1, S2 normal, there is no murmur, there is no rub or gallop, cannot assess jvd, no bilateral lower extremity edema   Abdomen:   Soft, non-tender, bowel sounds active all four quadrants,  no masses, no organomegaly       Extremities: Extremities normal, atraumatic, no cyanosis.     Pulses: 2+ and symmetric   Skin: Skin color, texture, turgor normal, no rashes or lesions   Pysch: Normal mood and affect   Neurologic: Normal gross motor and sensory exam.  Cranial nerves intact       Labs:   Recent Labs     04/17/22  0641 04/18/22  0600 04/19/22  0833    139 141   K 3.5 3.1* 3.4*   BUN 23* 21* 20   CREATININE 1.8* 1.7* 1.9*    104 101   CO2 22 25 30   GLUCOSE 85 68* 104*   CALCIUM 9.6 9.4 10.0   MG 2.00 1.90 2.00     Recent Labs     04/17/22  0641 04/17/22  0641 04/18/22  0600 04/18/22  0600 04/19/22  0833   WBC 5.0  --  4.5  --  5.0   HGB 12.4*  --  11.4*  --  12.9*   HCT 39.0*  --  36.0*  --  39.1*     --  232  --  261   MCV 77.9*   < > 78.1*   < > 76.1*    < > = values in this interval not displayed. Recent Labs     04/17/22  0641   TRIG 67   HDL 67*     No results for input(s): PTT, INR in the last 72 hours. Invalid input(s): PT  Recent Labs     04/16/22  1443 04/16/22  1813 04/16/22  2353   TROPONINI 0.08* 0.09* 0.09*     No results for input(s): BNP in the last 72 hours. No results for input(s): NTPROBNP in the last 72 hours. No results for input(s): TSH in the last 72 hours. Imaging:       Assessment & Plan:     1. Acute on chronic HFrEF:   Patient presented volume overloaded but hemodynamically stable. She had systolic heart failure due to nonischemic cardiomyopathy. It is most likely due to prior history of severe uncontrolled hypertension and contribution by amyloidosis. LVEF has now recovered.     -Cw Vyndamax (tafamidis) 61 daily (there is a possibility tafamidis may have precipitated acute heart failure; it was started a week ago; clinical trial did suggest worsening symptoms when patients have NYHA FC III - we will monitor for now).    -Cw coreg 6.25 bid   -Cw Entresto half a tab of 24/26 mg bid (watch Cr and K). -Stopped imdur to avoid hypotension.   - Unable to start spironolactone due to CKD and/or hypotension.   -Resume Lasix 40 mg p.o. twice daily (patient was actually taking only once daily prior to admission). - Strict I's and O's every shift and standing weights if possible, low-salt diet and daily BMP with reflex to Mg, wean supplemental oxygen to off for sats greater than 94%. -Correct lytes. -We will consider starting SGLT2 inhibitors for patient's HFrEF later in the clinic.   - If LVEF remains < 35% after 90 days of GDMT, will discuss referral to EP for possible device placement.    -Will plan for LifeVest prior to dc home.      2. Mild CAD:   Has h/o NSTEMI but cath with only mild CAD.    -Agree with asa, bb, statin.     3.  HTN:    BP is now controlled with current medications.      -Cw meds     4. H/o NSVT:   No events.      -Cw coreg       5. HLP:   On statin.      6. ATTR amyloidosis:   Per #1    Patient may be discharged home today on the above meds and follow up with me in 1 week with a BMP prior to visit. Please call me with any questions. I have spent 35 minutes of face to face time with the patient with more than 50% spent counseling and coordinating care. I have personally reviewed the reports and images of labs, radiological studies, cardiac studies including ECG's and telemetry, current and old medical records. The note was completed using EMR and Dragon dictation system. Every effort was made to ensure accuracy; however, inadvertent computerized transcription errors may be present. All questions and concerns were addressed to the patient/family. Alternatives to my treatment were discussed. Thank you for allowing to us to participate in the ml or Vignesh Lau. Please call our service with questions.     Lauren Ritchie MD, [facility] [place], [address]  The [address]  [address] [place] [address]  Ph: [phone]  Fax: [phone]

## 2022-04-19 NOTE — PROGRESS NOTES
Internal Medicine Progress Note    Admit Date: 4/16/2022  Day: 4  Diet: ADULT DIET; Regular; 3 carb choices (45 gm/meal); Low Fat/Low Chol/High Fiber/2 gm Na; Low Sodium (2 gm); 1500 ml    CC: SOB    Interval history:   NROMA  Pt seen and examined this morning  Resting comfortably with no new complaints. Had significant urine output overnight, 6L  SOB and leg swelling improved. Heart failure could be due to poor kidney functioning vs. Non-compliance with medication vs. Worsening cardiac amyloidosis. ECHO shows worsening cardiac function  Denies fevers, chills, N/V, SOB, dizziness, dysuria, light-headedness, palpitations.       Medications:     Scheduled Meds:   furosemide  40 mg Oral BID    potassium chloride  40 mEq Oral Once    insulin glargine  8 Units SubCUTAneous Nightly    polyethylene glycol  17 g Oral Daily    sennosides-docusate sodium  2 tablet Oral Daily    sacubitril-valsartan  0.5 tablet Oral BID    melatonin  5 mg Oral Nightly    aspirin  81 mg Oral Daily    atorvastatin  40 mg Oral Nightly    dorzolamide-timolol  1 drop Both Eyes 2 times per day    insulin lispro  0-6 Units SubCUTAneous TID WC    insulin lispro  0-3 Units SubCUTAneous Nightly    latanoprost  1 drop Both Eyes Nightly    Tafamidis  61 mg Oral Daily    sodium chloride flush  5-40 mL IntraVENous 2 times per day    heparin (porcine)  5,000 Units SubCUTAneous 3 times per day    carvedilol  6.25 mg Oral BID     Continuous Infusions:   dextrose      sodium chloride       PRN Meds:melatonin, glucagon (rDNA), dextrose, sodium chloride flush, sodium chloride, ondansetron **OR** ondansetron, acetaminophen **OR** acetaminophen, perflutren lipid microspheres, glucose, dextrose bolus (hypoglycemia) **OR** dextrose bolus (hypoglycemia)    Objective:   Vitals:   T-max:  Patient Vitals for the past 8 hrs:   BP Temp Temp src Pulse Resp SpO2 Weight   04/19/22 0753 136/82 97.6 °F (36.4 °C) -- 66 18 98 % --   04/19/22 0600 -- -- -- -- -- -- 170 lb 9.6 oz (77.4 kg)   04/19/22 0416 107/68 98.3 °F (36.8 °C) Oral 72 16 99 % --       Intake/Output Summary (Last 24 hours) at 4/19/2022 0932  Last data filed at 4/19/2022 0917  Gross per 24 hour   Intake 1260 ml   Output 5695 ml   Net -4435 ml       Physical Exam  Vitals and nursing note reviewed. Constitutional:       General: He is not in acute distress. Appearance: Normal appearance. He is not ill-appearing or toxic-appearing. HENT:      Head: Normocephalic and atraumatic. Right Ear: External ear normal.      Left Ear: External ear normal.      Nose: Nose normal.      Mouth/Throat:      Mouth: Mucous membranes are moist.      Pharynx: Oropharynx is clear. Eyes:      General: No scleral icterus. Conjunctiva/sclera: Conjunctivae normal.      Pupils: Pupils are equal, round, and reactive to light. Cardiovascular:      Rate and Rhythm: Normal rate and regular rhythm. Pulses: Normal pulses. Heart sounds: No murmur heard. Pulmonary:      Effort: Pulmonary effort is normal. No respiratory distress. Breath sounds: No stridor. No wheezing or rhonchi. Abdominal:      General: Abdomen is flat. Bowel sounds are normal. There is no distension. Palpations: Abdomen is soft. Tenderness: There is no abdominal tenderness. There is no guarding or rebound. Musculoskeletal:         General: No swelling. Normal range of motion. Cervical back: Normal range of motion and neck supple. No rigidity. Right lower leg: Edema (1+ pitting) present. Left lower leg: Edema (1+ pitting) present. Lymphadenopathy:      Cervical: No cervical adenopathy. Skin:     General: Skin is warm and dry. Capillary Refill: Capillary refill takes less than 2 seconds. Coloration: Skin is not jaundiced or pale. Findings: No bruising. Neurological:      General: No focal deficit present. Mental Status: He is alert and oriented to person, place, and time. Cranial Nerves: No cranial nerve deficit. Sensory: No sensory deficit. Motor: No weakness. Gait: Gait normal.   Psychiatric:         Mood and Affect: Mood normal.         Behavior: Behavior normal.         Thought Content: Thought content normal.         Judgment: Judgment normal.       LABS:    CBC:   Recent Labs     04/17/22  0641 04/18/22  0600 04/19/22  0833   WBC 5.0 4.5 5.0   HGB 12.4* 11.4* 12.9*   HCT 39.0* 36.0* 39.1*    232 261   MCV 77.9* 78.1* 76.1*     Renal:    Recent Labs     04/17/22  0641 04/18/22  0600 04/19/22  0833    139 141   K 3.5 3.1* 3.4*    104 101   CO2 22 25 30   BUN 23* 21* 20   CREATININE 1.8* 1.7* 1.9*   GLUCOSE 85 68* 104*   CALCIUM 9.6 9.4 10.0   MG 2.00 1.90 2.00   ANIONGAP 14 10 10     Hepatic: No results for input(s): AST, ALT, BILITOT, BILIDIR, PROT, LABALBU, ALKPHOS in the last 72 hours. Troponin:   Recent Labs     04/16/22  1443 04/16/22  1813 04/16/22  2353   TROPONINI 0.08* 0.09* 0.09*     BNP: No results for input(s): BNP in the last 72 hours. Lipids:   Recent Labs     04/17/22  0641   CHOL 130   HDL 67*     ABGs:  No results for input(s): PHART, CBU0TLQ, PO2ART, IGR2PWV, BEART, THGBART, K7HHTVES, BHL0FDV in the last 72 hours. INR: No results for input(s): INR in the last 72 hours. Lactate: No results for input(s): LACTATE in the last 72 hours. Cultures:  -----------------------------------------------------------------  RAD:   VL Extremity Venous Bilateral   Final Result      XR CHEST (2 VW)   Final Result      Minimal right basilar atelectatic change. Stable cardiomegaly. Assessment/Plan:   Mr. Cassi Jhaveri is an 79yo male with PMH ATTR Cardiac Amyloidosis, CM, Systolic CHF (EF 12-37% --> 50%), CAD, CKD 3b, HTN, IDDM, and HLD who presents with dyspnea on exertion and leg swelling. AoC HFrEF  Amyloid Cardiomyopathy  Likely 2/2 hx of severe uncontrolled HTN and amyloidosis.  His NYHA FC is II, stage C. Echo is suggestive of cardiac amyloidosis. Unable to start ACEI, ARB, spironolactone, Entresto or SGLT2 due to CKD and/or hypotension. BNP elevated to >11,000. Last ECHO in 2019 showed EF improved to 50% with grade 3 diastolic dysfunction.  - Repeat ECHO shows severe LVH, EF 20-25%, global hypokinesis of LV, and grade 3 diastolic dysfunction  - Change Lasix 40 mg IV BID to PO 40 mg BID  - strict Is and Os, daily weights, low Na diet  - Cardiology following, appreciate recs   - cont home Tafamidis 61mg PO daily  - LE dopplers showed no DVT    CAD  - cont daily ASA, statin    HTN  - Coreg 6.25mg BID  - stopped imdur and start Entresto half a tab of 24/26 mg bid    IDDM  - Lantus 10 -> 8U  - Low dose SSI  - POC Glucose AC HS  - hypoglycemia protocol    CKD stage 3b  - daily labs    HLD  - lipitor 40mg daily    Code Status:Limited  FEN: ADULT DIET; Regular; 3 carb choices (45 gm/meal); Low Fat/Low Chol/High Fiber/2 gm Na;  Low Sodium (2 gm); 1500 ml  PPX:  SQH  DISPO: GMF    Jose Roberto Lantigua MD, PGY-1  04/19/22  9:32 AM    This patient will be staffed and discussed with Monisha Scherer MD.

## 2022-04-19 NOTE — PROGRESS NOTES
Urine is 5995 ml. Weight is better 190 >> 170 pounds  BP is better     Change lasix to oral BID  Entresto BID  On Tafamidis for ATTR amyloid    OK to DC and follow up in 2-3 weeks               Assessment/Plan :     CKD 3a  Cr at baseline  - P/C ratio is 500 mg / day     AoC Systolic and Diastolic CHF  Amyloid Cardiomyopathy  Last ECHO 9/2019, Concentric LVH, grade 3 diastolic dysfunction. Will repeat  - pt on tafamidis  - continue diuresis with Lasix 40 bid IV for one more day  - will add 10 mg metolazone  - consider starting Entresto if blood pressure tolerates    HTN  - pt on imdur and carvedilol    Chronic microcytic Anemia  At baseline, MCV 78.1   iron studies are OK    CAD  - cont daily ASA, statin       HLD  - lipitor 40mg daily    [facility] would like to thank Lesly Johnson MD   for opportunity to serve this patient      Please call with questions at-   24 Hrs Answering service [phone] or  7 am- 5 pm via Perfect serve or cell phone  Rasheed Trevino MD      CC/reason for consult :     CKD 3A     HPI :     Roseanna Hirsch is a 78 y.o. male presented to   the hospital on 4/16/2022 with 2-3 days of SOB and inc leg swelling. He cannot walk long distances without becoming SOB. He denied orthopnea. No recent infections or changes in diet and medications. He states that he has gained weight. Weight in March 2, 2022 was 182, PCP told him his weight should stay in the 170's. On admission weight was 190lb.       ROS:       positives in bold   Constitutional:  fever, chills, weakness, weight change, fatigue  Skin:  rash, pruritus, hair loss, bruising, dry skin, petechiae  Head, Face, Neck   headaches, swelling,  cervical adenopathy  Respiratory: shortness of breath, cough, or wheezing  Cardiovascular: chest pain, palpitations, dizzy, edema  Gastrointestinal: nausea, vomiting, diarrhea, constipation,belly pain    Yellow skin, blood in stool  Musculoskeletal:  back pain, muscle weakness, gait problems,       joint pain or swelling. Genitourinary:  dysuria, poor urine flow, flank pain, blood in urine  Neurologic:  vertigo, TIA'S, syncope, seizures, focal weakness  Psychosocial:  insomnia, anxiety, or depression. Additional positive findings:                    All other remaining systems are negative or unable to obtain        PMH/PSH/SH/Family History:     Past Medical History:   Diagnosis Date    Abdominal hernia     Anemia     Arthritis     MILD    CHF (congestive heart failure) (HCC)     EF 35-40%. Non-ischemic     Hyperlipidemia     Hypertension     Type II or unspecified type diabetes mellitus without mention of complication, not stated as uncontrolled        Past Surgical History:   Procedure Laterality Date    HERNIA REPAIR      HIP SURGERY      OTHER SURGICAL HISTORY N/A 12/15/2015    LAPAROSCOPIC LEFT INGUINAL HERNIA REPAIR WITH MESH        reports that he has never smoked. He has never used smokeless tobacco. He reports that he does not drink alcohol and does not use drugs. family history includes Cancer in his sister; Heart Disease in his father; High Blood Pressure in his father and mother.          Medication:     Current Facility-Administered Medications: furosemide (LASIX) tablet 40 mg, 40 mg, Oral, BID  insulin glargine (LANTUS;BASAGLAR) injection pen 8 Units, 8 Units, SubCUTAneous, Nightly  polyethylene glycol (GLYCOLAX) packet 17 g, 17 g, Oral, Daily  sennosides-docusate sodium (SENOKOT-S) 8.6-50 MG tablet 2 tablet, 2 tablet, Oral, Daily  sacubitril-valsartan (ENTRESTO) 24-26 MG per tablet 0.5 tablet, 0.5 tablet, Oral, BID  melatonin tablet 6 mg, 6 mg, Oral, Nightly PRN  melatonin disintegrating tablet 5 mg, 5 mg, Oral, Nightly  aspirin EC tablet 81 mg, 81 mg, Oral, Daily  atorvastatin (LIPITOR) tablet 40 mg, 40 mg, Oral, Nightly  dorzolamide-timolol (COSOPT) 22.3-6.8 MG/ML ophthalmic solution 1 drop, 1 drop, Both Eyes, 2 times per day  insulin lispro (1 Unit Dial) 0-6 Units, 0-6 Units, SubCUTAneous, TID WC  insulin lispro (1 Unit Dial) 0-3 Units, 0-3 Units, SubCUTAneous, Nightly  glucagon (rDNA) injection 1 mg, 1 mg, IntraMUSCular, PRN  dextrose 5 % solution, 100 mL/hr, IntraVENous, PRN  latanoprost (XALATAN) 0.005 % ophthalmic solution 1 drop, 1 drop, Both Eyes, Nightly  Tafamidis CAPS 61 mg, 61 mg, Oral, Daily  sodium chloride flush 0.9 % injection 5-40 mL, 5-40 mL, IntraVENous, 2 times per day  sodium chloride flush 0.9 % injection 5-40 mL, 5-40 mL, IntraVENous, PRN  0.9 % sodium chloride infusion, , IntraVENous, PRN  ondansetron (ZOFRAN-ODT) disintegrating tablet 4 mg, 4 mg, Oral, Q8H PRN **OR** ondansetron (ZOFRAN) injection 4 mg, 4 mg, IntraVENous, Q6H PRN  acetaminophen (TYLENOL) tablet 650 mg, 650 mg, Oral, Q6H PRN **OR** acetaminophen (TYLENOL) suppository 650 mg, 650 mg, Rectal, Q6H PRN  perflutren lipid microspheres (DEFINITY) injection 1.65 mg, 1.5 mL, IntraVENous, ONCE PRN  heparin (porcine) injection 5,000 Units, 5,000 Units, SubCUTAneous, 3 times per day  carvedilol (COREG) tablet 6.25 mg, 6.25 mg, Oral, BID  glucose chewable tablet 4 each, 4 each, Oral, PRN  dextrose bolus (hypoglycemia) 10% 125 mL, 125 mL, IntraVENous, PRN **OR** dextrose bolus (hypoglycemia) 10% 250 mL, 250 mL, IntraVENous, PRN       Vitals :     Vitals:    04/19/22 0753   BP: 136/82   Pulse: 66   Resp: 18   Temp: 97.6 °F (36.4 °C)   SpO2: 98%       I & O :       Intake/Output Summary (Last 24 hours) at 4/19/2022 0835  Last data filed at 4/19/2022 6220  Gross per 24 hour   Intake 1260 ml   Output 5695 ml   Net -4435 ml        Physical Examination :     General appearance: Anxious- no, distressed- no, in good spirits-  HEENT: Lips- normal, teeth- ok , oral mucosa- moist  Neck : Mass- no, appears symmetrical, JVD- not visible, veins slightly engorged   Respiratory: Respiratory effort-  normal, wheeze- no, crackles -   Cardiovascular:  Ausculation- No M/R/G, Edema 1+   Abdomen: visible mass- no, distention- no, scar- no, tenderness- no                            hepatosplenomegaly-  no  Musculoskeletal:  clubbing no,cyanosis- no , digital ischemia- no                           muscle strength- grossly normal , tone - grossly normal, 1+ pitting edema to above the ankle  Skin: rashes- no , ulcers- no, induration- no, tightening - no  Psychiatric:  Judgement and insight- normal           AAO X 3  Additional finding:      LABS:     Recent Labs     04/16/22  1044 04/17/22  0641 04/18/22  0600   WBC 5.4 5.0 4.5   HGB 12.6* 12.4* 11.4*   HCT 39.0* 39.0* 36.0*    244 232     Recent Labs     04/16/22  1044 04/17/22  0641 04/18/22  0600    141 139   K 3.9 3.5 3.1*    105 104   CO2 23 22 25   BUN 24* 23* 21*   CREATININE 1.9* 1.8* 1.7*   GLUCOSE 125* 85 68*   MG  --  2.00 1.90            Thanks  Nephrology  [address], [address]  Office: [phone]    Fax: [phone]

## 2022-04-20 ENCOUNTER — CARE COORDINATION (OUTPATIENT)
Dept: CASE MANAGEMENT | Age: 82
End: 2022-04-20

## 2022-04-20 NOTE — CARE COORDINATION
[facility] Transitions Initial Follow Up Call    Call within 2 business days of discharge: Yes    Patient: Chalino Wheeler Patient : 1940   MRN: [id number]  Reason for Admission: SOB, fatigue  Discharge Date: 22 RARS: Readmission Risk Score: 13.4 ( )      Last Discharge [facility]       Complaint Diagnosis Description Type Department Provider    22 Shortness of Breath; Fatigue Acute on chronic congestive heart failure, unspecified heart failure type ([address] . .. ED to Hosp-Admission (Discharged) (ADMITTED) [facility] 6 Isaac Porras MD; Griselda Wade. .. Spoke with: Ama Avalos  Patient stated he is doing pretty good. He denies sob, fever, chills, swelling, cp, cough or palpitations. Appetite and fluid intake is good. No problems with bladder or bowel elimination. Patient is waiting on someone to come to his home. He isn't sure if it is the [facility] agency. He didn't have his discharge paperwork available to review medication. Will continue to follow. Transitions of Care Initial Call    Was this an external facility discharge? No Discharge Facility: [facility]    Challenges to be reviewed by the provider   Additional needs identified to be addressed with provider: No  none             Method of communication with provider : none      Advance Care Planning:   Does patient have an Advance Directive: not on file. Was this a readmission? No  Patient stated reason for admission: SOB  Patients top risk factors for readmission: functional physical ability  ineffective coping  medical condition-DM, CKD, Anemia, CHF, CVD  medication management    Care Transition Nurse (CTN) contacted the patient by telephone to perform post hospital discharge assessment. Verified name and  with patient as identifiers. Provided introduction to self, and explanation of the CTN role. CTN reviewed discharge instructions, medical action plan and red flags with patient who verbalized understanding.  Patient given an opportunity to ask questions and does not have any further questions or concerns at this time. Were discharge instructions available to patient? No. Reviewed appropriate site of care based on symptoms and resources available to patient including: PCP  Specialist  Home health. The patient agrees to contact the PCP office for questions related to their healthcare. Medication reconciliation was performed with not done, who verbalizes understanding of administration of home medications. Advised obtaining a 90-day supply of all daily and as-needed medications. Covid Risk Education     Educated patient about risk for severe COVID-19 due to risk factors according to CDC guidelines. LPN CC reviewed discharge instructions, medical action plan and red flag symptoms with the patient who verbalized understanding. Discussed COVID vaccination status: Yes. Education provided on COVID-19 vaccination as appropriate. Discussed exposure protocols and quarantine with CDC Guidelines. Patient was given an opportunity to verbalize any questions and concerns and agrees to contact LPN CC or health care provider for questions related to their healthcare. Reviewed and educated not done on any new and changed medications related to discharge diagnosis. Was patient discharged with a pulse oximeter? No Discussed and confirmed pulse oximeter discharge instructions and when to notify provider or seek emergency care. LPN CC provided contact information. Plan for follow-up call in 3-5 days based on severity of symptoms and risk factors.   Plan for next call: medication management-medication review  community resources-[facility]      Non-face-to-face services provided:  Education of patient/family/caregiver/guardian to support self-management--    Care Transitions 24 Hour Call    Do you have all of your prescriptions and are they filled?: Yes  Patient DME: Straight cane, Walker  Do you have support at home?: Partner/Spouse/SO  Are you an active caregiver in your home?: No  Care Transitions Interventions  Disease Association: Completed             Follow Up  Future Appointments   Date Time Provider Micheline Gaines   4/26/2022  2:30 PM KAYLYNN Nam - CNP [facility]   6/22/2022  2:15 PM Lauren Ritchie MD [place] [id number] [facility]       Audie Mohamud LPN

## 2022-04-23 ENCOUNTER — FOLLOWUP TELEPHONE ENCOUNTER (OUTPATIENT)
Dept: INPATIENT UNIT | Age: 82
End: 2022-04-23

## 2022-04-23 NOTE — PROGRESS NOTES
Attempted to contact patient for 72 hour HF hospital follow up. Noted pt was contacted earlier this date by CTN but did not review all HF criteria. Calls x 2 (1530 and 1650) were unanswered. Reached out to [address], to inquire if she can follow up.

## 2022-04-25 DIAGNOSIS — I50.43 ACUTE ON CHRONIC COMBINED SYSTOLIC AND DIASTOLIC HEART FAILURE (HCC): ICD-10-CM

## 2022-04-25 LAB
ANION GAP SERPL CALCULATED.3IONS-SCNC: 16 MMOL/L (ref 3–16)
BUN BLDV-MCNC: 47 MG/DL (ref 7–20)
CALCIUM SERPL-MCNC: 9.8 MG/DL (ref 8.3–10.6)
CHLORIDE BLD-SCNC: 100 MMOL/L (ref 99–110)
CO2: 23 MMOL/L (ref 21–32)
CREAT SERPL-MCNC: 2.4 MG/DL (ref 0.8–1.3)
GFR AFRICAN AMERICAN: 31
GFR NON-AFRICAN AMERICAN: 26
GLUCOSE BLD-MCNC: 98 MG/DL (ref 70–99)
POTASSIUM SERPL-SCNC: 3.8 MMOL/L (ref 3.5–5.1)
SODIUM BLD-SCNC: 139 MMOL/L (ref 136–145)

## 2022-04-26 ENCOUNTER — OFFICE VISIT (OUTPATIENT)
Dept: CARDIOLOGY CLINIC | Age: 82
End: 2022-04-26
Payer: MEDICARE

## 2022-04-26 VITALS
DIASTOLIC BLOOD PRESSURE: 64 MMHG | SYSTOLIC BLOOD PRESSURE: 116 MMHG | BODY MASS INDEX: 23.55 KG/M2 | OXYGEN SATURATION: 94 % | WEIGHT: 168.2 LBS | HEART RATE: 71 BPM | HEIGHT: 71 IN

## 2022-04-26 DIAGNOSIS — Z09 HOSPITAL DISCHARGE FOLLOW-UP: ICD-10-CM

## 2022-04-26 DIAGNOSIS — E78.00 PURE HYPERCHOLESTEROLEMIA: ICD-10-CM

## 2022-04-26 DIAGNOSIS — I10 BENIGN ESSENTIAL HTN: ICD-10-CM

## 2022-04-26 DIAGNOSIS — I50.20 HFREF (HEART FAILURE WITH REDUCED EJECTION FRACTION) (HCC): Primary | ICD-10-CM

## 2022-04-26 DIAGNOSIS — I25.10 CAD IN NATIVE ARTERY: ICD-10-CM

## 2022-04-26 DIAGNOSIS — I43 CARDIAC AMYLOIDOSIS (HCC): ICD-10-CM

## 2022-04-26 DIAGNOSIS — E85.4 CARDIAC AMYLOIDOSIS (HCC): ICD-10-CM

## 2022-04-26 DIAGNOSIS — I42.8 NICM (NONISCHEMIC CARDIOMYOPATHY) (HCC): ICD-10-CM

## 2022-04-26 PROCEDURE — 99496 TRANSJ CARE MGMT HIGH F2F 7D: CPT | Performed by: NURSE PRACTITIONER

## 2022-04-26 PROCEDURE — 1111F DSCHRG MED/CURRENT MED MERGE: CPT | Performed by: NURSE PRACTITIONER

## 2022-04-26 RX ORDER — FUROSEMIDE 40 MG/1
20 TABLET ORAL 2 TIMES DAILY
Qty: 60 TABLET | Refills: 0
Start: 2022-04-26 | End: 2022-05-20

## 2022-04-26 NOTE — CONSULTS
HF RN consult received from Dr Gary Luna. Chart reviewed. Pt presented to ED with c/o LE edema and SOB x 2-3 weeks. Pt has known HFrEF recovered. He follows with [facility]. He previously had Dr Peter Nash as his primary cardiologist but has transitioned to Dr Jamil Riojas. EF was 35-40% in June 2019 but had recovered to 50-55% in Sept 2019 but then showed \"speckle\" suggestive of amyloidosis. Pt eventually underwent PYP scan March 2020 which was strongly positive for amyloidosis. Pt was last seen in office on 3/2/22 by Dr Jamil Riojas and was started on Vyndamax at that time. Weight was 182#. ProBNP on admission was 11,403 with Cr 1.9. CXR was unremarkable. Echo on 4/18/22 showed worsened EF 20-25% with gr 3 DD. Cardiology was consulted. Pt was treated with IV Lasix and diuresed 9.1 liters over hospital course. 20# weight loss by time of discharge. Pt was discharged on EB BB, ARNI with plans for SGLT2 and Orion as OP. Pt was discharged with LifeVest with plans to re-eval for ICD after 3 months GDMT. Referral was placed to Cardiac Rehab. HF measures were implemented: daily weights, I/O, and sodium restricted diet. HF careplan is current. HF instructions were added to AVS which included AHA HF interactive workbook link. HF RN followed peripherally throughout hospital course. Follow up was arranged for Tues 4/26 with Faina Conklin NP. BMP was ordered to be done prior to visit.

## 2022-04-26 NOTE — PROGRESS NOTES
Marshall Urbina, Life Vest rep, confirmed pt was seen by the Life Vest RN and vest was placed on 4/20. Wife was present and all instructions were reviewed with both pt and wife per rep.

## 2022-04-27 ENCOUNTER — CARE COORDINATION (OUTPATIENT)
Dept: CASE MANAGEMENT | Age: 82
End: 2022-04-27

## 2022-04-27 NOTE — CARE COORDINATION
[facility] Transitions Follow Up Call    2022    Patient: Servando Beck  Patient : 1940   MRN: [id number]  Reason for Admission: SOB, fatigue    Discharge Date: 22 RARS: Readmission Risk Score: 13.4 ( )         Spoke with: Mekhi Cardona  Patient states he is fine. He went to the cardiologist yesterday and everything was ok. Patient says he is walking now. Appetite and fluid intake is good. He denies cp, sob, fever, chills, weakness, palpitations, fatigue, cough or swelling. No bladder or bowel issues. Taking medication as prescribed. Will continue to follow. Care Transitions Follow Up Call    Needs to be reviewed by the provider   Additional needs identified to be addressed with provider: No  none             Method of communication with provider : none      Care Transition Nurse (CTN) contacted the patient by telephone to follow up after admission on 22. Verified name and  with patient as identifiers. Addressed changes since last contact: none  Discussed follow-up appointments. If no appointment was previously scheduled, appointment scheduling offered: No.   Is follow up appointment scheduled within 7 days of discharge? No.    Advance Care Planning:   Does patient have an Advance Directive: not on file. CTN reviewed discharge instructions, medical action plan and red flags with patient and discussed any barriers to care and/or understanding of plan of care after discharge. Discussed appropriate site of care based on symptoms and resources available to patient including: PCP  Specialist  Home health. The patient agrees to contact the PCP office for questions related to their healthcare.      Patients top risk factors for readmission: functional physical ability  ineffective coping  medical condition-DM, CKD,ANEMIA, CHF, CVD  Interventions to address risk factors: Education of patient/family/caregiver/guardian to support self-management--      Non-[facility] follow up appointment(s): CTN provided contact information for future needs. Plan for follow-up call in 5-7 days based on severity of symptoms and risk factors. Plan for next call: self management--              Care Transitions Subsequent and Final Call    Subsequent and Final Calls  Care Transitions Interventions  Disease Association: Completed      Other Interventions:            Follow Up  Future Appointments   Date Time Provider Micheline Gaines   5/13/2022  1:00 PM Joel Davison MD [facility]   6/22/2022  2:15 PM Joel Davison MD [address] LPN

## 2022-05-03 ENCOUNTER — CARE COORDINATION (OUTPATIENT)
Dept: CASE MANAGEMENT | Age: 82
End: 2022-05-03

## 2022-05-03 NOTE — CARE COORDINATION
[facility] Transitions Follow Up Call    5/3/2022    Patient: Leticia Fermin  Patient : 1940   MRN: [id number]  Reason for Admission: SOB, fatigue  Discharge Date: 22 RARS: Readmission Risk Score: 13.4 ( )        Care Transitions Follow Up Call    Needs to be reviewed by the provider   Additional needs identified to be addressed with provider: No  none             Method of communication with provider : none      Care Transition Nurse (CTN) contacted the patient by telephone to follow up after admission on 2022. Verified name and  with patient as identifiers. Addressed changes since last contact: none  Discussed follow-up appointments. If no appointment was previously scheduled, appointment scheduling offered: No.   Is follow up appointment scheduled within 7 days of discharge? No.    Advance Care Planning:   Does patient have an Advance Directive: not on file and referral to internal ACP facilitator. CTN reviewed discharge instructions, medical action plan and red flags with parent and discussed any barriers to care and/or understanding of plan of care after discharge. Discussed appropriate site of care based on symptoms and resources available to patient including: PCP  Specialist. The patient agrees to contact the PCP office for questions related to their healthcare. Patients top risk factors for readmission: functional physical ability  Interventions to address risk factors: Education of patient/family/caregiver/guardian to support self-management--      CTN provided contact information for future needs. Plan for follow-up call in 5-7 days based on severity of symptoms and risk factors. Care Transitions Subsequent and Final Call    Subsequent and Final Calls  Care Transitions Interventions  Disease Association: Completed      Other Interventions:            Follow Up  Future Appointments   Date Time Provider Micheline Gaines   2022  1:00 PM Mariela Andrade MD Trevor Otoole [facility]   6/22/2022  2:15 PM [facility]       Wali Grover LPN

## 2022-05-06 NOTE — PROGRESS NOTES
[facility]) Physicians   General & Laparoscopic Surgery  [employer]       HPI:    Jose Mcdermott is very pleasant 78 y.o. male who is referred by ED for ventral hernia    Long standing history of hernia without any complications of nausea, vomiting, or bowel obstruction. Minimal pain    High risk from cardiac standpoint    Past Medical History:   Diagnosis Date    Abdominal hernia     Anemia     Arthritis     MILD    CHF (congestive heart failure) (HCC)     EF 35-40%. Non-ischemic     Hyperlipidemia     Hypertension     Type II or unspecified type diabetes mellitus without mention of complication, not stated as uncontrolled      Past Surgical History:   Procedure Laterality Date    HERNIA REPAIR      HIP SURGERY      OTHER SURGICAL HISTORY N/A 12/15/2015    LAPAROSCOPIC LEFT INGUINAL HERNIA REPAIR WITH MESH     Family History   Problem Relation Age of Onset    High Blood Pressure Mother     High Blood Pressure Father     Heart Disease Father         cad    Cancer Sister      Social History     Tobacco Use    Smoking status: Never Smoker    Smokeless tobacco: Never Used   Substance Use Topics    Alcohol use: No     I counseled the patient on the importance of not smoking and risks of ETOH. No Known Allergies  Vitals:    04/14/22 1110   BP: (!) 139/90   Pulse: 89   Weight: 181 lb (82.1 kg)   Height: 5' 11\" (1.803 m)       Body mass index is 25.24 kg/m².       Current Outpatient Medications:     LANTUS SOLOSTAR 100 UNIT/ML injection pen, INJECT 15 UNITS SUBCUTANEOUSLY NIGHTLY, Disp: 15 mL, Rfl: 0    Tafamidis 61 MG CAPS, Take 61 mg by mouth daily, Disp: 30 capsule, Rfl: 11    isosorbide mononitrate (IMDUR) 30 MG extended release tablet, Take 1 tablet by mouth once daily, Disp: 90 tablet, Rfl: 1    dorzolamide-timolol (COSOPT) 22.3-6.8 MG/ML ophthalmic solution, INSTILL 1 DROP INTO EACH EYE EVERY 12 HOURS, Disp: , Rfl:     JANUVIA 50 MG tablet, Take 1 tablet by mouth once daily, Disp: 90 tablet, Rfl: 1    carvedilol (COREG) 6.25 MG tablet, Take 1 tablet by mouth 2 times daily, Disp: 180 tablet, Rfl: 3    latanoprost (XALATAN) 0.005 % ophthalmic solution, INSTILL 1 DROP INTO EACH EYE ONCE DAILY IN THE EVENING, Disp: , Rfl:     atorvastatin (LIPITOR) 40 MG tablet, Take 1 tablet by mouth once daily, Disp: 90 tablet, Rfl: 0    potassium chloride (KLOR-CON) 10 MEQ extended release tablet, Take 1 tablet by mouth once daily, Disp: 60 tablet, Rfl: 5    MM PEN NEEDLES 31G X 5 MM MISC, USE   SUBCUTANEOUSLY ONCE DAILY, Disp: 100 each, Rfl: 5    aspirin 81 MG EC tablet, Take 1 tablet by mouth daily, Disp: 30 tablet, Rfl: 3    furosemide (LASIX) 40 MG tablet, Take 0.5 tablets by mouth 2 times daily, Disp: 60 tablet, Rfl: 0    sacubitril-valsartan (ENTRESTO) 24-26 MG per tablet, Take 0.5 tablets by mouth 2 times daily, Disp: 60 tablet, Rfl: 0      Review of Systems - History obtained from the patient  General ROS: negative  Psychological ROS: negative  Ophthalmic ROS: negative  Neurological ROS: negative  ENT ROS: negative  Allergy and Immunology ROS: negative  Hematological and Lymphatic ROS: negative  Endocrine ROS: negative  Respiratory ROS: negative  Cardiovascular ROS: negative  Gastrointestinal ROS: Periumbilical bulge   Genito-Urinary ROS: negative  Musculoskeletal ROS: negative   Skin ROS: negative    Physical Exam   Constitutional: Patient is oriented to person, place, and time. Vital signs are normal. Patient  appears well-developed and well-nourished. Patient  is active and cooperative. Non-toxic appearance. No distress. HENT:   Head: Normocephalic and atraumatic. Head is without laceration. Right Ear: External ear normal. No lacerations. No drainage, swelling or tenderness. Left Ear: External ear normal. No lacerations. No drainage, swelling or tenderness. Nose: Nose normal. No nose lacerations or nasal deformity.    Mouth/Throat: Uvula is midline, oropharynx is clear and moist and mucous membranes are normal. No oropharyngeal exudate. Eyes: Conjunctivae, EOM and lids are normal. Pupils are equal, round, and reactive to light. Right eye exhibits no discharge. No foreign body present in the right eye. Left eye exhibits no discharge. No foreign body present in the left eye. No scleral icterus. Neck: Trachea normal and normal range of motion. Neck supple. No JVD present. No tracheal tenderness present. Carotid bruit is not present. No rigidity. No tracheal deviation and no edema present. No thyromegaly present. Cardiovascular: Normal rate, regular rhythm, normal heart sounds, intact distal pulses and normal pulses. Pulmonary/Chest: Effort normal and breath sounds normal. No stridor. No respiratory distress. Patient  has no wheezes. Patient has no rales. Patient exhibits no tenderness and no crepitus. Abdominal: Soft. Normal appearance and bowel sounds are normal. Patient exhibits no distension, no abdominal bruit, no ascites and no mass. There is no hepatosplenomegaly. There is no tenderness. There is no rigidity, no rebound, no guarding and no CVA tenderness. Hernia confirmed in theventral area. Hernia confirmed negative in groin area. Musculoskeletal: Normal range of motion. Patient exhibits no edema or tenderness. Lymphadenopathy:        Head (right side): No submental, no submandibular, no preauricular, no posterior auricular and no occipital adenopathy present. Head (left side): No submental, no submandibular, no preauricular, no posterior auricular and no occipital adenopathy present. Patient  has no cervical adenopathy. Right: No supraclavicular adenopathy present. Left: No supraclavicular adenopathy present. Neurological: Patient is alert and oriented to person, place, and time. Patient has normal strength. Coordination and gait normal. GCS eye subscore is 4. GCS verbal subscore is 5. GCS motor subscore is 6.    Skin: Skin is warm and dry. No abrasion and no rash noted. Patient  is not diaphoretic. No cyanosis or erythema. Psychiatric: Patient has a normal mood and affect.   speech is normal and behavior is normal. Cognition and memory are normal.         Data  CT scan reviewed personally and discussed with patient        A/P    Ventral Hernia without obstruction  Given lack of symptoms and high risk for surgery would not recommend repair at this time    Ann Leong

## 2022-05-11 DIAGNOSIS — I50.20 HFREF (HEART FAILURE WITH REDUCED EJECTION FRACTION) (HCC): ICD-10-CM

## 2022-05-11 LAB
ANION GAP SERPL CALCULATED.3IONS-SCNC: 16 MMOL/L (ref 3–16)
BUN BLDV-MCNC: 34 MG/DL (ref 7–20)
CALCIUM SERPL-MCNC: 9.9 MG/DL (ref 8.3–10.6)
CHLORIDE BLD-SCNC: 106 MMOL/L (ref 99–110)
CO2: 21 MMOL/L (ref 21–32)
CREAT SERPL-MCNC: 2.4 MG/DL (ref 0.8–1.3)
GFR AFRICAN AMERICAN: 31
GFR NON-AFRICAN AMERICAN: 26
GLUCOSE BLD-MCNC: 66 MG/DL (ref 70–99)
POTASSIUM SERPL-SCNC: 3.8 MMOL/L (ref 3.5–5.1)
SODIUM BLD-SCNC: 143 MMOL/L (ref 136–145)

## 2022-05-12 ENCOUNTER — CARE COORDINATION (OUTPATIENT)
Dept: CASE MANAGEMENT | Age: 82
End: 2022-05-12

## 2022-05-12 NOTE — CARE COORDINATION
[facility] Transitions Follow Up Call    2022    Patient: Paul Berumen  Patient : 1940   MRN: [id number]   Reason for Admission: CHF, Acute MI  Discharge Date: 22 RARS: Readmission Risk Score: 13.4 ( )         Spoke with: Paul Berumen     CTN spoke with patient this am for follow up CTN call. Patient states he is doing well, reporting no weight gain, no BLE Edema, denies having any difficulty with urination, no heart palpitations, dizziness or lightheadedness. Patient has follow up with Cardiologist on , still has heart monitor in place. Care Transitions Follow Up Call    Needs to be reviewed by the provider   Additional needs identified to be addressed with provider: No  none             Method of communication with provider : none      Verified name and  with patient as identifiers. Addressed changes since last contact: none  Discussed follow-up appointments. If no appointment was previously scheduled, appointment scheduling offered: Yes. Is follow up appointment scheduled within 7 days of discharge? No.    Advance Care Planning:   Does patient have an Advance Directive: not on file. CTN reviewed discharge instructions, medical action plan and red flags with patient and discussed any barriers to care and/or understanding of plan of care after discharge. Discussed appropriate site of care based on symptoms and resources available to patient including: PCP  Specialist  Urgent care clinics  [facility]   When to call 911. The patient agrees to contact the PCP office for questions related to their healthcare.      Patients top risk factors for readmission: medical condition-CHF  medication management  multiple health system providers  polypharmacy  Interventions to address risk factors: Education of patient/family/caregiver/guardian to support self-management-Instructed to continue to weigh daily, reporting any 3 lb weight gain overnight, or 5 lb weight gain in a week, reporting to MD immediately. CTN provided contact information for future needs. Plan for follow-up call in 5-7 days based on severity of symptoms and risk factors. Plan for next call: follow up appointment-Make sure to keep upcoming appointment with Cardiologist.  Any issues or concerns that may arise. Care Transitions Subsequent and Final Call    Schedule Follow Up Appointment with PCP: Declined  Subsequent and Final Calls  Do you have any ongoing symptoms?: No  Have your medications changed?: No  Do you have any questions related to your medications?: No  Do you currently have any active services?: No  Do you have any needs or concerns that I can assist you with?: No  Identified Barriers: None  Care Transitions Interventions  No Identified Needs  Disease Association: Completed      Other Interventions:          Follow Up  Future Appointments   Date Time Provider Micheline Gaines   5/13/2022  1:00 PM MD Trevor Nevarez   6/22/2022  2:15 PM MD Trevor Nevarez [facility]     Thank Fatou Reddy RN  Care Transition Coordinator  Contact [facility]:[phone]

## 2022-05-13 ENCOUNTER — OFFICE VISIT (OUTPATIENT)
Dept: CARDIOLOGY CLINIC | Age: 82
End: 2022-05-13
Payer: MEDICARE

## 2022-05-13 VITALS
HEART RATE: 68 BPM | DIASTOLIC BLOOD PRESSURE: 48 MMHG | SYSTOLIC BLOOD PRESSURE: 90 MMHG | BODY MASS INDEX: 24.52 KG/M2 | WEIGHT: 175.8 LBS

## 2022-05-13 DIAGNOSIS — Z79.899 LONG TERM USE OF DRUG: ICD-10-CM

## 2022-05-13 DIAGNOSIS — I50.22 CHRONIC SYSTOLIC CHF (CONGESTIVE HEART FAILURE) (HCC): ICD-10-CM

## 2022-05-13 DIAGNOSIS — I50.20 HFREF (HEART FAILURE WITH REDUCED EJECTION FRACTION) (HCC): Primary | ICD-10-CM

## 2022-05-13 PROCEDURE — 1036F TOBACCO NON-USER: CPT | Performed by: INTERNAL MEDICINE

## 2022-05-13 PROCEDURE — G8420 CALC BMI NORM PARAMETERS: HCPCS | Performed by: INTERNAL MEDICINE

## 2022-05-13 PROCEDURE — 1111F DSCHRG MED/CURRENT MED MERGE: CPT | Performed by: INTERNAL MEDICINE

## 2022-05-13 PROCEDURE — 1123F ACP DISCUSS/DSCN MKR DOCD: CPT | Performed by: INTERNAL MEDICINE

## 2022-05-13 PROCEDURE — G8427 DOCREV CUR MEDS BY ELIG CLIN: HCPCS | Performed by: INTERNAL MEDICINE

## 2022-05-13 PROCEDURE — 4040F PNEUMOC VAC/ADMIN/RCVD: CPT | Performed by: INTERNAL MEDICINE

## 2022-05-13 PROCEDURE — 99214 OFFICE O/P EST MOD 30 MIN: CPT | Performed by: INTERNAL MEDICINE

## 2022-05-13 RX ORDER — CARVEDILOL 12.5 MG/1
12.5 TABLET ORAL 2 TIMES DAILY
Qty: 180 TABLET | Refills: 3 | Status: SHIPPED | OUTPATIENT
Start: 2022-05-13

## 2022-05-13 NOTE — PROGRESS NOTES
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 Echo 2015: moderate LVH, normal EF.      Echo 06/2019: severe LVH, EF 35-40%, mild RV dysfx, mild valve disease     Kathi 07/2019: mod LVd, EF 39%, mid to distal inferior and apical fixed defect.      LHC 07/2019: mild diffuse CAD, nl LVEDP.      ECG 9/6/19: NSR, 1st AVB, LAFB, RBBB (trifascicular block), possible inferior MI.      Echo 9/20/19: severe LVH, LVEF 83-85%, diastolic III, apical sparing on speckle strain imaging suggestive of amyloidosis, severe LAE, mild MR.       Tc PYP scan 3/12/20: strongly positive for amyloidosis.       09/2019 kappa/labda ratio 1.69 (normal in the setting of CKD, Cr 1.9).  24-hour UPEP normal; SPEP 05/2021 normal; hence no AL amyloidosis.     Patient was admitted for AHF 4/16 - 4/19/22. Echo 4/18/2022: Severe LVH, consider cardiac amyloidosis, LVEF 20-25% with global hypokinesis, diastolic grade 3, increased LVEDP, normal RV size with severe hypokinesis, no significant valvular abnormalities. Patient is here for a follow up. He reports no complaints. His BP during doc appointments 110-130/60-90, HR 70-80. Cr 2.4 on 4/25 and 5/11/22. Histories     Past Medical History:   has a past medical history of Abdominal hernia, Anemia, Arthritis, CHF (congestive heart failure) ([address] Hyperlipidemia, Hypertension, and Type II or unspecified type diabetes mellitus without mention of complication, not stated as uncontrolled. Surgical History:   has a past surgical history that includes hernia repair; hip surgery; and other surgical history (N/A, 12/15/2015). Social History:   reports that he has never smoked. He has never used smokeless tobacco. He reports that he does not drink alcohol and does not use drugs.      Family History:  No evidence for sudden cardiac death or premature CAD      Medications:     Home medications were reviewed and are listed below    Prior to Admission medications    Medication Sig Start Date End Date Taking? Authorizing Provider   carvedilol (COREG) 12.5 MG tablet Take 1 tablet by mouth 2 times daily 5/13/22  Yes Janki Arceo MD   furosemide (LASIX) 40 MG tablet Take 0.5 tablets by mouth 2 times daily 4/26/22  Yes Emory Lyles APRN - CNP   sacubitril-valsartan (ENTRESTO) 24-26 MG per tablet Take 0.5 tablets by mouth 2 times daily 4/19/22  Yes Josue Spring MD   LANTUS SOLOSTAR 100 UNIT/ML injection pen INJECT 15 UNITS SUBCUTANEOUSLY NIGHTLY 3/23/22  Yes Radha Higgins MD   Tafamidis 61 MG CAPS Take 61 mg by mouth daily 3/16/22  Yes Janki Arceo MD   dorzolamide-timolol (COSOPT) 22.3-6.8 MG/ML ophthalmic solution INSTILL 1 DROP INTO EACH EYE EVERY 12 HOURS 1/31/22  Yes Historical Provider, MD   JANUVIA 50 MG tablet Take 1 tablet by mouth once daily 2/8/22  Yes Radha Higgins MD   latanoprost (XALATAN) 0.005 % ophthalmic solution INSTILL 1 DROP INTO EACH EYE ONCE DAILY IN THE EVENING 12/13/21  Yes Historical Provider, MD   atorvastatin (LIPITOR) 40 MG tablet Take 1 tablet by mouth once daily 1/3/22  Yes CHUNG Milian   potassium chloride (KLOR-CON) 10 MEQ extended release tablet Take 1 tablet by mouth once daily 7/26/21  Yes Anupam Shell MD   MM PEN NEEDLES 31G X 5 MM MISC USE   SUBCUTANEOUSLY ONCE DAILY 5/4/21  Yes Anupam Shell MD   aspirin 81 MG EC tablet Take 1 tablet by mouth daily 7/1/19  Yes Pablo Berrios MD          Allergy:     Patient has no known allergies. Review of Systems:     All 12 point review of symptoms completed. Pertinent positives identified in the HPI, all other review of symptoms negative as below. CONSTITUTIONAL: No fatigue  SKIN: No rash or pruritis. EYES: No visual changes or diplopia. No scleral icterus.   ENT: No Headaches, hearing loss or vertigo. No mouth sores or sore throat. CARDIOVASCULAR: No chest pain/chest pressure/chest discomfort. No palpitations. No edema. RESPIRATORY: No dyspnea. No cough or wheezing, no sputum production. GASTROINTESTINAL: No N/V/D. No abdominal pain, appetite loss, blood in stools. GENITOURINARY: No dysuria, trouble voiding, or hematuria. MUSCULOSKELETAL:  No gait disturbance, weakness or joint complaints. NEUROLOGICAL: No headache, diplopia, change in muscle strength, numbness or tingling. No change in gait, balance, coordination, mood, affect, memory, mentation, behavior. PSHYCH: No anxiety, loss of interest, change in sexual behavior, feelings of self-harm, or confusion. ENDOCRINE: No excessive thirst, fluid intake, or urination. No tremor. HEMATOLOGIC: No abnormal bruising or bleeding. ALLERGY: No nasal congestion or hives.       Physical Examination:     Vitals:    05/13/22 1302   BP: (!) 90/48   Pulse: 68   Weight: 175 lb 12.8 oz (79.7 kg)       Wt Readings from Last 3 Encounters:   05/13/22 175 lb 12.8 oz (79.7 kg)   04/26/22 168 lb 3.2 oz (76.3 kg)   04/19/22 170 lb 9.6 oz (77.4 kg)         General Appearance:  Alert, cooperative, no distress, appears stated age Appropriate weight   Head:  Normocephalic, without obvious abnormality, atraumatic   Eyes:  PERRL, conjunctiva/corneas clear EOM intact  Ears normal   Throat no lesions       Nose: Nares normal, no drainage or sinus tenderness   Throat: Lips, mucosa, and tongue normal   Neck: Supple, symmetrical, trachea midline, no adenopathy, thyroid: not enlarged, symmetric, no tenderness/mass/nodules, no carotid bruit       Lungs:   Clear to auscultation bilaterally, respirations unlabored   Chest Wall:  No tenderness or deformity   Heart:  Regular rhythm, rate is controlled, S1, S2 normal, there is no murmur, there is no rub or gallop, cannot assess jvd, no bilateral lower extremity edema   Abdomen:   Soft, non-tender, bowel sounds active all four quadrants,  no masses, no organomegaly       Extremities: Extremities normal, atraumatic, no cyanosis   Pulses: 2+ and symmetric   Skin: Skin color, texture, turgor normal, no rashes or lesions   Pysch: Normal mood and affect   Neurologic: Normal gross motor and sensory exam.  Cranial nerves intact        Labs:     Lab Results   Component Value Date    WBC 5.0 04/19/2022    HGB 12.9 (L) 04/19/2022    HCT 39.1 (L) 04/19/2022    MCV 76.1 (L) 04/19/2022     04/19/2022     Lab Results   Component Value Date     05/11/2022    K 3.8 05/11/2022     05/11/2022    CO2 21 05/11/2022    BUN 34 (H) 05/11/2022    CREATININE 2.4 (H) 05/11/2022    GLUCOSE 66 (L) 05/11/2022    CALCIUM 9.9 05/11/2022    PROT 7.6 03/04/2022    LABALBU 4.0 03/04/2022    BILITOT 1.4 (H) 03/04/2022    ALKPHOS 291 (H) 03/04/2022    AST 38 (H) 03/04/2022    ALT 25 03/04/2022    LABGLOM 26 (A) 05/11/2022    GFRAA 31 (A) 05/11/2022    AGRATIO 1.1 03/04/2022    GLOB 3.2 07/17/2019         Lab Results   Component Value Date    CHOL 130 04/17/2022    CHOL 148 10/21/2021    CHOL 151 10/27/2020     Lab Results   Component Value Date    TRIG 67 04/17/2022    TRIG 75 10/21/2021    TRIG 81 10/27/2020     Lab Results   Component Value Date    HDL 67 (H) 04/17/2022    HDL 75 (H) 10/21/2021    HDL 72 (H) 10/27/2020     Lab Results   Component Value Date    LDLCALC 50 04/17/2022    LDLCALC 58 10/21/2021    LDLCALC 63 10/27/2020     Lab Results   Component Value Date    LABVLDL 13 04/17/2022    LABVLDL 15 10/21/2021    LABVLDL 16 10/27/2020     No results found for: [facility]    Lab Results   Component Value Date    INR 1.13 11/08/2019    INR 1.13 09/05/2019    INR 1.04 07/17/2019    PROTIME 12.9 11/08/2019    PROTIME 12.9 09/05/2019    PROTIME 11.8 07/17/2019       The ASCVD Risk score (Nabil Carmen, et al., 2013) failed to calculate for the following reasons:     The 2013 ASCVD risk score is only valid for ages 36 to 78    The patient has a prior MI or stroke diagnosis      Assessment / Plan:      Diagnosis Orders   1. HFrEF (heart failure with reduced ejection fraction) (formerly [facility])  Basic Metabolic Panel   2. Long term use of drug  Basic Metabolic Panel   3. Chronic systolic CHF (congestive heart failure) (formerly [facility])          1. Chronic HFrEF:   Patient has systolic heart failure due to nonischemic cardiomyopathy (most likely due to prior history of severe uncontrolled hypertension and contribution by aTTR amyloidosis).  LVEF recovered and is now back worse.     -Cw Vyndamax (tafamidis) 61 daily (there is a possibility tafamidis may have precipitated acute heart failure; it was started a week ago; clinical trial did suggest worsening symptoms when patients have NYHA FC III - we will monitor for now).    -Increase coreg 12.5 bid   -Cw Entresto half a tab of 24/26 mg bid (watch Cr and K). -Will repeat a Bmp in 1 week. -Stopped imdur to avoid hypotension.   - Unable to start spironolactone due to CKD   -Cw lasix 20 bid  -No SGLT2 inhibitors due to CKD. -Will repeat a limited echo next month. - If LVEF remains < 35% after 90 days of GDMT, will discuss referral to EP for possible device placement. Cw LifeVest.      2. Mild CAD:   Has h/o NSTEMI but cath with only mild CAD.    -Agree with asa, bb, statin.     3.  HTN:    BP is now controlled with current medications.    -Cw meds     4. H/o NSVT:   No events.      -Cw coreg       5. HLP:   On statin.      6. ATTR amyloidosis:   Per #1        Return in about 4 weeks (around 6/10/2022). I have spent 35 minutes of face to face time with the patient with more than 50% spent counseling and coordinating care. I have personally reviewed the reports and images of labs, radiological studies, cardiac studies including ECG's and telemetry, current and old medical records. The note was completed using EMR and Dragon dictation system.  Every effort was made to ensure accuracy; however, inadvertent computerized transcription errors may be present. All questions and concerns were addressed to the patient/family. Alternatives to my treatment were discussed. I would like to thank you for providing me the opportunity to participate in the care of your patient. If you have any questions, please do not hesitate to contact me.      Oscar Johnson MD, [address] Office Phone: [phone]  Fax: [phone]

## 2022-05-19 ENCOUNTER — CARE COORDINATION (OUTPATIENT)
Dept: CASE MANAGEMENT | Age: 82
End: 2022-05-19

## 2022-05-19 NOTE — CARE COORDINATION
[facility] Transitions Follow Up Call    2022    Patient: Allegra Recinos  Patient : 1940   MRN: [id number]   Reason for Admission: CHF, Acute MI  Discharge Date: 22 RARS: Readmission Risk Score: 13.4 ( )         Spoke with: Spouse    CTN spoke with patients spouse this afternoon for follow up CTN call. Spouse states patient is doing well, no reports of any fever, chills, nausea, vomiting, chest pain, SOB or cough. Patient with no congestion, pain, difficulty emptying bladder, LE edema, feeling lightheaded, dizziness, and heart palpitations. Weight gain is not present, had follow up with Cardiologist, scheduling patient for EP, for possible device placement. Care Transitions Follow Up Call    Needs to be reviewed by the provider   Additional needs identified to be addressed with provider: No  none             Method of communication with provider : none      Care Transition Nurse contacted the family by telephone to follow up. Verified name and  with family as identifiers. Addressed changes since last contact: none  Discussed follow-up appointments. If no appointment was previously scheduled, appointment scheduling offered: Yes. Is follow up appointment scheduled within 7 days of discharge? Yes. Advance Care Planning:   Does patient have an Advance Directive: not on file. CTN reviewed discharge instructions, medical action plan and red flags with family and discussed any barriers to care and/or understanding of plan of care after discharge. Discussed appropriate site of care based on symptoms and resources available to patient including: PCP  Specialist  Urgent care clinics  [facility]   When to call 911. The family agrees to contact the PCP office for questions related to their healthcare.      Patients top risk factors for readmission: multiple health system providers  Interventions to address risk factors: Education of patient/family/caregiver/guardian to support self-management-Spouse instructed to continue to monitor weight daily, make sure patient is taking all medications as prescribed. CTN provided contact information for future needs. No further follow-up call indicated based on severity of symptoms and risk factors. Care Transitions Subsequent and Final Call    Schedule Follow Up Appointment with PCP: Declined  Subsequent and Final Calls  Do you have any ongoing symptoms?: No  Have your medications changed?: No  Do you have any questions related to your medications?: No  Do you currently have any active services?: No  Do you have any needs or concerns that I can assist you with?: No  Identified Barriers: None  Care Transitions Interventions  No Identified Needs  Disease Association: Completed      Other Interventions:            Follow Up  Future Appointments   Date Time Provider Micheline Gaines   6/22/2022  2:15 PM MD Trevor Lawrence [facility]     Thank You,    Bismark Ng RN  Care Transition Coordinator  Contact RBKVSI:[phone]

## 2022-05-20 RX ORDER — FUROSEMIDE 40 MG/1
20 TABLET ORAL 2 TIMES DAILY
Qty: 60 TABLET | Refills: 0 | Status: SHIPPED | OUTPATIENT
Start: 2022-05-20 | End: 2022-05-31 | Stop reason: SDUPTHER

## 2022-05-31 RX ORDER — FUROSEMIDE 40 MG/1
20 TABLET ORAL 2 TIMES DAILY
Qty: 120 TABLET | Refills: 1 | Status: SHIPPED | OUTPATIENT
Start: 2022-05-31

## 2022-05-31 NOTE — TELEPHONE ENCOUNTER
Medication:   Requested Prescriptions     Pending Prescriptions Disp Refills    furosemide (LASIX) 40 MG tablet [Pharmacy Med Name: Furosemide 40 MG Oral Tablet] 60 tablet 0     Sig: Take 1 tablet by mouth twice daily        Last Filled:  5/20/2022, 60, 0    Patient Phone Number: [phone] (home)     Last appt: 3/15/2022   Next appt: Visit date not found    Last OARRS: No flowsheet data found.

## 2022-06-22 ENCOUNTER — OFFICE VISIT (OUTPATIENT)
Dept: CARDIOLOGY CLINIC | Age: 82
End: 2022-06-22
Payer: MEDICARE

## 2022-06-22 VITALS
HEART RATE: 70 BPM | SYSTOLIC BLOOD PRESSURE: 112 MMHG | BODY MASS INDEX: 25.77 KG/M2 | WEIGHT: 184.8 LBS | DIASTOLIC BLOOD PRESSURE: 70 MMHG

## 2022-06-22 DIAGNOSIS — Z79.899 LONG TERM USE OF DRUG: ICD-10-CM

## 2022-06-22 DIAGNOSIS — I50.22 CHRONIC SYSTOLIC CHF (CONGESTIVE HEART FAILURE) (HCC): ICD-10-CM

## 2022-06-22 DIAGNOSIS — R06.02 SOB (SHORTNESS OF BREATH): ICD-10-CM

## 2022-06-22 DIAGNOSIS — I50.20 HFREF (HEART FAILURE WITH REDUCED EJECTION FRACTION) (HCC): Primary | ICD-10-CM

## 2022-06-22 PROCEDURE — G8417 CALC BMI ABV UP PARAM F/U: HCPCS | Performed by: INTERNAL MEDICINE

## 2022-06-22 PROCEDURE — 1036F TOBACCO NON-USER: CPT | Performed by: INTERNAL MEDICINE

## 2022-06-22 PROCEDURE — G8427 DOCREV CUR MEDS BY ELIG CLIN: HCPCS | Performed by: INTERNAL MEDICINE

## 2022-06-22 PROCEDURE — 1123F ACP DISCUSS/DSCN MKR DOCD: CPT | Performed by: INTERNAL MEDICINE

## 2022-06-22 PROCEDURE — 99215 OFFICE O/P EST HI 40 MIN: CPT | Performed by: INTERNAL MEDICINE

## 2022-06-22 NOTE — PROGRESS NOTES
Cc: HFrEF, ATTR amyloidosis. 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     Echo 2015: moderate LVH, normal EF.      Echo 06/2019: severe LVH, EF 35-40%, mild RV dysfx, mild valve disease     Kathi 07/2019: mod LVd, EF 39%, mid to distal inferior and apical fixed defect.      LHC 07/2019: mild diffuse CAD, nl LVEDP.      ECG 9/6/19: NSR, 1st AVB, LAFB, RBBB (trifascicular block), possible inferior MI.      Echo 9/20/19: severe LVH, LVEF 93-40%, diastolic III, apical sparing on speckle strain imaging suggestive of amyloidosis, severe LAE, mild MR.       Tc PYP scan 3/12/20: strongly positive for amyloidosis.       09/2019 kappa/labda ratio 1.69 (normal in the setting of CKD, Cr 1.9).  24-hour UPEP normal; SPEP 05/2021 normal; hence no AL amyloidosis.       Echo 4/18/2022: Severe LVH, consider cardiac amyloidosis, LVEF 20-25% with global hypokinesis, diastolic grade 3, increased LVEDP, normal RV size with severe hypokinesis, no significant valvular abnormalities.     Patient is here for a follow up. He reports no complaints. Patient continues to wear his LifeVest.  Weight has remained relatively stable. Lab review 05/2022: Creatinine 2.4 stable, K3.8. Histories     Past Medical History:   has a past medical history of Abdominal hernia, Anemia, Arthritis, CHF (congestive heart failure) ([address] Hyperlipidemia, Hypertension, and Type II or unspecified type diabetes mellitus without mention of complication, not stated as uncontrolled. Surgical History:   has a past surgical history that includes hernia repair; hip surgery; and other surgical history (N/A, 12/15/2015). Social History:   reports that he has never smoked. He has never used smokeless tobacco. He reports that he does not drink alcohol and does not use drugs.      Family History:  No evidence for sudden cardiac death or premature CAD      Medications:     Home medications were reviewed and are listed below    Prior to Admission medications    Medication Sig Start Date End Date Taking? Authorizing Provider   furosemide (LASIX) 40 MG tablet Take 0.5 tablets by mouth 2 times daily 5/31/22  Yes Rachel Alcala MD   carvedilol (COREG) 12.5 MG tablet Take 1 tablet by mouth 2 times daily 5/13/22  Yes Chi Mullen MD   sacubitril-valsartan (ENTRESTO) 24-26 MG per tablet Take 0.5 tablets by mouth 2 times daily 4/19/22  Yes Fabian Dowell MD   LANTUS SOLOSTAR 100 UNIT/ML injection pen INJECT 15 UNITS SUBCUTANEOUSLY NIGHTLY 3/23/22  Yes Rachel Alcala MD   Tafamidis 61 MG CAPS Take 61 mg by mouth daily 3/16/22  Yes Chi Mullen MD   dorzolamide-timolol (COSOPT) 22.3-6.8 MG/ML ophthalmic solution INSTILL 1 DROP INTO EACH EYE EVERY 12 HOURS 1/31/22  Yes Historical Provider, MD   JANUVIA 50 MG tablet Take 1 tablet by mouth once daily 2/8/22  Yes Rachel Alcala MD   latanoprost (XALATAN) 0.005 % ophthalmic solution INSTILL 1 DROP INTO EACH EYE ONCE DAILY IN THE EVENING 12/13/21  Yes Historical Provider, MD   atorvastatin (LIPITOR) 40 MG tablet Take 1 tablet by mouth once daily 1/3/22  Yes CHUNG Askew   potassium chloride (KLOR-CON) 10 MEQ extended release tablet Take 1 tablet by mouth once daily 7/26/21  Yes Mariluz Ji MD   MM PEN NEEDLES 31G X 5 MM MISC USE   SUBCUTANEOUSLY ONCE DAILY 5/4/21  Yes Mariluz Ji MD   aspirin 81 MG EC tablet Take 1 tablet by mouth daily 7/1/19  Yes Maegan Munoz MD          Allergy:     Patient has no known allergies. Review of Systems:     All 12 point review of symptoms completed. Pertinent positives identified in the HPI, all other review of symptoms negative as below. CONSTITUTIONAL: No fatigue  SKIN: No rash or pruritis. EYES: No visual changes or diplopia. No scleral icterus.   ENT: No Headaches, hearing loss or vertigo. No mouth sores or sore throat. CARDIOVASCULAR: No chest pain/chest pressure/chest discomfort. No palpitations. No edema. RESPIRATORY: No dyspnea. No cough or wheezing, no sputum production. GASTROINTESTINAL: No N/V/D. No abdominal pain, appetite loss, blood in stools. GENITOURINARY: No dysuria, trouble voiding, or hematuria. MUSCULOSKELETAL:  No gait disturbance, weakness or joint complaints. NEUROLOGICAL: No headache, diplopia, change in muscle strength, numbness or tingling. No change in gait, balance, coordination, mood, affect, memory, mentation, behavior. PSHYCH: No anxiety, loss of interest, change in sexual behavior, feelings of self-harm, or confusion. ENDOCRINE: No excessive thirst, fluid intake, or urination. No tremor. HEMATOLOGIC: No abnormal bruising or bleeding. ALLERGY: No nasal congestion or hives.       Physical Examination:     Vitals:    06/22/22 1402   BP: 112/70   Pulse: 70   Weight: 184 lb 12.8 oz (83.8 kg)       Wt Readings from Last 3 Encounters:   06/22/22 184 lb 12.8 oz (83.8 kg)   05/13/22 175 lb 12.8 oz (79.7 kg)   04/26/22 168 lb 3.2 oz (76.3 kg)         General Appearance:  Alert, cooperative, no distress, appears stated age Appropriate weight   Head:  Normocephalic, without obvious abnormality, atraumatic   Eyes:  PERRL, conjunctiva/corneas clear EOM intact  Ears normal   Throat no lesions       Nose: Nares normal, no drainage or sinus tenderness   Throat: Lips, mucosa, and tongue normal   Neck: Supple, symmetrical, trachea midline, no adenopathy, thyroid: not enlarged, symmetric, no tenderness/mass/nodules, no carotid bruit       Lungs:   Clear to auscultation bilaterally, respirations unlabored   Chest Wall:  No tenderness or deformity   Heart:  Regular rhythm, rate is controlled, S1, S2 normal, there is an S3, there is no murmur, there is no rub, cannot assess jvd, no bilateral lower extremity edema   Abdomen:   Soft, non-tender, bowel sounds active all four quadrants,  no masses, no organomegaly       Extremities: Extremities normal, atraumatic, no cyanosis   Pulses: 2+ and symmetric   Skin: Skin color, texture, turgor normal, no rashes or lesions   Pysch: Normal mood and affect   Neurologic: Normal gross motor and sensory exam.  Cranial nerves intact        Labs:     Lab Results   Component Value Date    WBC 5.0 04/19/2022    HGB 12.9 (L) 04/19/2022    HCT 39.1 (L) 04/19/2022    MCV 76.1 (L) 04/19/2022     04/19/2022     Lab Results   Component Value Date     05/11/2022    K 3.8 05/11/2022     05/11/2022    CO2 21 05/11/2022    BUN 34 (H) 05/11/2022    CREATININE 2.4 (H) 05/11/2022    GLUCOSE 66 (L) 05/11/2022    CALCIUM 9.9 05/11/2022    PROT 7.6 03/04/2022    LABALBU 4.0 03/04/2022    BILITOT 1.4 (H) 03/04/2022    ALKPHOS 291 (H) 03/04/2022    AST 38 (H) 03/04/2022    ALT 25 03/04/2022    LABGLOM 26 (A) 05/11/2022    GFRAA 31 (A) 05/11/2022    AGRATIO 1.1 03/04/2022    GLOB 3.2 07/17/2019         Lab Results   Component Value Date    CHOL 130 04/17/2022    CHOL 148 10/21/2021    CHOL 151 10/27/2020     Lab Results   Component Value Date    TRIG 67 04/17/2022    TRIG 75 10/21/2021    TRIG 81 10/27/2020     Lab Results   Component Value Date    HDL 67 (H) 04/17/2022    HDL 75 (H) 10/21/2021    HDL 72 (H) 10/27/2020     Lab Results   Component Value Date    LDLCALC 50 04/17/2022    LDLCALC 58 10/21/2021    LDLCALC 63 10/27/2020     Lab Results   Component Value Date    LABVLDL 13 04/17/2022    LABVLDL 15 10/21/2021    LABVLDL 16 10/27/2020     No results found for: [facility]    Lab Results   Component Value Date    INR 1.13 11/08/2019    INR 1.13 09/05/2019    INR 1.04 07/17/2019    PROTIME 12.9 11/08/2019    PROTIME 12.9 09/05/2019    PROTIME 11.8 07/17/2019       The ASCVD Risk score (Rishabh Solares, et al., 2013) failed to calculate for the following reasons:     The 2013 ASCVD risk score is only valid for ages 36 to 78    The patient has a prior MI or stroke diagnosis      Assessment / Plan:      Diagnosis Orders   1. HFrEF (heart failure with reduced ejection fraction) ([address]  Basic Metabolic Panel    Echo limited   2. SOB (shortness of breath)  Echo limited   3. Long term use of drug  Basic Metabolic Panel   4. Chronic systolic CHF (congestive heart failure) (H[facility])          1. Chronic HFrEF:   Patient has systolic heart failure due to nonischemic cardiomyopathy (most likely due to prior history of severe uncontrolled hypertension and contribution by aTTR amyloidosis).  LVEF recovered and is now back worse.     -Cw Vyndamax (tafamidis) 61 daily (there is a possibility tafamidis may have precipitated acute heart failure; it was started a week ago; clinical trial did suggest worsening symptoms when patients have NYHA FC III - we will monitor for now).    - Cw coreg 12.5 bid   -Cw Entresto half a tab of 24/26 mg bid (watch Cr and K). -Will repeat a Bmp in 1 week. - Unable to start spironolactone due to CKD   -Cw lasix 20 bid  -No SGLT2 inhibitors due to CKD. -Will repeat a limited echo next month. - If LVEF remains < 35% after 90 days of GDMT, will discuss referral to EP for possible device placement. Cw LifeVest.      2. Mild CAD:   Has h/o NSTEMI but cath with only mild CAD.    -Agree with asa, bb, statin.     3.  HTN:    BP is now controlled with current medications.    -Cw meds     4. H/o NSVT:   No events.      -Cw coreg       5. HLP:   On statin.      6. ATTR amyloidosis:   Per #1           We will schedule a follow up visit in 6 weeks      I have spent 42 minutes of face to face time with the patient with more than 50% spent counseling and coordinating care. I have personally reviewed the reports and images of labs, radiological studies, cardiac studies including ECG's and telemetry, current and old medical records. The note was completed using EMR and Dragon dictation system.  Every effort was made to ensure accuracy; however, inadvertent computerized transcription errors may be present. All questions and concerns were addressed to the patient/family. Alternatives to my treatment were discussed. I would like to thank you for providing me the opportunity to participate in the care of your patient. If you have any questions, please do not hesitate to contact me.      Maddy Bunn MD, [address] Office Phone: [phone]  Fax: [phone]

## 2022-06-29 ENCOUNTER — TELEPHONE (OUTPATIENT)
Dept: FAMILY MEDICINE CLINIC | Age: 82
End: 2022-06-29

## 2022-06-29 RX ORDER — INSULIN GLARGINE-YFGN 100 [IU]/ML
INJECTION, SOLUTION SUBCUTANEOUS
Qty: 15 ML | Refills: 0 | OUTPATIENT
Start: 2022-06-29

## 2022-06-29 RX ORDER — ATORVASTATIN CALCIUM 40 MG/1
TABLET, FILM COATED ORAL
Qty: 90 TABLET | Refills: 0 | Status: SHIPPED | OUTPATIENT
Start: 2022-06-29 | End: 2022-09-29 | Stop reason: SDUPTHER

## 2022-06-29 RX ORDER — INSULIN GLARGINE 100 [IU]/ML
INJECTION, SOLUTION SUBCUTANEOUS
Qty: 15 ML | Refills: 0 | Status: SHIPPED | OUTPATIENT
Start: 2022-06-29 | End: 2022-08-08

## 2022-06-29 NOTE — TELEPHONE ENCOUNTER
Medication:   Requested Prescriptions     Pending Prescriptions Disp Refills    insulin glargine (LANTUS SOLOSTAR) 100 UNIT/ML injection pen 15 mL 0     Sig: INJECT 15 UNITS SUBCUTANEOUSLY NIGHTLY       Last Filled:  3/23/2022, 15mL, 0    Patient Phone Number: [phone] (home)     Last appt: 3/15/2022   Next appt: 6/29/2022    Last Labs DM:   Lab Results   Component Value Date    LABA1C 6.0 10/21/2021

## 2022-06-29 NOTE — TELEPHONE ENCOUNTER
Medication and Quantity requested:  LANTUS SOLOSTAR 100 UNIT/ML injection pen   QTY 15 ML    Last Visit 06/22/22      Pharmacy and phone number updated in EPIC:  yes       [facility] stated that patient is on last pen and having trouble with the pen. The patient has no refills.  This would be a emergency refill

## 2022-06-29 NOTE — TELEPHONE ENCOUNTER
Office has been notified that pt is requiring Prior Authorization for the following medication:    insulin glargine (LANTUS SOLOSTAR) 100 UNIT/ML injection pen    Please initiate this request through CoverMyMeds, contacting the following Payor/Insurance:    Alejandra     Please see below, or the documentation attached to this encounter for any additional information that may assist in processing PA: Thank you!

## 2022-06-30 ENCOUNTER — TELEPHONE (OUTPATIENT)
Dept: ADMINISTRATIVE | Age: 82
End: 2022-06-30

## 2022-06-30 NOTE — TELEPHONE ENCOUNTER
Submitted PA for [facility]  Via [facility] Key: [id number] STATUS: DENIED. LETTER ATTACHED. If this requires a response please respond to the pool. [address]). Please advise patient thank you.

## 2022-07-01 ENCOUNTER — HOSPITAL ENCOUNTER (EMERGENCY)
Age: 82
Discharge: HOME | End: 2022-07-01
Attending: EMERGENCY MEDICINE
Payer: MEDICARE

## 2022-07-01 VITALS
TEMPERATURE: 97.6 F | SYSTOLIC BLOOD PRESSURE: 159 MMHG | BODY MASS INDEX: 26.5 KG/M2 | HEIGHT: 71 IN | OXYGEN SATURATION: 100 % | RESPIRATION RATE: 17 BRPM | DIASTOLIC BLOOD PRESSURE: 96 MMHG | WEIGHT: 189.3 LBS | HEART RATE: 68 BPM

## 2022-07-01 DIAGNOSIS — K42.9 UMBILICAL HERNIA WITHOUT OBSTRUCTION AND WITHOUT GANGRENE: Primary | ICD-10-CM

## 2022-07-01 PROCEDURE — 99282 EMERGENCY DEPT VISIT SF MDM: CPT

## 2022-07-01 RX ORDER — INSULIN DETEMIR 100 [IU]/ML
INJECTION, SOLUTION SUBCUTANEOUS
Qty: 5 PEN | Refills: 3 | Status: SHIPPED | OUTPATIENT
Start: 2022-07-01

## 2022-07-01 ASSESSMENT — PAIN - FUNCTIONAL ASSESSMENT: PAIN_FUNCTIONAL_ASSESSMENT: NONE - DENIES PAIN

## 2022-07-01 NOTE — ED PROVIDER NOTES
[address] [facility]          ATTENDING PHYSICIAN NOTE       Date of evaluation: 7/1/2022    Chief Complaint     Other (umbilical hernia)      History of Present Illness     Aleksander Stewart is a 78 y.o. male who presents with a complaint of umbilical hernia. He states he has a history of similar hernia, reduced in the ED, but has not undergone surgical repair due to his multiple medical comorbidities. He states it popped out earlier tonight and he was unable to 'push it back in'. He endorses nausea and one eipsode of vomiting in the ER waiting room prior to being seen in the ED. He denies fevers and chills and has been having regular BMs prior to the event tonight. Review of Systems     Review of Systems   Constitutional: Negative for chills, fatigue, fever and unexpected weight change. Respiratory: Positive for cough. Gastrointestinal: Positive for nausea and vomiting. Negative for abdominal distention, abdominal pain, constipation, diarrhea and rectal pain. Genitourinary: Negative for difficulty urinating. All other systems reviewed and are negative. Past Medical, Surgical, Family, and Social History     He has a past medical history of Abdominal hernia, Anemia, Arthritis, CHF (congestive heart failure) ([facility] [address] Hyperlipidemia, Hypertension, and Type II or unspecified type diabetes mellitus without mention of complication, not stated as uncontrolled. He has a past surgical history that includes hernia repair; hip surgery; and other surgical history (N/A, 12/15/2015). His family history includes Cancer in his sister; Heart Disease in his father; High Blood Pressure in his father and mother. He reports that he has never smoked. He has never used smokeless tobacco. He reports that he does not drink alcohol and does not use drugs.     Medications     Discharge Medication List as of 7/1/2022  4:31 AM      CONTINUE these medications which have NOT CHANGED    Details atorvastatin (LIPITOR) 40 MG tablet Take 1 tablet by mouth once daily, Disp-90 tablet, R-0Normal      insulin glargine (LANTUS SOLOSTAR) 100 UNIT/ML injection pen INJECT 15 UNITS SUBCUTANEOUSLY NIGHTLY, Disp-15 mL, R-0Normal      furosemide (LASIX) 40 MG tablet Take 0.5 tablets by mouth 2 times daily, Disp-120 tablet, R-1Normal      carvedilol (COREG) 12.5 MG tablet Take 1 tablet by mouth 2 times daily, Disp-180 tablet, R-3Normal      sacubitril-valsartan (ENTRESTO) 24-26 MG per tablet Take 0.5 tablets by mouth 2 times daily, Disp-60 tablet, R-0Normal      Tafamidis 61 MG CAPS Take 61 mg by mouth daily, Disp-30 capsule, R-11Print      dorzolamide-timolol (COSOPT) 22.3-6.8 MG/ML ophthalmic solution INSTILL 1 DROP INTO EACH EYE EVERY 12 HOURSHistorical Med      JANUVIA 50 MG tablet Take 1 tablet by mouth once daily, Disp-90 tablet, R-1Normal      latanoprost (XALATAN) 0.005 % ophthalmic solution INSTILL 1 DROP INTO EACH EYE ONCE DAILY IN THE EVENINGHistorical Med      potassium chloride (KLOR-CON) 10 MEQ extended release tablet Take 1 tablet by mouth once daily, Disp-60 tablet, R-5Normal      MM PEN NEEDLES 31G X 5 MM MISC USE   SUBCUTANEOUSLY ONCE DAILY, Disp-100 each, R-5Normal      aspirin 81 MG EC tablet Take 1 tablet by mouth daily, Disp-30 tablet, R-3Print             Allergies     He has No Known Allergies. Physical Exam     INITIAL VITALS: BP: (!) 159/96, Temp: 97.6 °F (36.4 °C), Heart Rate: 68, Resp: 17, SpO2: 100 %   Physical Exam  Constitutional: Well nourished Elderly  [place] Male who appears in no acute distress. HEENT: NC/AT. EOMI. Resp: Respirations unlabored. Abd: Moderate umbilical hernia at the 12 o'clock position approximately 5 cm in diameter. Easily reduced at bedside with direct pressure. No tenderness to palpation, rebound or guarding noted. MSK: Full ROM, no edema or tenderness to palpation. Skin: Extremities are warm and well perfused. Neuro: A&Ox3. Cranial nerves grossly intact   Strength and sensation intact x4 extremities. Psych: Thought content, behavior & judgement normal.    Diagnostic Results         RADIOLOGY:  No orders to display       LABS:   No results found for this visit on 07/01/22. ED BEDSIDE ULTRASOUND:  No results found. RECENT VITALS:  BP: (!) 159/96,Temp: 97.6 °F (36.4 °C), Heart Rate: 68, Resp: 17, SpO2: 100 %     Procedures         ED Course     Nursing Notes, Past Medical Hx, Past Surgical Hx, Social Hx,Allergies, and Family Hx were reviewed. patient was given the following medications:  No orders of the defined types were placed in this encounter. CONSULTS:  None    MEDICAL DECISIONMAKING / ASSESSMENT / PLAN     Stephanie Headley is a 78 y.o. male presenting with a protruding umbilical hernia. Easily reduced at the bedside. No abdominal tenderness suggesting incarcerated or strangulated hernia. No sign of AAA, mesenteric ischemia, volvulus or acute abdominal obstruction. After bedside reduction, he tolerated oral intake well and was deemed appropriate for discharge. Strict return precautions emphasized. Clinical Impression     1.  Umbilical hernia without obstruction and without gangrene        Disposition     PATIENT REFERRED TO:  Valery Williamson MD  [address] [facility]  [phone]    Schedule an appointment as soon as possible for a visit       [facility] Emergency Department  [address] [place] [address]  [phone]    If symptoms worsen      DISCHARGE MEDICATIONS:  Discharge Medication List as of 7/1/2022  4:31 AM          DISPOSITION Decision To Discharge 07/01/2022 04:13:29 AM          Shikha Drake MD  07/02/22 [id number]

## 2022-07-01 NOTE — ED TRIAGE NOTES
Patient arrived to ED with complaints of of abdominal pain from umbilical hernia. Provider once patient arrived to room reduced hernia at bedside. Patient denies pain at this time.

## 2022-07-01 NOTE — ED NOTES
Discharge instructions provided to patient by RN at bedside. No further concerns addressed at this time.      Ileana Jang RN  07/01/22 [id number]

## 2022-07-02 ASSESSMENT — ENCOUNTER SYMPTOMS
NAUSEA: 1
RECTAL PAIN: 0
ABDOMINAL DISTENTION: 0
ABDOMINAL PAIN: 0
COUGH: 1
CONSTIPATION: 0
VOMITING: 1
DIARRHEA: 0

## 2022-07-12 ENCOUNTER — TELEPHONE (OUTPATIENT)
Dept: CARDIOLOGY CLINIC | Age: 82
End: 2022-07-12

## 2022-07-12 NOTE — TELEPHONE ENCOUNTER
Spoke with pharmacist Graciela Goldsmith to verify pt's dosage, dosage was the same as our records, refill was sent in. Graciela Goldsmith verbalized understanding.

## 2022-07-12 NOTE — TELEPHONE ENCOUNTER
Pharmacy called to see if Pt is still supposed to be taking Entresto. If so they would like Dr. Michael Hay to send prescription there.

## 2022-07-27 ENCOUNTER — HOSPITAL ENCOUNTER (EMERGENCY)
Age: 82
Discharge: HOME | End: 2022-07-27
Attending: EMERGENCY MEDICINE
Payer: MEDICARE

## 2022-07-27 ENCOUNTER — PROCEDURE VISIT (OUTPATIENT)
Dept: CARDIOLOGY CLINIC | Age: 82
End: 2022-07-27
Payer: MEDICARE

## 2022-07-27 VITALS
DIASTOLIC BLOOD PRESSURE: 97 MMHG | WEIGHT: 192 LBS | OXYGEN SATURATION: 100 % | RESPIRATION RATE: 18 BRPM | TEMPERATURE: 97 F | HEART RATE: 66 BPM | SYSTOLIC BLOOD PRESSURE: 137 MMHG | HEIGHT: 71 IN | BODY MASS INDEX: 26.88 KG/M2

## 2022-07-27 DIAGNOSIS — I50.20 HFREF (HEART FAILURE WITH REDUCED EJECTION FRACTION) (HCC): ICD-10-CM

## 2022-07-27 DIAGNOSIS — K42.9 UMBILICAL HERNIA WITHOUT OBSTRUCTION AND WITHOUT GANGRENE: Primary | ICD-10-CM

## 2022-07-27 DIAGNOSIS — R06.02 SOB (SHORTNESS OF BREATH): ICD-10-CM

## 2022-07-27 DIAGNOSIS — Z79.899 LONG TERM USE OF DRUG: ICD-10-CM

## 2022-07-27 LAB
ANION GAP SERPL CALCULATED.3IONS-SCNC: 16 MMOL/L (ref 3–16)
BUN BLDV-MCNC: 23 MG/DL (ref 7–20)
CALCIUM SERPL-MCNC: 9.6 MG/DL (ref 8.3–10.6)
CHLORIDE BLD-SCNC: 107 MMOL/L (ref 99–110)
CO2: 20 MMOL/L (ref 21–32)
CREAT SERPL-MCNC: 2 MG/DL (ref 0.8–1.3)
GFR AFRICAN AMERICAN: 39
GFR NON-AFRICAN AMERICAN: 32
GLUCOSE BLD-MCNC: 87 MG/DL (ref 70–99)
POTASSIUM SERPL-SCNC: 4 MMOL/L (ref 3.5–5.1)
SODIUM BLD-SCNC: 143 MMOL/L (ref 136–145)

## 2022-07-27 PROCEDURE — 99282 EMERGENCY DEPT VISIT SF MDM: CPT

## 2022-07-27 PROCEDURE — 93308 TTE F-UP OR LMTD: CPT | Performed by: INTERNAL MEDICINE

## 2022-07-27 ASSESSMENT — PAIN DESCRIPTION - DESCRIPTORS: DESCRIPTORS: ACHING

## 2022-07-27 ASSESSMENT — PAIN SCALES - GENERAL: PAINLEVEL_OUTOF10: 7

## 2022-07-27 ASSESSMENT — PAIN DESCRIPTION - LOCATION: LOCATION: ABDOMEN

## 2022-07-27 ASSESSMENT — LIFESTYLE VARIABLES: HOW OFTEN DO YOU HAVE A DRINK CONTAINING ALCOHOL: NEVER

## 2022-07-27 ASSESSMENT — PAIN DESCRIPTION - PAIN TYPE: TYPE: ACUTE PAIN

## 2022-07-27 ASSESSMENT — PAIN - FUNCTIONAL ASSESSMENT: PAIN_FUNCTIONAL_ASSESSMENT: 0-10

## 2022-07-27 ASSESSMENT — PAIN DESCRIPTION - FREQUENCY: FREQUENCY: CONTINUOUS

## 2022-07-27 ASSESSMENT — PAIN DESCRIPTION - ORIENTATION: ORIENTATION: MID

## 2022-07-27 NOTE — DISCHARGE INSTRUCTIONS
Return if unable to reduce hernia.   Please call your family doctor to discuss another surgical consult to determine plan for recurrent hernia

## 2022-07-27 NOTE — ED PROVIDER NOTES
[address] [facility]          ATTENDING PHYSICIAN NOTE       Date of evaluation: 7/27/2022    Chief Complaint     Abdominal Pain (Pt sated has hernia that \"pop out again\")      History of Present Illness     Marlana Burkitt is a 78 y.o. male who presents with umbilical hernia. Patient with a history of umbilical hernia previously reduced. Family states that they did talk to a surgeon but due to numerous risk factors including age diabetes hyperlipidemia and other past medical history that surgical option would be a last choice. He states that he is on the bathroom on the toilet today and then got up from the toilet and then felt the hernia pop out. He was unable to reduce it on at home. He did vomit x1. This was prior to arrival.  No modifying factors or associate signs or symptoms. Review of Systems     Review of Systems no bloody bowel movements. Positive abdominal pain over area of hernia. No chest pain or chest pressure. No bright red blood per rectum or melena reported by patient otherwise any review of systems reported by patient    Past Medical, Surgical, Family, and Social History     He has a past medical history of Abdominal hernia, Anemia, Arthritis, CHF (congestive heart failure) ([address] Hyperlipidemia, Hypertension, and Type II or unspecified type diabetes mellitus without mention of complication, not stated as uncontrolled. He has a past surgical history that includes hernia repair; hip surgery; and other surgical history (N/A, 12/15/2015). His family history includes Cancer in his sister; Heart Disease in his father; High Blood Pressure in his father and mother. He reports that he has never smoked. He has never used smokeless tobacco. He reports that he does not drink alcohol and does not use drugs.     Medications     Previous Medications    ASPIRIN 81 MG EC TABLET    Take 1 tablet by mouth daily    ATORVASTATIN (LIPITOR) 40 MG TABLET    Take 1 tablet by mouth once daily    CARVEDILOL (COREG) 12.5 MG TABLET    Take 1 tablet by mouth 2 times daily    DORZOLAMIDE-TIMOLOL (COSOPT) 22.3-6.8 MG/ML OPHTHALMIC SOLUTION    INSTILL 1 DROP INTO EACH EYE EVERY 12 HOURS    FUROSEMIDE (LASIX) 40 MG TABLET    Take 0.5 tablets by mouth 2 times daily    INSULIN DETEMIR (LEVEMIR FLEXTOUCH) 100 UNIT/ML INJECTION PEN    Inject 15 units daily    INSULIN GLARGINE (LANTUS SOLOSTAR) 100 UNIT/ML INJECTION PEN    INJECT 15 UNITS SUBCUTANEOUSLY NIGHTLY    JANUVIA 50 MG TABLET    Take 1 tablet by mouth once daily    LATANOPROST (XALATAN) 0.005 % OPHTHALMIC SOLUTION    INSTILL 1 DROP INTO EACH EYE ONCE DAILY IN THE EVENING    MM PEN NEEDLES 31G X 5 MM MISC    USE   SUBCUTANEOUSLY ONCE DAILY    POTASSIUM CHLORIDE (KLOR-CON) 10 MEQ EXTENDED RELEASE TABLET    Take 1 tablet by mouth once daily    SACUBITRIL-VALSARTAN (ENTRESTO) 24-26 MG PER TABLET    Take 0.5 tablets by mouth 2 times daily    TAFAMIDIS 61 MG CAPS    Take 61 mg by mouth daily       Allergies     He has No Known Allergies. Physical Exam     INITIAL VITALS: BP: (!) 137/97, Temp: 97 °F (36.1 °C), Heart Rate: 66, Resp: 18, SpO2: 100 %   Physical Exam  Vitals and nursing note reviewed. Constitutional:       Appearance: He is not ill-appearing or diaphoretic. HENT:      Head: Normocephalic. Eyes:      General: No scleral icterus. Extraocular Movements: Extraocular movements intact. Conjunctiva/sclera: Conjunctivae normal.   Cardiovascular:      Rate and Rhythm: Normal rate and regular rhythm. Pulmonary:      Effort: Pulmonary effort is normal.   Abdominal:      Palpations: Abdomen is soft. Tenderness: abdominal tenderness      Comments: Positive supraumbilical hernia 4 cm x 3 cm. No erythema warmth over that area. Rest of abdominal exam was unremarkable. It was painful to palpation. Musculoskeletal:         General: Normal range of motion. Cervical back: Neck supple.    Skin:     General: Skin is warm and dry. Neurological:      Mental Status: He is alert and oriented to person, place, and time. Psychiatric:         Behavior: Behavior normal.       Diagnostic Results         RADIOLOGY:  No orders to display       LABS:   No results found for this visit on 07/27/22. ED BEDSIDE ULTRASOUND:  No results found. RECENT VITALS:  BP: (!) 137/97,Temp: 97 °F (36.1 °C), Heart Rate: 66, Resp: 18, SpO2: 100 %     Procedures     Procedures     Patient had his umbilical hernia manually reduced with direct pressure. Patient was tolerating p.o.'s after the procedure. No sedation was required. ED Course     Nursing Notes, Past Medical Hx, Past Surgical Hx, Social Hx,Allergies, and Family Hx were reviewed. patient was given the following medications:  No orders of the defined types were placed in this encounter. CONSULTS:  None    MEDICAL DECISIONMAKING / ASSESSMENT / PLAN     Donn Aguilera is a 78 y.o. male presented to the ED with umbilical hernia. This was reduced without difficulty using pressure or manipulation. He tolerated p.o. after. Instructed follow-up with family physician to discuss surgical referral although they report they have done this in the past and he is not a surgical candidate at that time. There is no evidence of current umbilical hernia after reduction. Patient was feeling immediately better after reduction. Discharged good condition. Clinical Impression     1.  Umbilical hernia without obstruction and without gangrene        Disposition     PATIENT REFERRED TO:  Samantha Luu MD  [address] [place] [address]  [phone]    Schedule an appointment as soon as possible for a visit       DISCHARGE MEDICATIONS:  New Prescriptions    No medications on file       DISPOSITION Decision To Discharge 07/27/2022 08:34:32 AM         Elham Nava MD  07/27/22 3040

## 2022-07-28 NOTE — RESULT ENCOUNTER NOTE
LM informing pt echo results showed cardiac function is stable, pt to continue medications unchanged. Advised pt to call the office with any questions.

## 2022-07-29 RX ORDER — POTASSIUM CHLORIDE 750 MG/1
TABLET, FILM COATED, EXTENDED RELEASE ORAL
Qty: 60 TABLET | Refills: 0 | OUTPATIENT
Start: 2022-07-29

## 2022-07-29 RX ORDER — POTASSIUM CHLORIDE 750 MG/1
TABLET, FILM COATED, EXTENDED RELEASE ORAL
Qty: 60 TABLET | Refills: 5 | Status: SHIPPED | OUTPATIENT
Start: 2022-07-29

## 2022-07-29 NOTE — RESULT ENCOUNTER NOTE
Please let patient know that the associated study/labs are stable. Patient will need to continue current therapy. Thank you.

## 2022-08-03 ENCOUNTER — OFFICE VISIT (OUTPATIENT)
Dept: CARDIOLOGY CLINIC | Age: 82
End: 2022-08-03
Payer: MEDICARE

## 2022-08-03 VITALS
HEART RATE: 79 BPM | BODY MASS INDEX: 25.91 KG/M2 | WEIGHT: 185.8 LBS | SYSTOLIC BLOOD PRESSURE: 110 MMHG | DIASTOLIC BLOOD PRESSURE: 60 MMHG

## 2022-08-03 DIAGNOSIS — I50.22 CHRONIC SYSTOLIC CHF (CONGESTIVE HEART FAILURE) (HCC): Primary | ICD-10-CM

## 2022-08-03 PROCEDURE — 1123F ACP DISCUSS/DSCN MKR DOCD: CPT | Performed by: INTERNAL MEDICINE

## 2022-08-03 PROCEDURE — G8417 CALC BMI ABV UP PARAM F/U: HCPCS | Performed by: INTERNAL MEDICINE

## 2022-08-03 PROCEDURE — 99214 OFFICE O/P EST MOD 30 MIN: CPT | Performed by: INTERNAL MEDICINE

## 2022-08-03 PROCEDURE — G8427 DOCREV CUR MEDS BY ELIG CLIN: HCPCS | Performed by: INTERNAL MEDICINE

## 2022-08-03 PROCEDURE — 1036F TOBACCO NON-USER: CPT | Performed by: INTERNAL MEDICINE

## 2022-08-03 NOTE — PROGRESS NOTES
Cc: HFrEF, ATTR amyloidosis. 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 Echo 2015: moderate LVH, normal EF. Echo 06/2019: severe LVH, EF 35-40%, mild RV dysfx, mild valve disease     Kathi 07/2019: mod LVd, EF 39%, mid to distal inferior and apical fixed defect. LHC 07/2019: mild diffuse CAD, nl LVEDP. ECG 9/6/19: NSR, 1st AVB, LAFB, RBBB (trifascicular block), possible inferior MI. Echo 9/20/19: severe LVH, LVEF 90-37%, diastolic III, apical sparing on speckle strain imaging suggestive of amyloidosis, severe LAE, mild MR. Tc PYP scan 3/12/20: strongly positive for amyloidosis. 09/2019 kappa/labda ratio 1.69 (normal in the setting of CKD, Cr 1.9). 24-hour UPEP normal; SPEP 05/2021 normal; hence no AL amyloidosis. Echo 4/18/2022: Severe LVH, consider cardiac amyloidosis, LVEF 20-25% with global hypokinesis, diastolic grade 3, increased LVEDP, normal RV size with severe hypokinesis, no significant valvular abnormalities. Limited echo 07/2022: severe LVH, EF 25-30%. Patient is here for a follow up. He reports no complaints. , weight is stable. Histories     Past Medical History:   has a past medical history of Abdominal hernia, Anemia, Arthritis, CHF (congestive heart failure) ([facility] [address] Hyperlipidemia, Hypertension, and Type II or unspecified type diabetes mellitus without mention of complication, not stated as uncontrolled. Surgical History:   has a past surgical history that includes hernia repair; hip surgery; and other surgical history (N/A, 12/15/2015). Social History:   reports that he has never smoked. He has never used smokeless tobacco. He reports that he does not drink alcohol and does not use drugs.      Family History:  No evidence for sudden cardiac death or premature CAD      Medications:     Home medications were reviewed and are listed below    Prior to Admission medications    Medication Sig Start Date End Date Taking? Authorizing Provider   potassium chloride (KLOR-CON) 10 MEQ extended release tablet Take 1 tablet by mouth once daily 7/29/22   Vernell Esteves MD   sacubitril-valsartan (ENTRESTO) 24-26 MG per tablet Take 0.5 tablets by mouth 2 times daily 7/12/22   Leonides Camacho MD   insulin detemir (LEVEMIR FLEXTOUCH) 100 UNIT/ML injection pen Inject 15 units daily 7/1/22   Vernell Esteves MD   atorvastatin (LIPITOR) 40 MG tablet Take 1 tablet by mouth once daily 6/29/22   KAYLYNN Scanlon - CNP   insulin glargine (LANTUS SOLOSTAR) 100 UNIT/ML injection pen INJECT 15 UNITS SUBCUTANEOUSLY NIGHTLY 6/29/22   KAYLYNN Scanlon - CNP   furosemide (LASIX) 40 MG tablet Take 0.5 tablets by mouth 2 times daily 5/31/22   Naomi Barnhart MD   carvedilol (COREG) 12.5 MG tablet Take 1 tablet by mouth 2 times daily 5/13/22   Leonides Cmaacho MD   Tafamidis 61 MG CAPS Take 61 mg by mouth daily 3/16/22   Leonides Camacho MD   dorzolamide-timolol (COSOPT) 22.3-6.8 MG/ML ophthalmic solution INSTILL 1 DROP INTO EACH EYE EVERY 12 HOURS 1/31/22   Historical Provider, MD   JANUVIA 50 MG tablet Take 1 tablet by mouth once daily 2/8/22   Naomi Barnhart MD   latanoprost (XALATAN) 0.005 % ophthalmic solution INSTILL 1 DROP INTO EACH EYE ONCE DAILY IN THE EVENING 12/13/21   Historical Provider, MD   MM PEN NEEDLES 31G X 5 MM MISC USE   SUBCUTANEOUSLY ONCE DAILY 5/4/21   SELAM Doll MD   aspirin 81 MG EC tablet Take 1 tablet by mouth daily 7/1/19   Nakia Baez MD          Allergy:     Patient has no known allergies. Review of Systems:     All 12 point review of symptoms completed. Pertinent positives identified in the HPI, all other review of symptoms negative as below. CONSTITUTIONAL: No fatigue  SKIN: No rash or pruritis. EYES: No visual changes or diplopia.  No scleral icterus. ENT: No Headaches, hearing loss or vertigo. No mouth sores or sore throat. CARDIOVASCULAR: No chest pain/chest pressure/chest discomfort. No palpitations. No edema. RESPIRATORY: No dyspnea. No cough or wheezing, no sputum production. GASTROINTESTINAL: No N/V/D. No abdominal pain, appetite loss, blood in stools. GENITOURINARY: No dysuria, trouble voiding, or hematuria. MUSCULOSKELETAL:  No gait disturbance, weakness or joint complaints. NEUROLOGICAL: No headache, diplopia, change in muscle strength, numbness or tingling. No change in gait, balance, coordination, mood, affect, memory, mentation, behavior. PSHYCH: No anxiety, loss of interest, change in sexual behavior, feelings of self-harm, or confusion. ENDOCRINE: No excessive thirst, fluid intake, or urination. No tremor. HEMATOLOGIC: No abnormal bruising or bleeding. ALLERGY: No nasal congestion or hives.       Physical Examination:     Vitals:    08/03/22 1426   BP: 110/60   Pulse: 79   Weight: 185 lb 12.8 oz (84.3 kg)       Wt Readings from Last 3 Encounters:   08/03/22 185 lb 12.8 oz (84.3 kg)   07/27/22 192 lb (87.1 kg)   07/01/22 189 lb 4.8 oz (85.9 kg)         General Appearance:  Alert, cooperative, no distress, appears stated age Appropriate weight   Head:  Normocephalic, without obvious abnormality, atraumatic   Eyes:  PERRL, conjunctiva/corneas clear EOM intact  Ears normal   Throat no lesions       Nose: Nares normal, no drainage or sinus tenderness   Throat: Lips, mucosa, and tongue normal   Neck: Supple, symmetrical, trachea midline, no adenopathy, thyroid: not enlarged, symmetric, no tenderness/mass/nodules, no carotid bruit       Lungs:   Clear to auscultation bilaterally, respirations unlabored   Chest Wall:  No tenderness or deformity   Heart:  Regular rhythm, rate is controlled, S1, S2 normal, there is no murmur, there is summation gallop, cannot assess jvd, 1+ bilateral lower extremity edema   Abdomen:   Soft, non-tender, bowel sounds active all four quadrants,  no masses, no organomegaly       Extremities: Extremities normal, atraumatic, no cyanosis   Pulses: 2+ and symmetric   Skin: Skin color, texture, turgor normal, no rashes or lesions   Pysch: Normal mood and affect   Neurologic: Normal gross motor and sensory exam.  Cranial nerves intact        Labs:     Lab Results   Component Value Date    WBC 5.0 04/19/2022    HGB 12.9 (L) 04/19/2022    HCT 39.1 (L) 04/19/2022    MCV 76.1 (L) 04/19/2022     04/19/2022     Lab Results   Component Value Date     07/27/2022    K 4.0 07/27/2022     07/27/2022    CO2 20 (L) 07/27/2022    BUN 23 (H) 07/27/2022    CREATININE 2.0 (H) 07/27/2022    GLUCOSE 87 07/27/2022    CALCIUM 9.6 07/27/2022    PROT 7.6 03/04/2022    LABALBU 4.0 03/04/2022    BILITOT 1.4 (H) 03/04/2022    ALKPHOS 291 (H) 03/04/2022    AST 38 (H) 03/04/2022    ALT 25 03/04/2022    LABGLOM 32 (A) 07/27/2022    GFRAA 39 (A) 07/27/2022    AGRATIO 1.1 03/04/2022    GLOB 3.2 07/17/2019         Lab Results   Component Value Date    CHOL 130 04/17/2022    CHOL 148 10/21/2021    CHOL 151 10/27/2020     Lab Results   Component Value Date    TRIG 67 04/17/2022    TRIG 75 10/21/2021    TRIG 81 10/27/2020     Lab Results   Component Value Date    HDL 67 (H) 04/17/2022    HDL 75 (H) 10/21/2021    HDL 72 (H) 10/27/2020     Lab Results   Component Value Date    LDLCALC 50 04/17/2022    LDLCALC 58 10/21/2021    LDLCALC 63 10/27/2020     Lab Results   Component Value Date    LABVLDL 13 04/17/2022    LABVLDL 15 10/21/2021    LABVLDL 16 10/27/2020     No results found for: [facility]    Lab Results   Component Value Date    INR 1.13 11/08/2019    INR 1.13 09/05/2019    INR 1.04 07/17/2019    PROTIME 12.9 11/08/2019    PROTIME 12.9 09/05/2019    PROTIME 11.8 07/17/2019       The ASCVD Risk score (Vane Guaman, et al., 2013) failed to calculate for the following reasons:     The 2013 ASCVD risk score is only valid for ages 36 to 78    The patient has a prior MI or stroke diagnosis      Assessment / Plan:      Diagnosis Orders   1. Chronic systolic CHF (congestive heart failure) (HCC)             1. Chronic HFrEF:   Patient has systolic heart failure due to nonischemic cardiomyopathy (most likely due to prior history of severe uncontrolled hypertension and contribution by aTTR amyloidosis). LVEF recovered but has now deteriorated; repeat echo shows no improvement.      -Cw Vyndamax (tafamidis) 61 daily (there is a possibility tafamidis may have precipitated acute heart failure; it was started a week ago; clinical trial did suggest worsening symptoms when patients have NYHA FC III - we will monitor for now). - Cw coreg 12.5 bid   -Cw Entresto half a tab of 24/26 mg bid (watch Cr and K). - Unable to start spironolactone due to CKD   -Cw lasix 20 bid  -No SGLT2 inhibitors due to CKD. - Patient wants to return the LifeVest but is agreeable to discuss ICD placement; I will refer him to EP. 2. Mild CAD:  Has h/o NSTEMI but cath with only mild CAD. -Agree with asa, bb, statin. 3. HTN:    BP is now controlled with current medications.     -Cw meds     4. H/o NSVT:  No events.     -Cw coreg       5. HLP:   On statin. 6. ATTR amyloidosis:   Per #1              We will schedule a follow up visit in 3 months      I have spent 35 minutes of face to face time with the patient with more than 50% spent counseling and coordinating care. I have personally reviewed the reports and images of labs, radiological studies, cardiac studies including ECG's and telemetry, current and old medical records. The note was completed using EMR and Dragon dictation system. Every effort was made to ensure accuracy; however, inadvertent computerized transcription errors may be present. All questions and concerns were addressed to the patient/family. Alternatives to my treatment were discussed.      I would like to thank you for providing me the opportunity to participate in the care of your patient. If you have any questions, please do not hesitate to contact me.      Jaylan Olivarez MD, [facility] - [address] Office Phone: [phone]  Fax: [phone]

## 2022-08-08 RX ORDER — INSULIN GLARGINE-YFGN 100 [IU]/ML
INJECTION, SOLUTION SUBCUTANEOUS
Qty: 15 ML | Refills: 0 | Status: SHIPPED | OUTPATIENT
Start: 2022-08-08

## 2022-08-08 NOTE — TELEPHONE ENCOUNTER
Medication:   Requested Prescriptions     Pending Prescriptions Disp Refills    Insulin Glargine-yfgn (SEMGLEE, YFGN,) 100 UNIT/ML SOPN [Pharmacy Med Name: Semglee (yfgn) 100 UNIT/ML Subcutaneous Solution Pen-injector] 15 mL 0     Sig: INJECT 15 UNITS SUBCUTANEOUSLY NIGHTLY       Last Filled:  6/29/2022    Patient Phone Number: [phone] (home)     Last appt: 3/15/2022   Next appt: Visit date not found    Last Labs DM:   Lab Results   Component Value Date/Time    LABA1C 6.0 10/21/2021 11:28 AM

## 2022-08-10 ENCOUNTER — HOSPITAL ENCOUNTER (EMERGENCY)
Age: 82
Discharge: HOME | End: 2022-08-10
Attending: STUDENT IN AN ORGANIZED HEALTH CARE EDUCATION/TRAINING PROGRAM
Payer: MEDICARE

## 2022-08-10 VITALS
TEMPERATURE: 97.6 F | OXYGEN SATURATION: 100 % | DIASTOLIC BLOOD PRESSURE: 74 MMHG | BODY MASS INDEX: 25.38 KG/M2 | HEART RATE: 64 BPM | RESPIRATION RATE: 16 BRPM | SYSTOLIC BLOOD PRESSURE: 120 MMHG | WEIGHT: 182 LBS

## 2022-08-10 DIAGNOSIS — K42.9 UMBILICAL HERNIA WITHOUT OBSTRUCTION AND WITHOUT GANGRENE: Primary | ICD-10-CM

## 2022-08-10 PROCEDURE — 99283 EMERGENCY DEPT VISIT LOW MDM: CPT

## 2022-08-10 ASSESSMENT — ENCOUNTER SYMPTOMS
ABDOMINAL PAIN: 1
NAUSEA: 0
BACK PAIN: 0
RECTAL PAIN: 0
PHOTOPHOBIA: 0
BLOOD IN STOOL: 0
APNEA: 0
VOMITING: 0

## 2022-08-10 ASSESSMENT — PAIN DESCRIPTION - LOCATION: LOCATION: ABDOMEN

## 2022-08-10 ASSESSMENT — PAIN - FUNCTIONAL ASSESSMENT: PAIN_FUNCTIONAL_ASSESSMENT: 0-10

## 2022-08-10 ASSESSMENT — PAIN DESCRIPTION - PAIN TYPE: TYPE: ACUTE PAIN

## 2022-08-10 ASSESSMENT — PAIN SCALES - GENERAL: PAINLEVEL_OUTOF10: 6

## 2022-08-10 ASSESSMENT — PAIN DESCRIPTION - DESCRIPTORS: DESCRIPTORS: CRAMPING

## 2022-08-10 NOTE — ED NOTES
Pt given PO challenge. 1 small cup of water drank by patient. Will reassess.       Bertram Martel RN  08/10/22 1941

## 2022-08-10 NOTE — ED PROVIDER NOTES
[address] ENCOUNTER          ATTENDING PHYSICIAN NOTE       Date of evaluation: 8/10/2022    Chief Complaint     Abdominal Pain (Known hernia states was straining for bowel moment and had hernia \"pop out\" earlier today with one emesis. States in the past has had manually reduced. )      History of Present Illness     Emiliano Lopez is a 78 y.o. male history of abdominal hernia, CHF, hyperlipidemia, hypertension who presents that he was straining after having a bowel movement when he felt his hernia popped out. He states this happened about 2 hours ago. He states that he had 1 episode of nonbloody nonbilious emesis at home. He states that his pain is where the hernia site is located. He feels similar to the last time that he was here with an abdominal hernia. Patient denies any new fevers, chills. He denies any trouble urinating. He denies any chest pain or trouble breathing. Review of Systems     Review of Systems   HENT:  Negative for congestion and ear discharge. Eyes:  Negative for photophobia and visual disturbance. Respiratory:  Negative for apnea. Cardiovascular:  Negative for chest pain. Gastrointestinal:  Positive for abdominal pain. Negative for blood in stool, nausea, rectal pain and vomiting. Endocrine: Negative for polydipsia. Genitourinary:  Negative for difficulty urinating. Musculoskeletal:  Negative for back pain. Skin:  Negative for pallor. Neurological:  Negative for dizziness and syncope. Hematological:  Negative for adenopathy. All other systems reviewed and are negative. Past Medical, Surgical, Family, and Social History     He has a past medical history of Abdominal hernia, Anemia, Arthritis, CHF (congestive heart failure) ([address] Hyperlipidemia, Hypertension, and Type II or unspecified type diabetes mellitus without mention of complication, not stated as uncontrolled.   He has a past surgical history that includes hernia repair; hip surgery; and other surgical history (N/A, 12/15/2015). His family history includes Cancer in his sister; Heart Disease in his father; High Blood Pressure in his father and mother. He reports that he has never smoked. He has never used smokeless tobacco. He reports that he does not drink alcohol and does not use drugs. Medications     Previous Medications    ASPIRIN 81 MG EC TABLET    Take 1 tablet by mouth daily    ATORVASTATIN (LIPITOR) 40 MG TABLET    Take 1 tablet by mouth once daily    CARVEDILOL (COREG) 12.5 MG TABLET    Take 1 tablet by mouth 2 times daily    DORZOLAMIDE-TIMOLOL (COSOPT) 22.3-6.8 MG/ML OPHTHALMIC SOLUTION    INSTILL 1 DROP INTO EACH EYE EVERY 12 HOURS    FUROSEMIDE (LASIX) 40 MG TABLET    Take 0.5 tablets by mouth 2 times daily    INSULIN DETEMIR (LEVEMIR FLEXTOUCH) 100 UNIT/ML INJECTION PEN    Inject 15 units daily    INSULIN GLARGINE-YFGN (SEMGLEE, YFGN,) 100 UNIT/ML SOPN    INJECT 15 UNITS SUBCUTANEOUSLY NIGHTLY    JANUVIA 50 MG TABLET    Take 1 tablet by mouth once daily    LATANOPROST (XALATAN) 0.005 % OPHTHALMIC SOLUTION    INSTILL 1 DROP INTO EACH EYE ONCE DAILY IN THE EVENING    MM PEN NEEDLES 31G X 5 MM MISC    USE   SUBCUTANEOUSLY ONCE DAILY    POTASSIUM CHLORIDE (KLOR-CON) 10 MEQ EXTENDED RELEASE TABLET    Take 1 tablet by mouth once daily    SACUBITRIL-VALSARTAN (ENTRESTO) 24-26 MG PER TABLET    Take 0.5 tablets by mouth 2 times daily    TAFAMIDIS 61 MG CAPS    Take 61 mg by mouth daily       Allergies     He has No Known Allergies. Physical Exam     INITIAL VITALS: BP: (!) 134/90, Temp: 97.6 °F (36.4 °C), Heart Rate: 64, Resp: 16, SpO2: 100 %   Physical Exam  Vitals and nursing note reviewed. Constitutional:       General: He is not in acute distress. Appearance: He is well-developed. He is not ill-appearing, toxic-appearing or diaphoretic. HENT:      Head: Normocephalic and atraumatic.    Eyes:      Pupils: Pupils are equal, round, and reactive to light. Cardiovascular:      Rate and Rhythm: Normal rate and regular rhythm. Heart sounds: Normal heart sounds. Pulmonary:      Effort: Pulmonary effort is normal.      Breath sounds: Normal breath sounds. Abdominal:      General: Bowel sounds are normal. There is no distension. Palpations: Abdomen is soft. Tenderness: There is abdominal tenderness in the periumbilical area. Hernia: A hernia is present. Hernia is present in the umbilical area. Genitourinary:     Testes: Normal.   Musculoskeletal:         General: No tenderness. Normal range of motion. Cervical back: Neck supple. Skin:     General: Skin is warm and dry. Capillary Refill: Capillary refill takes less than 2 seconds. Neurological:      Mental Status: He is alert and oriented to person, place, and time. Psychiatric:         Mood and Affect: Mood normal.       Diagnostic Results       RADIOLOGY:  No orders to display       LABS:   No results found for this visit on 08/10/22. ED BEDSIDE ULTRASOUND:  No results found. RECENT VITALS:  BP: (!) 134/90,Temp: 97.6 °F (36.4 °C), Heart Rate: 64, Resp: 16, SpO2: 100 %     Procedures     Verbal consent required. Patient reported Trendelenburg and had gradual pressure applied to the periumbilical region with rapid reduction of umbilical hernia and symptomatic relief. ED Course     Nursing Notes, Past Medical Hx, Past Surgical Hx, Social Hx,Allergies, and Family Hx were reviewed. patient was given the following medications:  No orders of the defined types were placed in this encounter. CONSULTS:  None    MEDICAL DECISIONMAKING / ASSESSMENT / PLAN     Ambrose Milton is a 78 y.o. male with a history of a umbilical hernia who presented with a herniated. He was not strangulated or incarcerated and patient was otherwise hemodynamically stable.   He had an inciting factor by him straining on the toilet which she has done before and had this quickly reduced in the department with the patient having relief of pain and tolerating oral intake without any nausea or vomiting. Given his history and current clinical status at this time no further work-up was required. He states that he is not a surgical candidate because of his age and they are just managing this conservatively. Patient will be given referral with his surgical provider Dr. Joseph Becerra as needed and if he has further frequent episodes. Clinical Impression     1.  Umbilical hernia without obstruction and without gangrene        Disposition     PATIENT REFERRED TO:  Samantha Luu MD  [address] [place] [address]  [phone]    Schedule an appointment as soon as possible for a visit   As needed    [facility] Emergency Department  [address] [address]  [address]  [phone]    If symptoms worsen and you have nausea, vomiting or worsening pain    DISCHARGE MEDICATIONS:  New Prescriptions    No medications on file       DISPOSITION Decision To Discharge 08/10/2022 07:45:10 PM         Travis Prado MD  08/10/22 1950

## 2022-08-11 NOTE — ED NOTES
Discharge instructions given. Verbalized understanding. Denies further questions or concerns. A&Ox4. Ambulates from ED with steady gait.       Haleigh Bennett RN  08/10/22 2025

## 2022-08-19 ENCOUNTER — TELEPHONE (OUTPATIENT)
Dept: FAMILY MEDICINE CLINIC | Age: 82
End: 2022-08-19

## 2022-08-19 NOTE — TELEPHONE ENCOUNTER
[facility] [place] and their providers provide homecare and they want to know patient A1C    Please call Effie :[phone]

## 2022-08-24 RX ORDER — SITAGLIPTIN 50 MG/1
TABLET, FILM COATED ORAL
Qty: 90 TABLET | Refills: 0 | Status: SHIPPED | OUTPATIENT
Start: 2022-08-24

## 2022-08-24 NOTE — TELEPHONE ENCOUNTER
Medication:   Requested Prescriptions     Pending Prescriptions Disp Refills    JANUVIA 50 MG tablet [Pharmacy Med Name: Januvia 50 MG Oral Tablet] 90 tablet 0     Sig: Take 1 tablet by mouth once daily       Last Filled:      Patient Phone Number: [phone] (home)     Last appt: 10/21/2021   Next appt: Visit date not found    Last Labs DM:   Lab Results   Component Value Date/Time    LABA1C 6.0 10/21/2021 11:28 AM

## 2022-09-07 DIAGNOSIS — Z79.899 LONG TERM USE OF DRUG: ICD-10-CM

## 2022-09-07 DIAGNOSIS — I50.20 HFREF (HEART FAILURE WITH REDUCED EJECTION FRACTION) (HCC): ICD-10-CM

## 2022-09-07 LAB
ANION GAP SERPL CALCULATED.3IONS-SCNC: 12 MMOL/L (ref 3–16)
BUN BLDV-MCNC: 25 MG/DL (ref 7–20)
CALCIUM SERPL-MCNC: 9.7 MG/DL (ref 8.3–10.6)
CHLORIDE BLD-SCNC: 105 MMOL/L (ref 99–110)
CO2: 23 MMOL/L (ref 21–32)
CREAT SERPL-MCNC: 1.9 MG/DL (ref 0.8–1.3)
GFR AFRICAN AMERICAN: 41
GFR NON-AFRICAN AMERICAN: 34
GLUCOSE BLD-MCNC: 60 MG/DL (ref 70–99)
POTASSIUM SERPL-SCNC: 3.8 MMOL/L (ref 3.5–5.1)
SODIUM BLD-SCNC: 140 MMOL/L (ref 136–145)

## 2022-09-08 ENCOUNTER — TELEPHONE (OUTPATIENT)
Dept: CARDIOLOGY CLINIC | Age: 82
End: 2022-09-08

## 2022-09-08 NOTE — TELEPHONE ENCOUNTER
Susie from [facility] and state that pt is unable to pay for his Entresto 24-26 mg. Do we have any samples or coupons? ARB? She is asking for alternatives. Please advise.  [phone]

## 2022-09-12 ENCOUNTER — TELEPHONE (OUTPATIENT)
Dept: FAMILY MEDICINE CLINIC | Age: 82
End: 2022-09-12

## 2022-09-12 NOTE — TELEPHONE ENCOUNTER
Tried calling pt and it went straight to VM. Left  for pt to call back. Spoke with Alcira and she understood Dr Nazanin Velásquez response.

## 2022-09-12 NOTE — TELEPHONE ENCOUNTER
Susie from  [facility] called and stated patients     A1C is 6.1 and would like to know if he can discontinue since it is controlled plus patient is having trouble paying for it.   He states it is way to expensive     Also please call Bharat Cabrera on her secure line to advise [phone] SECURE LINE  To leave a message       Also if discontinuing please call Renea Avelar to also let him know it is discontinued   [phone]

## 2022-09-14 NOTE — PROGRESS NOTES
[address]   Cardiac Electrophysiology Consultation   Date: 9/22/2022  Reason for Consultation:   Consult Requesting Physician: No att. providers found     Chief Complaint:   Chief Complaint   Patient presents with    New Patient        HPI: Taco Orozco is a 78 y.o. 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 Echo (4/2022) showed EF 20-25%, repeat echo (7/2022) showed EF 25-30% despite optimal GDMT. Interval History: Today, he is here for consideration of ICD implantation for primary prevention. The pt is not interested at all in ICD implantation. Assessment:  NICMP / HFrEF, EF 20-25%, on Coreg, Entresto  aTTR cardiac amyloidosis, on tafamidis  CAD, nonobstructive, on ASA, BB, statin  NSVT, on Coreg  RBBB  HTN, stable on current meds  HLD, on statin  CKD, stage 3  DM, type 2    Plan:  Continue to f/u with Dr. Lj Stout for HF management  No changes in current medications  Follow up with EP as needed    Chronic systolic congestive heart failure with LV ejection fraction 20-25%  On maximally tolerated guideline directed medical therapy for more than 3 months. No recent revascularization within the past 3 months  No recent MI within the past 40 days  QRS complex 174 ms. NYHA class 2    Hence he would qualify for ICD implantation for primary prevention of sudden cardiac arrest. (MADIT II, SCD HeFT and DEFINITE trials)    I had a detailed shared decision making interaction with the patient and he is not interested in proceeding with ICD implantation. The indications, risks, benefits and alternatives to ICD implantation have been reviewed in detail with the patient. Procedure will be performed with moderate sedation.  The risks include perforation, bleeding, pneumothorax, pocket pain or hematoma, pocket or system infection, lead dislodgement, malfunctioning device and complications associated with defibrillation testing. I have used 3D anatomic model of the heart to explain in detail. Lay terms were used and patient's questions were answered in detail. The patient was specifically told that this device is meant to decrease the risk of sudden death and will not decrease the risk of myocardial infarction or symptomatic heart failure. The patient verbalized understanding, but does not wish to proceed with the procedure. Past Medical History:   Diagnosis Date    Abdominal hernia     Anemia     Arthritis     MILD    CHF (congestive heart failure) (HCC)     EF 35-40%. Non-ischemic     Hyperlipidemia     Hypertension     Type II or unspecified type diabetes mellitus without mention of complication, not stated as uncontrolled         Past Surgical History:   Procedure Laterality Date    HERNIA REPAIR      HIP SURGERY      OTHER SURGICAL HISTORY N/A 12/15/2015    LAPAROSCOPIC LEFT INGUINAL HERNIA REPAIR WITH MESH       Allergies:  No Known Allergies    Medication:   Prior to Admission medications    Medication Sig Start Date End Date Taking?  Authorizing Provider   JANUVIA 50 MG tablet Take 1 tablet by mouth once daily 8/24/22  Yes Fran Goldmann, MD   Insulin Glargine-yfgn (SEMGLEE, YFGN,) 100 UNIT/ML SOPN INJECT 15 UNITS SUBCUTANEOUSLY NIGHTLY 8/8/22  Yes Paulo Weems MD   potassium chloride (KLOR-CON) 10 MEQ extended release tablet Take 1 tablet by mouth once daily 7/29/22  Yes Rafa Retana MD   sacubitril-valsartan (ENTRESTO) 24-26 MG per tablet Take 0.5 tablets by mouth 2 times daily 7/12/22  Yes Shani Alfaro MD   insulin detemir (LEVEMIR FLEXTOUCH) 100 UNIT/ML injection pen Inject 15 units daily 7/1/22  Yes Rafa Retana MD   atorvastatin (LIPITOR) 40 MG tablet Take 1 tablet by mouth once daily 6/29/22  Yes KAYLYNN Perez - CNP   furosemide (LASIX) 40 MG tablet Take 0.5 tablets by mouth 2 times daily 5/31/22  Yes Mallory Ledesma MD   carvedilol (COREG) 12.5 MG tablet Take 1 tablet by mouth 2 times daily 5/13/22  Yes Susie Carney MD   Tafamidis 61 MG CAPS Take 61 mg by mouth daily 3/16/22  Yes Susie Carney MD   dorzolamide-timolol (COSOPT) 22.3-6.8 MG/ML ophthalmic solution INSTILL 1 DROP INTO EACH EYE EVERY 12 HOURS 1/31/22  Yes Historical Provider, MD   latanoprost (XALATAN) 0.005 % ophthalmic solution INSTILL 1 DROP INTO EACH EYE ONCE DAILY IN THE EVENING 12/13/21  Yes Historical Provider, MD   MM PEN NEEDLES 31G X 5 MM MISC USE   SUBCUTANEOUSLY ONCE DAILY 5/4/21  Yes Julianne Condon MD   aspirin 81 MG EC tablet Take 1 tablet by mouth daily 7/1/19  Yes Iván Hogan MD       Social History:   reports that he has never smoked. He has never used smokeless tobacco. He reports that he does not drink alcohol and does not use drugs. Family History:  family history includes Cancer in his sister; Heart Disease in his father; High Blood Pressure in his father and mother. Reviewed. Denies family history of sudden cardiac death, arrhythmia, premature CAD    Review of System:    General ROS: negative for - chills, fever   Psychological ROS: negative for - anxiety or depression  Ophthalmic ROS: negative for - eye pain or loss of vision  ENT ROS: negative for - epistaxis, headaches, nasal discharge, sore throat   Allergy and Immunology ROS: negative for - hives, nasal congestion   Hematological and Lymphatic ROS: negative for - bleeding problems, blood clots, bruising or jaundice  Endocrine ROS: negative for - skin changes, temperature intolerance or unexpected weight changes  Respiratory ROS: negative for - cough, hemoptysis, pleuritic pain, SOB, sputum changes or wheezing  Cardiovascular ROS: Per HPI.    Gastrointestinal ROS: negative for - abdominal pain, blood in stools, diarrhea, hematemesis, melena, nausea/vomiting or swallowing difficulty/pain  Genito-Urinary ROS: negative for - dysuria or incontinence  Musculoskeletal ROS: negative for - joint swelling or muscle pain  Neurological ROS: negative for - confusion, dizziness, gait disturbance, headaches, numbness/tingling, seizures, speech problems, tremors, visual changes or weakness  Dermatological ROS: negative for - rash    Physical Examination:  Vitals:    09/22/22 1104   BP: 130/70   Pulse: 64       Constitutional: Oriented. No distress. Head: Normocephalic and atraumatic. Mouth/Throat: Oropharynx is clear and moist.   Eyes: Conjunctivae normal. EOM are normal.   Neck: Normal range of motion. Neck supple. No rigidity. No JVD present. Cardiovascular: Normal rate, regular rhythm, S1&S2 and intact distal pulses. Pulmonary/Chest: Bilateral respiratory sounds. No wheezes. No rhonchi. Abdominal: Soft. Bowel sounds present. No distension, No tenderness. Musculoskeletal: No tenderness. No edema    Lymphadenopathy: Has no cervical adenopathy. Neurological: Alert and oriented. Cranial nerve appears intact, No Gross deficit   Skin: Skin is warm and dry. No rash noted. Psychiatric: Has a normal mood, affect and behavior     Labs:  Reviewed. ECG: reviewed, Sinus  Rhythm, with QRS duration 170 ms. No pathologic Q waves, ventricular pre-excitation, or QT prolongation. Studies:   1. 14 day Zio (10/8-10/22/19):  SR with average HR 75 (), junctional arrhythmia, 2 runs NSVT with the longest lasting 8 beats and rate up to 197 bpm, 5 runs SVT with the longest being 9 beats at 107 bpm.  No significant pauses. 2. Echo 7/27/22:    Summary   Limited echo for CHF and SOB   Left ventricular cavity size is normal.   There is severe concentric left ventricular hypertrophy. Left ventricular function appears to be reduced with an estimated ejection   fraction of 25-30%. Global left ventricular hypokinesis. Global L. Strain = -5.5%. Echo 4/18/22: EF 20-25%  Echo 9/20/19: EF 50-55%  Echo 6/29/19: EF 35-40%  Echo 1/29/15: EF 55-60%    3.  Stress Test 7/12/19:    Summary    Overall findings represent a intermediate to high risk scan. LV is moderately dilated. LVEF 39%    Moderate size fixed defect involving the mid to distal inferior wall along    with the apex. ECG: Non-diagnostic EKG response due to failure to reach target heart rate. 4. Cath 7/26/19 (Dr. Selene Wang): Conclusions:  -No obstructive CAD  -Normal LVEDP   Recommendations:  -Continue medical therapy.  -Followup in the CHF clinic    5. Cardiac amyloid scan 3/12/20:   Impression:       1. Strongly suggestive of ATTR amyloidosis: A semi-quantitative visual score of 2 or 3 or H/CL ratio > 1.5. The MCOT, echocardiogram, stress test, and coronary angiography/PCI were reviewed by myself and used for my plan of care. - The patient is counseled to follow a low salt diet to assure blood pressure remains controlled for cardiovascular risk factor modification.   - The patient is counseled to avoid excess caffeine, and energy drinks as this may exacerbated ectopy and arrhythmia. - The patient is counseled to get regular exercise 3-5 times per week to control cardiovascular risk factors. - The patient is counseled to lose weigt to control cardiovascular risk factors. -The patient is counseled about the health hazards of smoking including cardiovascular side effects and its impact on morbidity and mortality. Thank you for allowing me to participate in the care of [facility]. All questions and concerns were addressed to the patient/family. Alternatives to my treatment were discussed. Hortensia Moreno RN, am scribing for and in the presence of Dr. Shashank Heaton. 09/22/22 11:17 AM  MARK Dennis MD, personally performed the services prescribed in this documentation as scribed by Ms. Nadia Garcia RN in my presence and it is both accurate and complete.        Luke Rutherford MD  Cardiac Electrophysiology  [address]

## 2022-09-22 ENCOUNTER — OFFICE VISIT (OUTPATIENT)
Dept: CARDIOLOGY CLINIC | Age: 82
End: 2022-09-22
Payer: MEDICARE

## 2022-09-22 VITALS
SYSTOLIC BLOOD PRESSURE: 130 MMHG | WEIGHT: 170.8 LBS | DIASTOLIC BLOOD PRESSURE: 70 MMHG | BODY MASS INDEX: 23.82 KG/M2 | HEART RATE: 64 BPM

## 2022-09-22 DIAGNOSIS — I10 BENIGN ESSENTIAL HTN: ICD-10-CM

## 2022-09-22 DIAGNOSIS — I50.22 CHRONIC SYSTOLIC CHF (CONGESTIVE HEART FAILURE) (HCC): Primary | ICD-10-CM

## 2022-09-22 DIAGNOSIS — E85.4 CARDIAC AMYLOIDOSIS (HCC): ICD-10-CM

## 2022-09-22 DIAGNOSIS — I47.29 NSVT (NONSUSTAINED VENTRICULAR TACHYCARDIA): ICD-10-CM

## 2022-09-22 DIAGNOSIS — E78.00 PURE HYPERCHOLESTEROLEMIA: ICD-10-CM

## 2022-09-22 DIAGNOSIS — I45.10 RIGHT BUNDLE BRANCH BLOCK: ICD-10-CM

## 2022-09-22 DIAGNOSIS — I25.10 CAD IN NATIVE ARTERY: ICD-10-CM

## 2022-09-22 DIAGNOSIS — I43 CARDIAC AMYLOIDOSIS (HCC): ICD-10-CM

## 2022-09-22 DIAGNOSIS — I42.8 NICM (NONISCHEMIC CARDIOMYOPATHY) (HCC): ICD-10-CM

## 2022-09-22 PROCEDURE — 93000 ELECTROCARDIOGRAM COMPLETE: CPT | Performed by: INTERNAL MEDICINE

## 2022-09-22 PROCEDURE — 99214 OFFICE O/P EST MOD 30 MIN: CPT | Performed by: INTERNAL MEDICINE

## 2022-09-22 PROCEDURE — 1123F ACP DISCUSS/DSCN MKR DOCD: CPT | Performed by: INTERNAL MEDICINE

## 2022-09-22 PROCEDURE — G8427 DOCREV CUR MEDS BY ELIG CLIN: HCPCS | Performed by: INTERNAL MEDICINE

## 2022-09-22 PROCEDURE — 1036F TOBACCO NON-USER: CPT | Performed by: INTERNAL MEDICINE

## 2022-09-22 PROCEDURE — G8420 CALC BMI NORM PARAMETERS: HCPCS | Performed by: INTERNAL MEDICINE

## 2022-09-27 RX ORDER — ATORVASTATIN CALCIUM 40 MG/1
TABLET, FILM COATED ORAL
Qty: 90 TABLET | Refills: 0 | OUTPATIENT
Start: 2022-09-27

## 2022-09-29 RX ORDER — ATORVASTATIN CALCIUM 40 MG/1
TABLET, FILM COATED ORAL
Qty: 90 TABLET | Refills: 0 | Status: SHIPPED | OUTPATIENT
Start: 2022-09-29

## 2022-09-29 NOTE — TELEPHONE ENCOUNTER
Medication:   Requested Prescriptions     Pending Prescriptions Disp Refills    atorvastatin (LIPITOR) 40 MG tablet 90 tablet 0     Sig: Take 1 tablet by mouth once daily       Last Filled:  6/29/2022    Patient Phone Number: [phone] (home)     Last appt: 3/15/2022   Next appt: Visit date not found    Last Lipid:   Lab Results   Component Value Date/Time    CHOL 130 04/17/2022 06:41 AM    TRIG 67 04/17/2022 06:41 AM    HDL 67 04/17/2022 06:41 AM    HDL 56 01/13/2012 11:49 AM    LDLCALC 50 04/17/2022 06:41 AM

## 2022-09-29 NOTE — TELEPHONE ENCOUNTER
Medication and Quantity requested: Atorvastatin 40mg     Last Visit  3-15-22,Dr. WEAVER    Pharmacy and phone number updated in EPIC:  yes,Soha

## 2022-11-08 ENCOUNTER — OFFICE VISIT (OUTPATIENT)
Dept: CARDIOLOGY CLINIC | Age: 82
End: 2022-11-08
Payer: MEDICARE

## 2022-11-08 VITALS
HEART RATE: 55 BPM | DIASTOLIC BLOOD PRESSURE: 76 MMHG | BODY MASS INDEX: 24.13 KG/M2 | WEIGHT: 173 LBS | SYSTOLIC BLOOD PRESSURE: 130 MMHG

## 2022-11-08 DIAGNOSIS — I50.22 CHRONIC SYSTOLIC CHF (CONGESTIVE HEART FAILURE) (HCC): Primary | ICD-10-CM

## 2022-11-08 PROCEDURE — 1036F TOBACCO NON-USER: CPT | Performed by: INTERNAL MEDICINE

## 2022-11-08 PROCEDURE — 3078F DIAST BP <80 MM HG: CPT | Performed by: INTERNAL MEDICINE

## 2022-11-08 PROCEDURE — 3074F SYST BP LT 130 MM HG: CPT | Performed by: INTERNAL MEDICINE

## 2022-11-08 PROCEDURE — G8484 FLU IMMUNIZE NO ADMIN: HCPCS | Performed by: INTERNAL MEDICINE

## 2022-11-08 PROCEDURE — 1123F ACP DISCUSS/DSCN MKR DOCD: CPT | Performed by: INTERNAL MEDICINE

## 2022-11-08 PROCEDURE — 99214 OFFICE O/P EST MOD 30 MIN: CPT | Performed by: INTERNAL MEDICINE

## 2022-11-08 PROCEDURE — G8427 DOCREV CUR MEDS BY ELIG CLIN: HCPCS | Performed by: INTERNAL MEDICINE

## 2022-11-08 PROCEDURE — G8420 CALC BMI NORM PARAMETERS: HCPCS | Performed by: INTERNAL MEDICINE

## 2022-11-08 NOTE — PROGRESS NOTES
Cc:  HFrEF, ATTR amyloidosis. HPI:     Mr Hoda Koo is a 77 yo man with h/o severe LVH, aTTR amyloidosis on tafamidis, HFrEF with fluctuating LVEF, HTN, HLP, DM 2, CKD (baseline creatinine 2.1), mild CAD, NSTEMI, trifascicular block with RBBB, NSVT. Echo 2015: moderate LVH, normal EF. Echo 06/2019: severe LVH, EF 35-40%, mild RV dysfx, mild valve disease     Kathi 07/2019: mod LVd, EF 39%, mid to distal inferior and apical fixed defect. LHC 07/2019: mild diffuse CAD, nl LVEDP. ECG 9/6/19: NSR, 1st AVB, LAFB, RBBB (trifascicular block), possible inferior MI. Echo 9/20/19: severe LVH, LVEF 46-11%, diastolic III, apical sparing on speckle strain imaging suggestive of amyloidosis, severe LAE, mild MR. Tc PYP scan 3/12/20: strongly positive for amyloidosis. 09/2019 kappa/labda ratio 1.69 (normal in the setting of CKD, Cr 1.9). 24-hour UPEP normal; SPEP 05/2021 normal; hence no AL amyloidosis. Echo 4/18/2022: Severe LVH, consider cardiac amyloidosis, LVEF 20-25% with global hypokinesis, diastolic grade 3, increased LVEDP, normal RV size with severe hypokinesis, no significant valvular abnormalities. Limited echo 07/2022: severe LVH, EF 25-30%. Patient is here for a follow up. He reports no complaints. , weight is stable. Histories     Past Medical History:   has a past medical history of Abdominal hernia, Anemia, Arthritis, CHF (congestive heart failure) ([facility] [address] Hyperlipidemia, Hypertension, and Type II or unspecified type diabetes mellitus without mention of complication, not stated as uncontrolled. Surgical History:   has a past surgical history that includes hernia repair; hip surgery; and other surgical history (N/A, 12/15/2015). Social History:   reports that he has never smoked. He has never used smokeless tobacco. He reports that he does not drink alcohol and does not use drugs.      Family History:  No evidence for sudden cardiac death or premature CAD      Medications:     Home medications were reviewed and are listed below    Prior to Admission medications    Medication Sig Start Date End Date Taking? Authorizing Provider   sacubitril-valsartan (ENTRESTO) 24-26 MG per tablet Take 0.5 tablets by mouth 2 times daily 11/8/22  Yes Lisandro Hollingsworth MD   atorvastatin (LIPITOR) 40 MG tablet Take 1 tablet by mouth once daily 9/29/22  Yes Dian Escobedo MD   JANUVIA 50 MG tablet Take 1 tablet by mouth once daily 8/24/22  Yes Fredy Bergman MD   Insulin Glargine-yfgn (SEMGLEE, YFGN,) 100 UNIT/ML SOPN INJECT 15 UNITS SUBCUTANEOUSLY NIGHTLY 8/8/22  Yes Anibal Stephenson MD   potassium chloride (KLOR-CON) 10 MEQ extended release tablet Take 1 tablet by mouth once daily 7/29/22  Yes Ibis Bustos MD   insulin detemir (LEVEMIR FLEXTOUCH) 100 UNIT/ML injection pen Inject 15 units daily 7/1/22  Yes Ibis Bustos MD   furosemide (LASIX) 40 MG tablet Take 0.5 tablets by mouth 2 times daily 5/31/22  Yes Dina Escobedo MD   carvedilol (COREG) 12.5 MG tablet Take 1 tablet by mouth 2 times daily 5/13/22  Yes Lisandro Hollingsworth MD   Tafamidis 61 MG CAPS Take 61 mg by mouth daily 3/16/22  Yes Lisandro Hollingsworth MD   dorzolamide-timolol (COSOPT) 22.3-6.8 MG/ML ophthalmic solution INSTILL 1 DROP INTO EACH EYE EVERY 12 HOURS 1/31/22  Yes Historical Provider, MD   latanoprost (XALATAN) 0.005 % ophthalmic solution INSTILL 1 DROP INTO EACH EYE ONCE DAILY IN THE EVENING 12/13/21  Yes Historical Provider, MD   MM PEN NEEDLES 31G X 5 MM MISC USE   SUBCUTANEOUSLY ONCE DAILY 5/4/21  Yes Nai Dawson MD   aspirin 81 MG EC tablet Take 1 tablet by mouth daily 7/1/19  Yes Peola Kawasaki, MD          Allergy:     Patient has no known allergies. Review of Systems:     All 12 point review of symptoms completed. Pertinent positives identified in the HPI, all other review of symptoms negative as below. CONSTITUTIONAL: No fatigue  SKIN: No rash or pruritis.   EYES: No visual changes or diplopia. No scleral icterus. ENT: No Headaches, hearing loss or vertigo. No mouth sores or sore throat. CARDIOVASCULAR: No chest pain/chest pressure/chest discomfort. No palpitations. No edema. RESPIRATORY: No dyspnea. No cough or wheezing, no sputum production. GASTROINTESTINAL: No N/V/D. No abdominal pain, appetite loss, blood in stools. GENITOURINARY: No dysuria, trouble voiding, or hematuria. MUSCULOSKELETAL:  No gait disturbance, weakness or joint complaints. NEUROLOGICAL: No headache, diplopia, change in muscle strength, numbness or tingling. No change in gait, balance, coordination, mood, affect, memory, mentation, behavior. PSHYCH: No anxiety, loss of interest, change in sexual behavior, feelings of self-harm, or confusion. ENDOCRINE: No excessive thirst, fluid intake, or urination. No tremor. HEMATOLOGIC: No abnormal bruising or bleeding. ALLERGY: No nasal congestion or hives.       Physical Examination:     Vitals:    11/08/22 1411   BP: 130/76   Pulse: 55   Weight: 173 lb (78.5 kg)       Wt Readings from Last 3 Encounters:   11/08/22 173 lb (78.5 kg)   09/22/22 170 lb 12.8 oz (77.5 kg)   08/10/22 182 lb (82.6 kg)         General Appearance:  Alert, cooperative, no distress, appears stated age Appropriate weight   Head:  Normocephalic, without obvious abnormality, atraumatic   Eyes:  PERRL, conjunctiva/corneas clear EOM intact  Ears normal   Throat no lesions       Nose: Nares normal, no drainage or sinus tenderness   Throat: Lips, mucosa, and tongue normal   Neck: Supple, symmetrical, trachea midline, no adenopathy, thyroid: not enlarged, symmetric, no tenderness/mass/nodules, no carotid bruit       Lungs:   Clear to auscultation bilaterally, respirations unlabored   Chest Wall:  No tenderness or deformity   Heart:  Regular rhythm, rate is controlled, S1, S2 normal, there is no murmur, there is no rub or gallop, cannot assess jvd, no bilateral lower extremity edema   Abdomen: Soft, non-tender, bowel sounds active all four quadrants,  no masses, no organomegaly       Extremities: Extremities normal, atraumatic, no cyanosis   Pulses: 2+ and symmetric   Skin: Skin color, texture, turgor normal, no rashes or lesions   Pysch: Normal mood and affect   Neurologic: Normal gross motor and sensory exam.  Cranial nerves intact        Labs:     Lab Results   Component Value Date    WBC 5.0 04/19/2022    HGB 12.9 (L) 04/19/2022    HCT 39.1 (L) 04/19/2022    MCV 76.1 (L) 04/19/2022     04/19/2022     Lab Results   Component Value Date     09/07/2022    K 3.8 09/07/2022     09/07/2022    CO2 23 09/07/2022    BUN 25 (H) 09/07/2022    CREATININE 1.9 (H) 09/07/2022    GLUCOSE 60 (L) 09/07/2022    CALCIUM 9.7 09/07/2022    PROT 7.6 03/04/2022    LABALBU 4.0 03/04/2022    BILITOT 1.4 (H) 03/04/2022    ALKPHOS 291 (H) 03/04/2022    AST 38 (H) 03/04/2022    ALT 25 03/04/2022    LABGLOM 34 (A) 09/07/2022    GFRAA 41 (A) 09/07/2022    AGRATIO 1.1 03/04/2022    GLOB 3.2 07/17/2019         Lab Results   Component Value Date    CHOL 130 04/17/2022    CHOL 148 10/21/2021    CHOL 151 10/27/2020     Lab Results   Component Value Date    TRIG 67 04/17/2022    TRIG 75 10/21/2021    TRIG 81 10/27/2020     Lab Results   Component Value Date    HDL 67 (H) 04/17/2022    HDL 75 (H) 10/21/2021    HDL 72 (H) 10/27/2020     Lab Results   Component Value Date    LDLCALC 50 04/17/2022    LDLCALC 58 10/21/2021    LDLCALC 63 10/27/2020     Lab Results   Component Value Date    LABVLDL 13 04/17/2022    LABVLDL 15 10/21/2021    LABVLDL 16 10/27/2020     No results found for: CHOLHDLRATIO    Lab Results   Component Value Date    INR 1.13 11/08/2019    INR 1.13 09/05/2019    INR 1.04 07/17/2019    PROTIME 12.9 11/08/2019    PROTIME 12.9 09/05/2019    PROTIME 11.8 07/17/2019       The ASCVD Risk score (Rizwana MCKEON, et al., 2019) failed to calculate for the following reasons:     The 2019 ASCVD risk score is only valid for ages 36 to 78    The patient has a prior MI or stroke diagnosis      Assessment / Plan:      Diagnosis Orders   1. Chronic systolic CHF (congestive heart failure) (HCC)             1. Chronic HFrEF:   Patient has systolic heart failure due to nonischemic cardiomyopathy (most likely due to prior history of severe uncontrolled hypertension and contribution by aTTR amyloidosis). LVEF recovered but has now deteriorated; repeat echo shows no improvement.      -Cw Vyndamax (tafamidis) 61 daily (there is a possibility tafamidis may have precipitated acute heart failure; it was started a week ago; clinical trial did suggest worsening symptoms when patients have NYHA FC III - we will monitor for now). - Cw coreg 12.5 bid   -Cw Entresto half a tab of 24/26 mg bid (watch Cr and K). - Unable to start spironolactone due to CKD   -Cw lasix 20 bid  -No SGLT2 inhibitors due to CKD. - Patient has met with Dr. Nadja Rosario and discussed risks and benefits of ICD implantation; he politely declined to have an ICD. 2. Mild CAD:  Has h/o NSTEMI but cath with only mild CAD. -Agree with asa, bb, statin. 3. HTN:    BP is now controlled with current medications.     -Cw meds     4. H/o NSVT:  No events.     -Cw coreg       5. HLP:   On statin. 6. ATTR amyloidosis:   Per #1              We will schedule a follow up visit in 6 months      I have spent 35 minutes of face to face time with the patient with more than 50% spent counseling and coordinating care. I have personally reviewed the reports and images of labs, radiological studies, cardiac studies including ECG's and telemetry, current and old medical records. The note was completed using EMR and Dragon dictation system. Every effort was made to ensure accuracy; however, inadvertent computerized transcription errors may be present. All questions and concerns were addressed to the patient/family. Alternatives to my treatment were discussed.      I would

## 2022-11-11 ENCOUNTER — OFFICE VISIT (OUTPATIENT)
Dept: FAMILY MEDICINE CLINIC | Age: 82
End: 2022-11-11
Payer: MEDICARE

## 2022-11-11 VITALS
SYSTOLIC BLOOD PRESSURE: 138 MMHG | OXYGEN SATURATION: 99 % | HEART RATE: 68 BPM | BODY MASS INDEX: 24.18 KG/M2 | WEIGHT: 173.4 LBS | DIASTOLIC BLOOD PRESSURE: 70 MMHG

## 2022-11-11 DIAGNOSIS — Z89.421 ACQUIRED ABSENCE OF OTHER RIGHT TOE(S) (HCC): ICD-10-CM

## 2022-11-11 DIAGNOSIS — I10 BENIGN ESSENTIAL HTN: ICD-10-CM

## 2022-11-11 DIAGNOSIS — I50.22 CHRONIC SYSTOLIC CHF (CONGESTIVE HEART FAILURE) (HCC): ICD-10-CM

## 2022-11-11 DIAGNOSIS — N18.31 CHRONIC RENAL FAILURE, STAGE 3A (HCC): ICD-10-CM

## 2022-11-11 DIAGNOSIS — E11.22 CONTROLLED TYPE 2 DIABETES MELLITUS WITH STAGE 3 CHRONIC KIDNEY DISEASE, WITHOUT LONG-TERM CURRENT USE OF INSULIN (HCC): Primary | ICD-10-CM

## 2022-11-11 DIAGNOSIS — E85.82 WILD-TYPE TRANSTHYRETIN-RELATED (ATTR) AMYLOIDOSIS (HCC): ICD-10-CM

## 2022-11-11 DIAGNOSIS — N18.30 CONTROLLED TYPE 2 DIABETES MELLITUS WITH STAGE 3 CHRONIC KIDNEY DISEASE, WITHOUT LONG-TERM CURRENT USE OF INSULIN (HCC): Primary | ICD-10-CM

## 2022-11-11 PROBLEM — I50.9 HEART FAILURE (HCC): Status: RESOLVED | Noted: 2022-04-16 | Resolved: 2022-11-11

## 2022-11-11 LAB — HBA1C MFR BLD: 6.4 %

## 2022-11-11 PROCEDURE — 90694 VACC AIIV4 NO PRSRV 0.5ML IM: CPT | Performed by: STUDENT IN AN ORGANIZED HEALTH CARE EDUCATION/TRAINING PROGRAM

## 2022-11-11 PROCEDURE — G8427 DOCREV CUR MEDS BY ELIG CLIN: HCPCS | Performed by: STUDENT IN AN ORGANIZED HEALTH CARE EDUCATION/TRAINING PROGRAM

## 2022-11-11 PROCEDURE — 3044F HG A1C LEVEL LT 7.0%: CPT | Performed by: STUDENT IN AN ORGANIZED HEALTH CARE EDUCATION/TRAINING PROGRAM

## 2022-11-11 PROCEDURE — 3074F SYST BP LT 130 MM HG: CPT | Performed by: STUDENT IN AN ORGANIZED HEALTH CARE EDUCATION/TRAINING PROGRAM

## 2022-11-11 PROCEDURE — 83036 HEMOGLOBIN GLYCOSYLATED A1C: CPT | Performed by: STUDENT IN AN ORGANIZED HEALTH CARE EDUCATION/TRAINING PROGRAM

## 2022-11-11 PROCEDURE — 1036F TOBACCO NON-USER: CPT | Performed by: STUDENT IN AN ORGANIZED HEALTH CARE EDUCATION/TRAINING PROGRAM

## 2022-11-11 PROCEDURE — G8484 FLU IMMUNIZE NO ADMIN: HCPCS | Performed by: STUDENT IN AN ORGANIZED HEALTH CARE EDUCATION/TRAINING PROGRAM

## 2022-11-11 PROCEDURE — 99214 OFFICE O/P EST MOD 30 MIN: CPT | Performed by: STUDENT IN AN ORGANIZED HEALTH CARE EDUCATION/TRAINING PROGRAM

## 2022-11-11 PROCEDURE — 1123F ACP DISCUSS/DSCN MKR DOCD: CPT | Performed by: STUDENT IN AN ORGANIZED HEALTH CARE EDUCATION/TRAINING PROGRAM

## 2022-11-11 PROCEDURE — 3078F DIAST BP <80 MM HG: CPT | Performed by: STUDENT IN AN ORGANIZED HEALTH CARE EDUCATION/TRAINING PROGRAM

## 2022-11-11 PROCEDURE — G8420 CALC BMI NORM PARAMETERS: HCPCS | Performed by: STUDENT IN AN ORGANIZED HEALTH CARE EDUCATION/TRAINING PROGRAM

## 2022-11-11 PROCEDURE — G0008 ADMIN INFLUENZA VIRUS VAC: HCPCS | Performed by: STUDENT IN AN ORGANIZED HEALTH CARE EDUCATION/TRAINING PROGRAM

## 2022-11-11 RX ORDER — FUROSEMIDE 20 MG/1
20 TABLET ORAL 2 TIMES DAILY
Qty: 180 TABLET | Refills: 1 | Status: SHIPPED | OUTPATIENT
Start: 2022-11-11

## 2022-11-11 ASSESSMENT — PATIENT HEALTH QUESTIONNAIRE - PHQ9
SUM OF ALL RESPONSES TO PHQ QUESTIONS 1-9: 0
SUM OF ALL RESPONSES TO PHQ9 QUESTIONS 1 & 2: 0
2. FEELING DOWN, DEPRESSED OR HOPELESS: 0
SUM OF ALL RESPONSES TO PHQ QUESTIONS 1-9: 0
1. LITTLE INTEREST OR PLEASURE IN DOING THINGS: 0
SUM OF ALL RESPONSES TO PHQ QUESTIONS 1-9: 0
SUM OF ALL RESPONSES TO PHQ QUESTIONS 1-9: 0

## 2022-11-11 NOTE — PROGRESS NOTES
[address] [facility] Note    Date: 11/11/2022    Assessment/Plan:   1. 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 Acquired absence of other right toe(s) ([facility] [address]  3. Chronic systolic CHF (congestive heart failure) (HCC) Controlled, continue current management  4. Chronic renal failure, stage 3a ([facility] [address]   5. Benign essential HTN Controlled, continue current management  6. Wild-type transthyretin-related (ATTR) amyloidosis (HCC)    Doing well, no concerns   A1c 6.4 today, decrease levemir to 5 units nightly (from 15 units nightly)   Resent lasix so does not have to cut tabs in half - continue 20 mg BID  Follow up 3 months for DM2     Come for AWV    Orders Placed This Encounter   Procedures    Influenza, FLUAD, (age 72 y+), IM, Preservative Free, 0.5 mL    POCT glycosylated hemoglobin (Hb A1C)     Orders Placed This Encounter   Medications    furosemide (LASIX) 20 MG tablet     Sig: Take 1 tablet by mouth 2 times daily     Dispense:  180 tablet     Refill:  1     COVID booster  Shingles vaccine at pharm    Return in about 3 months (around 2/11/2023). Discussed medication(s) risks, benefits, side effects, adverse reactions and interactions with patient. Patient voiced understanding. Subjective/Objective:   HPI  Chief Complaint   Patient presents with    Follow-up     No concerns, doing well  Keeping weight in 170s and does well w/ this. DMT2  Rx: glargine 15 units daily, januvia 50 mg daily  , doesn't check it everyday  Pt denied any hypoglycemia episodes nor symptoms suggestive of hypoglycemia which include profuse sweating, anxiousness, light headedness.    Check BG at home:   Lab Results   Component Value Date    LABA1C 6.4 11/11/2022     Lab Results   Component Value Date    CREATININE 1.9 (H) 09/07/2022 CKD  Follows w/ [facility] Dr. Zora Kemp, saw November 9, 2022    HFrEF/HTN/CAD  LVH, aTTR amyloidosis on tafamidis, HFrEF with fluctuating LVEF, HTN, HLP, DM 2, CKD (baseline creatinine 2.1), mild CAD, NSTEMI, trifascicular block with RBBB, NSVT. Follows w/ cardiology dr Fatou Lima, saw 11/8/22    Per chart review cardiology plan:  Patient has systolic heart failure due to nonischemic cardiomyopathy (most likely due to prior history of severe uncontrolled hypertension and contribution by aTTR amyloidosis). LVEF recovered but has now deteriorated; repeat echo shows no improvement.   -Cw Vyndamax (tafamidis) 61 daily (there is a possibility tafamidis may have precipitated acute heart failure; it was started a week ago; clinical trial did suggest worsening symptoms when patients have NYHA FC III - we will monitor for now). - Cw coreg 12.5 bid   -Cw Entresto half a tab of 24/26 mg bid (watch Cr and K). - Unable to start spironolactone due to CKD   -Cw lasix 20 bid  -No SGLT2 inhibitors due to CKD. - Patient has met with Dr. David Judd and discussed risks and benefits of ICD implantation; he politely declined to have an ICD. On statin/asa/bb    Wt Readings from Last 3 Encounters:   11/11/22 173 lb 6.4 oz (78.7 kg)   11/08/22 173 lb (78.5 kg)   09/22/22 170 lb 12.8 oz (77.5 kg)     Body mass index is 24.18 kg/m². 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, without long-term current use of insulin (HCC)    Benign essential HTN    Pure hypercholesterolemia    Iron deficiency anemia due to chronic blood loss    Primary osteoarthritis of both knees    Vitamin D deficiency    Right BBB/left ant fasc block    Abnormal EKG    Chronic renal failure, stage 3 (moderate) (HCC)    Dilated cardiomyopathy (HCC)    Chronic systolic CHF (congestive heart failure) (HCC)    S/P coronary angiogram    Severe left ventricular hypertrophy    HFrEF (heart failure with reduced ejection fraction) (HCC)    NSTEMI (non-ST elevated myocardial infarction) (HCC)    Wild-type transthyretin-related (ATTR) amyloidosis (HCC)    Non-recurrent unilateral inguinal hernia without obstruction or gangrene    Abnormal CAT scan    Acquired absence of other right toe(s) ([facility] [address]     Past Medical History:   Diagnosis Date    Abdominal hernia     Anemia     Arthritis     MILD    CHF (congestive heart failure) (HCC)     EF 35-40%.  Non-ischemic     Hyperlipidemia     Hypertension     Type II or unspecified type diabetes mellitus without mention of complication, not stated as uncontrolled        Past Surgical History:   Procedure Laterality Date    HERNIA REPAIR      HIP SURGERY      OTHER SURGICAL HISTORY N/A 12/15/2015    LAPAROSCOPIC LEFT INGUINAL HERNIA REPAIR WITH MESH       Current Outpatient Medications   Medication Sig Dispense Refill    furosemide (LASIX) 20 MG tablet Take 1 tablet by mouth 2 times daily 180 tablet 1    atorvastatin (LIPITOR) 40 MG tablet Take 1 tablet by mouth once daily 90 tablet 0    JANUVIA 50 MG tablet Take 1 tablet by mouth once daily 90 tablet 0    Insulin Glargine-yfgn (SEMGLEE, YFGN,) 100 UNIT/ML SOPN INJECT 15 UNITS SUBCUTANEOUSLY NIGHTLY 15 mL 0    potassium chloride (KLOR-CON) 10 MEQ extended release tablet Take 1 tablet by mouth once daily 60 tablet 5    insulin detemir (LEVEMIR FLEXTOUCH) 100 UNIT/ML injection pen Inject 15 units daily 5 pen 3    furosemide (LASIX) 40 MG tablet Take 0.5 tablets by mouth 2 times daily 120 tablet 1    carvedilol (COREG) 12.5 MG tablet Take 1 tablet by mouth 2 times daily 180 tablet 3    Tafamidis 61 MG CAPS Take 61 mg by mouth daily 30 capsule 11    dorzolamide-timolol (COSOPT) 22.3-6.8 MG/ML ophthalmic solution INSTILL 1 DROP INTO EACH EYE EVERY 12 HOURS      latanoprost (XALATAN) 0.005 % ophthalmic solution INSTILL 1 DROP INTO EACH EYE ONCE DAILY IN THE EVENING      MM PEN NEEDLES 31G X 5 MM MISC USE   SUBCUTANEOUSLY ONCE DAILY 100 each 5    aspirin 81 MG EC tablet Take 1 tablet by mouth daily 30 tablet 3    sacubitril-valsartan (ENTRESTO) 24-26 MG per tablet Take 0.5 tablets by mouth 2 times daily 180 tablet 3     No current facility-administered medications for this visit.      No Known Allergies    Social History     Socioeconomic History    Marital status:      Spouse name: None    Number of children: None    Years of education: None    Highest education level: None   Tobacco Use    Smoking status: Never    Smokeless tobacco: Never   Vaping Use    Vaping Use: Never used   Substance and Sexual Activity    Alcohol use: No    Drug use: No    Sexual activity: Not Currently     Social Determinants of Health     Financial Resource Strain: Low Risk     Difficulty of Paying Living Expenses: Not hard at all   Food Insecurity: No Food Insecurity    Worried About Running Out of Food in the Last Year: Never true    Ran Out of Food in the Last Year: Never true     Family History   Problem Relation Age of Onset    High Blood Pressure Mother     High Blood Pressure Father     Heart Disease Father         cad    Cancer Sister          Vitals:  /70 (Site: Left Upper Arm, Position: Sitting, Cuff Size: Medium Adult)   Pulse 68   Wt 173 lb 6.4 oz (78.7 kg)   SpO2 99%   BMI 24.18 kg/m²     Physical Exam   General:  Well-appearing, no acute distress, alert, non-toxic  HEENT:  Normocephalic, atraumatic, without lymphadenopathy, EOMI, neck supple  Cardiovascular: normal heart rate, normal rhythm, no murmurs, rubs or gallops  Respiratory: normal breath sounds, good air movement, no respiratory distress, no wheezing, rales or rhonchi  GI: bowel sounds normal, soft, non-distended, no tenderness, no masses or peritoneal signs  Extremities: intact distal pulses, warm, dry, well perfused, without clubbing, cyanosis or edema, normal movement of all extremities. No joint swelling, deformity or tenderness. Skin:  No rash, warm and dry  PSYCH:  alert and oriented x 3; normal affect  NEURO:  cranial nerves intact/exam non focal, normal motor function, normal sensory function, normal speech, no gross focal deficits noted, gait within normal    Anastacio Velasquez MD    11/11/2022 5:19 PM    Documentation was done using voice recognition dragon software. Every effort was made to ensure accuracy; however, inadvertent, unintentional computerized transcription errors may be present.

## 2022-11-17 ENCOUNTER — TELEPHONE (OUTPATIENT)
Dept: CARDIOLOGY CLINIC | Age: 82
End: 2022-11-17

## 2022-11-23 ENCOUNTER — TELEPHONE (OUTPATIENT)
Dept: FAMILY MEDICINE CLINIC | Age: 82
End: 2022-11-23

## 2022-11-23 NOTE — TELEPHONE ENCOUNTER
Called for [facility] for fax number   Called patient to  inform him his forms need his signature

## 2022-11-30 ENCOUNTER — TELEPHONE (OUTPATIENT)
Dept: CARDIOLOGY CLINIC | Age: 82
End: 2022-11-30

## 2022-12-06 ENCOUNTER — TELEPHONE (OUTPATIENT)
Dept: CARDIOLOGY CLINIC | Age: 82
End: 2022-12-06

## 2022-12-06 NOTE — TELEPHONE ENCOUNTER
Per dr Charlie Beard Note: 11/2022    1. Chronic HFrEF:   Patient has systolic heart failure due to nonischemic cardiomyopathy (most likely due to prior history of severe uncontrolled hypertension and contribution by aTTR amyloidosis). LVEF recovered but has now deteriorated; repeat echo shows no improvement.      -Cw Vyndamax (tafamidis) 61 daily (there is a possibility tafamidis may have precipitated acute heart failure; it was started a week ago; clinical trial did suggest worsening symptoms when patients have NYHA FC III - we will monitor for now). - Cw coreg 12.5 bid   -Cw Entresto half a tab of 24/26 mg bid (watch Cr and K). Spoke w/ [facility], confirmed script as above.

## 2022-12-06 NOTE — TELEPHONE ENCOUNTER
Pharmacy called stating the dosing instructions are not recommended and wants verification that Deny Taylor wants pt to take Entresto 24/26 1/2 table bid. Please verify.

## 2022-12-09 ENCOUNTER — TELEPHONE (OUTPATIENT)
Dept: FAMILY MEDICINE CLINIC | Age: 82
End: 2022-12-09

## 2022-12-09 NOTE — TELEPHONE ENCOUNTER
Ludy Allen NP with [facility] is calling regarding encounter on 9.12.22. Patient has still been taking the Januvia. NP checked A1c today and it was 6.9    Would you like to see patient since he has still been taking this?     Please advise and call patient back

## 2022-12-09 NOTE — TELEPHONE ENCOUNTER
Can see in a couple months, to stop nightly insulin totally.  Okay to be on januvia, may be able to wean off [place] as well next visit

## 2023-01-18 RX ORDER — ATORVASTATIN CALCIUM 40 MG/1
TABLET, FILM COATED ORAL
Qty: 90 TABLET | Refills: 2 | Status: SHIPPED | OUTPATIENT
Start: 2023-01-18

## 2023-01-18 NOTE — TELEPHONE ENCOUNTER
Medication:   Requested Prescriptions     Pending Prescriptions Disp Refills    atorvastatin (LIPITOR) 40 MG tablet [Pharmacy Med Name: Atorvastatin Calcium 40 MG Oral Tablet] 90 tablet 0     Sig: Take 1 tablet by mouth once daily       Last Filled:      Patient Phone Number: [phone] (home)     Last appt: 11/11/2022   Next appt: 2/15/2023    Last Lipid:   Lab Results   Component Value Date/Time    CHOL 130 04/17/2022 06:41 AM    TRIG 67 04/17/2022 06:41 AM    HDL 67 04/17/2022 06:41 AM    HDL 56 01/13/2012 11:49 AM    LDLCALC 50 04/17/2022 06:41 AM

## 2023-01-26 ENCOUNTER — TELEPHONE (OUTPATIENT)
Dept: CARDIOLOGY CLINIC | Age: 83
End: 2023-01-26

## 2023-01-26 NOTE — TELEPHONE ENCOUNTER
[employer] called and stated the patient had forms sent to them for the Vedamax medication and there is a section that needs patients name and date of birth so they can schedule shipment. So therefore section 7 right above 7a there is a single line that needs the above information resent.

## 2023-02-08 NOTE — TELEPHONE ENCOUNTER
Medication and Quantity requested:   furosemide (LASIX) 20 MG tablet      Last Visit:   11/11/2022      Pharmacy and phone number updated in [facility]:  [email or web address] [facility] [place]

## 2023-02-08 NOTE — TELEPHONE ENCOUNTER
Medication:   Requested Prescriptions     Pending Prescriptions Disp Refills    furosemide (LASIX) 20 MG tablet 180 tablet 1     Sig: Take 1 tablet by mouth 2 times daily        Last Filled:  11/11/2022, 180, 1    Patient Phone Number: [phone] (home)     Last appt: 11/11/2022   Next appt: 2/15/2023    Last OARRS: No flowsheet data found.

## 2023-02-09 RX ORDER — FUROSEMIDE 20 MG/1
20 TABLET ORAL 2 TIMES DAILY
Qty: 180 TABLET | Refills: 1 | Status: SHIPPED | OUTPATIENT
Start: 2023-02-09

## 2023-02-22 ENCOUNTER — OFFICE VISIT (OUTPATIENT)
Dept: FAMILY MEDICINE CLINIC | Age: 83
End: 2023-02-22

## 2023-02-22 VITALS
SYSTOLIC BLOOD PRESSURE: 118 MMHG | WEIGHT: 171 LBS | BODY MASS INDEX: 23.94 KG/M2 | DIASTOLIC BLOOD PRESSURE: 73 MMHG | HEIGHT: 71 IN | HEART RATE: 66 BPM

## 2023-02-22 DIAGNOSIS — E11.22 CONTROLLED TYPE 2 DIABETES MELLITUS WITH STAGE 3 CHRONIC KIDNEY DISEASE, WITHOUT LONG-TERM CURRENT USE OF INSULIN (HCC): Primary | ICD-10-CM

## 2023-02-22 DIAGNOSIS — I10 BENIGN ESSENTIAL HTN: ICD-10-CM

## 2023-02-22 DIAGNOSIS — E78.00 PURE HYPERCHOLESTEROLEMIA: ICD-10-CM

## 2023-02-22 DIAGNOSIS — E85.82 WILD-TYPE TRANSTHYRETIN-RELATED (ATTR) AMYLOIDOSIS (HCC): ICD-10-CM

## 2023-02-22 DIAGNOSIS — Z89.421 ACQUIRED ABSENCE OF OTHER RIGHT TOE(S) (HCC): ICD-10-CM

## 2023-02-22 DIAGNOSIS — N18.31 CHRONIC RENAL FAILURE, STAGE 3A (HCC): ICD-10-CM

## 2023-02-22 DIAGNOSIS — N18.30 CONTROLLED TYPE 2 DIABETES MELLITUS WITH STAGE 3 CHRONIC KIDNEY DISEASE, WITHOUT LONG-TERM CURRENT USE OF INSULIN (HCC): Primary | ICD-10-CM

## 2023-02-22 DIAGNOSIS — I50.20 HFREF (HEART FAILURE WITH REDUCED EJECTION FRACTION) (HCC): ICD-10-CM

## 2023-02-22 LAB — HBA1C MFR BLD: 7.8 %

## 2023-02-22 SDOH — ECONOMIC STABILITY: FOOD INSECURITY: WITHIN THE PAST 12 MONTHS, THE FOOD YOU BOUGHT JUST DIDN'T LAST AND YOU DIDN'T HAVE MONEY TO GET MORE.: NEVER TRUE

## 2023-02-22 SDOH — ECONOMIC STABILITY: HOUSING INSECURITY
IN THE LAST 12 MONTHS, WAS THERE A TIME WHEN YOU DID NOT HAVE A STEADY PLACE TO SLEEP OR SLEPT IN A SHELTER (INCLUDING NOW)?: NO

## 2023-02-22 SDOH — ECONOMIC STABILITY: INCOME INSECURITY: HOW HARD IS IT FOR YOU TO PAY FOR THE VERY BASICS LIKE FOOD, HOUSING, MEDICAL CARE, AND HEATING?: NOT HARD AT ALL

## 2023-02-22 SDOH — ECONOMIC STABILITY: FOOD INSECURITY: WITHIN THE PAST 12 MONTHS, YOU WORRIED THAT YOUR FOOD WOULD RUN OUT BEFORE YOU GOT MONEY TO BUY MORE.: NEVER TRUE

## 2023-02-22 ASSESSMENT — PATIENT HEALTH QUESTIONNAIRE - PHQ9
SUM OF ALL RESPONSES TO PHQ QUESTIONS 1-9: 0
SUM OF ALL RESPONSES TO PHQ QUESTIONS 1-9: 0
2. FEELING DOWN, DEPRESSED OR HOPELESS: 0
SUM OF ALL RESPONSES TO PHQ9 QUESTIONS 1 & 2: 0
1. LITTLE INTEREST OR PLEASURE IN DOING THINGS: 0
SUM OF ALL RESPONSES TO PHQ QUESTIONS 1-9: 0
SUM OF ALL RESPONSES TO PHQ QUESTIONS 1-9: 0

## 2023-02-22 NOTE — PATIENT INSTRUCTIONS
Come for awv--- annual wellness visit    Bring in all medications next visit. Shingles vaccine at pharmacy    You and Mrs. Collins Sires should come for annual wellness visit in April

## 2023-02-22 NOTE — PROGRESS NOTES
[address] [facility] Note    Date: 2/22/2023    Assessment/Plan:     1. Controlled type 2 diabetes mellitus with stage 3 chronic kidney disease, without long-term current use of insulin (HCC)  -     POCT glycosylated hemoglobin (Hb A1C)---7.8 today  Controlled for age, to check if been taking Januvia, to bring medications next visit. 2. HFrEF (heart failure with reduced ejection fraction) ([facility] [address] controlled continue follow-up with cardiology  3. Acquired absence of other right toe(s) ([facility] [address]  4. Wild-type transthyretin-related (ATTR) amyloidosis (HCC) continue follow-up with cardiology  5. Chronic renal failure, stage 3a (HCC) has follow-up with nephro in May he will get labs likely at that time. 6. Benign essential HTN Controlled, continue current management  7. Pure hypercholesterolemia continue statin    Come for awv--- annual wellness visit in April w/ mrs perdomo  Bring in all medications next visit. Shingles vaccine at pharmacy    Orders Placed This Encounter   Procedures    POCT glycosylated hemoglobin (Hb A1C)     No orders of the defined types were placed in this encounter. Return in about 2 months (around 4/22/2023). Discussed medication(s) risks, benefits, side effects, adverse reactions and interactions with patient. Patient voiced understanding. Subjective/Objective:   HPI  Chief Complaint   Patient presents with    Diabetes     Here for diabetes follow-up, he stoped insulin nightly levemir 5 nightly, and only on Januvia 50 daily (he thinks on [place] and [place]?  May not be taking this, not sure didn't bring meds today)   Feeling much better tho since stopping nightly insulin  Breathing doing well  No swelling in LE, weight looks good- good for him to stay around 170 for CHF    DMT2 a1c 7.8  Rx: stopped levemir 5 nightly, so only on Januvia 50 daily  Feeling much better  Pt denied any hypoglycemia episodes nor symptoms suggestive of hypoglycemia which include profuse sweating, anxiousness, light headedness. Check BG at home:   Lab Results   Component Value Date    LABA1C 7.8 02/22/2023     Lab Results   Component Value Date    CREATININE 1.9 (H) 09/07/2022     Lab Results   Component Value Date    LABMICR YES 03/04/2022     Pt is on an ACEI/ARB, Statin. Last Retinopathy screen: follows w/ eye doctor this month. Follows up in 6 months      CKD  Follows w/ [facility] Dr. Jimena Rodriges, saw November 9, 2022  Upcoming appointment in may     HFrEF/HTN/CAD  LVH, aTTR amyloidosis on tafamidis, HFrEF with fluctuating LVEF, HTN, HLP, DM 2, CKD (baseline creatinine 2.1), mild CAD, NSTEMI, trifascicular block with RBBB, NSVT. Follows w/ cardiology dr Nilton Davison, saw 11/8/22  170s for weight does well for him. Hospitalized when weight was 189 lbs   Upcomin appointment in may w/ cards  Lasix 20 BID    Per chart review cardiology plan:  Patient has systolic heart failure due to nonischemic cardiomyopathy (most likely due to prior history of severe uncontrolled hypertension and contribution by aTTR amyloidosis). LVEF recovered but has now deteriorated; repeat echo shows no improvement.   -Cw Vyndamax (tafamidis) 61 daily (there is a possibility tafamidis may have precipitated acute heart failure; it was started a week ago; clinical trial did suggest worsening symptoms when patients have NYHA FC III - we will monitor for now). - Cw coreg 12.5 bid   -Cw Entresto half a tab of 24/26 mg bid (watch Cr and K). - Unable to start spironolactone due to CKD   -Cw lasix 20 bid  -No SGLT2 inhibitors due to CKD. - Patient has met with Dr. Mort Cowden and discussed risks and benefits of ICD implantation; he politely declined to have an ICD. On statin/asa/bb      Wt Readings from Last 3 Encounters:   02/22/23 171 lb (77.6 kg)   11/11/22 173 lb 6.4 oz (78.7 kg)   11/08/22 173 lb (78.5 kg)     Body mass index is 23.85 kg/m².     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, without long-term current use of insulin ([facility])    Benign essential HTN    Pure hypercholesterolemia    Iron deficiency anemia due to chronic blood loss    Primary osteoarthritis of both knees    Vitamin D deficiency    Right BBB/left ant fasc block    Abnormal EKG    Chronic renal failure, stage 3 (moderate) ([facility])    Dilated cardiomyopathy ([facility])    Chronic systolic CHF (congestive heart failure) ([facility])    S/P coronary angiogram    Severe left ventricular hypertrophy    HFrEF (heart failure with reduced ejection fraction) ([facility])    NSTEMI (non-ST elevated myocardial infarction) ([address]    Wild-type transthyretin-related (ATTR) amyloidosis ([facility])    Non-recurrent unilateral inguinal hernia without obstruction or gangrene    Abnormal CAT scan    Acquired absence of other right toe(s) ([address]     Past Medical History:   Diagnosis Date    Abdominal hernia     Anemia     Arthritis     MILD    CHF (congestive heart failure) ([facility])     EF 35-40%.  Non-ischemic     Hyperlipidemia     Hypertension     Type II or unspecified type diabetes mellitus without mention of complication, not stated as uncontrolled        Past Surgical History:   Procedure Laterality Date    HERNIA REPAIR      HIP SURGERY      OTHER SURGICAL HISTORY N/A 12/15/2015    LAPAROSCOPIC LEFT INGUINAL HERNIA REPAIR WITH MESH       Current Outpatient Medications   Medication Sig Dispense Refill    furosemide (LASIX) 20 MG tablet Take 1 tablet by mouth 2 times daily 180 tablet 1    atorvastatin (LIPITOR) 40 MG tablet Take 1 tablet by mouth once daily 90 tablet 2    sacubitril-valsartan (ENTRESTO) 24-26 MG per tablet Take 0.5 tablets by mouth 2 times daily 180 tablet 3    JANUVIA 50 MG tablet Take 1 tablet by mouth once daily 90 tablet 0    potassium chloride (KLOR-CON) 10 MEQ extended release tablet Take 1 tablet by mouth once daily 60 tablet 5    furosemide (LASIX) 40 MG tablet Take 0.5 tablets by mouth 2 times daily 120 tablet 1    carvedilol (COREG) 12.5 MG tablet Take 1 tablet by mouth 2 times daily 180 tablet 3    Tafamidis 61 MG CAPS Take 61 mg by mouth daily 30 capsule 11    dorzolamide-timolol (COSOPT) 22.3-6.8 MG/ML ophthalmic solution INSTILL 1 DROP INTO EACH EYE EVERY 12 HOURS      latanoprost (XALATAN) 0.005 % ophthalmic solution INSTILL 1 DROP INTO EACH EYE ONCE DAILY IN THE EVENING      MM PEN NEEDLES 31G X 5 MM MISC USE   SUBCUTANEOUSLY ONCE DAILY 100 each 5    aspirin 81 MG EC tablet Take 1 tablet by mouth daily 30 tablet 3     No current facility-administered medications for this visit.      No Known Allergies    Social History     Socioeconomic History    Marital status:      Spouse name: None    Number of children: None    Years of education: None    Highest education level: None   Tobacco Use    Smoking status: Never    Smokeless tobacco: Never   Vaping Use    Vaping Use: Never used   Substance and Sexual Activity    Alcohol use: No    Drug use: No    Sexual activity: Not Currently     Social Determinants of Health     Financial Resource Strain: Low Risk     Difficulty of Paying Living Expenses: Not hard at all   Food Insecurity: No Food Insecurity    Worried About [address] [employer] in the Last Year: Never true    Ran Out of Food in the Last Year: Never true   Transportation Needs: Unknown    Lack of Transportation (Non-Medical): No   Housing Stability: Unknown    Unstable Housing in the Last Year: No     Family History Problem Relation Age of Onset    High Blood Pressure Mother     High Blood Pressure Father     Heart Disease Father         cad    Cancer Sister          Vitals:  /73   Pulse 66   Ht 5' 11\" (1.803 m)   Wt 171 lb (77.6 kg)   BMI 23.85 kg/m²     Physical Exam   General:  Well-appearing, no acute distress, alert, non-toxic  HEENT:  Normocephalic, atraumatic, without lymphadenopathy, EOMI, neck supple  Cardiovascular: normal heart rate, normal rhythm, no murmurs, rubs or gallops  Respiratory: normal breath sounds, good air movement, no respiratory distress, no wheezing, rales or rhonchi  GI: bowel sounds normal, soft, non-distended, no tenderness, no masses or peritoneal signs  Extremities: intact distal pulses, warm, dry, well perfused, without clubbing, cyanosis or edema, normal movement of all extremities. No joint swelling, deformity or tenderness. Skin:  No rash, warm and dry  PSYCH:  alert and oriented x 3; normal affect  NEURO:  cranial nerves intact/exam non focal, normal motor function, normal sensory function, normal speech, no gross focal deficits noted, gait within normal    Royal Acevedo MD    2/22/2023 1:23 PM    Documentation was done using voice recognition dragon software. Every effort was made to ensure accuracy; however, inadvertent, unintentional computerized transcription errors may be present.

## 2023-05-11 LAB
ALBUMIN SERPL-MCNC: 3.9 G/DL (ref 3.4–5)
ANION GAP SERPL CALCULATED.3IONS-SCNC: 14 MMOL/L (ref 3–16)
BUN SERPL-MCNC: 35 MG/DL (ref 7–20)
CALCIUM SERPL-MCNC: 9.5 MG/DL (ref 8.3–10.6)
CHLORIDE SERPL-SCNC: 106 MMOL/L (ref 99–110)
CO2 SERPL-SCNC: 20 MMOL/L (ref 21–32)
CREAT SERPL-MCNC: 2.6 MG/DL (ref 0.8–1.3)
GFR SERPLBLD CREATININE-BSD FMLA CKD-EPI: 23 ML/MIN/{1.73_M2}
GLUCOSE SERPL-MCNC: 196 MG/DL (ref 70–99)
PHOSPHATE SERPL-MCNC: 3.7 MG/DL (ref 2.5–4.9)
POTASSIUM SERPL-SCNC: 4.3 MMOL/L (ref 3.5–5.1)
SODIUM SERPL-SCNC: 140 MMOL/L (ref 136–145)

## 2023-05-23 ENCOUNTER — OFFICE VISIT (OUTPATIENT)
Dept: CARDIOLOGY CLINIC | Age: 83
End: 2023-05-23
Payer: MEDICARE

## 2023-05-23 VITALS
SYSTOLIC BLOOD PRESSURE: 130 MMHG | HEART RATE: 62 BPM | BODY MASS INDEX: 24.07 KG/M2 | WEIGHT: 172.6 LBS | DIASTOLIC BLOOD PRESSURE: 80 MMHG

## 2023-05-23 DIAGNOSIS — I50.22 CHRONIC SYSTOLIC CHF (CONGESTIVE HEART FAILURE) (HCC): Primary | ICD-10-CM

## 2023-05-23 PROCEDURE — G8420 CALC BMI NORM PARAMETERS: HCPCS | Performed by: INTERNAL MEDICINE

## 2023-05-23 PROCEDURE — 1036F TOBACCO NON-USER: CPT | Performed by: INTERNAL MEDICINE

## 2023-05-23 PROCEDURE — G8428 CUR MEDS NOT DOCUMENT: HCPCS | Performed by: INTERNAL MEDICINE

## 2023-05-23 PROCEDURE — 3075F SYST BP GE 130 - 139MM HG: CPT | Performed by: INTERNAL MEDICINE

## 2023-05-23 PROCEDURE — 3079F DIAST BP 80-89 MM HG: CPT | Performed by: INTERNAL MEDICINE

## 2023-05-23 PROCEDURE — 99214 OFFICE O/P EST MOD 30 MIN: CPT | Performed by: INTERNAL MEDICINE

## 2023-05-23 PROCEDURE — 1123F ACP DISCUSS/DSCN MKR DOCD: CPT | Performed by: INTERNAL MEDICINE

## 2023-05-23 RX ORDER — CARVEDILOL 12.5 MG/1
12.5 TABLET ORAL 2 TIMES DAILY
Qty: 180 TABLET | Refills: 3 | Status: SHIPPED | OUTPATIENT
Start: 2023-05-23

## 2023-05-23 NOTE — PROGRESS NOTES
Cc: HFrEF, ATTR amyloidosis. HPI:     Mr Dary Bruno is a 79 yo man with h/o severe LVH, aTTR amyloidosis on tafamidis, HFrEF with fluctuating LVEF, HTN, HLP, DM 2, CKD (baseline creatinine 2.1), mild CAD, NSTEMI, trifascicular block with RBBB, NSVT. Echo 2015: moderate LVH, normal EF. Echo 06/2019: severe LVH, EF 35-40%, mild RV dysfx, mild valve disease     Kathi 07/2019: mod LVd, EF 39%, mid to distal inferior and apical fixed defect. LHC 07/2019: mild diffuse CAD, nl LVEDP. ECG 9/6/19: NSR, 1st AVB, LAFB, RBBB (trifascicular block), possible inferior MI. Echo 9/20/19: severe LVH, LVEF 35-39%, diastolic III, apical sparing on speckle strain imaging suggestive of amyloidosis, severe LAE, mild MR. Tc PYP scan 3/12/20: strongly positive for amyloidosis. 09/2019 kappa/labda ratio 1.69 (normal in the setting of CKD, Cr 1.9). 24-hour UPEP normal; SPEP 05/2021 normal; hence no AL amyloidosis. Echo 4/18/2022: Severe LVH, consider cardiac amyloidosis, LVEF 20-25% with global hypokinesis, diastolic grade 3, increased LVEDP, normal RV size with severe hypokinesis, no significant valvular abnormalities. Limited echo 07/2022: severe LVH, EF 25-30%. Patient is here for a follow up. He reports no complaints. Histories     Past Medical History:   has a past medical history of Abdominal hernia, Anemia, Arthritis, CHF (congestive heart failure) ([address] Hyperlipidemia, Hypertension, and Type II or unspecified type diabetes mellitus without mention of complication, not stated as uncontrolled. Surgical History:   has a past surgical history that includes hernia repair; hip surgery; and other surgical history (N/A, 12/15/2015). Social History:   reports that he has never smoked. He has never used smokeless tobacco. He reports that he does not drink alcohol and does not use drugs.      Family History:  No evidence for sudden cardiac death or premature CAD      Medications:     Home

## 2023-05-24 RX ORDER — CARVEDILOL 12.5 MG/1
TABLET ORAL
Qty: 180 TABLET | Refills: 3 | OUTPATIENT
Start: 2023-05-24

## 2023-09-13 RX ORDER — POTASSIUM CHLORIDE 750 MG/1
TABLET, FILM COATED, EXTENDED RELEASE ORAL
Qty: 60 TABLET | Refills: 0 | Status: SHIPPED | OUTPATIENT
Start: 2023-09-13

## 2023-10-23 ENCOUNTER — TELEPHONE (OUTPATIENT)
Dept: FAMILY MEDICINE CLINIC | Age: 83
End: 2023-10-23

## 2023-10-23 RX ORDER — ATORVASTATIN CALCIUM 40 MG/1
40 TABLET, FILM COATED ORAL DAILY
Qty: 90 TABLET | Refills: 1 | OUTPATIENT
Start: 2023-10-23

## 2023-10-23 NOTE — TELEPHONE ENCOUNTER
Medication:   Requested Prescriptions     Pending Prescriptions Disp Refills    atorvastatin (LIPITOR) 40 MG tablet 90 tablet 1     Sig: Take 1 tablet by mouth daily       Last Filled:  01/18/2023    Patient Phone Number: [phone] (home)     Last appt: 2/22/2023   Next appt: Visit date not found    Last Lipid:   Lab Results   Component Value Date/Time    CHOL 130 04/17/2022 06:41 AM    TRIG 67 04/17/2022 06:41 AM    HDL 67 04/17/2022 06:41 AM    HDL 56 01/13/2012 11:49 AM    LDLCALC 50 04/17/2022 06:41 AM

## 2023-10-23 NOTE — TELEPHONE ENCOUNTER
Medication and Quantity requested: atorvastatin (LIPITOR) 40 MG tablet [[id number]       Last Visit  2/22/23    Pharmacy and phone number updated in EPIC:  yes

## 2023-10-23 NOTE — TELEPHONE ENCOUNTER
Lov 2/2/23  Lrf 90 2 1/18/23   Lab Results   Component Value Date    CHOL 130 04/17/2022    CHOL 148 10/21/2021    CHOL 151 10/27/2020     Lab Results   Component Value Date    TRIG 67 04/17/2022    TRIG 75 10/21/2021    TRIG 81 10/27/2020     Lab Results   Component Value Date    HDL 67 (H) 04/17/2022    HDL 75 (H) 10/21/2021    HDL 72 (H) 10/27/2020     Lab Results   Component Value Date    LDLCALC 50 04/17/2022    LDLCALC 58 10/21/2021    LDLCALC 63 10/27/2020     Lab Results   Component Value Date    LABVLDL 13 04/17/2022    LABVLDL 15 10/21/2021    LABVLDL 16 10/27/2020     No results found for: \"CHOLHDLRATIO\"

## 2023-10-24 NOTE — TELEPHONE ENCOUNTER
Pt called back asking for medication until his appt with new Provider on 11/6/23    Pt was offered an appt to see Dr Aleyda Vazquez and Elisabeth Jessica and pt refused    Pt stated that he will wait to see his new provider at a different location     Pt wants us to call in a prescription for his medication until is appt with his new provider at a different location

## 2023-10-27 RX ORDER — ATORVASTATIN CALCIUM 40 MG/1
40 TABLET, FILM COATED ORAL DAILY
Qty: 30 TABLET | Refills: 0 | Status: SHIPPED | OUTPATIENT
Start: 2023-10-27 | End: 2023-11-26

## 2023-10-31 LAB
25(OH)D3 SERPL-MCNC: 16.8 NG/ML
ALBUMIN SERPL-MCNC: 4.3 G/DL (ref 3.4–5)
ANION GAP SERPL CALCULATED.3IONS-SCNC: 13 MMOL/L (ref 3–16)
BUN SERPL-MCNC: 39 MG/DL (ref 7–20)
CALCIUM SERPL-MCNC: 9.7 MG/DL (ref 8.3–10.6)
CHLORIDE SERPL-SCNC: 104 MMOL/L (ref 99–110)
CO2 SERPL-SCNC: 23 MMOL/L (ref 21–32)
CREAT SERPL-MCNC: 2.6 MG/DL (ref 0.8–1.3)
CREAT UR-MCNC: 96.4 MG/DL (ref 39–259)
GFR SERPLBLD CREATININE-BSD FMLA CKD-EPI: 23 ML/MIN/{1.73_M2}
GLUCOSE SERPL-MCNC: 135 MG/DL (ref 70–99)
PHOSPHATE SERPL-MCNC: 3.5 MG/DL (ref 2.5–4.9)
POTASSIUM SERPL-SCNC: 4.4 MMOL/L (ref 3.5–5.1)
PROT UR-MCNC: 28 MG/DL
PROT/CREAT UR-RTO: 0.3 MG/DL
PTH-INTACT SERPL-MCNC: 187.8 PG/ML (ref 14–72)
SODIUM SERPL-SCNC: 140 MMOL/L (ref 136–145)
URATE SERPL-MCNC: 8.9 MG/DL (ref 3.5–7.2)

## 2023-11-06 ENCOUNTER — OFFICE VISIT (OUTPATIENT)
Dept: FAMILY MEDICINE CLINIC | Age: 83
End: 2023-11-06
Payer: MEDICARE

## 2023-11-06 VITALS
WEIGHT: 168 LBS | BODY MASS INDEX: 23.52 KG/M2 | DIASTOLIC BLOOD PRESSURE: 85 MMHG | OXYGEN SATURATION: 99 % | HEIGHT: 71 IN | SYSTOLIC BLOOD PRESSURE: 130 MMHG | HEART RATE: 60 BPM | TEMPERATURE: 97.2 F

## 2023-11-06 DIAGNOSIS — Z89.421 ACQUIRED ABSENCE OF OTHER RIGHT TOE(S) (HCC): ICD-10-CM

## 2023-11-06 DIAGNOSIS — I50.22 CHRONIC SYSTOLIC CHF (CONGESTIVE HEART FAILURE) (HCC): ICD-10-CM

## 2023-11-06 DIAGNOSIS — E85.82 WILD-TYPE TRANSTHYRETIN-RELATED (ATTR) AMYLOIDOSIS (HCC): ICD-10-CM

## 2023-11-06 DIAGNOSIS — N18.30 CONTROLLED TYPE 2 DIABETES MELLITUS WITH STAGE 3 CHRONIC KIDNEY DISEASE, WITHOUT LONG-TERM CURRENT USE OF INSULIN (HCC): Primary | ICD-10-CM

## 2023-11-06 DIAGNOSIS — N18.31 CHRONIC RENAL FAILURE, STAGE 3A (HCC): ICD-10-CM

## 2023-11-06 DIAGNOSIS — E78.00 PURE HYPERCHOLESTEROLEMIA: ICD-10-CM

## 2023-11-06 DIAGNOSIS — I10 BENIGN ESSENTIAL HTN: ICD-10-CM

## 2023-11-06 DIAGNOSIS — E11.22 CONTROLLED TYPE 2 DIABETES MELLITUS WITH STAGE 3 CHRONIC KIDNEY DISEASE, WITHOUT LONG-TERM CURRENT USE OF INSULIN (HCC): Primary | ICD-10-CM

## 2023-11-06 PROBLEM — I47.29 NSVT (NONSUSTAINED VENTRICULAR TACHYCARDIA) (HCC): Status: RESOLVED | Noted: 2023-11-06 | Resolved: 2023-11-06

## 2023-11-06 PROBLEM — I47.29 NSVT (NONSUSTAINED VENTRICULAR TACHYCARDIA) (HCC): Status: ACTIVE | Noted: 2023-11-06

## 2023-11-06 LAB — HBA1C MFR BLD: 7.9 %

## 2023-11-06 PROCEDURE — G8420 CALC BMI NORM PARAMETERS: HCPCS | Performed by: FAMILY MEDICINE

## 2023-11-06 PROCEDURE — 3079F DIAST BP 80-89 MM HG: CPT | Performed by: FAMILY MEDICINE

## 2023-11-06 PROCEDURE — 1123F ACP DISCUSS/DSCN MKR DOCD: CPT | Performed by: FAMILY MEDICINE

## 2023-11-06 PROCEDURE — G8427 DOCREV CUR MEDS BY ELIG CLIN: HCPCS | Performed by: FAMILY MEDICINE

## 2023-11-06 PROCEDURE — G8484 FLU IMMUNIZE NO ADMIN: HCPCS | Performed by: FAMILY MEDICINE

## 2023-11-06 PROCEDURE — 1036F TOBACCO NON-USER: CPT | Performed by: FAMILY MEDICINE

## 2023-11-06 PROCEDURE — 99214 OFFICE O/P EST MOD 30 MIN: CPT | Performed by: FAMILY MEDICINE

## 2023-11-06 PROCEDURE — 3075F SYST BP GE 130 - 139MM HG: CPT | Performed by: FAMILY MEDICINE

## 2023-11-06 PROCEDURE — 83036 HEMOGLOBIN GLYCOSYLATED A1C: CPT | Performed by: FAMILY MEDICINE

## 2023-11-06 PROCEDURE — 3051F HG A1C>EQUAL 7.0%<8.0%: CPT | Performed by: FAMILY MEDICINE

## 2023-11-06 ASSESSMENT — ENCOUNTER SYMPTOMS
COUGH: 0
EYES NEGATIVE: 1
EYE PAIN: 0
ABDOMINAL PAIN: 0
SINUS PRESSURE: 0
SHORTNESS OF BREATH: 0
WHEEZING: 0
EYE ITCHING: 0
RHINORRHEA: 0
SORE THROAT: 0

## 2023-11-06 NOTE — PROGRESS NOTES
Nishant Bob (:  1940) is a 78 y.o. male,New patient, here for evaluation of the following chief complaint(s):  Established New Doctor          Subjective   SUBJECTIVE/OBJECTIVE:  HPI  78 yr old male here to establish care. Previous PCP [facility]  Here for diabetes follow-up, per patient stopped taking insulin shots, per pt not sure if he is on any oral meds. Review of chart shows was  januvia 50 gm   DMT2 a1c 7.8 on         CKD  Follows w/ [facility] Dr. 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 Follows w/ cardiology dr Johanna Villa,    HTN on  coreg/lasix    Hl on statin , lipids due. Review of Systems   Constitutional: Negative. Negative for fatigue. HENT:  Negative for congestion, ear pain, rhinorrhea, sinus pressure and sore throat. Eyes: Negative. Negative for pain and itching. Respiratory:  Negative for cough, shortness of breath and wheezing. Cardiovascular:  Negative for chest pain and palpitations. Gastrointestinal:  Negative for abdominal pain. Genitourinary:  Negative for frequency and urgency. Musculoskeletal:  Negative for gait problem. Skin:  Negative for rash. Neurological:  Negative for dizziness and headaches. Psychiatric/Behavioral:  The patient is not nervous/anxious. Objective   Physical Exam  Constitutional:       Appearance: Normal appearance. HENT:      Head: Normocephalic and atraumatic. Right Ear: Tympanic membrane, ear canal and external ear normal.      Left Ear: Tympanic membrane, ear canal and external ear normal.      Nose: Nose normal. No congestion or rhinorrhea. Mouth/Throat:      Mouth: Mucous membranes are moist.      Pharynx: Oropharynx is clear.  No oropharyngeal exudate or posterior oropharyngeal

## 2023-11-06 NOTE — TELEPHONE ENCOUNTER
Requested Prescriptions     Pending Prescriptions Disp Refills    sacubitril-valsartan (ENTRESTO) 24-26 MG per tablet 180 tablet 3     Sig: Take 0.5 tablets by mouth 2 times daily            Last Office Visit: 5/23/2023     Next Office Visit: 11/28/2023

## 2023-11-15 RX ORDER — POTASSIUM CHLORIDE 750 MG/1
TABLET, FILM COATED, EXTENDED RELEASE ORAL
Qty: 60 TABLET | Refills: 0 | Status: SHIPPED | OUTPATIENT
Start: 2023-11-15

## 2023-11-17 ENCOUNTER — TELEPHONE (OUTPATIENT)
Dept: FAMILY MEDICINE CLINIC | Age: 83
End: 2023-11-17

## 2023-11-21 RX ORDER — FUROSEMIDE 20 MG/1
20 TABLET ORAL 2 TIMES DAILY
Qty: 180 TABLET | Refills: 1 | Status: SHIPPED | OUTPATIENT
Start: 2023-11-21

## 2023-11-21 NOTE — TELEPHONE ENCOUNTER
Medication:   Requested Prescriptions     Pending Prescriptions Disp Refills    furosemide (LASIX) 20 MG tablet 180 tablet 1     Sig: Take 1 tablet by mouth 2 times daily        Last Filled:      Patient Phone Number: [phone] (home)     Last appt: 11/6/2023   Next appt: 2/6/2024    Last OARRS:        No data to display

## 2023-11-28 ENCOUNTER — OFFICE VISIT (OUTPATIENT)
Dept: CARDIOLOGY CLINIC | Age: 83
End: 2023-11-28
Payer: MEDICARE

## 2023-11-28 VITALS
WEIGHT: 168.8 LBS | SYSTOLIC BLOOD PRESSURE: 130 MMHG | BODY MASS INDEX: 23.54 KG/M2 | DIASTOLIC BLOOD PRESSURE: 76 MMHG | HEART RATE: 78 BPM

## 2023-11-28 DIAGNOSIS — E85.82 WILD-TYPE TRANSTHYRETIN-RELATED (ATTR) AMYLOIDOSIS (HCC): ICD-10-CM

## 2023-11-28 DIAGNOSIS — I50.22 CHRONIC SYSTOLIC CHF (CONGESTIVE HEART FAILURE) (HCC): Primary | ICD-10-CM

## 2023-11-28 PROCEDURE — 3075F SYST BP GE 130 - 139MM HG: CPT | Performed by: INTERNAL MEDICINE

## 2023-11-28 PROCEDURE — G8428 CUR MEDS NOT DOCUMENT: HCPCS | Performed by: INTERNAL MEDICINE

## 2023-11-28 PROCEDURE — G8484 FLU IMMUNIZE NO ADMIN: HCPCS | Performed by: INTERNAL MEDICINE

## 2023-11-28 PROCEDURE — 99214 OFFICE O/P EST MOD 30 MIN: CPT | Performed by: INTERNAL MEDICINE

## 2023-11-28 PROCEDURE — 1036F TOBACCO NON-USER: CPT | Performed by: INTERNAL MEDICINE

## 2023-11-28 PROCEDURE — 3078F DIAST BP <80 MM HG: CPT | Performed by: INTERNAL MEDICINE

## 2023-11-28 PROCEDURE — G8420 CALC BMI NORM PARAMETERS: HCPCS | Performed by: INTERNAL MEDICINE

## 2023-11-28 PROCEDURE — 1123F ACP DISCUSS/DSCN MKR DOCD: CPT | Performed by: INTERNAL MEDICINE

## 2023-11-28 RX ORDER — MULTIVIT-MIN/IRON/FOLIC ACID/K 18-600-40
CAPSULE ORAL
COMMUNITY

## 2023-11-28 RX ORDER — ATORVASTATIN CALCIUM 40 MG/1
40 TABLET, FILM COATED ORAL DAILY
Qty: 30 TABLET | Refills: 2 | Status: SHIPPED | OUTPATIENT
Start: 2023-11-28

## 2023-11-28 RX ORDER — ALLOPURINOL 100 MG/1
100 TABLET ORAL DAILY
COMMUNITY

## 2023-11-28 NOTE — PROGRESS NOTES
discussed. I would like to thank you for providing me the opportunity to participate in the care of your patient. If you have any questions, please do not hesitate to contact me.      Jacob Verdin MD, [facility], [address] [place], [place] [address] Office Phone: [phone]  Fax: [phone]

## 2023-12-08 ENCOUNTER — TELEPHONE (OUTPATIENT)
Dept: CARDIOLOGY CLINIC | Age: 83
End: 2023-12-08

## 2023-12-08 NOTE — TELEPHONE ENCOUNTER
Submitted PA for VYNDAMAX  [facility] Key: [id number] STATUS: [id number] HAS BEEN CANCELLED DUE TO BEING COVERED THROUGH PATIENT ASSISTANCE PROGRAM    Follow up done daily; if no response in three days we will refax for status check.  If another three days goes by with no response we will call the insurance for status.

## 2024-01-22 RX ORDER — POTASSIUM CHLORIDE 750 MG/1
TABLET, FILM COATED, EXTENDED RELEASE ORAL
Qty: 60 TABLET | Refills: 0 | Status: SHIPPED | OUTPATIENT
Start: 2024-01-22

## 2024-01-22 NOTE — TELEPHONE ENCOUNTER
Medication:   Requested Prescriptions     Pending Prescriptions Disp Refills    potassium chloride (KLOR-CON) 10 MEQ extended release tablet [Pharmacy Med Name: Potassium Chloride ER 10 MEQ Oral Tablet Extended Release] 60 tablet 0     Sig: Take 1 tablet by mouth once daily        Last Filled:  11815/2023    Patient Phone Number: [phone] (home)     Last appt: 2/22/2023   Next appt: Visit date not found    Last OARRS:        No data to display

## 2024-03-01 RX ORDER — ATORVASTATIN CALCIUM 40 MG/1
40 TABLET, FILM COATED ORAL DAILY
Qty: 90 TABLET | Refills: 3 | Status: SHIPPED | OUTPATIENT
Start: 2024-03-01

## 2024-03-01 RX ORDER — ATORVASTATIN CALCIUM 40 MG/1
40 TABLET, FILM COATED ORAL DAILY
Qty: 30 TABLET | Refills: 0 | OUTPATIENT
Start: 2024-03-01

## 2024-03-01 RX ORDER — POTASSIUM CHLORIDE 750 MG/1
10 TABLET, FILM COATED, EXTENDED RELEASE ORAL DAILY
Qty: 90 TABLET | Refills: 0 | Status: SHIPPED | OUTPATIENT
Start: 2024-03-01

## 2024-03-01 NOTE — TELEPHONE ENCOUNTER
Requested Prescriptions     Pending Prescriptions Disp Refills    atorvastatin (LIPITOR) 40 MG tablet 90 tablet 3     Sig: Take 1 tablet by mouth daily    potassium chloride (KLOR-CON) 10 MEQ extended release tablet 90 tablet 0     Sig: Take 1 tablet by mouth daily            Last Office Visit: 11/28/2023     Next Office Visit: 5/29/2024         Last Labs: 01.03.2024

## 2024-03-01 NOTE — TELEPHONE ENCOUNTER
I Medication Refills:    potassium chloride (KLOR-CON) 10 MEQ extended release tablet [[id number]]    Order Details  Dose, Route, Frequency: As Directed   Dispense Quantity: 60 tablet Refills: 0          Sig: Take 1 tablet by mouth once daily       atorvastatin (LIPITOR) 40 MG tablet [[id number]]    Order Details  Dose: 40 mg Route: Oral Frequency: DAILY   Dispense Quantity: 30 tablet Refills: 2          Sig: Take 1 tablet by mouth once daily     Pharmacy    [facility] [address] [place] [address] - [address] [place] [address] - P [phone] - F [phone]  [address] [facility] [address]  Phone: [phone]  Fax: [phone]       Last Office Visit: 11.28.2023    Next Office Visit: 05.29.2024    Last Refill:     Last Labs: 01.03.2024

## 2024-03-06 ENCOUNTER — TELEPHONE (OUTPATIENT)
Dept: FAMILY MEDICINE CLINIC | Age: 84
End: 2024-03-06

## 2024-03-06 NOTE — TELEPHONE ENCOUNTER
Patient would like to schedule as NTP appt with Dr Olea     Left message to call back to get appt scheduled

## 2024-03-07 ENCOUNTER — OFFICE VISIT (OUTPATIENT)
Dept: FAMILY MEDICINE CLINIC | Age: 84
End: 2024-03-07

## 2024-03-07 VITALS
SYSTOLIC BLOOD PRESSURE: 128 MMHG | HEIGHT: 71 IN | OXYGEN SATURATION: 99 % | DIASTOLIC BLOOD PRESSURE: 70 MMHG | HEART RATE: 66 BPM | WEIGHT: 169.4 LBS | BODY MASS INDEX: 23.72 KG/M2

## 2024-03-07 DIAGNOSIS — Z76.89 ENCOUNTER TO ESTABLISH CARE: ICD-10-CM

## 2024-03-07 DIAGNOSIS — N18.30 CONTROLLED TYPE 2 DIABETES MELLITUS WITH STAGE 3 CHRONIC KIDNEY DISEASE, WITHOUT LONG-TERM CURRENT USE OF INSULIN (HCC): ICD-10-CM

## 2024-03-07 DIAGNOSIS — N18.32 STAGE 3B CHRONIC KIDNEY DISEASE (HCC): ICD-10-CM

## 2024-03-07 DIAGNOSIS — Z89.421 ACQUIRED ABSENCE OF OTHER RIGHT TOE(S) (HCC): ICD-10-CM

## 2024-03-07 DIAGNOSIS — E11.22 CONTROLLED TYPE 2 DIABETES MELLITUS WITH STAGE 3 CHRONIC KIDNEY DISEASE, WITHOUT LONG-TERM CURRENT USE OF INSULIN (HCC): ICD-10-CM

## 2024-03-07 DIAGNOSIS — Z00.00 ANNUAL PHYSICAL EXAM: Primary | ICD-10-CM

## 2024-03-07 DIAGNOSIS — I50.22 CHRONIC SYSTOLIC CHF (CONGESTIVE HEART FAILURE) (HCC): ICD-10-CM

## 2024-03-07 RX ORDER — ERGOCALCIFEROL 1.25 MG/1
50000 CAPSULE ORAL WEEKLY
COMMUNITY
Start: 2024-01-15

## 2024-03-07 SDOH — ECONOMIC STABILITY: INCOME INSECURITY: HOW HARD IS IT FOR YOU TO PAY FOR THE VERY BASICS LIKE FOOD, HOUSING, MEDICAL CARE, AND HEATING?: NOT HARD AT ALL

## 2024-03-07 SDOH — ECONOMIC STABILITY: FOOD INSECURITY: WITHIN THE PAST 12 MONTHS, THE FOOD YOU BOUGHT JUST DIDN'T LAST AND YOU DIDN'T HAVE MONEY TO GET MORE.: NEVER TRUE

## 2024-03-07 SDOH — ECONOMIC STABILITY: FOOD INSECURITY: WITHIN THE PAST 12 MONTHS, YOU WORRIED THAT YOUR FOOD WOULD RUN OUT BEFORE YOU GOT MONEY TO BUY MORE.: NEVER TRUE

## 2024-03-07 ASSESSMENT — PATIENT HEALTH QUESTIONNAIRE - PHQ9
SUM OF ALL RESPONSES TO PHQ QUESTIONS 1-9: 0
2. FEELING DOWN, DEPRESSED OR HOPELESS: 0
SUM OF ALL RESPONSES TO PHQ QUESTIONS 1-9: 0
1. LITTLE INTEREST OR PLEASURE IN DOING THINGS: 0
SUM OF ALL RESPONSES TO PHQ9 QUESTIONS 1 & 2: 0

## 2024-03-07 ASSESSMENT — ENCOUNTER SYMPTOMS
COUGH: 0
SHORTNESS OF BREATH: 0
DIARRHEA: 0
CONSTIPATION: 0

## 2024-03-07 NOTE — PROGRESS NOTES
sacubitril-valsartan (ENTRESTO) 24-26 MG per tablet, Take 1 tablet by mouth 2 times daily, Disp: 180 tablet, Rfl: 3    Tafamidis 61 MG CAPS, Take 61 mg by mouth daily, Disp: 30 capsule, Rfl: 11    furosemide (LASIX) 20 MG tablet, Take 1 tablet by mouth 2 times daily, Disp: 180 tablet, Rfl: 1    SITagliptin (JANUVIA) 25 MG tablet, Take 1 tablet by mouth daily, Disp: 30 tablet, Rfl: 3    carvedilol (COREG) 12.5 MG tablet, Take 1 tablet by mouth 2 times daily, Disp: 180 tablet, Rfl: 3    dorzolamide-timolol (COSOPT) 22.3-6.8 MG/ML ophthalmic solution, INSTILL 1 DROP INTO EACH EYE EVERY 12 HOURS, Disp: , Rfl:     aspirin 81 MG EC tablet, Take 1 tablet by mouth daily, Disp: 30 tablet, Rfl: 3  No Known Allergies    Review of Systems:  Review of Systems   Constitutional:  Negative for fatigue and fever.   HENT:  Negative for congestion.    Respiratory:  Negative for cough and shortness of breath.    Cardiovascular:  Negative for leg swelling.   Gastrointestinal:  Negative for constipation and diarrhea.   Genitourinary:  Negative for dysuria.   Neurological:  Negative for headaches.   Psychiatric/Behavioral:  Negative for sleep disturbance. The patient is not nervous/anxious.        Objective:    Physical Exam:  Vitals:    03/07/24 1107   BP: 128/70   Pulse: 66   SpO2: 99%   Weight: 76.8 kg (169 lb 6.4 oz)   Height: 1.803 m (5' 11\")     Physical Exam  Constitutional:       General: He is not in acute distress.     Appearance: He is not ill-appearing, toxic-appearing or diaphoretic.   HENT:      Head: Normocephalic and atraumatic.      Right Ear: External ear normal.      Left Ear: External ear normal.      Nose: Nose normal.   Eyes:      General:         Right eye: No discharge.         Left eye: No discharge.      Extraocular Movements: Extraocular movements intact.      Conjunctiva/sclera: Conjunctivae normal.   Cardiovascular:      Rate and Rhythm: Normal rate and regular rhythm.      Heart sounds: Normal heart sounds.

## 2024-03-14 DIAGNOSIS — N18.30 CONTROLLED TYPE 2 DIABETES MELLITUS WITH STAGE 3 CHRONIC KIDNEY DISEASE, WITHOUT LONG-TERM CURRENT USE OF INSULIN (HCC): ICD-10-CM

## 2024-03-14 DIAGNOSIS — E11.22 CONTROLLED TYPE 2 DIABETES MELLITUS WITH STAGE 3 CHRONIC KIDNEY DISEASE, WITHOUT LONG-TERM CURRENT USE OF INSULIN (HCC): ICD-10-CM

## 2024-03-14 NOTE — TELEPHONE ENCOUNTER
Medication:   Requested Prescriptions     Pending Prescriptions Disp Refills    JANUVIA 25 MG tablet [Pharmacy Med Name: Januvia 25 MG Oral Tablet] 30 tablet 0     Sig: Take 1 tablet by mouth once daily        Last Filled: 11/06/2023      Patient Phone Number: [phone] (home)     Last appt: 11/6/2023   Next appt: Visit date not found    Last OARRS:        No data to display

## 2024-03-14 NOTE — TELEPHONE ENCOUNTER
Medication:   Requested Prescriptions     Pending Prescriptions Disp Refills    JANUVIA 25 MG tablet [Pharmacy Med Name: Januvia 25 MG Oral Tablet] 30 tablet 0     Sig: Take 1 tablet by mouth once daily        Last Filled:  8/24/2022 Filled by another doctor    Patient Phone Number: [phone] (home)     Last appt: 11/6/2023   Next appt: Visit date not found    Last OARRS:        No data to display

## 2024-03-15 RX ORDER — SITAGLIPTIN 25 MG/1
25 TABLET, FILM COATED ORAL DAILY
Qty: 30 TABLET | Refills: 0 | Status: SHIPPED | OUTPATIENT
Start: 2024-03-15

## 2024-03-20 ENCOUNTER — TELEPHONE (OUTPATIENT)
Dept: CARDIOLOGY CLINIC | Age: 84
End: 2024-03-20

## 2024-03-20 RX ORDER — TAFAMIDIS 61 MG/1
61 CAPSULE, LIQUID FILLED ORAL DAILY
Qty: 30 CAPSULE | Refills: 2 | Status: SHIPPED | OUTPATIENT
Start: 2024-03-20

## 2024-03-20 NOTE — TELEPHONE ENCOUNTER
I have sent message to [facility] specialty pharmacy to find out if if this was filled and if it can be refilled. Per GEB last note in November of 23 he was to continue this medication and be monitored for worsening symptoms

## 2024-03-20 NOTE — TELEPHONE ENCOUNTER
Rx sent to [facility] specialty pharmacy-they are going to process stat. Vyndamax has to come from here not [facility]

## 2024-03-20 NOTE — TELEPHONE ENCOUNTER
Patient called requesting refill for Vyndamax 61mg daily. Do not see in medication list.    Pharmacy  [id number] - [place], [place] - [address] [facility] - P [phone] - F [phone]  [address] [facility], [facility] [address]  Phone: [phone]  Fax: [phone]

## 2024-03-29 ENCOUNTER — TELEPHONE (OUTPATIENT)
Dept: CARDIOLOGY CLINIC | Age: 84
End: 2024-03-29

## 2024-03-29 NOTE — TELEPHONE ENCOUNTER
Per [facility] pharmacy pt vyndamax sched for delivery 3/27/24 with 0 copay. Pt notified

## 2024-04-13 DIAGNOSIS — N18.30 CONTROLLED TYPE 2 DIABETES MELLITUS WITH STAGE 3 CHRONIC KIDNEY DISEASE, WITHOUT LONG-TERM CURRENT USE OF INSULIN (HCC): ICD-10-CM

## 2024-04-13 DIAGNOSIS — E11.22 CONTROLLED TYPE 2 DIABETES MELLITUS WITH STAGE 3 CHRONIC KIDNEY DISEASE, WITHOUT LONG-TERM CURRENT USE OF INSULIN (HCC): ICD-10-CM

## 2024-04-15 RX ORDER — SITAGLIPTIN 25 MG/1
25 TABLET, FILM COATED ORAL DAILY
Qty: 30 TABLET | Refills: 0 | Status: SHIPPED | OUTPATIENT
Start: 2024-04-15

## 2024-04-16 DIAGNOSIS — N18.30 CONTROLLED TYPE 2 DIABETES MELLITUS WITH STAGE 3 CHRONIC KIDNEY DISEASE, WITHOUT LONG-TERM CURRENT USE OF INSULIN (HCC): ICD-10-CM

## 2024-04-16 DIAGNOSIS — E11.22 CONTROLLED TYPE 2 DIABETES MELLITUS WITH STAGE 3 CHRONIC KIDNEY DISEASE, WITHOUT LONG-TERM CURRENT USE OF INSULIN (HCC): ICD-10-CM

## 2024-04-17 RX ORDER — SITAGLIPTIN 25 MG/1
25 TABLET, FILM COATED ORAL DAILY
Qty: 30 TABLET | Refills: 0 | OUTPATIENT
Start: 2024-04-17

## 2024-05-01 LAB
ALBUMIN SERPL-MCNC: 3.9 G/DL (ref 3.4–5)
ANION GAP SERPL CALCULATED.3IONS-SCNC: 12 MMOL/L (ref 3–16)
BUN SERPL-MCNC: 47 MG/DL (ref 7–20)
CALCIUM SERPL-MCNC: 10 MG/DL (ref 8.3–10.6)
CHLORIDE SERPL-SCNC: 104 MMOL/L (ref 99–110)
CO2 SERPL-SCNC: 24 MMOL/L (ref 21–32)
CREAT SERPL-MCNC: 3.1 MG/DL (ref 0.8–1.3)
GFR SERPLBLD CREATININE-BSD FMLA CKD-EPI: 19 ML/MIN/{1.73_M2}
GLUCOSE SERPL-MCNC: 134 MG/DL (ref 70–99)
PHOSPHATE SERPL-MCNC: 3.8 MG/DL (ref 2.5–4.9)
POTASSIUM SERPL-SCNC: 4.5 MMOL/L (ref 3.5–5.1)
PTH-INTACT SERPL-MCNC: 135.9 PG/ML (ref 14–72)
SODIUM SERPL-SCNC: 140 MMOL/L (ref 136–145)
URATE SERPL-MCNC: 8.6 MG/DL (ref 3.5–7.2)

## 2024-05-02 LAB
CREAT UR-MCNC: 98.8 MG/DL (ref 39–259)
PROT UR-MCNC: 19 MG/DL
PROT/CREAT UR-RTO: 0.2 MG/DL

## 2024-05-20 DIAGNOSIS — N18.30 CONTROLLED TYPE 2 DIABETES MELLITUS WITH STAGE 3 CHRONIC KIDNEY DISEASE, WITHOUT LONG-TERM CURRENT USE OF INSULIN (HCC): ICD-10-CM

## 2024-05-20 DIAGNOSIS — E11.22 CONTROLLED TYPE 2 DIABETES MELLITUS WITH STAGE 3 CHRONIC KIDNEY DISEASE, WITHOUT LONG-TERM CURRENT USE OF INSULIN (HCC): ICD-10-CM

## 2024-05-20 RX ORDER — SITAGLIPTIN 25 MG/1
25 TABLET, FILM COATED ORAL DAILY
Qty: 30 TABLET | Refills: 0 | Status: SHIPPED | OUTPATIENT
Start: 2024-05-20

## 2024-05-20 NOTE — TELEPHONE ENCOUNTER
Medication:   Requested Prescriptions     Pending Prescriptions Disp Refills    JANUVIA 25 MG tablet [Pharmacy Med Name: Januvia 25 MG Oral Tablet] 30 tablet 0     Sig: Take 1 tablet by mouth once daily        Last Filled:      04/15/2024       Patient Phone Number: [phone] (home)     Last appt: 11/6/2023   Next appt: Visit date not found    Last OARRS:        No data to display

## 2024-05-29 ENCOUNTER — OFFICE VISIT (OUTPATIENT)
Dept: CARDIOLOGY CLINIC | Age: 84
End: 2024-05-29
Payer: MEDICARE

## 2024-05-29 VITALS
BODY MASS INDEX: 23.38 KG/M2 | SYSTOLIC BLOOD PRESSURE: 100 MMHG | DIASTOLIC BLOOD PRESSURE: 60 MMHG | HEART RATE: 69 BPM | WEIGHT: 167.6 LBS

## 2024-05-29 DIAGNOSIS — E85.82 WILD-TYPE TRANSTHYRETIN-RELATED (ATTR) AMYLOIDOSIS (HCC): ICD-10-CM

## 2024-05-29 DIAGNOSIS — I50.22 CHRONIC SYSTOLIC CHF (CONGESTIVE HEART FAILURE) (HCC): Primary | ICD-10-CM

## 2024-05-29 PROCEDURE — 99214 OFFICE O/P EST MOD 30 MIN: CPT | Performed by: INTERNAL MEDICINE

## 2024-05-29 PROCEDURE — 1123F ACP DISCUSS/DSCN MKR DOCD: CPT | Performed by: INTERNAL MEDICINE

## 2024-05-29 PROCEDURE — G8420 CALC BMI NORM PARAMETERS: HCPCS | Performed by: INTERNAL MEDICINE

## 2024-05-29 PROCEDURE — 1036F TOBACCO NON-USER: CPT | Performed by: INTERNAL MEDICINE

## 2024-05-29 PROCEDURE — 3078F DIAST BP <80 MM HG: CPT | Performed by: INTERNAL MEDICINE

## 2024-05-29 PROCEDURE — G8427 DOCREV CUR MEDS BY ELIG CLIN: HCPCS | Performed by: INTERNAL MEDICINE

## 2024-05-29 PROCEDURE — 3074F SYST BP LT 130 MM HG: CPT | Performed by: INTERNAL MEDICINE

## 2024-05-29 NOTE — PROGRESS NOTES
gross motor and sensory exam.  Cranial nerves intact        Labs:     Lab Results   Component Value Date    WBC 5.3 01/03/2024    HGB 12.5 (L) 01/03/2024    HCT 37.9 (L) 01/03/2024    MCV 81.6 01/03/2024     01/03/2024     Lab Results   Component Value Date     05/01/2024    K 4.5 05/01/2024     05/01/2024    CO2 24 05/01/2024    BUN 47 (H) 05/01/2024    CREATININE 3.1 (H) 05/01/2024    GLUCOSE 134 (H) 05/01/2024    CALCIUM 10.0 05/01/2024    PROT 7.1 02/12/2013    BILITOT 1.4 (H) 03/04/2022    ALKPHOS 291 (H) 03/04/2022    AST 38 (H) 03/04/2022    ALT 25 03/04/2022    LABGLOM 19 (A) 05/01/2024    GFRAA 41 (A) 09/07/2022    AGRATIO 1.1 03/04/2022    GLOB 3.2 07/17/2019         Lab Results   Component Value Date    CHOL 130 04/17/2022    CHOL 148 10/21/2021    CHOL 151 10/27/2020     Lab Results   Component Value Date    TRIG 67 04/17/2022    TRIG 75 10/21/2021    TRIG 81 10/27/2020     Lab Results   Component Value Date    HDL 67 (H) 04/17/2022    HDL 75 (H) 10/21/2021    HDL 72 (H) 10/27/2020     No components found for: \"LDLCHOLESTEROL\", \"LDLCALC\"    Lab Results   Component Value Date    VLDL 13 04/17/2022    VLDL 15 10/21/2021    VLDL 16 10/27/2020     No results found for: \"CHOLHDLRATIO\"    Lab Results   Component Value Date    INR 1.13 11/08/2019    INR 1.13 09/05/2019    INR 1.04 07/17/2019    PROTIME 12.9 11/08/2019    PROTIME 12.9 09/05/2019    PROTIME 11.8 07/17/2019       The ASCVD Risk score (Rizwana DK, et al., 2019) failed to calculate for the following reasons:    The 2019 ASCVD risk score is only valid for ages 40 to 79    The patient has a prior MI or stroke diagnosis      Assessment / Plan:      Diagnosis Orders   1. Chronic systolic CHF (congestive heart failure) (HCC)        2. Wild-type transthyretin-related (ATTR) amyloidosis (HCC)                 1. Chronic HFrEF:   Patient has systolic heart failure due to nonischemic cardiomyopathy (most likely due to prior history of severe

## 2024-06-03 RX ORDER — CARVEDILOL 12.5 MG/1
12.5 TABLET ORAL 2 TIMES DAILY
Qty: 180 TABLET | Refills: 3 | Status: SHIPPED | OUTPATIENT
Start: 2024-06-03

## 2024-06-03 NOTE — TELEPHONE ENCOUNTER
Requested Prescriptions     Pending Prescriptions Disp Refills    carvedilol (COREG) 12.5 MG tablet [Pharmacy Med Name: Carvedilol 12.5 MG Oral Tablet] 180 tablet 3     Sig: Take 1 tablet by mouth twice daily            Last Office Visit: 5/29/2024     Next Office Visit: 11/22/2024       Last Labs: 05.01.2024

## 2024-06-11 DIAGNOSIS — I50.20 UNSPECIFIED SYSTOLIC (CONGESTIVE) HEART FAILURE (HCC): ICD-10-CM

## 2024-06-11 DIAGNOSIS — I10 ESSENTIAL (PRIMARY) HYPERTENSION: ICD-10-CM

## 2024-06-11 NOTE — TELEPHONE ENCOUNTER
Requested Prescriptions     Pending Prescriptions Disp Refills    VYNDAMAX 61 MG CAPS [Pharmacy Med Name: VYNDAMAX  61MG  Capsule] 90 capsule 3     Sig: TAKE 1 CAPSULE BY MOUTH ONCE EVERY DAY              Last Office Visit: 5/29/2024     Next Office Visit: 11/22/2024         Last Labs: 05.01.2024

## 2024-06-12 RX ORDER — TAFAMIDIS 61 MG/1
CAPSULE, LIQUID FILLED ORAL
Qty: 90 CAPSULE | Refills: 3 | Status: SHIPPED | OUTPATIENT
Start: 2024-06-12

## 2024-07-01 DIAGNOSIS — N18.30 CONTROLLED TYPE 2 DIABETES MELLITUS WITH STAGE 3 CHRONIC KIDNEY DISEASE, WITHOUT LONG-TERM CURRENT USE OF INSULIN (HCC): ICD-10-CM

## 2024-07-01 DIAGNOSIS — E11.22 CONTROLLED TYPE 2 DIABETES MELLITUS WITH STAGE 3 CHRONIC KIDNEY DISEASE, WITHOUT LONG-TERM CURRENT USE OF INSULIN (HCC): ICD-10-CM

## 2024-07-01 RX ORDER — SITAGLIPTIN 25 MG/1
25 TABLET, FILM COATED ORAL DAILY
Qty: 30 TABLET | Refills: 0 | Status: SHIPPED | OUTPATIENT
Start: 2024-07-01

## 2024-07-01 NOTE — TELEPHONE ENCOUNTER
Medication:   Requested Prescriptions     Pending Prescriptions Disp Refills    JANUVIA 25 MG tablet [Pharmacy Med Name: Januvia 25 MG Oral Tablet] 30 tablet 0     Sig: Take 1 tablet by mouth once daily        Last Filled: 05/20/2024      Patient Phone Number: [phone] (home)     Last appt: 11/6/2023   Next appt: Visit date not found    Last OARRS:        No data to display

## 2024-07-15 ENCOUNTER — TELEPHONE (OUTPATIENT)
Dept: FAMILY MEDICINE CLINIC | Age: 84
End: 2024-07-15

## 2024-07-15 NOTE — TELEPHONE ENCOUNTER
Batsheva pt wife called and stated that pt has the hiccups and she would like Dr Olea to send a prescription to the pharmacy to stop the hiccups

## 2024-07-29 DIAGNOSIS — E11.22 CONTROLLED TYPE 2 DIABETES MELLITUS WITH STAGE 3 CHRONIC KIDNEY DISEASE, WITHOUT LONG-TERM CURRENT USE OF INSULIN (HCC): ICD-10-CM

## 2024-07-29 DIAGNOSIS — N18.30 CONTROLLED TYPE 2 DIABETES MELLITUS WITH STAGE 3 CHRONIC KIDNEY DISEASE, WITHOUT LONG-TERM CURRENT USE OF INSULIN (HCC): ICD-10-CM

## 2024-07-29 NOTE — TELEPHONE ENCOUNTER
Medication:   Requested Prescriptions     Pending Prescriptions Disp Refills    JANUVIA 25 MG tablet [Pharmacy Med Name: Januvia 25 MG Oral Tablet] 30 tablet 0     Sig: Take 1 tablet by mouth once daily        Last Filled: 07/01/2024      Patient Phone Number: [phone] (home)     Last appt: 11/6/2023   Next appt: Visit date not found    Last OARRS:        No data to display                Medication:   Requested Prescriptions     Pending Prescriptions Disp Refills    JANUVIA 25 MG tablet [Pharmacy Med Name: Januvia 25 MG Oral Tablet] 30 tablet 0     Sig: Take 1 tablet by mouth once daily        Last Filled:      Patient Phone Number: [phone] (home)     Last appt: 11/6/2023   Next appt: Visit date not found    Last OARRS:        No data to display

## 2024-08-08 ENCOUNTER — APPOINTMENT (OUTPATIENT)
Age: 84
DRG: 291 | End: 2024-08-08
Attending: STUDENT IN AN ORGANIZED HEALTH CARE EDUCATION/TRAINING PROGRAM
Payer: MEDICARE

## 2024-08-08 ENCOUNTER — HOSPITAL ENCOUNTER (INPATIENT)
Age: 84
LOS: 4 days | Discharge: HOME | DRG: 291 | End: 2024-08-12
Attending: EMERGENCY MEDICINE | Admitting: STUDENT IN AN ORGANIZED HEALTH CARE EDUCATION/TRAINING PROGRAM
Payer: MEDICARE

## 2024-08-08 ENCOUNTER — APPOINTMENT (OUTPATIENT)
Dept: GENERAL RADIOLOGY | Age: 84
DRG: 291 | End: 2024-08-08
Payer: MEDICARE

## 2024-08-08 DIAGNOSIS — I21.4 NSTEMI (NON-ST ELEVATED MYOCARDIAL INFARCTION) (HCC): Primary | ICD-10-CM

## 2024-08-08 DIAGNOSIS — N18.32 STAGE 3B CHRONIC KIDNEY DISEASE (HCC): ICD-10-CM

## 2024-08-08 DIAGNOSIS — Z86.79 HISTORY OF CHRONIC CHF: ICD-10-CM

## 2024-08-08 DIAGNOSIS — R06.09 DOE (DYSPNEA ON EXERTION): ICD-10-CM

## 2024-08-08 DIAGNOSIS — E11.65 TYPE 2 DIABETES MELLITUS WITH HYPERGLYCEMIA, WITHOUT LONG-TERM CURRENT USE OF INSULIN (HCC): ICD-10-CM

## 2024-08-08 DIAGNOSIS — I50.9 CONGESTIVE HEART FAILURE, UNSPECIFIED HF CHRONICITY, UNSPECIFIED HEART FAILURE TYPE (HCC): ICD-10-CM

## 2024-08-08 LAB
ALBUMIN SERPL-MCNC: 3.5 G/DL (ref 3.4–5)
ALBUMIN/GLOB SERPL: 1.1 {RATIO} (ref 1.1–2.2)
ALP SERPL-CCNC: 282 U/L (ref 40–129)
ALT SERPL-CCNC: 37 U/L (ref 10–40)
ANION GAP SERPL CALCULATED.3IONS-SCNC: 13 MMOL/L (ref 3–16)
ANION GAP SERPL CALCULATED.3IONS-SCNC: 16 MMOL/L (ref 3–16)
APTT BLD: 28.7 SEC (ref 22.1–36.4)
AST SERPL-CCNC: 41 U/L (ref 15–37)
BASE EXCESS BLDV CALC-SCNC: -4.1 MMOL/L (ref -2–3)
BASOPHILS # BLD: 0 K/UL (ref 0–0.2)
BASOPHILS NFR BLD: 0.7 %
BILIRUB SERPL-MCNC: 0.5 MG/DL (ref 0–1)
BUN SERPL-MCNC: 22 MG/DL (ref 7–20)
BUN SERPL-MCNC: 22 MG/DL (ref 7–20)
CALCIUM SERPL-MCNC: 9 MG/DL (ref 8.3–10.6)
CALCIUM SERPL-MCNC: 9.3 MG/DL (ref 8.3–10.6)
CHLORIDE SERPL-SCNC: 102 MMOL/L (ref 99–110)
CHLORIDE SERPL-SCNC: 103 MMOL/L (ref 99–110)
CO2 BLDV-SCNC: 23 MMOL/L
CO2 SERPL-SCNC: 16 MMOL/L (ref 21–32)
CO2 SERPL-SCNC: 20 MMOL/L (ref 21–32)
COHGB MFR BLDV: 1.1 % (ref 0–1.5)
CREAT SERPL-MCNC: 2.3 MG/DL (ref 0.8–1.3)
CREAT SERPL-MCNC: 2.4 MG/DL (ref 0.8–1.3)
DEPRECATED RDW RBC AUTO: 18.6 % (ref 12.4–15.4)
DO-HGB MFR BLDV: 74.2 %
EKG ATRIAL RATE: 75 BPM
EKG DIAGNOSIS: NORMAL
EKG Q-T INTERVAL: 474 MS
EKG QRS DURATION: 170 MS
EKG QTC CALCULATION (BAZETT): 589 MS
EKG R AXIS: 266 DEGREES
EKG T AXIS: 67 DEGREES
EKG VENTRICULAR RATE: 93 BPM
EOSINOPHIL # BLD: 0.1 K/UL (ref 0–0.6)
EOSINOPHIL NFR BLD: 0.7 %
GFR SERPLBLD CREATININE-BSD FMLA CKD-EPI: 26 ML/MIN/{1.73_M2}
GFR SERPLBLD CREATININE-BSD FMLA CKD-EPI: 27 ML/MIN/{1.73_M2}
GLUCOSE BLD-MCNC: 138 MG/DL (ref 70–99)
GLUCOSE BLD-MCNC: 142 MG/DL (ref 70–99)
GLUCOSE BLD-MCNC: 205 MG/DL (ref 70–99)
GLUCOSE SERPL-MCNC: 206 MG/DL (ref 70–99)
GLUCOSE SERPL-MCNC: 233 MG/DL (ref 70–99)
HCO3 BLDV-SCNC: 22 MMOL/L (ref 24–28)
HCT VFR BLD AUTO: 32.9 % (ref 40.5–52.5)
HGB BLD-MCNC: 10.8 G/DL (ref 13.5–17.5)
INR PPP: 1.15 (ref 0.85–1.15)
LYMPHOCYTES # BLD: 0.8 K/UL (ref 1–5.1)
LYMPHOCYTES NFR BLD: 11.4 %
MAGNESIUM SERPL-MCNC: 2 MG/DL (ref 1.8–2.4)
MCH RBC QN AUTO: 28.1 PG (ref 26–34)
MCHC RBC AUTO-ENTMCNC: 33 G/DL (ref 31–36)
MCV RBC AUTO: 85.3 FL (ref 80–100)
METHGB MFR BLDV: 0.6 % (ref 0–1.5)
MONOCYTES # BLD: 0.6 K/UL (ref 0–1.3)
MONOCYTES NFR BLD: 8 %
NEUTROPHILS # BLD: 5.6 K/UL (ref 1.7–7.7)
NEUTROPHILS NFR BLD: 79.2 %
NT-PROBNP SERPL-MCNC: ABNORMAL PG/ML (ref 0–449)
PCO2 BLDV: 43.3 MMHG (ref 41–51)
PERFORMED ON: ABNORMAL
PH BLDV: 7.31 [PH] (ref 7.35–7.45)
PLATELET # BLD AUTO: 231 K/UL (ref 135–450)
PMV BLD AUTO: 9.3 FL (ref 5–10.5)
PO2 BLDV: <30 MMHG (ref 25–40)
POTASSIUM SERPL-SCNC: 4.2 MMOL/L (ref 3.5–5.1)
POTASSIUM SERPL-SCNC: 4.5 MMOL/L (ref 3.5–5.1)
PROT SERPL-MCNC: 6.6 G/DL (ref 6.4–8.2)
PROTHROMBIN TIME: 14.9 SEC (ref 11.9–14.9)
RBC # BLD AUTO: 3.85 M/UL (ref 4.2–5.9)
SAO2 % BLDV: 25 %
SODIUM SERPL-SCNC: 135 MMOL/L (ref 136–145)
SODIUM SERPL-SCNC: 135 MMOL/L (ref 136–145)
TROPONIN, HIGH SENSITIVITY: 102 NG/L (ref 0–22)
TROPONIN, HIGH SENSITIVITY: 102 NG/L (ref 0–22)
TROPONIN, HIGH SENSITIVITY: 86 NG/L (ref 0–22)
TROPONIN, HIGH SENSITIVITY: 99 NG/L (ref 0–22)
WBC # BLD AUTO: 7.1 K/UL (ref 4–11)

## 2024-08-08 PROCEDURE — 84484 ASSAY OF TROPONIN QUANT: CPT

## 2024-08-08 PROCEDURE — 80053 COMPREHEN METABOLIC PANEL: CPT

## 2024-08-08 PROCEDURE — 71045 X-RAY EXAM CHEST 1 VIEW: CPT

## 2024-08-08 PROCEDURE — 99285 EMERGENCY DEPT VISIT HI MDM: CPT

## 2024-08-08 PROCEDURE — 93005 ELECTROCARDIOGRAM TRACING: CPT

## 2024-08-08 PROCEDURE — 6370000000 HC RX 637 (ALT 250 FOR IP)

## 2024-08-08 PROCEDURE — 6360000002 HC RX W HCPCS

## 2024-08-08 PROCEDURE — 83735 ASSAY OF MAGNESIUM: CPT

## 2024-08-08 PROCEDURE — 83880 ASSAY OF NATRIURETIC PEPTIDE: CPT

## 2024-08-08 PROCEDURE — 2580000003 HC RX 258

## 2024-08-08 PROCEDURE — 93005 ELECTROCARDIOGRAM TRACING: CPT | Performed by: PHYSICIAN ASSISTANT

## 2024-08-08 PROCEDURE — 6370000000 HC RX 637 (ALT 250 FOR IP): Performed by: STUDENT IN AN ORGANIZED HEALTH CARE EDUCATION/TRAINING PROGRAM

## 2024-08-08 PROCEDURE — 85730 THROMBOPLASTIN TIME PARTIAL: CPT

## 2024-08-08 PROCEDURE — 6360000002 HC RX W HCPCS: Performed by: PHYSICIAN ASSISTANT

## 2024-08-08 PROCEDURE — 2060000000 HC ICU INTERMEDIATE R&B

## 2024-08-08 PROCEDURE — 85610 PROTHROMBIN TIME: CPT

## 2024-08-08 PROCEDURE — 82803 BLOOD GASES ANY COMBINATION: CPT

## 2024-08-08 PROCEDURE — 85025 COMPLETE CBC W/AUTO DIFF WBC: CPT

## 2024-08-08 PROCEDURE — 93005 ELECTROCARDIOGRAM TRACING: CPT | Performed by: STUDENT IN AN ORGANIZED HEALTH CARE EDUCATION/TRAINING PROGRAM

## 2024-08-08 PROCEDURE — 2580000003 HC RX 258: Performed by: STUDENT IN AN ORGANIZED HEALTH CARE EDUCATION/TRAINING PROGRAM

## 2024-08-08 PROCEDURE — 36415 COLL VENOUS BLD VENIPUNCTURE: CPT

## 2024-08-08 RX ORDER — DEXTROSE MONOHYDRATE 100 MG/ML
INJECTION, SOLUTION INTRAVENOUS CONTINUOUS PRN
Status: DISCONTINUED | OUTPATIENT
Start: 2024-08-08 | End: 2024-08-08 | Stop reason: SDUPTHER

## 2024-08-08 RX ORDER — GLUCAGON 1 MG/ML
1 KIT INJECTION PRN
Status: DISCONTINUED | OUTPATIENT
Start: 2024-08-08 | End: 2024-08-08 | Stop reason: SDUPTHER

## 2024-08-08 RX ORDER — HEPARIN SODIUM 5000 [USP'U]/ML
5000 INJECTION, SOLUTION INTRAVENOUS; SUBCUTANEOUS EVERY 8 HOURS SCHEDULED
Status: DISCONTINUED | OUTPATIENT
Start: 2024-08-08 | End: 2024-08-08 | Stop reason: SDUPTHER

## 2024-08-08 RX ORDER — ATORVASTATIN CALCIUM 40 MG/1
40 TABLET, FILM COATED ORAL NIGHTLY
Status: DISCONTINUED | OUTPATIENT
Start: 2024-08-08 | End: 2024-08-12 | Stop reason: HOSPADM

## 2024-08-08 RX ORDER — CARVEDILOL 12.5 MG/1
12.5 TABLET ORAL 2 TIMES DAILY
Status: DISCONTINUED | OUTPATIENT
Start: 2024-08-08 | End: 2024-08-08

## 2024-08-08 RX ORDER — ACETAMINOPHEN 325 MG/1
650 TABLET ORAL EVERY 6 HOURS PRN
Status: DISCONTINUED | OUTPATIENT
Start: 2024-08-08 | End: 2024-08-12 | Stop reason: HOSPADM

## 2024-08-08 RX ORDER — SODIUM CHLORIDE 0.9 % (FLUSH) 0.9 %
5-40 SYRINGE (ML) INJECTION EVERY 12 HOURS SCHEDULED
Status: DISCONTINUED | OUTPATIENT
Start: 2024-08-08 | End: 2024-08-12 | Stop reason: HOSPADM

## 2024-08-08 RX ORDER — SODIUM CHLORIDE 0.9 % (FLUSH) 0.9 %
5-40 SYRINGE (ML) INJECTION PRN
Status: DISCONTINUED | OUTPATIENT
Start: 2024-08-08 | End: 2024-08-12 | Stop reason: HOSPADM

## 2024-08-08 RX ORDER — ONDANSETRON 2 MG/ML
4 INJECTION INTRAMUSCULAR; INTRAVENOUS EVERY 6 HOURS PRN
Status: DISCONTINUED | OUTPATIENT
Start: 2024-08-08 | End: 2024-08-08

## 2024-08-08 RX ORDER — ASPIRIN 81 MG/1
81 TABLET ORAL DAILY
Status: DISCONTINUED | OUTPATIENT
Start: 2024-08-08 | End: 2024-08-12 | Stop reason: HOSPADM

## 2024-08-08 RX ORDER — FUROSEMIDE 20 MG
20 TABLET ORAL 2 TIMES DAILY
Status: DISCONTINUED | OUTPATIENT
Start: 2024-08-08 | End: 2024-08-09

## 2024-08-08 RX ORDER — POLYETHYLENE GLYCOL 3350 17 G/17G
17 POWDER, FOR SOLUTION ORAL DAILY PRN
Status: DISCONTINUED | OUTPATIENT
Start: 2024-08-08 | End: 2024-08-08 | Stop reason: SDUPTHER

## 2024-08-08 RX ORDER — ACETAMINOPHEN 650 MG/1
650 SUPPOSITORY RECTAL EVERY 6 HOURS PRN
Status: DISCONTINUED | OUTPATIENT
Start: 2024-08-08 | End: 2024-08-08 | Stop reason: SDUPTHER

## 2024-08-08 RX ORDER — INSULIN LISPRO 100 [IU]/ML
0-4 INJECTION, SOLUTION INTRAVENOUS; SUBCUTANEOUS NIGHTLY
Status: DISCONTINUED | OUTPATIENT
Start: 2024-08-08 | End: 2024-08-12 | Stop reason: HOSPADM

## 2024-08-08 RX ORDER — ALOGLIPTIN 6.25 MG/1
6.25 TABLET, FILM COATED ORAL DAILY
Status: DISCONTINUED | OUTPATIENT
Start: 2024-08-08 | End: 2024-08-08

## 2024-08-08 RX ORDER — INSULIN LISPRO 100 [IU]/ML
0-8 INJECTION, SOLUTION INTRAVENOUS; SUBCUTANEOUS
Status: DISCONTINUED | OUTPATIENT
Start: 2024-08-08 | End: 2024-08-12 | Stop reason: HOSPADM

## 2024-08-08 RX ORDER — ONDANSETRON 4 MG/1
4 TABLET, ORALLY DISINTEGRATING ORAL EVERY 8 HOURS PRN
Status: DISCONTINUED | OUTPATIENT
Start: 2024-08-08 | End: 2024-08-08

## 2024-08-08 RX ORDER — METOPROLOL TARTRATE 25 MG/1
25 TABLET, FILM COATED ORAL 2 TIMES DAILY
Status: DISCONTINUED | OUTPATIENT
Start: 2024-08-08 | End: 2024-08-09

## 2024-08-08 RX ORDER — GLUCAGON 1 MG/ML
1 KIT INJECTION PRN
Status: DISCONTINUED | OUTPATIENT
Start: 2024-08-08 | End: 2024-08-12 | Stop reason: HOSPADM

## 2024-08-08 RX ORDER — FUROSEMIDE 10 MG/ML
20 INJECTION INTRAMUSCULAR; INTRAVENOUS ONCE
Status: COMPLETED | OUTPATIENT
Start: 2024-08-08 | End: 2024-08-08

## 2024-08-08 RX ORDER — INSULIN GLARGINE 100 [IU]/ML
5 INJECTION, SOLUTION SUBCUTANEOUS NIGHTLY
Status: DISCONTINUED | OUTPATIENT
Start: 2024-08-08 | End: 2024-08-12 | Stop reason: HOSPADM

## 2024-08-08 RX ORDER — ACETAMINOPHEN 325 MG/1
650 TABLET ORAL EVERY 6 HOURS PRN
Status: DISCONTINUED | OUTPATIENT
Start: 2024-08-08 | End: 2024-08-08 | Stop reason: SDUPTHER

## 2024-08-08 RX ORDER — DEXTROSE MONOHYDRATE 100 MG/ML
INJECTION, SOLUTION INTRAVENOUS CONTINUOUS PRN
Status: DISCONTINUED | OUTPATIENT
Start: 2024-08-08 | End: 2024-08-12 | Stop reason: HOSPADM

## 2024-08-08 RX ORDER — ACETAMINOPHEN 650 MG/1
650 SUPPOSITORY RECTAL EVERY 6 HOURS PRN
Status: DISCONTINUED | OUTPATIENT
Start: 2024-08-08 | End: 2024-08-12 | Stop reason: HOSPADM

## 2024-08-08 RX ORDER — POLYETHYLENE GLYCOL 3350 17 G/17G
17 POWDER, FOR SOLUTION ORAL DAILY PRN
Status: DISCONTINUED | OUTPATIENT
Start: 2024-08-08 | End: 2024-08-12 | Stop reason: HOSPADM

## 2024-08-08 RX ORDER — SODIUM CHLORIDE 9 MG/ML
INJECTION, SOLUTION INTRAVENOUS PRN
Status: DISCONTINUED | OUTPATIENT
Start: 2024-08-08 | End: 2024-08-12 | Stop reason: HOSPADM

## 2024-08-08 RX ORDER — HEPARIN SODIUM 5000 [USP'U]/ML
5000 INJECTION, SOLUTION INTRAVENOUS; SUBCUTANEOUS EVERY 8 HOURS SCHEDULED
Status: DISCONTINUED | OUTPATIENT
Start: 2024-08-08 | End: 2024-08-09

## 2024-08-08 RX ADMIN — SODIUM CHLORIDE, PRESERVATIVE FREE 10 ML: 5 INJECTION INTRAVENOUS at 14:50

## 2024-08-08 RX ADMIN — FUROSEMIDE 20 MG: 20 TABLET ORAL at 17:34

## 2024-08-08 RX ADMIN — ATORVASTATIN CALCIUM 40 MG: 40 TABLET, FILM COATED ORAL at 21:15

## 2024-08-08 RX ADMIN — HEPARIN SODIUM 5000 UNITS: 5000 INJECTION INTRAVENOUS; SUBCUTANEOUS at 14:53

## 2024-08-08 RX ADMIN — FUROSEMIDE 20 MG: 10 INJECTION, SOLUTION INTRAMUSCULAR; INTRAVENOUS at 11:45

## 2024-08-08 RX ADMIN — HEPARIN SODIUM 5000 UNITS: 5000 INJECTION INTRAVENOUS; SUBCUTANEOUS at 21:14

## 2024-08-08 RX ADMIN — INSULIN GLARGINE 5 UNITS: 100 INJECTION, SOLUTION SUBCUTANEOUS at 21:15

## 2024-08-08 RX ADMIN — METOPROLOL TARTRATE 25 MG: 25 TABLET, FILM COATED ORAL at 18:45

## 2024-08-08 RX ADMIN — SACUBITRIL AND VALSARTAN 1 TABLET: 24; 26 TABLET, FILM COATED ORAL at 21:16

## 2024-08-08 RX ADMIN — AMIODARONE HYDROCHLORIDE 1 MG/MIN: 50 INJECTION, SOLUTION INTRAVENOUS at 18:35

## 2024-08-08 ASSESSMENT — LIFESTYLE VARIABLES
HOW MANY STANDARD DRINKS CONTAINING ALCOHOL DO YOU HAVE ON A TYPICAL DAY: PATIENT DOES NOT DRINK
HOW OFTEN DO YOU HAVE A DRINK CONTAINING ALCOHOL: NEVER

## 2024-08-08 ASSESSMENT — PAIN - FUNCTIONAL ASSESSMENT: PAIN_FUNCTIONAL_ASSESSMENT: NONE - DENIES PAIN

## 2024-08-08 NOTE — PROGRESS NOTES
Pt arrived on floor. A/O x4 but disoriented at times. No skin issue noted. Tele and IV placed. Assessment completed. VS stable. Bed lowest position, bed locked, grippers socks on and call light within reach. Oriented to call light and room.

## 2024-08-08 NOTE — H&P
Internal Medicine  PGY 3  History & Physical      CC SOB    History Obtained From:  patient    HISTORY OF PRESENT ILLNESS:  This is an 83-year-old male with past medical history of HFrEF of 25 to 30% with G3DD, cardiac amyloidosis, T2DM with right toe amputation, HTN, hyperlipidemia who presented to the ER complaining of shortness of breath.  Patient endorsing week ago he had some subjective fever and cough which resolved but had become short of breath at that time.  Cough resolved, but shortness of breath has persisted and got worse over the last 2 days.  Patient endorsed becoming short of breath with mild exertion especially walking from his bedroom to his bathroom.  He however denied any chest pain or palpitations.  He decided to come get evaluated today.    ER course  Vitals were stable  Troponin was elevated 99--> 86, EKG showed wide QRS with prolonged QTc and right bundle branch block.  Patient denies any chest pain.  proBNP [place] at [address], patient is on room air, with clear lungs and no peripheral edema  Chest x-ray is normal  Labs showed stable creatinine, with no significant electrolyte abnormalities  Patient received a dose of Lasix 20 mg IV    Patient was seen and evaluated in the ER he was laying in bed comfortably, denied any acute complaints.  He is on room air saturating well and already vitals were stable.  He denied any chest pain, shortness of breath or orthopnea.  Lungs were clear on exam no central or peripheral edema.  Patient will be admitted for further evaluation.      Past Medical History:        Diagnosis Date    Abdominal hernia     Arthritis     MILD    CHF (congestive heart failure) ([facility])     7/2022 ECHO: severe concentric LVH, EF 25-30%, Global left ventricular hypokinesis, mild MR, TR, ID, AR; non-ischemic,    CKD (chronic kidney disease) stage 3, GFR 30-59 ml/min ([facility])     Dr. YUDITH Umaña    Hyperlipidemia     Hypertension     RADHA (iron deficiency anemia)     Type II or unspecified  type diabetes mellitus without mention of complication, not stated as uncontrolled        Past Surgical History:        Procedure Laterality Date    HERNIA REPAIR      HIP SURGERY      OTHER SURGICAL HISTORY N/A 12/15/2015    LAPAROSCOPIC LEFT INGUINAL HERNIA REPAIR WITH MESH       Medications Priorto Admission:    Medications Prior to Admission: SITagliptin (JANUVIA) 25 MG tablet, Take 1 tablet by mouth daily  VYNDAMAX 61 MG CAPS, TAKE 1 CAPSULE BY MOUTH ONCE EVERY DAY  carvedilol (COREG) 12.5 MG tablet, Take 1 tablet by mouth twice daily  atorvastatin (LIPITOR) 40 MG tablet, Take 1 tablet by mouth daily  allopurinol (ZYLOPRIM) 100 MG tablet, Take 1 tablet by mouth daily  sacubitril-valsartan (ENTRESTO) 24-26 MG per tablet, Take 1 tablet by mouth 2 times daily  furosemide (LASIX) 20 MG tablet, Take 1 tablet by mouth 2 times daily (Patient taking differently: Take 1 tablet by mouth daily)  dorzolamide-timolol (COSOPT) 22.3-6.8 MG/ML ophthalmic solution, INSTILL 1 DROP INTO EACH EYE EVERY 12 HOURS  aspirin 81 MG EC tablet, Take 1 tablet by mouth daily    Allergies:  Patient has no known allergies.    Social History:   TOBACCO:   reports that he has never smoked. He has never used smokeless tobacco.  ETOH:   reports no history of alcohol use.  DRUGS :   Patient currently lives     Family History:       Problem Relation Age of Onset    High Blood Pressure Mother     High Blood Pressure Father     Heart Disease Father         cad    Cancer Sister        Review of Systems    ROS: A 10 point review of systems was conducted, significant findings as noted in HPI.    Physical Exam  Constitutional:       General: He is awake. He is not in acute distress.     Appearance: He is overweight. He is not ill-appearing, toxic-appearing or diaphoretic.   HENT:      Head: Normocephalic and atraumatic.      Right Ear: Hearing normal.      Left Ear: Hearing normal.      Nose: Nose normal.      Mouth/Throat:      Lips: Pink.      Mouth:

## 2024-08-08 NOTE — ED NOTES
ED TO INPATIENT SBAR HANDOFF    Patient Name: Pedro Becerra   :  1940  83 y.o.   MRN:  [id number]  ED Room #:  A04/A04-04  Family/Caregiver Present no   Restraints no   Sitter no   Sepsis Risk Score      Situation  Code Status: Full Code No additional code details.    Allergies: Patient has no known allergies.  Weight: Patient Vitals for the past 96 hrs (Last 3 readings):   Weight   24 0914 84.4 kg (186 lb 1.6 oz)     Arrived from: home  Chief Complaint:   Chief Complaint   Patient presents with    Shortness of Breath     Per EMS pt arrived on scene and pt was fatigue and SOB. EMS did EKG and found to be in AFIB and converted while in ambulance and feels better. Pt at this time reports SOB only with exertion.     Hospital Problem/Diagnosis:  Principal Problem:    Heart failure (HCC)  Resolved Problems:    * No resolved hospital problems. *    Imaging:   XR CHEST PORTABLE   Final Result      Patchy consolidation in the right lung base-atelectasis is favored, pneumonia   not excluded.      Left lung is clear.      Borderline cardiomegaly.            Electronically signed by Jj Mitchell        Abnormal labs:   Abnormal Labs Reviewed   CBC WITH AUTO DIFFERENTIAL - Abnormal; Notable for the following components:       Result Value    RBC 3.85 (*)     Hemoglobin 10.8 (*)     Hematocrit 32.9 (*)     RDW 18.6 (*)     Lymphocytes Absolute 0.8 (*)     All other components within normal limits   BASIC METABOLIC PANEL W/ REFLEX TO MG FOR LOW K - Abnormal; Notable for the following components:    Sodium 135 (*)     CO2 20 (*)     Glucose 233 (*)     BUN 22 (*)     Creatinine 2.4 (*)     Est, Glom Filt Rate 26 (*)     All other components within normal limits   BRAIN NATRIURETIC PEPTIDE - Abnormal; Notable for the following components:    Pro-BNP 24,655 (*)     All other components within normal limits   BLOOD GAS, VENOUS - Abnormal; Notable for the following components:    pH, Estrada 7.314 (*)     HCO3, Venous  22.0 (*)     Base Excess, Estrada -4.1 (*)     All other components within normal limits   TROPONIN - Abnormal; Notable for the following components:    Troponin, High Sensitivity 99 (*)     All other components within normal limits   TROPONIN - Abnormal; Notable for the following components:    Troponin, High Sensitivity 86 (*)     All other components within normal limits     Critical values: Yes BNP 18090     Abnormal Assessment Findings: Pt SOB w/ exertion.    Background  History:   Past Medical History:   Diagnosis Date    Abdominal hernia     Arthritis     MILD    CHF (congestive heart failure) (Formerly [facility])     7/2022 ECHO: severe concentric LVH, EF 25-30%, Global left ventricular hypokinesis, mild MR, TR, SC, AR; non-ischemic,    CKD (chronic kidney disease) stage 3, GFR 30-59 ml/min (Formerly [facility])     Dr. YUDITH Umaña    Hyperlipidemia     Hypertension     RADHA (iron deficiency anemia)     Type II or unspecified type diabetes mellitus without mention of complication, not stated as uncontrolled        Assessment    Vitals/MEWS: MEWS Score: 1  Level of Consciousness: Alert (0)   Vitals:    08/08/24 1030 08/08/24 1100 08/08/24 1130 08/08/24 1145   BP: 126/85 124/89 (!) 130/99 (!) 130/99   Pulse: 84 94 84 99   Resp: 21 21 22 17   Temp:       TempSrc:       SpO2: 97% 97% 100% 96%   Weight:         FiO2 (%):   O2 Flow Rate: O2 Device: None (Room air)    Cardiac Rhythm:    Pain Assessment:  [] Verbal [] Wu Baker Scale  Pain Scale: Pain Assessment  Pain Assessment: None - Denies Pain  Last documented pain score (0-10 scale)    Last documented pain medication administered:   Mental Status: alert  Orientation Level:    NIH Score:    C-SSRS: Risk of Suicide: No Risk  Bedside swallow:    Vishnu Coma Scale (GCS): [place] Coma Scale  Eye Opening: Spontaneous  Best Verbal Response: Confused  Best Motor Response: Obeys commands  [place] Coma Scale Score: 14  Active LDA's:   Peripheral IV 08/08/24 Proximal;Right Forearm (Active)                 PO  Status:   Pertinent or High Risk Medications/Drips:    If Yes, please provide details:   Pending Blood Product Administration:        You may also review the ED PT Care Timeline found under the Summary Nursing Index tab.    Recommendation    Pending orders No pending orders at this time  Plan for Discharge (if known):   Additional Comments: Alert x3 disoriented to time   If any further questions, please call Sending RN at [phone].    Electronically signed by: Electronically signed by Jatinder Samayoa RN on 8/8/2024 at 11:53 AM       Jatinder Samayoa RN  08/08/24 6598

## 2024-08-08 NOTE — ED PROVIDER NOTES
THE [facility]  EMERGENCY DEPARTMENT ENCOUNTER          PHYSICIAN ASSISTANT NOTE       Date of evaluation: 8/8/2024    Chief Complaint     Shortness of Breath (Per EMS pt arrived on scene and pt was fatigue and SOB. EMS did EKG and found to be in AFIB and converted while in ambulance and feels better. Pt at this time reports SOB only with exertion.)      History of Present Illness     Pedro Becerra is a 83 y.o. male, with a history of hypertension, hyperlipidemia, diabetes, stage III chronic kidney disease and CHF with a last known EF of 25 to 30% based on echo from 2022 (has declined ICD), who presents to the ED with complaints of dyspnea on exertion that has been progressively worsening over the last week, even when he walks throughout his house.  He denies change in his two-pillow orthopnea, no swelling in his legs.  States he had a mild \"cold\" approximately 1 week ago that resolved.  He had no associated chest pain.  No dizziness or lightheadedness.  He called paramedics this morning at the urging of his wife and reportedly he was in A-fib on their arrival but converted en route which and is in sinus rhythm here.  He said no fever, chills, nausea or vomiting.  No abdominal pain or changes in urinary bowel habits.  He otherwise has no complaints.    The patient is followed by his cardiologist, Dr. Mathews, was last seen in May 2024, noted to have a history of cardiac amyloidosis (ATTR), on Vyndamax, mild CAD on cath in 7/2019, no reported history of Afib.  Review of his medications indicate he is supposed to be taking Lasix twice daily but he believes he is only taking it once daily.    ASSESSMENT / PLAN  (MEDICAL DECISION MAKING)     INITIAL VITALS: BP: (!) 131/92, Temp: 97.6 °F (36.4 °C), Pulse: 92, Respirations: 18, SpO2: 100 %    Pedro Becerra is a 83 y.o. male with history of hypertension, hyperlipidemia, diabetes, stage III chronic kidney disease and CHF with an EF of 25 to 30% as well as cardiac  and/or Complexity of Data Reviewed  External Data Reviewed: labs, radiology, ECG and notes.  Labs: ordered. Decision-making details documented in ED Course.  Radiology: ordered. Decision-making details documented in ED Course.  ECG/medicine tests: ordered. Decision-making details documented in ED Course.  Discussion of management or test interpretation with external provider(s): The patient is discussed with the hospitalist for admission.    Risk  Prescription drug management.  Decision regarding hospitalization.        This patient was also evaluated by the attending physician. All care plans were discussed and agreed upon.    Clinical Impression     1. NSTEMI (non-ST elevated myocardial infarction) ([facility])    2. DRISCOLL (dyspnea on exertion)    3. History of chronic CHF    4. Stage 3b chronic kidney disease ([facility])    5. Type 2 diabetes mellitus with hyperglycemia, without long-term current use of insulin ([facility])        Disposition       DISPOSITION Admitted 08/08/2024 11:33:25 AM        Diagnostic Results and Other Data     EKG   Interpreted in conjunction with emergency department physician Shankar Jones MD  Rhythm:  Wide QRS rhythm  Rate: normal  Axis: right  Ectopy: none  Conduction: right bundle branch block (complete), bifasicular, and 1st degree AV block  ST Segments: no acute change  T Waves: no acute change  Q Waves:inferior leads  Clinical Impression: no acute changes other than increased rate today  Comparison:  9/2022    RECENT VITALS:  BP: (!) 131/92, Temp: 97.6 °F (36.4 °C), Pulse: 92, Respirations: 18, SpO2: 100 %     ED Course     Nursing Notes, Past Medical Hx,Past Surgical Hx, Social Hx, Allergies, and Family Hx were reviewed.         The patient was given the following medications:  Orders Placed This Encounter   Medications    furosemide (LASIX) injection 20 mg       CONSULTS:  None    Review of Systems     Review of Systems   Constitutional:  Negative for chills and fever.   Respiratory:  Positive

## 2024-08-08 NOTE — PROGRESS NOTES
Rapid response called due to increase  on tele. Amiodarone IV push given. Pt HR lowered. Currently on amiodarone drip. Will be transferring to PCU.

## 2024-08-08 NOTE — PLAN OF CARE
Problem: Discharge Planning  Goal: Discharge to home or other facility with appropriate resources  Outcome: Progressing  Flowsheets (Taken 8/8/2024 1644)  Discharge to home or other facility with appropriate resources: Identify barriers to discharge with patient and caregiver     Problem: ABCDS Injury Assessment  Goal: Absence of physical injury  Outcome: Progressing  Flowsheets (Taken 8/8/2024 1644)  Absence of Physical Injury: Implement safety measures based on patient assessment     Problem: Chronic Conditions and Co-morbidities  Goal: Patient's chronic conditions and co-morbidity symptoms are monitored and maintained or improved  8/8/2024 1644 by Emerald Chu, RN  Outcome: Progressing     Problem: Safety - Adult  Goal: Free from fall injury  Outcome: Progressing  Note: Pt remains free of fall. Bed lowest position, bed locked, grippers socks on and call light within reach.

## 2024-08-08 NOTE — SIGNIFICANT EVENT
Rapid was called around 6pm due to tachy in 190s  Vitally stable w/o chest pain, SOB  TeleStrip showed SVT/Vtach  EKG and electrolytes ordered stat.  It responded to carotid massage before arrival of ICU team  Rhythm converted to regular sinus   Lopressor 25mg BID advised  Order to Transfer to PCU    Rapid called again around 615 due to tachycardia  EKG showed Vtachy, vitally stable  No chest pain, SOB or symptoms  Amiodarone 150mg bolus given, amiodarone gtt ordered   Code STEMI was also called by RN due to automatic EKG interpretation.  Trops ordered, no increase from last trop   Electrolyes ordered and normokalemia and normo-magnesemia

## 2024-08-08 NOTE — ED PROVIDER NOTES
ED Attending Attestation Note     Date of evaluation: 8/8/2024    This patient was seen by the resident.  I have seen and examined the patient, agree with the workup, evaluation, management and diagnosis. The care plan has been discussed.  I have reviewed the ECG and concur with the resident's interpretation.  My assessment reveals awake alert male who for the last several weeks has had increasing dyspnea on exertion.  States he has difficulty even making it to the bathroom without being short of breath.  He is awake alert no respiratory distress..       Shankar Jones MD  08/08/24 0902

## 2024-08-08 NOTE — PROGRESS NOTES
_4 Eyes Skin Assessment     NAME:  Pedro Becerra  YOB: 1940  MEDICAL RECORD NUMBER:  [id number]    The patient is being assessed for  Admission    I agree that at least one RN has performed a thorough Head to Toe Skin Assessment on the patient. ALL assessment sites listed below have been assessed.      Areas assessed by both nurses:    Head, Face, Ears, Shoulders, Back, Chest, Arms, Elbows, Hands, Sacrum. Buttock, Coccyx, Ischium, Legs. Feet and Heels, and Under Medical Devices         Does the Patient have a Wound? No noted wound(s)       Federico Prevention initiated by RN: Yes  Wound Care Orders initiated by RN: No    Pressure Injury (Stage 3,4, Unstageable, DTI, NWPT, and Complex wounds) if present, place Wound referral order by RN under : No    New Ostomies, if present place, Ostomy referral order under : No     Nurse 1 eSignature: Electronically signed by ELIER OLIVA RN on 8/8/24 at 1:21 PM EDT    **SHARE this note so that the co-signing nurse can place an eSignature**    Nurse 2 eSignature: Electronically signed by Rafael Vigil RN on 8/8/24 at 7:25 PM EDT

## 2024-08-09 ENCOUNTER — APPOINTMENT (OUTPATIENT)
Age: 84
DRG: 291 | End: 2024-08-09
Attending: STUDENT IN AN ORGANIZED HEALTH CARE EDUCATION/TRAINING PROGRAM
Payer: MEDICARE

## 2024-08-09 LAB
ALBUMIN SERPL-MCNC: 3.6 G/DL (ref 3.4–5)
ANION GAP SERPL CALCULATED.3IONS-SCNC: 15 MMOL/L (ref 3–16)
BASOPHILS # BLD: 0 K/UL (ref 0–0.2)
BASOPHILS NFR BLD: 0.4 %
BUN SERPL-MCNC: 24 MG/DL (ref 7–20)
CALCIUM SERPL-MCNC: 9.5 MG/DL (ref 8.3–10.6)
CHLORIDE SERPL-SCNC: 103 MMOL/L (ref 99–110)
CO2 SERPL-SCNC: 19 MMOL/L (ref 21–32)
CREAT SERPL-MCNC: 2.4 MG/DL (ref 0.8–1.3)
DEPRECATED RDW RBC AUTO: 18.4 % (ref 12.4–15.4)
EOSINOPHIL # BLD: 0 K/UL (ref 0–0.6)
EOSINOPHIL NFR BLD: 0.5 %
GFR SERPLBLD CREATININE-BSD FMLA CKD-EPI: 26 ML/MIN/{1.73_M2}
GLUCOSE BLD-MCNC: 131 MG/DL (ref 70–99)
GLUCOSE BLD-MCNC: 168 MG/DL (ref 70–99)
GLUCOSE BLD-MCNC: 172 MG/DL (ref 70–99)
GLUCOSE BLD-MCNC: 174 MG/DL (ref 70–99)
GLUCOSE SERPL-MCNC: 167 MG/DL (ref 70–99)
HCT VFR BLD AUTO: 34.2 % (ref 40.5–52.5)
HGB BLD-MCNC: 11 G/DL (ref 13.5–17.5)
LYMPHOCYTES # BLD: 1.4 K/UL (ref 1–5.1)
LYMPHOCYTES NFR BLD: 19 %
MAGNESIUM SERPL-MCNC: 2 MG/DL (ref 1.8–2.4)
MCH RBC QN AUTO: 27.1 PG (ref 26–34)
MCHC RBC AUTO-ENTMCNC: 32.1 G/DL (ref 31–36)
MCV RBC AUTO: 84.6 FL (ref 80–100)
MONOCYTES # BLD: 1.1 K/UL (ref 0–1.3)
MONOCYTES NFR BLD: 15 %
NEUTROPHILS # BLD: 4.7 K/UL (ref 1.7–7.7)
NEUTROPHILS NFR BLD: 65.1 %
PERFORMED ON: ABNORMAL
PHOSPHATE SERPL-MCNC: 3.5 MG/DL (ref 2.5–4.9)
PLATELET # BLD AUTO: 237 K/UL (ref 135–450)
PMV BLD AUTO: 9.1 FL (ref 5–10.5)
POTASSIUM SERPL-SCNC: 4 MMOL/L (ref 3.5–5.1)
RBC # BLD AUTO: 4.05 M/UL (ref 4.2–5.9)
SODIUM SERPL-SCNC: 137 MMOL/L (ref 136–145)
WBC # BLD AUTO: 7.2 K/UL (ref 4–11)

## 2024-08-09 PROCEDURE — 93306 TTE W/DOPPLER COMPLETE: CPT

## 2024-08-09 PROCEDURE — 2580000003 HC RX 258

## 2024-08-09 PROCEDURE — 6370000000 HC RX 637 (ALT 250 FOR IP): Performed by: NURSE PRACTITIONER

## 2024-08-09 PROCEDURE — 6360000002 HC RX W HCPCS

## 2024-08-09 PROCEDURE — 6370000000 HC RX 637 (ALT 250 FOR IP)

## 2024-08-09 PROCEDURE — 85025 COMPLETE CBC W/AUTO DIFF WBC: CPT

## 2024-08-09 PROCEDURE — 83735 ASSAY OF MAGNESIUM: CPT

## 2024-08-09 PROCEDURE — 6370000000 HC RX 637 (ALT 250 FOR IP): Performed by: STUDENT IN AN ORGANIZED HEALTH CARE EDUCATION/TRAINING PROGRAM

## 2024-08-09 PROCEDURE — 93005 ELECTROCARDIOGRAM TRACING: CPT

## 2024-08-09 PROCEDURE — 99291 CRITICAL CARE FIRST HOUR: CPT | Performed by: INTERNAL MEDICINE

## 2024-08-09 PROCEDURE — 80069 RENAL FUNCTION PANEL: CPT

## 2024-08-09 PROCEDURE — 2060000000 HC ICU INTERMEDIATE R&B

## 2024-08-09 PROCEDURE — 51798 US URINE CAPACITY MEASURE: CPT

## 2024-08-09 PROCEDURE — 36415 COLL VENOUS BLD VENIPUNCTURE: CPT

## 2024-08-09 RX ORDER — LANOLIN ALCOHOL/MO/W.PET/CERES
6 CREAM (GRAM) TOPICAL ONCE AS NEEDED
Status: COMPLETED | OUTPATIENT
Start: 2024-08-09 | End: 2024-08-09

## 2024-08-09 RX ORDER — BISMUTH SUBSALICYLATE 262 MG/1
524 TABLET, CHEWABLE ORAL ONCE
Status: COMPLETED | OUTPATIENT
Start: 2024-08-09 | End: 2024-08-09

## 2024-08-09 RX ORDER — MECOBALAMIN 5000 MCG
5 TABLET,DISINTEGRATING ORAL NIGHTLY PRN
Status: DISCONTINUED | OUTPATIENT
Start: 2024-08-09 | End: 2024-08-12 | Stop reason: HOSPADM

## 2024-08-09 RX ORDER — METOPROLOL TARTRATE 50 MG
50 TABLET ORAL 2 TIMES DAILY
Status: DISCONTINUED | OUTPATIENT
Start: 2024-08-09 | End: 2024-08-12 | Stop reason: HOSPADM

## 2024-08-09 RX ORDER — FUROSEMIDE 10 MG/ML
20 INJECTION INTRAMUSCULAR; INTRAVENOUS EVERY 8 HOURS
Status: DISCONTINUED | OUTPATIENT
Start: 2024-08-09 | End: 2024-08-11

## 2024-08-09 RX ORDER — MECOBALAMIN 5000 MCG
5 TABLET,DISINTEGRATING ORAL
Status: DISCONTINUED | OUTPATIENT
Start: 2024-08-09 | End: 2024-08-09

## 2024-08-09 RX ORDER — FUROSEMIDE 10 MG/ML
20 INJECTION INTRAMUSCULAR; INTRAVENOUS EVERY 8 HOURS
Status: DISCONTINUED | OUTPATIENT
Start: 2024-08-09 | End: 2024-08-09

## 2024-08-09 RX ADMIN — SACUBITRIL AND VALSARTAN 1 TABLET: 24; 26 TABLET, FILM COATED ORAL at 08:00

## 2024-08-09 RX ADMIN — BISMUTH SUBSALICYLATE 524 MG: 262 TABLET, CHEWABLE ORAL at 18:56

## 2024-08-09 RX ADMIN — ASPIRIN 81 MG: 81 TABLET, COATED ORAL at 08:00

## 2024-08-09 RX ADMIN — FUROSEMIDE 20 MG: 20 TABLET ORAL at 07:59

## 2024-08-09 RX ADMIN — Medication 5 MG: at 23:49

## 2024-08-09 RX ADMIN — ALUMINUM HYDROXIDE, MAGNESIUM HYDROXIDE, DIMETHICONE: 400; 400; 40 SUSPENSION ORAL at 12:11

## 2024-08-09 RX ADMIN — AMIODARONE HYDROCHLORIDE 0.5 MG/MIN: 50 INJECTION, SOLUTION INTRAVENOUS at 23:51

## 2024-08-09 RX ADMIN — Medication 6 MG: at 03:24

## 2024-08-09 RX ADMIN — APIXABAN 2.5 MG: 2.5 TABLET, FILM COATED ORAL at 21:27

## 2024-08-09 RX ADMIN — APIXABAN 2.5 MG: 2.5 TABLET, FILM COATED ORAL at 12:20

## 2024-08-09 RX ADMIN — METOPROLOL TARTRATE 25 MG: 25 TABLET, FILM COATED ORAL at 07:59

## 2024-08-09 RX ADMIN — METOPROLOL TARTRATE 50 MG: 50 TABLET, FILM COATED ORAL at 21:27

## 2024-08-09 RX ADMIN — SACUBITRIL AND VALSARTAN 1 TABLET: 24; 26 TABLET, FILM COATED ORAL at 21:27

## 2024-08-09 RX ADMIN — AMIODARONE HYDROCHLORIDE 0.5 MG/MIN: 50 INJECTION, SOLUTION INTRAVENOUS at 08:14

## 2024-08-09 RX ADMIN — HEPARIN SODIUM 5000 UNITS: 5000 INJECTION INTRAVENOUS; SUBCUTANEOUS at 06:09

## 2024-08-09 RX ADMIN — FUROSEMIDE 20 MG: 10 INJECTION, SOLUTION INTRAMUSCULAR; INTRAVENOUS at 23:44

## 2024-08-09 RX ADMIN — ATORVASTATIN CALCIUM 40 MG: 40 TABLET, FILM COATED ORAL at 21:27

## 2024-08-09 RX ADMIN — FUROSEMIDE 20 MG: 10 INJECTION, SOLUTION INTRAMUSCULAR; INTRAVENOUS at 15:46

## 2024-08-09 RX ADMIN — SODIUM CHLORIDE, PRESERVATIVE FREE 10 ML: 5 INJECTION INTRAVENOUS at 21:27

## 2024-08-09 RX ADMIN — INSULIN GLARGINE 5 UNITS: 100 INJECTION, SOLUTION SUBCUTANEOUS at 21:28

## 2024-08-09 NOTE — PLAN OF CARE
Problem: Discharge Planning  Goal: Discharge to home or other facility with appropriate resources  8/9/2024 1038 by Kiko Sweeney RN  Outcome: Progressing  Flowsheets (Taken 8/9/2024 1038)  Discharge to home or other facility with appropriate resources:   Identify barriers to discharge with patient and caregiver   Arrange for needed discharge resources and transportation as appropriate     Problem: ABCDS Injury Assessment  Goal: Absence of physical injury  8/9/2024 1038 by Kiko Sweeney RN  Outcome: Progressing  Flowsheets (Taken 8/9/2024 1038)  Absence of Physical Injury: Implement safety measures based on patient assessment     Problem: Chronic Conditions and Co-morbidities  Goal: Patient's chronic conditions and co-morbidity symptoms are monitored and maintained or improved  8/9/2024 1038 by Kiko Sweeney RN  Outcome: Progressing  Flowsheets (Taken 8/9/2024 1038)  Care Plan - Patient's Chronic Conditions and Co-Morbidity Symptoms are Monitored and Maintained or Improved:   Monitor and assess patient's chronic conditions and comorbid symptoms for stability, deterioration, or improvement   Collaborate with multidisciplinary team to address chronic and comorbid conditions and prevent exacerbation or deterioration     Problem: Safety - Adult  Goal: Free from fall injury  8/9/2024 1038 by Kiko Sweeney RN  Outcome: Progressing  Flowsheets (Taken 8/9/2024 1038)  Free From Fall Injury: Based on caregiver fall risk screen, instruct family/caregiver to ask for assistance with transferring infant if caregiver noted to have fall risk factors     Problem: Respiratory - Adult  Goal: Achieves optimal ventilation and oxygenation  Outcome: Progressing  Flowsheets (Taken 8/9/2024 1038)  Achieves optimal ventilation and oxygenation:   Assess for changes in mentation and behavior   Assess for changes in respiratory status   Position to facilitate oxygenation and minimize respiratory effort     Problem: Cardiovascular -  Adult  Goal: Maintains optimal cardiac output and hemodynamic stability  Outcome: Progressing  Flowsheets (Taken 8/9/2024 1038)  Maintains optimal cardiac output and hemodynamic stability:   Monitor blood pressure and heart rate   Monitor urine output and notify Licensed Independent Practitioner for values outside of normal range

## 2024-08-09 NOTE — PROGRESS NOTES
Internal Medicine  PGY 3  Progress Notes    CC SOB    Interval history  Patient seen and examined at bedside, denies any acute complaints this morning  To rapid response called last night for sustained wide-complex V. Tach, started on amnio drip after a bolus was given and cardiology was consulted.    Patient denies any chest pain palpitations or shortness of breath this morning  Potassium magnesium within normal limits  Creatinine is stable compared to baseline        HISTORY OF PRESENT ILLNESS:  This is an 83-year-old male with past medical history of HFrEF of 25 to 30% with G3DD, cardiac amyloidosis, T2DM with right toe amputation, HTN, hyperlipidemia who presented to the ER complaining of shortness of breath.  Patient endorsing week ago he had some subjective fever and cough which resolved but had become short of breath at that time.  Cough resolved, but shortness of breath has persisted and got worse over the last 2 days.  Patient endorsed becoming short of breath with mild exertion especially walking from his bedroom to his bathroom.  He however denied any chest pain or palpitations.  He decided to come get evaluated today.    ER course  Vitals were stable  Troponin was elevated 99--> 86, EKG showed wide QRS with prolonged QTc and right bundle branch block.  Patient denies any chest pain.  proBNP was elevated at 74[address], patient is on room air, with clear lungs and no peripheral edema  Chest x-ray is normal  Labs showed stable creatinine, with no significant electrolyte abnormalities  Patient received a dose of Lasix 20 mg IV    Patient was seen and evaluated in the ER he was laying in bed comfortably, denied any acute complaints.  He is on room air saturating well and  vitals were stable.  He denied any chest pain, shortness of breath or orthopnea.  Lungs were clear on exam no central or peripheral edema.  Patient will be admitted for further evaluation.      Past Medical History:        Diagnosis Date     prescribed.  He had cough and subjective fevers about a week ago likely viral, which resolved but has persistent shortness of breath on exertion.  Denies any chest pain.  Follows Dr. Mynor Mathews.  Cannot tolerate Jardiance and Aldactone due to CKD.  Patient was offered ICD but declined after thorough conversation.  - Chest x-ray no signs of pulmonary edema  - Patient has no peripheral or central edema, on room air saturating well  -Will restart home regimen: Lasix 20 mg p.o. twice daily                                               Lopressor 25 mg twice daily                                               Entresto twice daily                                               Follow-up urine output with strict I's and O's                                               Low-sodium diet                                               Daily standing weight                                                                               Monitor electrolytes and replace as needed    History of wide-complex QRS  This appeared to be patient's baseline EKG from 2020.  Had 2 episodes of sustained V. Tach last night requiring amnio bolus and continuous infusion.  -Cardiology consulted follow-up and recommendations  -Continue amnio drip, will transition to p.o.  -Keep on telemetry  -Replace electrolytes as needed    Elevated troponins  Troponin was elevated at 99, then 86.  Patient has underlying CKD.  EKG showed wide-complex QRS with right bundle branch block.  Patient denies any chest pain or shortness of breath.      History of type 2 diabetes  Last A1c was 7.9 on 11/6/2023, ordered a repeat, follow-up  -Started 5 U Lantus nightly  -Hypoglycemia protocol  -3 carb diet  -Medium dose sliding scale    CKD stage IIIb  Patient follows with Dr. Meggan Umaña  Creatinine stable from baseline  Continue to monitor renal function panel    Anemia likely due to CKD  Monitor daily CBCs    History of cardiac amyloidosis  Patient is on home Vyndamax 61

## 2024-08-09 NOTE — PLAN OF CARE
Problem: Discharge Planning  Goal: Discharge to home or other facility with appropriate resources  8/8/2024 2207 by Yovanny Ceja RN  Outcome: Progressing  8/8/2024 1644 by Emerald Chu RN  Outcome: Progressing  Flowsheets (Taken 8/8/2024 1644)  Discharge to home or other facility with appropriate resources: Identify barriers to discharge with patient and caregiver     Problem: ABCDS Injury Assessment  Goal: Absence of physical injury  8/8/2024 2207 by Yovanny Ceja RN  Outcome: Progressing  8/8/2024 1644 by Emerald Chu RN  Outcome: Progressing  Flowsheets (Taken 8/8/2024 1644)  Absence of Physical Injury: Implement safety measures based on patient assessment     Problem: Skin/Tissue Integrity  Goal: Absence of new skin breakdown  Description: 1.  Monitor for areas of redness and/or skin breakdown  2.  Assess vascular access sites hourly  3.  Every 4-6 hours minimum:  Change oxygen saturation probe site  4.  Every 4-6 hours:  If on nasal continuous positive airway pressure, respiratory therapy assess nares and determine need for appliance change or resting period.  Outcome: Progressing     Problem: Chronic Conditions and Co-morbidities  Goal: Patient's chronic conditions and co-morbidity symptoms are monitored and maintained or improved  8/8/2024 2207 by Yovanny Ceja RN  Outcome: Progressing  8/8/2024 1644 by Emerald Chu RN  Outcome: Progressing  8/8/2024 1644 by Emerald Chu RN  Outcome: Progressing  Flowsheets (Taken 8/8/2024 1644)  Care Plan - Patient's Chronic Conditions and Co-Morbidity Symptoms are Monitored and Maintained or Improved: Monitor and assess patient's chronic conditions and comorbid symptoms for stability, deterioration, or improvement     Problem: Safety - Adult  Goal: Free from fall injury  8/8/2024 2207 by Yovanny Ceja RN  Outcome: Progressing  8/8/2024 1644 by Emerald Chu RN  Outcome: Progressing  Note: Pt remains free of fall. Bed lowest position, bed

## 2024-08-09 NOTE — PROGRESS NOTES
4 Eyes Skin Assessment     NAME:  Pedro Becerra  YOB: 1940  MEDICAL RECORD NUMBER:  [id number]    The patient is being assessed for  Admission    I agree that at least one RN has performed a thorough Head to Toe Skin Assessment on the patient. ALL assessment sites listed below have been assessed.      Areas assessed by both nurses:    Head, Face, Ears, Shoulders, Back, Chest, Arms, Elbows, Hands, Sacrum. Buttock, Coccyx, Ischium, Legs. Feet and Heels, and Under Medical Devices         Does the Patient have a Wound? No noted wound(s)       Federico Prevention initiated by RN: Yes  Wound Care Orders initiated by RN: No    Pressure Injury (Stage 3,4, Unstageable, DTI, NWPT, and Complex wounds) if present, place Wound referral order by RN under : No    New Ostomies, if present place, Ostomy referral order under : No     Nurse 1 eSignature: Electronically signed by Yovanny Ceja RN on 8/8/24 at 9:08 PM EDT    **SHARE this note so that the co-signing nurse can place an eSignature**    Nurse 2 eSignature: Electronically signed by Dioni Sandoval RN on 8/8/24 at 9:22 PM EDT

## 2024-08-09 NOTE — DISCHARGE INSTRUCTIONS
Extra Heart Failure Education/ Tools/ Resources:     [email or web address]   --- this is American Heart Association interactive Healthier Living with Heart Failure guidebook.  Please click hyperlink or copy / paste link into search bar. The QR Code is also available below. Use your mouse to scroll through the pages.  Lots of information about weight monitoring, diet tips, activity, meds, etc    Heart Failure Tools and Resources QR Code is below. It includes multiple resources to include symptom tracker, med tracker, further HF info, and access to a HF Support Network online Community    HF [place] [employer]  -- this is a free smart phone nimco available for iPhone and Android download.  Use your phone to track sodium / fluid intake, zone tool symptom tracking, weights, medications, etc. Click on this hyperlink  HF [place] [employer]   for QR code for easy download or the link is also found in the below HF Tools and Resources.      DASH (Dietary Approach to Stop Hypertension) diet --  https://www.nhlbi.nih.gov/education/dash-eating-plan -- this diet is a flexible eating plan that promotes heart healthy eating style.  Click on hyperlink or copy / paste link into search bar.  Lots of low sodium recipes and tips.    https://[email or web address]/recipes  -- more free recipes

## 2024-08-09 NOTE — CONSULTS
Cardiology Consultation                                                                    Pt Name: Pedro Becerra  Age: 83 y.o.  Sex: male  : 1940  Location: [facility]/4322-01    Referring Physician: Ricardo Arguello*  Primary cardiologist:       Reason for Consult:     Reason for Consultation/Chief Complaint: SOB    HPI:      Pedro Becerra is a 83 y.o. male with a past medical history of  HFrEF of 25 to 30% with G3DD, cardiac amyloidosis, T2DM with right toe amputation, HTN, hyperlipidemia for shortness of breath.    Patient states that this has been going on for the past few weeks.  He states that it comes and goes most of the time.  Usually if he continues to take his medication it would go away on its own.  However at this time he felt that it kept Getting worse.  About a few weeks ago he did states that he had a cold which he felt was the cause of his shortness of breath however there was a period where he had resolution of his shortness of breath but then 2 days ago his shortness of breath had returned and it was much worse than before.  He stated that he is currently unable to walk to the bathroom without becoming short of breath which she was able to do prior to it.  He is a non-smoker, nonalcoholic denies any illicit drug use.  He denies any excessive salt intake states that he consumes only 1.5 to 2 L water a day at max.  He takes Lasix 20 mg twice daily at home.  He states that he never get swelling in his legs.  He has been noticing some PND denies orthopnea.  He also denies any racing of his heart.  As his shortness of breath was getting worse his wife called EMS.    In the ED patient's vitals were significant for blood pressure 131/92 he was started on 2 L of nasal cannula very briefly was weaned off quite fast.  His labs on admission were significant for bicarb 20, creatinine 2.4 with baseline around 2.6, proBNP 24,655 with his last BNP 11,000, troponin 86, hemoglobin 10.8, chest x-ray  was significant for some patchy consolidation and cardiomegaly.  His initial EKG was significant for wide QRS and right bundle branch block which she has present at baseline.    Interval history: Yesterday patient had an episode of tachycardia that was assumed to be V. tach by rapid response team and patient was given 25 mg of Lopressor twice daily along with amiodarone.  Histories     Past Medical History:   has a past medical history of Abdominal hernia, Arthritis, CHF (congestive heart failure) ([facility]), CKD (chronic kidney disease) stage 3, GFR 30-59 ml/min ([facility]), Hyperlipidemia, Hypertension, RADHA (iron deficiency anemia), and Type II or unspecified type diabetes mellitus without mention of complication, not stated as uncontrolled.    Surgical History:   has a past surgical history that includes hernia repair; hip surgery; and other surgical history (N/A, 12/15/2015).     Social History:   reports that he has never smoked. He has never used smokeless tobacco. He reports that he does not drink alcohol and does not use drugs.     Family History:  No evidence for sudden cardiac death or premature CAD      Medications:       Home Medications  Were reviewed and are listed in nursing record. and/or listed below  Prior to Admission medications    Medication Sig Start Date End Date Taking? Authorizing Provider   SITagliptin (JANUVIA) 25 MG tablet Take 1 tablet by mouth daily 7/29/24 10/27/24  Batsheva Colunga APRN - CNP   VYNDAMAX 61 MG CAPS TAKE 1 CAPSULE BY MOUTH ONCE EVERY DAY 6/12/24   Pushpa Rose APRN - CNP   carvedilol (COREG) 12.5 MG tablet Take 1 tablet by mouth twice daily 6/3/24   Mynor Mathews MD   atorvastatin (LIPITOR) 40 MG tablet Take 1 tablet by mouth daily 3/1/24   Pushpa Rose APRN - CNP   allopurinol (ZYLOPRIM) 100 MG tablet Take 1 tablet by mouth daily    Provider, MD Dante   sacubitril-valsartan (ENTRESTO) 24-26 MG per tablet Take 1 tablet by mouth 2 times daily 11/28/23   Reid  RVR.  2.  Elevated troponin and proBNP.  Patient has elevated values in the setting of cardiac amyloidosis.  proBNP higher due to acute exacerbation.  There is no evidence of ACS.  3.  Mild CAD.  4.  Hypertension.  Most likely due to volume overload.  5.  PAF RVR.  Subclinical (no palpitations).  6.  History of NSVT  7.  ATTR amyloidosis.  Patient has been on Vyndamax (clinical trial did suggest worsening symptoms when patients have NYHA FC III)      - Continue with amiodarone drip today, will consider switching to p.o. amiodarone tomorrow if rhythm stable  - Will increase Lopressor to 50 mg p.o. twice daily and transition to Toprol once beta blocker dose stable.  - Will start Eliquis 2.5 mg p.o. twice daily  - Continue with baby aspirin, Entresto low-dose twice daily  - Will continue with Lasix 20 mg IV every 8 hours  - Strict I's and O's every shift and standing weights if possible, low-salt diet, daily BMP with reflex to Mg (correct lytes for goals K >4.0 and Mg > 2.0) and wean supplemental oxygen to off (or down to baseline supplemental oxygen requirements) for sats greater than 90%.  - Will stop tafamidis (Vyndamax).  - Echocardiogram has been ordered by the primary team.        Due to the high probability of clinically significant life threating deterioration of the patient's condition that required my urgent intervention, a total critical care time 35 minutes was used. This time excludes any time that may have been spent performing procedures. This includes but not limited to vital sign monitoring, telemetry monitoring, continuous pulse oximety, IV medication, clinical response to the IV medications, documentation time, consultation time, interpretation of lab data, review of nursing notes and old record review.         I have personally reviewed the reports and images of labs, radiological studies, cardiac studies including ECG's and telemetry, current and old medical records. The note was completed using

## 2024-08-10 LAB
ALBUMIN SERPL-MCNC: 3.2 G/DL (ref 3.4–5)
ANION GAP SERPL CALCULATED.3IONS-SCNC: 13 MMOL/L (ref 3–16)
BASOPHILS # BLD: 0 K/UL (ref 0–0.2)
BASOPHILS NFR BLD: 0.1 %
BUN SERPL-MCNC: 27 MG/DL (ref 7–20)
CALCIUM SERPL-MCNC: 9 MG/DL (ref 8.3–10.6)
CHLORIDE SERPL-SCNC: 104 MMOL/L (ref 99–110)
CO2 SERPL-SCNC: 19 MMOL/L (ref 21–32)
CREAT SERPL-MCNC: 2.6 MG/DL (ref 0.8–1.3)
DEPRECATED RDW RBC AUTO: 17.9 % (ref 12.4–15.4)
ECHO AO ASC DIAM: 3.4 CM
ECHO AO ASCENDING AORTA INDEX: 1.71 CM/M2
ECHO AO ROOT DIAM: 2.8 CM
ECHO AO ROOT INDEX: 1.41 CM/M2
ECHO BSA: 1.99 M2
ECHO EST RA PRESSURE: 15 MMHG
ECHO IVC EXP: 2.2 CM
ECHO IVC INSP: 2.3 CM
ECHO LA AREA 4C: 18.5 CM2
ECHO LA MAJOR AXIS: 5.8 CM
ECHO LA VOL MOD A4C: 47 ML (ref 18–58)
ECHO LA VOLUME INDEX MOD A4C: 24 ML/M2 (ref 16–34)
ECHO LV EDV A2C: 167 ML
ECHO LV EDV A4C: 165 ML
ECHO LV EDV INDEX A4C: 83 ML/M2
ECHO LV EDV NDEX A2C: 84 ML/M2
ECHO LV EJECTION FRACTION A2C: 59 %
ECHO LV EJECTION FRACTION A4C: 10 %
ECHO LV EJECTION FRACTION BIPLANE: 38 % (ref 55–100)
ECHO LV ESV A2C: 68 ML
ECHO LV ESV A4C: 149 ML
ECHO LV ESV INDEX A2C: 34 ML/M2
ECHO LV ESV INDEX A4C: 75 ML/M2
ECHO LV FRACTIONAL SHORTENING: 2 % (ref 28–44)
ECHO LV INTERNAL DIMENSION DIASTOLE INDEX: 2.06 CM/M2
ECHO LV INTERNAL DIMENSION DIASTOLIC: 4.1 CM (ref 4.2–5.9)
ECHO LV INTERNAL DIMENSION SYSTOLIC INDEX: 2.01 CM/M2
ECHO LV INTERNAL DIMENSION SYSTOLIC: 4 CM
ECHO LV IVSD: 2.5 CM (ref 0.6–1)
ECHO LV MASS 2D: 472.5 G (ref 88–224)
ECHO LV MASS INDEX 2D: 237.4 G/M2 (ref 49–115)
ECHO LV POSTERIOR WALL DIASTOLIC: 2 CM (ref 0.6–1)
ECHO LV RELATIVE WALL THICKNESS RATIO: 0.98
ECHO LVOT AREA: 3.5 CM2
ECHO LVOT DIAM: 2.1 CM
ECHO RA AREA 4C: 26.1 CM2
ECHO RA END SYSTOLIC VOLUME APICAL 4 CHAMBER INDEX BSA: 53 ML/M2
ECHO RA VOLUME: 105 ML
ECHO RIGHT VENTRICULAR SYSTOLIC PRESSURE (RVSP): 53 MMHG
ECHO RV TAPSE: 1.3 CM (ref 1.7–?)
ECHO TV REGURGITANT MAX VELOCITY: 3.08 M/S
ECHO TV REGURGITANT PEAK GRADIENT: 38 MMHG
EKG DIAGNOSIS: NORMAL
EKG Q-T INTERVAL: 254 MS
EKG Q-T INTERVAL: 272 MS
EKG Q-T INTERVAL: 398 MS
EKG Q-T INTERVAL: 450 MS
EKG QRS DURATION: 152 MS
EKG QRS DURATION: 156 MS
EKG QRS DURATION: 168 MS
EKG QRS DURATION: 178 MS
EKG QTC CALCULATION (BAZETT): 402 MS
EKG QTC CALCULATION (BAZETT): 491 MS
EKG QTC CALCULATION (BAZETT): 555 MS
EKG QTC CALCULATION (BAZETT): 577 MS
EKG R AXIS: -47 DEGREES
EKG R AXIS: -88 DEGREES
EKG R AXIS: 258 DEGREES
EKG R AXIS: 265 DEGREES
EKG T AXIS: 187 DEGREES
EKG T AXIS: 195 DEGREES
EKG T AXIS: 73 DEGREES
EKG T AXIS: 77 DEGREES
EKG VENTRICULAR RATE: 117 BPM
EKG VENTRICULAR RATE: 151 BPM
EKG VENTRICULAR RATE: 196 BPM
EKG VENTRICULAR RATE: 99 BPM
EOSINOPHIL # BLD: 0.1 K/UL (ref 0–0.6)
EOSINOPHIL NFR BLD: 1.2 %
GFR SERPLBLD CREATININE-BSD FMLA CKD-EPI: 23 ML/MIN/{1.73_M2}
GLUCOSE BLD-MCNC: 130 MG/DL (ref 70–99)
GLUCOSE BLD-MCNC: 142 MG/DL (ref 70–99)
GLUCOSE BLD-MCNC: 149 MG/DL (ref 70–99)
GLUCOSE BLD-MCNC: 157 MG/DL (ref 70–99)
GLUCOSE SERPL-MCNC: 131 MG/DL (ref 70–99)
HCT VFR BLD AUTO: 32 % (ref 40.5–52.5)
HGB BLD-MCNC: 10.7 G/DL (ref 13.5–17.5)
LYMPHOCYTES # BLD: 1.7 K/UL (ref 1–5.1)
LYMPHOCYTES NFR BLD: 21.2 %
MCH RBC QN AUTO: 28 PG (ref 26–34)
MCHC RBC AUTO-ENTMCNC: 33.3 G/DL (ref 31–36)
MCV RBC AUTO: 83.9 FL (ref 80–100)
MONOCYTES # BLD: 1.2 K/UL (ref 0–1.3)
MONOCYTES NFR BLD: 14.9 %
NEUTROPHILS # BLD: 5 K/UL (ref 1.7–7.7)
NEUTROPHILS NFR BLD: 62.6 %
PERFORMED ON: ABNORMAL
PHOSPHATE SERPL-MCNC: 3.5 MG/DL (ref 2.5–4.9)
PLATELET # BLD AUTO: 195 K/UL (ref 135–450)
PMV BLD AUTO: 8.7 FL (ref 5–10.5)
POTASSIUM SERPL-SCNC: 4.1 MMOL/L (ref 3.5–5.1)
RBC # BLD AUTO: 3.81 M/UL (ref 4.2–5.9)
SODIUM SERPL-SCNC: 136 MMOL/L (ref 136–145)
WBC # BLD AUTO: 8 K/UL (ref 4–11)

## 2024-08-10 PROCEDURE — 36415 COLL VENOUS BLD VENIPUNCTURE: CPT

## 2024-08-10 PROCEDURE — 2580000003 HC RX 258

## 2024-08-10 PROCEDURE — 93010 ELECTROCARDIOGRAM REPORT: CPT | Performed by: INTERNAL MEDICINE

## 2024-08-10 PROCEDURE — 6360000002 HC RX W HCPCS

## 2024-08-10 PROCEDURE — 6370000000 HC RX 637 (ALT 250 FOR IP)

## 2024-08-10 PROCEDURE — 93306 TTE W/DOPPLER COMPLETE: CPT | Performed by: INTERNAL MEDICINE

## 2024-08-10 PROCEDURE — 82306 VITAMIN D 25 HYDROXY: CPT

## 2024-08-10 PROCEDURE — 99291 CRITICAL CARE FIRST HOUR: CPT | Performed by: INTERNAL MEDICINE

## 2024-08-10 PROCEDURE — 83970 ASSAY OF PARATHORMONE: CPT

## 2024-08-10 PROCEDURE — 85025 COMPLETE CBC W/AUTO DIFF WBC: CPT

## 2024-08-10 PROCEDURE — 2060000000 HC ICU INTERMEDIATE R&B

## 2024-08-10 PROCEDURE — 6370000000 HC RX 637 (ALT 250 FOR IP): Performed by: STUDENT IN AN ORGANIZED HEALTH CARE EDUCATION/TRAINING PROGRAM

## 2024-08-10 PROCEDURE — 2580000003 HC RX 258: Performed by: STUDENT IN AN ORGANIZED HEALTH CARE EDUCATION/TRAINING PROGRAM

## 2024-08-10 PROCEDURE — 51798 US URINE CAPACITY MEASURE: CPT

## 2024-08-10 PROCEDURE — 80069 RENAL FUNCTION PANEL: CPT

## 2024-08-10 RX ADMIN — INSULIN GLARGINE 5 UNITS: 100 INJECTION, SOLUTION SUBCUTANEOUS at 20:15

## 2024-08-10 RX ADMIN — SODIUM CHLORIDE, PRESERVATIVE FREE 10 ML: 5 INJECTION INTRAVENOUS at 20:12

## 2024-08-10 RX ADMIN — ATORVASTATIN CALCIUM 40 MG: 40 TABLET, FILM COATED ORAL at 20:15

## 2024-08-10 RX ADMIN — FUROSEMIDE 20 MG: 10 INJECTION, SOLUTION INTRAMUSCULAR; INTRAVENOUS at 17:00

## 2024-08-10 RX ADMIN — SODIUM CHLORIDE, PRESERVATIVE FREE 20 ML: 5 INJECTION INTRAVENOUS at 09:39

## 2024-08-10 RX ADMIN — APIXABAN 2.5 MG: 2.5 TABLET, FILM COATED ORAL at 09:32

## 2024-08-10 RX ADMIN — SODIUM CHLORIDE, PRESERVATIVE FREE 10 ML: 5 INJECTION INTRAVENOUS at 20:11

## 2024-08-10 RX ADMIN — FUROSEMIDE 20 MG: 10 INJECTION, SOLUTION INTRAMUSCULAR; INTRAVENOUS at 09:34

## 2024-08-10 RX ADMIN — ASPIRIN 81 MG: 81 TABLET, COATED ORAL at 09:38

## 2024-08-10 RX ADMIN — METOPROLOL TARTRATE 50 MG: 50 TABLET, FILM COATED ORAL at 12:32

## 2024-08-10 RX ADMIN — SACUBITRIL AND VALSARTAN 1 TABLET: 24; 26 TABLET, FILM COATED ORAL at 09:32

## 2024-08-10 RX ADMIN — APIXABAN 2.5 MG: 2.5 TABLET, FILM COATED ORAL at 20:15

## 2024-08-10 RX ADMIN — AMIODARONE HYDROCHLORIDE 0.5 MG/MIN: 50 INJECTION, SOLUTION INTRAVENOUS at 15:31

## 2024-08-10 RX ADMIN — SACUBITRIL AND VALSARTAN 1 TABLET: 24; 26 TABLET, FILM COATED ORAL at 20:15

## 2024-08-10 RX ADMIN — METOPROLOL TARTRATE 50 MG: 50 TABLET, FILM COATED ORAL at 20:15

## 2024-08-10 ASSESSMENT — PAIN SCALES - GENERAL
PAINLEVEL_OUTOF10: 0

## 2024-08-10 NOTE — PROGRESS NOTES
THIAGO informed RN that pt HR as dropping into the 40s then rebounding to 70s. Reached out to the hospitalist who replied that I should stop amiodarone drip and hold metoprolol as well as reach out to cardiology. Dr Mathews from cardio told me to continue the amio but hold the metoprolol. Metoprolol held, amio still running.

## 2024-08-10 NOTE — PROGRESS NOTES
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     Echo 2015: moderate LVH, normal EF.     Echo 06/2019: severe LVH, EF 35-40%, mild RV dysfx, mild valve disease     Kathi 07/2019: mod LVd, EF 39%, mid to distal inferior and apical fixed defect.     LHC 07/2019: mild diffuse CAD, nl LVEDP.     ECG 9/6/19: NSR, 1st AVB, LAFB, RBBB (trifascicular block), possible inferior MI.     Echo 9/20/19: severe LVH, LVEF 50-55%, diastolic III, apical sparing on speckle strain imaging suggestive of amyloidosis, severe LAE, mild MR.       Tc PYP scan 3/12/20: strongly positive for amyloidosis.      09/2019 kappa/labda ratio 1.69 (normal in the setting of CKD, Cr 1.9). 24-hour UPEP normal; SPEP 05/2021 normal; hence no AL amyloidosis.      Echo 4/18/2022: Severe LVH, consider cardiac amyloidosis, LVEF 20-25% with global hypokinesis, diastolic grade 3, increased LVEDP, normal RV size with severe hypokinesis, no significant valvular abnormalities.     Limited echo 07/2022: severe LVH, EF 25-30%.      Patient presented to the emergency room on 8/8 with progressively worse shortness of breath.  Patient was compliant with all of his medications and low-salt diet.  The emergency room BP was mildly elevated at 131/92 mmHg, HR occasionally severely tachycardic up to 190 bpm consistent with WCT, required 2 L of supplemental oxygen for adequate saturation.  ECG consistent with wide-complex tachycardia, SVT including AF RVR versus VT.  On personal review of patient's ECG on admission and prior ECGs along with telemetry strips, patient appears to have had episodes of PAF RVR.  proBNP 24,000,  high-sensitivity troponin 100 x 2.  Hemoglobin 11, chest x-ray reported normal.  Patient was admitted for acute on chronic HFrEF, PAF RVR.  He was given 1 dose of Lasix 20 mg IV and cardiology was consulted today.  Today patient reports some improvement with his symptoms.  He is off supplemental oxygen.  Creatinine stable at 2.4.  Telemetry shows normal sinus rhythm.  BP is within normal limits.  I's and O's inaccurate, weight down 1 pound.  On physical exam patient appears to have some basilar inspiratory rhonchi otherwise exam unremarkable.    Echo 8/9/2024: Severe LVH, LVEF 10-15%, normal LV cavity, indeterminate diastolic function due to A-fib, RV dilatation with dysfunction, biatrial enlargement, moderate MR/TR, RVSP 53 (15).    Interval history:  Patient today reports no complaints.  Telemetry currently shows normal sinus rhythm with frequent PACs, recent episode of AF RVR.  Patient remains on 2 L of supplemental oxygen 100% oxygen saturation.  I's and O's and weight unchanged.  Creatinine slightly up at 2.6 from 2.4.  Albumin 3, hemoglobin 10.    Objective:     Medications:   apixaban  2.5 mg Oral BID    furosemide  20 mg IntraVENous Q8H    metoprolol tartrate  50 mg Oral BID    sodium chloride flush  5-40 mL IntraVENous 2 times per day    sodium chloride flush  5-40 mL IntraVENous 2 times per day    insulin lispro  0-8 Units SubCUTAneous TID WC    insulin lispro  0-4 Units SubCUTAneous Nightly    aspirin  81 mg Oral Daily    atorvastatin  40 mg Oral Nightly    sacubitril-valsartan  1 tablet Oral BID    insulin glargine  5 Units SubCUTAneous Nightly       IV drips:   sodium chloride      sodium chloride      dextrose      amiodarone 0.5 mg/min (08/10/24 1531)       PRN:  melatonin, sodium chloride flush, sodium chloride, sodium chloride flush, sodium chloride, polyethylene glycol, acetaminophen **OR** acetaminophen, glucose, dextrose bolus **OR** dextrose bolus, glucagon (rDNA), dextrose, perflutren lipid  103 103 104   CO2 16* 19* 19*   GLUCOSE 206* 167* 131*   CALCIUM 9.3 9.5 9.0   MG 2.00 2.00  --      Recent Labs     08/08/24  0934 08/09/24  0555 08/10/24  0626   WBC 7.1 7.2 8.0   HGB 10.8* 11.0* 10.7*   HCT 32.9* 34.2* 32.0*    237 195   MCV 85.3 84.6 83.9     No results for input(s): \"CHOLTOT\", \"TRIG\", \"HDL\", \"CHOLHDL\", \"LDL\" in the last 72 hours.    Invalid input(s): \"LIPIDCOMM\", \"VLDCHOL\"  Recent Labs     08/08/24  0934   INR 1.15     No results for input(s): \"CKTOTAL\", \"CKMB\", \"CKMBINDEX\", \"TROPONINI\" in the last 72 hours.  No results for input(s): \"BNP\" in the last 72 hours.  No results for input(s): \"NTPROBNP\" in the last 72 hours.  No results for input(s): \"TSH\" in the last 72 hours.    Imaging:       Assessment & Plan:     1.  Acute on chronic HFrEF.  Patient presented mildly volume overloaded most likely due to episodes of PAF RVR.  Patient is NYHA FC IIIB/IV (end stage HF).  2.  Elevated troponin and proBNP.  Patient has elevated values in the setting of cardiac amyloidosis.  proBNP higher due to acute exacerbation.  There is no evidence of ACS.  3.  Mild CAD.  4.  Hypertension.  Most likely due to volume overload.  5.  PAF RVR.  Subclinical (no palpitations).  6.  History of NSVT  7.  ATTR amyloidosis.  Patient has been on Vyndamax (clinical trial did suggest worsening symptoms when patients have NYHA FC III)        - Continue with amiodarone drip today, will consider switching to p.o. amiodarone tomorrow if rhythm stable sinus.   - Continue Lopressor to 50 mg p.o. twice daily and transition to Toprol once beta blocker dose stable.  - Continue Eliquis 2.5 mg p.o. twice daily  - Continue with baby aspirin, Entresto low-dose twice daily  - Will continue with Lasix 20 mg IV every 8 hours  - Strict I's and O's every shift and standing weights if possible, low-salt diet, daily BMP with reflex to Mg (correct lytes for goals K >4.0 and Mg > 2.0) and wean supplemental oxygen to off (or down to baseline

## 2024-08-10 NOTE — PLAN OF CARE
Problem: Discharge Planning  Goal: Discharge to home or other facility with appropriate resources  8/9/2024 2322 by Deena Long RN  Outcome: Progressing  8/9/2024 1038 by Kiko Sweeney RN  Outcome: Progressing  Flowsheets (Taken 8/9/2024 1038)  Discharge to home or other facility with appropriate resources:   Identify barriers to discharge with patient and caregiver   Arrange for needed discharge resources and transportation as appropriate     Problem: ABCDS Injury Assessment  Goal: Absence of physical injury  8/9/2024 2322 by Deena Long RN  Outcome: Progressing  Flowsheets (Taken 8/9/2024 2320)  Absence of Physical Injury: Implement safety measures based on patient assessment  8/9/2024 1038 by Kiko Sweeney RN  Outcome: Progressing  Flowsheets (Taken 8/9/2024 1038)  Absence of Physical Injury: Implement safety measures based on patient assessment     Problem: Skin/Tissue Integrity  Goal: Absence of new skin breakdown  Description: 1.  Monitor for areas of redness and/or skin breakdown  2.  Assess vascular access sites hourly  3.  Every 4-6 hours minimum:  Change oxygen saturation probe site  4.  Every 4-6 hours:  If on nasal continuous positive airway pressure, respiratory therapy assess nares and determine need for appliance change or resting period.  Outcome: Progressing     Problem: Chronic Conditions and Co-morbidities  Goal: Patient's chronic conditions and co-morbidity symptoms are monitored and maintained or improved  8/9/2024 2322 by Deena Long RN  Outcome: Progressing  8/9/2024 1038 by Kiko Sweeney RN  Outcome: Progressing  Flowsheets (Taken 8/9/2024 1038)  Care Plan - Patient's Chronic Conditions and Co-Morbidity Symptoms are Monitored and Maintained or Improved:   Monitor and assess patient's chronic conditions and comorbid symptoms for stability, deterioration, or improvement   Collaborate with multidisciplinary team to address chronic and comorbid conditions and prevent exacerbation or

## 2024-08-10 NOTE — PROGRESS NOTES
V2.0    [facility] Progress Note      Name:  Pedro Becerra /Age/Sex: 1940  (83 y.o. male)   MRN & CSN:  [id number] & [id number] Encounter Date/Time: 8/10/2024 2:46 PM EDT   Location:  [address] [place] [address] PCP: Shankar Olea DO     Attending:Ricardo Arguello*       Hospital Day: 3    Assessment and Recommendations     84yo male presenting and being admitted with worsening shortness of breath and dyspnea on exertion. 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 He is being admitted for further workup of his symptoms and more so based on his significant cardiac history. He was HDS during my evaluation. He had minimal crackles at lung bases. Chest xray did not look in florid volume overload nor did his legs were swollen. His renal function remains at baseline. He did not have electrolyte abnormalities. He has chronically elevated troponin levels.      On the day of admission, He had episodes of nonsustained wide complex tachycardia. Cardiology consulted and thinking this may be Paroxysmal a-fib with RVR/aberrancy in the setting of known and chronic widened QRS. Was started on Amiodarone infusion. Being diuresed with IV lasix.  On beta-blockers but held due to episodic bradycardia. Started on Eliquis. On telemetry. Echo showing worsened EF of 10 to 15%..     Acute on chronic HFrEF exacerbation  Worsening HFrEF with a EF of 10 to 15% as of 8/10/2024  Grade 3 DD  Possible cardiac amyloidosis  -Continue Lasix is as per cardiology.  Appreciate cardiology.  - Palliative care?  -Entresto.  - Beta-blocker currently on hold.    Wide-complex tachycardia  -Amio    paroxysmal A-fib with RVR  -Continue Eliquis.  Amio drip.  Beta-blocker on hold secondary to fluctuations in heart rate.  -Needs PT OT after Amio drip is discontinued.    History of non-STEMI  -Continue aspirin, beta-blocker  03/04/2022 03:51 PM     Urine Cultures:   Lab Results   Component Value Date/Time    LABURIN >100,000 CFU/ml 09/05/2019 10:28 PM     Blood Cultures: No results found for: \"BC\"  No results found for: \"BLOODCULT2\"  Organism:   Lab Results   Component Value Date/Time    ORG Klebsiella oxytoca 09/05/2019 10:28 PM         Electronically signed by Ricardo Javier MD on 8/10/2024 at 2:46 PM

## 2024-08-11 LAB
25(OH)D3 SERPL-MCNC: 48.3 NG/ML
ALBUMIN SERPL-MCNC: 3.3 G/DL (ref 3.4–5)
ALBUMIN SERPL-MCNC: 3.4 G/DL (ref 3.4–5)
ANION GAP SERPL CALCULATED.3IONS-SCNC: 13 MMOL/L (ref 3–16)
ANION GAP SERPL CALCULATED.3IONS-SCNC: 15 MMOL/L (ref 3–16)
BASOPHILS # BLD: 0 K/UL (ref 0–0.2)
BASOPHILS NFR BLD: 0.3 %
BUN SERPL-MCNC: 28 MG/DL (ref 7–20)
BUN SERPL-MCNC: 28 MG/DL (ref 7–20)
CALCIUM SERPL-MCNC: 9.1 MG/DL (ref 8.3–10.6)
CALCIUM SERPL-MCNC: 9.2 MG/DL (ref 8.3–10.6)
CHLORIDE SERPL-SCNC: 100 MMOL/L (ref 99–110)
CHLORIDE SERPL-SCNC: 101 MMOL/L (ref 99–110)
CO2 SERPL-SCNC: 19 MMOL/L (ref 21–32)
CO2 SERPL-SCNC: 20 MMOL/L (ref 21–32)
CREAT SERPL-MCNC: 2.6 MG/DL (ref 0.8–1.3)
CREAT SERPL-MCNC: 2.7 MG/DL (ref 0.8–1.3)
DEPRECATED RDW RBC AUTO: 18.6 % (ref 12.4–15.4)
EOSINOPHIL # BLD: 0 K/UL (ref 0–0.6)
EOSINOPHIL NFR BLD: 0.5 %
GFR SERPLBLD CREATININE-BSD FMLA CKD-EPI: 22 ML/MIN/{1.73_M2}
GFR SERPLBLD CREATININE-BSD FMLA CKD-EPI: 23 ML/MIN/{1.73_M2}
GLUCOSE BLD-MCNC: 127 MG/DL (ref 70–99)
GLUCOSE BLD-MCNC: 129 MG/DL (ref 70–99)
GLUCOSE BLD-MCNC: 142 MG/DL (ref 70–99)
GLUCOSE BLD-MCNC: 143 MG/DL (ref 70–99)
GLUCOSE BLD-MCNC: 144 MG/DL (ref 70–99)
GLUCOSE SERPL-MCNC: 138 MG/DL (ref 70–99)
GLUCOSE SERPL-MCNC: 139 MG/DL (ref 70–99)
HCT VFR BLD AUTO: 35.7 % (ref 40.5–52.5)
HGB BLD-MCNC: 11.7 G/DL (ref 13.5–17.5)
LYMPHOCYTES # BLD: 1.5 K/UL (ref 1–5.1)
LYMPHOCYTES NFR BLD: 16.5 %
MCH RBC QN AUTO: 27.6 PG (ref 26–34)
MCHC RBC AUTO-ENTMCNC: 32.6 G/DL (ref 31–36)
MCV RBC AUTO: 84.7 FL (ref 80–100)
MONOCYTES # BLD: 1.3 K/UL (ref 0–1.3)
MONOCYTES NFR BLD: 14.7 %
NEUTROPHILS # BLD: 6.2 K/UL (ref 1.7–7.7)
NEUTROPHILS NFR BLD: 68 %
PERFORMED ON: ABNORMAL
PHOSPHATE SERPL-MCNC: 3.4 MG/DL (ref 2.5–4.9)
PHOSPHATE SERPL-MCNC: 3.5 MG/DL (ref 2.5–4.9)
PLATELET # BLD AUTO: 208 K/UL (ref 135–450)
PMV BLD AUTO: 8.3 FL (ref 5–10.5)
POTASSIUM SERPL-SCNC: 4 MMOL/L (ref 3.5–5.1)
POTASSIUM SERPL-SCNC: 4 MMOL/L (ref 3.5–5.1)
PTH-INTACT SERPL-MCNC: 208.9 PG/ML (ref 14–72)
RBC # BLD AUTO: 4.22 M/UL (ref 4.2–5.9)
SODIUM SERPL-SCNC: 134 MMOL/L (ref 136–145)
SODIUM SERPL-SCNC: 134 MMOL/L (ref 136–145)
URATE SERPL-MCNC: 8.7 MG/DL (ref 3.5–7.2)
WBC # BLD AUTO: 9.1 K/UL (ref 4–11)

## 2024-08-11 PROCEDURE — 6360000002 HC RX W HCPCS

## 2024-08-11 PROCEDURE — 2060000000 HC ICU INTERMEDIATE R&B

## 2024-08-11 PROCEDURE — 6370000000 HC RX 637 (ALT 250 FOR IP): Performed by: INTERNAL MEDICINE

## 2024-08-11 PROCEDURE — 6370000000 HC RX 637 (ALT 250 FOR IP)

## 2024-08-11 PROCEDURE — 6370000000 HC RX 637 (ALT 250 FOR IP): Performed by: NURSE PRACTITIONER

## 2024-08-11 PROCEDURE — 84550 ASSAY OF BLOOD/URIC ACID: CPT

## 2024-08-11 PROCEDURE — 80069 RENAL FUNCTION PANEL: CPT

## 2024-08-11 PROCEDURE — 2580000003 HC RX 258

## 2024-08-11 PROCEDURE — 6370000000 HC RX 637 (ALT 250 FOR IP): Performed by: STUDENT IN AN ORGANIZED HEALTH CARE EDUCATION/TRAINING PROGRAM

## 2024-08-11 PROCEDURE — 99233 SBSQ HOSP IP/OBS HIGH 50: CPT | Performed by: INTERNAL MEDICINE

## 2024-08-11 PROCEDURE — 36415 COLL VENOUS BLD VENIPUNCTURE: CPT

## 2024-08-11 PROCEDURE — 85025 COMPLETE CBC W/AUTO DIFF WBC: CPT

## 2024-08-11 RX ORDER — TORSEMIDE 20 MG/1
20 TABLET ORAL DAILY
Status: DISCONTINUED | OUTPATIENT
Start: 2024-08-12 | End: 2024-08-12 | Stop reason: HOSPADM

## 2024-08-11 RX ORDER — AMIODARONE HYDROCHLORIDE 200 MG/1
200 TABLET ORAL 2 TIMES DAILY
Status: DISCONTINUED | OUTPATIENT
Start: 2024-08-11 | End: 2024-08-12 | Stop reason: HOSPADM

## 2024-08-11 RX ADMIN — SACUBITRIL AND VALSARTAN 1 TABLET: 24; 26 TABLET, FILM COATED ORAL at 20:02

## 2024-08-11 RX ADMIN — AMIODARONE HYDROCHLORIDE 200 MG: 200 TABLET ORAL at 20:03

## 2024-08-11 RX ADMIN — SODIUM CHLORIDE, PRESERVATIVE FREE 30 ML: 5 INJECTION INTRAVENOUS at 08:35

## 2024-08-11 RX ADMIN — FUROSEMIDE 20 MG: 10 INJECTION, SOLUTION INTRAMUSCULAR; INTRAVENOUS at 08:35

## 2024-08-11 RX ADMIN — SACUBITRIL AND VALSARTAN 1 TABLET: 24; 26 TABLET, FILM COATED ORAL at 08:33

## 2024-08-11 RX ADMIN — INSULIN GLARGINE 5 UNITS: 100 INJECTION, SOLUTION SUBCUTANEOUS at 22:43

## 2024-08-11 RX ADMIN — METOPROLOL TARTRATE 50 MG: 50 TABLET, FILM COATED ORAL at 20:02

## 2024-08-11 RX ADMIN — SODIUM CHLORIDE, PRESERVATIVE FREE 10 ML: 5 INJECTION INTRAVENOUS at 20:03

## 2024-08-11 RX ADMIN — APIXABAN 2.5 MG: 2.5 TABLET, FILM COATED ORAL at 21:00

## 2024-08-11 RX ADMIN — ASPIRIN 81 MG: 81 TABLET, COATED ORAL at 08:33

## 2024-08-11 RX ADMIN — METOPROLOL TARTRATE 50 MG: 50 TABLET, FILM COATED ORAL at 08:33

## 2024-08-11 RX ADMIN — FUROSEMIDE 20 MG: 10 INJECTION, SOLUTION INTRAMUSCULAR; INTRAVENOUS at 00:17

## 2024-08-11 RX ADMIN — APIXABAN 2.5 MG: 2.5 TABLET, FILM COATED ORAL at 08:33

## 2024-08-11 RX ADMIN — ATORVASTATIN CALCIUM 40 MG: 40 TABLET, FILM COATED ORAL at 20:03

## 2024-08-11 RX ADMIN — AMIODARONE HYDROCHLORIDE 0.5 MG/MIN: 50 INJECTION, SOLUTION INTRAVENOUS at 03:50

## 2024-08-11 RX ADMIN — Medication 5 MG: at 22:55

## 2024-08-11 ASSESSMENT — PAIN SCALES - GENERAL
PAINLEVEL_OUTOF10: 0

## 2024-08-11 NOTE — PLAN OF CARE
Problem: Skin/Tissue Integrity  Goal: Absence of new skin breakdown  Description: 1.  Monitor for areas of redness and/or skin breakdown  2.  Assess vascular access sites hourly  3.  Every 4-6 hours minimum:  Change oxygen saturation probe site  4.  Every 4-6 hours:  If on nasal continuous positive airway pressure, respiratory therapy assess nares and determine need for appliance change or resting period.  Outcome: Progressing  Note: Reminded pt to not lie in the same position for too long so as to avoid skin break down.     Problem: Safety - Adult  Goal: Free from fall injury  Outcome: Progressing  Flowsheets (Taken 8/10/2024 2004)  Free From Fall Injury: Instruct family/caregiver on patient safety  Note: Bed alarm on. Reminded pt to call out for assistance before trying to get out of bed.     Problem: Metabolic/Fluid and Electrolytes - Adult  Goal: Electrolytes maintained within normal limits  Outcome: Progressing  Flowsheets (Taken 8/10/2024 2004)  Electrolytes maintained within normal limits:   Monitor labs and assess patient for signs and symptoms of electrolyte imbalances   Monitor response to electrolyte replacements, including repeat lab results as appropriate  Note: Offered pt fluids several times during the day.

## 2024-08-11 NOTE — PROGRESS NOTES
Cardiology Consult Service  Daily Progress Note        Admit Date:  8/8/2024  Primary cardiologist: Dr Mathews    Reason for Consultation/Chief Complaint: AHF, PAFRVR    Subjective:     Mr Becerra is a 82 yo man with h/o severe LVH, aTTR amyloidosis on tafamidis, HFrEF with fluctuating LVEF, HTN, HLP, DM 2, CKD (baseline creatinine 2.1), mild CAD, NSTEMI, trifascicular block with RBBB, NSVT.     Echo 2015: moderate LVH, normal EF.     Echo 06/2019: severe LVH, EF 35-40%, mild RV dysfx, mild valve disease     Kathi 07/2019: mod LVd, EF 39%, mid to distal inferior and apical fixed defect.     LHC 07/2019: mild diffuse CAD, nl LVEDP.     ECG 9/6/19: NSR, 1st AVB, LAFB, RBBB (trifascicular block), possible inferior MI.     Echo 9/20/19: severe LVH, LVEF 50-55%, diastolic III, apical sparing on speckle strain imaging suggestive of amyloidosis, severe LAE, mild MR.       Tc PYP scan 3/12/20: strongly positive for amyloidosis.      09/2019 kappa/labda ratio 1.69 (normal in the setting of CKD, Cr 1.9). 24-hour UPEP normal; SPEP 05/2021 normal; hence no AL amyloidosis.      Echo 4/18/2022: Severe LVH, consider cardiac amyloidosis, LVEF 20-25% with global hypokinesis, diastolic grade 3, increased LVEDP, normal RV size with severe hypokinesis, no significant valvular abnormalities.     Limited echo 07/2022: severe LVH, EF 25-30%.      Patient presented to the emergency room on 8/8 with progressively worse shortness of breath.  Patient was compliant with all of his medications and low-salt diet.  The emergency room BP was mildly elevated at 131/92 mmHg, HR occasionally severely tachycardic up to 190 bpm consistent with WCT, required 2 L of supplemental oxygen for adequate saturation.  ECG consistent with wide-complex tachycardia, SVT including AF RVR versus VT.  On personal review of patient's ECG on admission and prior ECGs along with telemetry strips, patient appears to have had episodes of PAF RVR.  proBNP 24,000,  100  101   CO2 16* 19* 19* 19*  20*   GLUCOSE 206* 167* 131* 139*  138*   CALCIUM 9.3 9.5 9.0 9.1  9.2   MG 2.00 2.00  --   --      Recent Labs     08/09/24  0555 08/10/24  0626 08/11/24  0726   WBC 7.2 8.0 9.1   HGB 11.0* 10.7* 11.7*   HCT 34.2* 32.0* 35.7*    195 208   MCV 84.6 83.9 84.7     No results for input(s): \"CHOLTOT\", \"TRIG\", \"HDL\", \"CHOLHDL\", \"LDL\" in the last 72 hours.    Invalid input(s): \"LIPIDCOMM\", \"VLDCHOL\"  No results for input(s): \"INR\" in the last 72 hours.    Invalid input(s): \"PT\", \"PTT\"    No results for input(s): \"CKTOTAL\", \"CKMB\", \"CKMBINDEX\", \"TROPONINI\" in the last 72 hours.  No results for input(s): \"BNP\" in the last 72 hours.  No results for input(s): \"NTPROBNP\" in the last 72 hours.  No results for input(s): \"TSH\" in the last 72 hours.    Imaging:       Assessment & Plan:     1.  Acute on chronic HFrEF.  Patient presented mildly volume overloaded most likely due to episodes of PAF RVR.  Patient is NYHA FC IIIB/IV (end stage HF).  2.  Elevated troponin and proBNP.  Patient has elevated values in the setting of cardiac amyloidosis.  proBNP higher due to acute exacerbation.  There is no evidence of ACS.  3.  Mild CAD.  4.  Hypertension.  Most likely due to volume overload.  5.  PAF RVR.  Subclinical (no palpitations).  6.  History of NSVT  7.  ATTR amyloidosis.  Patient has been on Vyndamax (clinical trial did suggest worsening symptoms when patients have NYHA FC III)        - Will change amiodarone drip to amiodarone 200 mg p.o. twice daily  - Continue Lopressor to 50 mg p.o. twice daily and transition to Toprol once beta blocker dose stable.  - Continue Eliquis 2.5 mg p.o. twice daily  - Continue with baby aspirin, Entresto low-dose twice daily  - Change IV Lasix to torsemide 20 mg p.o. daily (home diuretic was Lasix 20 mg daily)  - Strict I's and O's every shift and standing weights if possible, low-salt diet, daily BMP with reflex to Mg (correct lytes for goals K >4.0 and

## 2024-08-11 NOTE — PLAN OF CARE
Problem: Discharge Planning  Goal: Discharge to home or other facility with appropriate resources  Outcome: Progressing  Flowsheets (Taken 8/11/2024 1929)  Discharge to home or other facility with appropriate resources:   Identify barriers to discharge with patient and caregiver   Identify discharge learning needs (meds, wound care, etc)   Refer to discharge planning if patient needs post-hospital services based on physician order or complex needs related to functional status, cognitive ability or social support system     Problem: ABCDS Injury Assessment  Goal: Absence of physical injury  Outcome: Progressing  Flowsheets (Taken 8/11/2024 1907 by Deena Long, RN)  Absence of Physical Injury: Implement safety measures based on patient assessment  Note: Bed alarm on, bed in lowest position, call light within reach.

## 2024-08-11 NOTE — PLAN OF CARE
Problem: Discharge Planning  Goal: Discharge to home or other facility with appropriate resources  Outcome: Progressing     Problem: ABCDS Injury Assessment  Goal: Absence of physical injury  Outcome: Progressing     Problem: Skin/Tissue Integrity  Goal: Absence of new skin breakdown  Description: 1.  Monitor for areas of redness and/or skin breakdown  2.  Assess vascular access sites hourly  3.  Every 4-6 hours minimum:  Change oxygen saturation probe site  4.  Every 4-6 hours:  If on nasal continuous positive airway pressure, respiratory therapy assess nares and determine need for appliance change or resting period.  8/11/2024 0002 by Lashay Garcia RN  Outcome: Progressing  8/10/2024 2004 by Huma Juárez RN  Outcome: Progressing  Note: Reminded pt to not lie in the same position for too long so as to avoid skin break down.     Problem: Safety - Adult  Goal: Free from fall injury  8/11/2024 0002 by Lashay Garcia RN  Outcome: Progressing  8/10/2024 2004 by Huma Juárez RN  Outcome: Progressing  Flowsheets (Taken 8/10/2024 2004)  Free From Fall Injury: Instruct family/caregiver on patient safety  Note: Bed alarm on. Reminded pt to call out for assistance before trying to get out of bed.     Problem: Respiratory - Adult  Goal: Achieves optimal ventilation and oxygenation  Outcome: Progressing     Problem: Metabolic/Fluid and Electrolytes - Adult  Goal: Electrolytes maintained within normal limits  8/10/2024 2004 by Huma Juárez RN  Outcome: Progressing  Flowsheets (Taken 8/10/2024 2004)  Electrolytes maintained within normal limits:   Monitor labs and assess patient for signs and symptoms of electrolyte imbalances   Monitor response to electrolyte replacements, including repeat lab results as appropriate  Note: Offered pt fluids several times during the day.

## 2024-08-11 NOTE — PROGRESS NOTES
V2.0    [facility] Progress Note      Name:  Pedro Becerra /Age/Sex: 1940  (83 y.o. male)   MRN & CSN:  [id number] & [id number] Encounter Date/Time: 2024 2:46 PM EDT   Location:  Pending sale to [id number]- PCP: Shankar Olea DO     Attending:Ricardo Arguello*       Hospital Day: 4    Assessment and Recommendations     84yo male presenting and being admitted with worsening shortness of breath and dyspnea on exertion. 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 He is being admitted for further workup of his symptoms and more so based on his significant cardiac history. He was HDS during my evaluation. He had minimal crackles at lung bases. Chest xray did not look in florid volume overload nor did his legs were swollen. His renal function remains at baseline. He did not have electrolyte abnormalities. He has chronically elevated troponin levels.      On the day of admission, He had episodes of nonsustained wide complex tachycardia. Cardiology consulted and thinking this may be Paroxysmal a-fib with RVR/aberrancy in the setting of known and chronic widened QRS. Was started on Amiodarone infusion. Being diuresed with IV lasix.  On beta-blockers but held due to episodic bradycardia. Started on Eliquis. On telemetry.     Echo showing worsened EF of 10 to 15%.  Palliative care to be consulted.  Nephrology on board.    Acute on chronic HFrEF exacerbation  Worsening HFrEF with a EF of 10 to 15% as of 8/10/2024  Grade 3 DD  Possible cardiac amyloidosis  -Continue Lasix is as per cardiology.  Appreciate cardiology.  - Palliative care consulted.  -Entresto.  - Beta-blocker as ordered    Wide-complex tachycardia  -Amio    paroxysmal A-fib with RVR  -Continue Eliquis.  Amio drip.  Beta-blocker on hold secondary to fluctuations in heart rate.    History of non-STEMI  -Continue aspirin,  beta-blocker currently on hold.  Continue Entresto.  Lipitor.    Dyslipidemia  -Lipitor    Type II DM  -Continue insulin regimen as ordered.    Chronically elevated troponin -stable    CKD stage IV -stable  -BMP in the a.m.    Anemia of chronic disease  -Monitor    Diet ADULT DIET; Regular; 3 carb choices (45 gm/meal); Low Fat/Low Chol/High Fiber/2 gm Na   DVT Prophylaxis [] Lovenox, []  Heparin, [] SCDs, [] Ambulation,  [x] Eliquis, [] Xarelto  [] Coumadin   Code Status Full Code   Disposition From: Home  Expected Disposition: Home versus SNF  Estimated Date of Discharge: TBD  Patient requires continued admission due to worsening HFrEF   Surrogate Decision Maker/ POA On file     Personally reviewed Lab Studies and Imaging   I reviewed patient's echocardiogram showing an EF of 10 to 15%.    Subjective:     Patient seen and evaluated at bedside.  No complaints today.    Review of Systems:      Pertinent positives and negatives discussed in HPI    Objective:     Intake/Output Summary (Last 24 hours) at 8/11/2024 1446  Last data filed at 8/11/2024 1223  Gross per 24 hour   Intake 180 ml   Output 1275 ml   Net -1095 ml      Vitals:   Vitals:    08/10/24 2321 08/11/24 0348 08/11/24 0828 08/11/24 1207   BP: 111/83 118/78 120/83 117/77   Pulse: 88 89  87   Resp: 18 18 18 18   Temp: 97.4 °F (36.3 °C) 97.4 °F (36.3 °C) 97.5 °F (36.4 °C) 97.6 °F (36.4 °C)   TempSrc: Oral Oral Oral Oral   SpO2: 100% 99% 100%    Weight:  83.4 kg (183 lb 13.8 oz)     Height:             Physical Exam:      General: NAD  Eyes: EOMI  ENT: neck supple  Cardiovascular: Regular rate.  Respiratory: Bilateral symmetric expansion  Gastrointestinal: Soft, non tender  Genitourinary: no suprapubic tenderness  Musculoskeletal: No edema  Skin: warm, dry  Neuro: Alert.  Psych: Mood appropriate.         Medications:   Medications:    apixaban  2.5 mg Oral BID    furosemide  20 mg IntraVENous Q8H    metoprolol tartrate  50 mg Oral BID    sodium chloride flush   Negative 03/04/2022 03:51 PM    KETUA Negative 03/04/2022 03:51 PM     Urine Cultures:   Lab Results   Component Value Date/Time    LABURIN >100,000 CFU/ml 09/05/2019 10:28 PM     Blood Cultures: No results found for: \"BC\"  No results found for: \"BLOODCULT2\"  Organism:   Lab Results   Component Value Date/Time    ORG Klebsiella oxytoca 09/05/2019 10:28 PM         Electronically signed by Ricardo Javier MD on 8/11/2024 at 2:46 PM

## 2024-08-11 NOTE — CONSULTS
Ph: [phone], Fax: [phone]           [facility]               Reason for admission:                 Admitted with shortness of breath and heart failure.    Brief Summary :     Pedro Becerra is being seen by nephrology for CKD stage IV.      Interval History and plan:      Blood pressure is well-controlled  Urine output is 900 mL.    Weight is 186> 183 pounds    Plan:    Continue Lasix 20 mg every 8 hourly for fluid overload and shortness of breath.  A-fib management per cardiology.  Daily renal panel.  Urine for protein creatinine ratio.  GFR is pretty stable at baseline.  For the sake of completion I will check PTH and vitamin D.    No need for dialysis/ultrafiltration.                   Assessment :          1.  Chronic Kidney Disease:He is in stage IV chronic kidney disease .  He has chronic kidney disease since 2010. Creatinine fluctuates with the use of diuretic as he also has cardiorenal syndrome.  He has underlying hypertensive nephrosclerosis.History of diabetes which is well controlled with Lantus.  Extensive workup was done in the hospital.  In the labs at the time of initial evaluation  creatinine is 2.6 with a BUN of 27 and GFR 23 mL/minute.     Ultrasound of the kidney:no hydronephrosis and had bilateral normal-size kidneys.  Urological procedures: PSA was 0.6  Biopsy:  Proteinuria:  He has 600 mg / day of proteinuria.   SPEP and UPEP did not show any myeloma protein.  Free light chain ratio was slightly elevated at 1.69.     2.  HTN/Fluid over load: Hypertension / CHF is being managed by cardiology.  He was on Tafamidis for ATTR CHF.  Echocardiogram showed LVEF of 10-15%.  He has severe LVH and had global hypokinesis of left ventricle.    Coronary angiogram in July 2019 showed minimal coronary disease and no intervention was done.     3.  Bone and hyperparathyroidism : There is no need for calcitriol as it is within expected range for his chronic kidney disease.  PTH:  admitted on 3/11/2020 with HTN and CHF. He responded to lasix. He was DC on lasix, metoprolol and amlodipine.     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 His weight was 190 pounds and decreased to 182 pounds on DC.    Cardiology is consulted for heart failure and atrial fibrillation.  He is on amiodarone drip.  He was placed on Lopressor and Eliquis.  He is also on Lasix 3 times daily.  Tafamidis was stopped.    We are consulted for his chronic kidney disease stage IV.            PMH/PSH/SH/Family History:     Past Medical History:   Diagnosis Date    Abdominal hernia     Arthritis     MILD    CHF (congestive heart failure) ([facility])     7/2022 ECHO: severe concentric LVH, EF 25-30%, Global left ventricular hypokinesis, mild MR, TR, VA, AR; non-ischemic,    CKD (chronic kidney disease) stage 3, GFR 30-59 ml/min ([facility])     Dr. YUDITH Umaña    Hyperlipidemia     Hypertension     RADHA (iron deficiency anemia)     Type II or unspecified type diabetes mellitus without mention of complication, not stated as uncontrolled           Medication:     Current Facility-Administered Medications: apixaban (ELIQUIS) tablet 2.5 mg, 2.5 mg, Oral, BID  furosemide (LASIX) injection 20 mg, 20 mg, IntraVENous, Q8H  metoprolol tartrate (LOPRESSOR) tablet 50 mg, 50 mg, Oral, BID  melatonin disintegrating tablet 5 mg, 5 mg, Oral, Nightly PRN  sodium chloride flush 0.9 % injection 5-40 mL, 5-40 mL, IntraVENous, 2 times per day  sodium chloride flush 0.9 % injection 5-40 mL, 5-40 mL, IntraVENous, PRN  0.9 % sodium chloride infusion, , IntraVENous, PRN  sodium chloride flush 0.9 % injection 5-40 mL, 5-40 mL, IntraVENous, 2 times per day  sodium chloride flush 0.9 % injection 5-40 mL, 5-40 mL, IntraVENous, PRN  0.9 % sodium chloride infusion, , IntraVENous, PRN  polyethylene glycol (GLYCOLAX) packet 17 g, 17 g, Oral, Daily PRN  acetaminophen (TYLENOL) tablet 650 mg, 650 mg, Oral, Q6H PRN **OR** acetaminophen (TYLENOL)  suppository 650 mg, 650 mg, Rectal, Q6H PRN  glucose chewable tablet 16 g, 4 tablet, Oral, PRN  dextrose bolus 10% 125 mL, 125 mL, IntraVENous, PRN **OR** dextrose bolus 10% 250 mL, 250 mL, IntraVENous, PRN  glucagon injection 1 mg, 1 mg, SubCUTAneous, PRN  dextrose 10 % infusion, , IntraVENous, Continuous PRN  insulin lispro (HUMALOG,ADMELOG) injection vial 0-8 Units, 0-8 Units, SubCUTAneous, TID WC  insulin lispro (HUMALOG,ADMELOG) injection vial 0-4 Units, 0-4 Units, SubCUTAneous, Nightly  aspirin EC tablet 81 mg, 81 mg, Oral, Daily  atorvastatin (LIPITOR) tablet 40 mg, 40 mg, Oral, Nightly  perflutren lipid microspheres (DEFINITY) injection 1.5 mL, 1.5 mL, IntraVENous, ONCE PRN  sacubitril-valsartan (ENTRESTO) 24-26 MG per tablet 1 tablet, 1 tablet, Oral, BID  insulin glargine (LANTUS) injection vial 5 Units, 5 Units, SubCUTAneous, Nightly  [] amiodarone (CORDARONE) 450 mg in dextrose 5 % 250 mL infusion, 1 mg/min, IntraVENous, Continuous **FOLLOWED BY** amiodarone (CORDARONE) 450 mg in dextrose 5 % 250 mL infusion, 0.5 mg/min, IntraVENous, Continuous    Vitals :     Vitals:    24 0348   BP: 118/78   Pulse: 89   Resp: 18   Temp: 97.4 °F (36.3 °C)   SpO2: 99%          Physical Examination :     appearance: Alert, orientated  Respiratory: no distress  Cardiovascular: no visibly  raised JVD, Edema NONE  Abdomen: -  soft       Labs :     CBC:   Recent Labs     24  0934 24  0555 08/10/24  0626   WBC 7.1 7.2 8.0   HGB 10.8* 11.0* 10.7*   HCT 32.9* 34.2* 32.0*    237 195     BMP:    Recent Labs     24  1815 24  0555 08/10/24  0626   * 137 136   K 4.5 4.0 4.1    103 104   CO2 16* 19* 19*   BUN 22* 24* 27*   CREATININE 2.3* 2.4* 2.6*   GLUCOSE 206* 167* 131*   MG 2.00 2.00  --    PHOS  --  3.5 3.5     Lab Results   Component Value Date/Time    COLORU Yellow 2022 03:51 PM    NITRU POSITIVE 2022 03:51 PM    GLUCOSEU Negative 2022 03:51 PM    KETUA

## 2024-08-12 VITALS
RESPIRATION RATE: 18 BRPM | BODY MASS INDEX: 24.63 KG/M2 | DIASTOLIC BLOOD PRESSURE: 77 MMHG | HEIGHT: 71 IN | WEIGHT: 175.93 LBS | TEMPERATURE: 97.3 F | HEART RATE: 105 BPM | OXYGEN SATURATION: 100 % | SYSTOLIC BLOOD PRESSURE: 122 MMHG

## 2024-08-12 LAB
ALBUMIN SERPL-MCNC: 3.2 G/DL (ref 3.4–5)
ANION GAP SERPL CALCULATED.3IONS-SCNC: 15 MMOL/L (ref 3–16)
BASOPHILS # BLD: 0 K/UL (ref 0–0.2)
BASOPHILS NFR BLD: 0.3 %
BUN SERPL-MCNC: 32 MG/DL (ref 7–20)
CALCIUM SERPL-MCNC: 8.8 MG/DL (ref 8.3–10.6)
CHLORIDE SERPL-SCNC: 102 MMOL/L (ref 99–110)
CO2 SERPL-SCNC: 17 MMOL/L (ref 21–32)
CREAT SERPL-MCNC: 2.6 MG/DL (ref 0.8–1.3)
DEPRECATED RDW RBC AUTO: 18 % (ref 12.4–15.4)
EOSINOPHIL # BLD: 0 K/UL (ref 0–0.6)
EOSINOPHIL NFR BLD: 0.2 %
GFR SERPLBLD CREATININE-BSD FMLA CKD-EPI: 23 ML/MIN/{1.73_M2}
GLUCOSE BLD-MCNC: 158 MG/DL (ref 70–99)
GLUCOSE BLD-MCNC: 160 MG/DL (ref 70–99)
GLUCOSE BLD-MCNC: 162 MG/DL (ref 70–99)
GLUCOSE BLD-MCNC: 168 MG/DL (ref 70–99)
GLUCOSE SERPL-MCNC: 161 MG/DL (ref 70–99)
HCT VFR BLD AUTO: 35.1 % (ref 40.5–52.5)
HGB BLD-MCNC: 11.6 G/DL (ref 13.5–17.5)
LYMPHOCYTES # BLD: 0.9 K/UL (ref 1–5.1)
LYMPHOCYTES NFR BLD: 9.9 %
MCH RBC QN AUTO: 27.9 PG (ref 26–34)
MCHC RBC AUTO-ENTMCNC: 33 G/DL (ref 31–36)
MCV RBC AUTO: 84.4 FL (ref 80–100)
MONOCYTES # BLD: 1.2 K/UL (ref 0–1.3)
MONOCYTES NFR BLD: 13.2 %
NEUTROPHILS # BLD: 6.8 K/UL (ref 1.7–7.7)
NEUTROPHILS NFR BLD: 76.4 %
PERFORMED ON: ABNORMAL
PHOSPHATE SERPL-MCNC: 3.2 MG/DL (ref 2.5–4.9)
PLATELET # BLD AUTO: 223 K/UL (ref 135–450)
PMV BLD AUTO: 9.3 FL (ref 5–10.5)
POTASSIUM SERPL-SCNC: 3.5 MMOL/L (ref 3.5–5.1)
RBC # BLD AUTO: 4.16 M/UL (ref 4.2–5.9)
SODIUM SERPL-SCNC: 134 MMOL/L (ref 136–145)
WBC # BLD AUTO: 9 K/UL (ref 4–11)

## 2024-08-12 PROCEDURE — 99221 1ST HOSP IP/OBS SF/LOW 40: CPT | Performed by: NURSE PRACTITIONER

## 2024-08-12 PROCEDURE — 6370000000 HC RX 637 (ALT 250 FOR IP): Performed by: INTERNAL MEDICINE

## 2024-08-12 PROCEDURE — 80069 RENAL FUNCTION PANEL: CPT

## 2024-08-12 PROCEDURE — 97535 SELF CARE MNGMENT TRAINING: CPT

## 2024-08-12 PROCEDURE — 97116 GAIT TRAINING THERAPY: CPT

## 2024-08-12 PROCEDURE — 99233 SBSQ HOSP IP/OBS HIGH 50: CPT | Performed by: INTERNAL MEDICINE

## 2024-08-12 PROCEDURE — 6370000000 HC RX 637 (ALT 250 FOR IP)

## 2024-08-12 PROCEDURE — 2580000003 HC RX 258: Performed by: STUDENT IN AN ORGANIZED HEALTH CARE EDUCATION/TRAINING PROGRAM

## 2024-08-12 PROCEDURE — 97162 PT EVAL MOD COMPLEX 30 MIN: CPT

## 2024-08-12 PROCEDURE — 2580000003 HC RX 258

## 2024-08-12 PROCEDURE — 97530 THERAPEUTIC ACTIVITIES: CPT

## 2024-08-12 PROCEDURE — 85025 COMPLETE CBC W/AUTO DIFF WBC: CPT

## 2024-08-12 PROCEDURE — 97166 OT EVAL MOD COMPLEX 45 MIN: CPT

## 2024-08-12 PROCEDURE — 36415 COLL VENOUS BLD VENIPUNCTURE: CPT

## 2024-08-12 PROCEDURE — 6370000000 HC RX 637 (ALT 250 FOR IP): Performed by: STUDENT IN AN ORGANIZED HEALTH CARE EDUCATION/TRAINING PROGRAM

## 2024-08-12 RX ORDER — TORSEMIDE 20 MG/1
20 TABLET ORAL DAILY
Qty: 30 TABLET | Refills: 3 | Status: SHIPPED | OUTPATIENT
Start: 2024-08-13

## 2024-08-12 RX ORDER — AMIODARONE HYDROCHLORIDE 200 MG/1
200 TABLET ORAL 2 TIMES DAILY
Qty: 30 TABLET | Refills: 0 | Status: SHIPPED | OUTPATIENT
Start: 2024-08-12 | End: 2024-08-20 | Stop reason: SDUPTHER

## 2024-08-12 RX ORDER — CALCITRIOL 0.25 UG/1
0.25 CAPSULE, LIQUID FILLED ORAL EVERY OTHER DAY
Status: DISCONTINUED | OUTPATIENT
Start: 2024-08-12 | End: 2024-08-12 | Stop reason: HOSPADM

## 2024-08-12 RX ORDER — METOPROLOL TARTRATE 50 MG
50 TABLET ORAL 2 TIMES DAILY
Qty: 60 TABLET | Refills: 3 | Status: SHIPPED | OUTPATIENT
Start: 2024-08-12

## 2024-08-12 RX ORDER — CALCITRIOL 0.25 UG/1
0.25 CAPSULE, LIQUID FILLED ORAL EVERY OTHER DAY
Qty: 30 CAPSULE | Refills: 3 | Status: SHIPPED | OUTPATIENT
Start: 2024-08-14

## 2024-08-12 RX ADMIN — SODIUM CHLORIDE, PRESERVATIVE FREE 10 ML: 5 INJECTION INTRAVENOUS at 08:47

## 2024-08-12 RX ADMIN — ASPIRIN 81 MG: 81 TABLET, COATED ORAL at 08:43

## 2024-08-12 RX ADMIN — METOPROLOL TARTRATE 50 MG: 50 TABLET, FILM COATED ORAL at 08:43

## 2024-08-12 RX ADMIN — SODIUM CHLORIDE, PRESERVATIVE FREE 10 ML: 5 INJECTION INTRAVENOUS at 08:45

## 2024-08-12 RX ADMIN — CALCITRIOL CAPSULES 0.25 MCG 0.25 MCG: 0.25 CAPSULE ORAL at 08:54

## 2024-08-12 RX ADMIN — APIXABAN 2.5 MG: 2.5 TABLET, FILM COATED ORAL at 08:43

## 2024-08-12 RX ADMIN — TORSEMIDE 20 MG: 20 TABLET ORAL at 08:43

## 2024-08-12 RX ADMIN — SODIUM CHLORIDE, PRESERVATIVE FREE 10 ML: 5 INJECTION INTRAVENOUS at 08:46

## 2024-08-12 RX ADMIN — SACUBITRIL AND VALSARTAN 1 TABLET: 24; 26 TABLET, FILM COATED ORAL at 08:43

## 2024-08-12 RX ADMIN — AMIODARONE HYDROCHLORIDE 200 MG: 200 TABLET ORAL at 08:43

## 2024-08-12 ASSESSMENT — ENCOUNTER SYMPTOMS
GASTROINTESTINAL NEGATIVE: 1
SHORTNESS OF BREATH: 1

## 2024-08-12 ASSESSMENT — PAIN SCALES - GENERAL: PAINLEVEL_OUTOF10: 0

## 2024-08-12 NOTE — CONSULTS
[facility]  Palliative Medicine Consultation Note      Date Of Admission:8/8/2024  Date of consult: 08/12/24  Seen by PC in the past:  No    Recommendations:        Met with pt, introduced palliative care and had a voluntary discussion about advance care planning. I asked to include his wife by phone or other family member, but he declined, stating he would talk with his wife later on. I talked with him about his heart failure and provided education, as it seemed that he had a limited understanding of heart failure. He said he and his wife are doing fine at home, have grandkids help with mowing the lawn and other chores. He says he doesn't move around a lot at home due to DRISCOLL, but he enjoys sitting inside or outside at home. He is open to having a nurse come to check on him, and I spoke with him about home hospice services. He was in agreement with a hospice nurse coming to speak with him, and said he'd talk with his wife about it too, declining again for me to call her. I spoke with him about code status, and he says he wouldn't want any heroic measures or to be put on a ventilator, \"when I'm gone, I'm gone.\" I explained DNR/DNI and he confirmed that is consistent with his wishes. D/w SHELLEY Murray and MARK Juan.     1. Goals of Care/Advanced Care planning/Code status: changed to DNR/DNI (Limited- no to all interventions) per pt's wishes. The pt is in agreement with a hospice referral. He hopes to go home today, but will need a PT/OT eval since he hasn't been out of bed in a few days. Pt did not want me to talk with his wife/family today, and says he will speak with his wife later.   2. Pain: pt denies pain/discomfort.   3. SOB: p/w sob and admitted with acute on chronic heart failure with mild volume overload most likely due to episodes of Afib with RVR. Echocardiogram was completed showing new EF of 10-15%. The pt says he doesn't use a lot of salt and doesn't drink excessive fluids at home. He  denies BLE swelling. He endorses DRISCOLL and staying mostly sedentary at home as a result. He says he would become sob with a short walk across a small room at baseline.  4. Disposition: pending PT/OT eval, hopefully home with hospice    Reason for Consult:         [x]  Goals of Care  []  Code Status Discussion/Advanced Care Planning   []  Psychosocial/Family Support  []  Symptom Management  []  Other (Specify)    Requesting Physician: Dr. Romario Javier    CHIEF COMPLAINT:  SOB    History Obtained From:  patient, electronic medical record    History of Present Illness:         Pedro Becerra is a 83 y.o. male with PMH of HFrEF of 25 to 30% with G3DD, cardiac amyloidosis, T2DM with right toe amputation, HTN, hyperlipidemia who presented with shortness of breath. He had cough and subjective fevers about a week ago likely viral, which resolved but has persistent shortness of breath on exertion. Chest x-ray had no signs of pulmonary edema. Cardiology was consulted and suspected acute on chronic heart failure with mild volume overload most likely due to episodes of Afib with RVR. Echocardiogram was completed showing new EF of 10-15%. He is on lasix 20 mg every 8 hours and amiodarone.    Subjective:         Past Medical History:        Diagnosis Date    Abdominal hernia     Arthritis     MILD    CHF (congestive heart failure) ([facility])     7/2022 ECHO: severe concentric LVH, EF 25-30%, Global left ventricular hypokinesis, mild MR, TR, [place], AR; non-ischemic,    CKD (chronic kidney disease) stage 3, GFR 30-59 ml/min ([facility])     Dr. YUDITH Umaña    Hyperlipidemia     Hypertension     RADHA (iron deficiency anemia)     Type II or unspecified type diabetes mellitus without mention of complication, not stated as uncontrolled        Past Surgical History:        Procedure Laterality Date    HERNIA REPAIR      HIP SURGERY      OTHER SURGICAL HISTORY N/A 12/15/2015    LAPAROSCOPIC LEFT INGUINAL HERNIA REPAIR WITH MESH       Current  \"GLUCOSEU\", \"AMORPHOUS\" in the last 72 hours.    Invalid input(s): \"KETONESU\"    XR CHEST PORTABLE   Final Result      Patchy consolidation in the right lung base-atelectasis is favored, pneumonia   not excluded.      Left lung is clear.      Borderline cardiomegaly.            Electronically signed by Jj Mitchell            Conclusion/Time spent:         Recommendations see above    Time spent with patient and/or family: 30 min  Time reviewing records: 10 min   Time communicating with staff: 5 min     A total of 45 minutes spent with the patient and family on unit greater than 50% in counseling regarding palliative care and in goals of care for the patient.    Thank you to Dr. Romario Javier for this consultation. We will continue to follow Mr. Becerra's care as needed.    Rekha Abel NP  Palliative Care  [phone]

## 2024-08-12 NOTE — PROGRESS NOTES
Pt discharged home with family. IV removed, belongings taken with family. Home health to see patient within a few days. Paperwork and medications reviewed. All questions answered.    Electronically signed by Yessica Jennings RN on 8/12/2024 at 6:31 PM

## 2024-08-12 NOTE — PROGRESS NOTES
Physical Therapy  Facility/Department: [facility]  Physical Therapy Initial Assessment    Name: Pedro Becerra  : 1940  MRN: [id number]  Date of Service: 2024    Discharge Recommendations:  Subacute/Skilled Nursing Facility, 24 hour supervision or assist, Home with Home health PT   PT Equipment Recommendations  Equipment Needed: Yes (Rolling walker)    Assessment   Assessment: Pt from home with heart failure.  Pt with generalized weakness and decreased functional mobility from inactivity during hospital admission.  Gait and balance are poor, requiring rolling walker for safety.  Pt fatigues quickly, needing increased seated rest breaks.  Concerns for pt safely returning home.  Pt would benefit from short SNF to increase strength to improve gait, balance and activity tolerance.  IF pt returns home, pt needs 24hr capable hands on assistance, a rolling walker and home PT to ensure safety.  Treatment Diagnosis: Decreased gait associated with heart failure.  Decision Making: Medium Complexity  Requires PT Follow-Up: Yes     Plan   General Plan:  (2-5)  Current Treatment Recommendations: Transfer training, Functional mobility training, Strengthening, Gait training, Safety education & training, Patient/Caregiver education & training    Safety Devices  Type of Devices: Left in chair, Chair alarm in place, Call light within reach, Nurse notified     Restrictions  Other position/activity restrictions: Up as tolerated     Subjective   Pain: Pt denies pain.    Chart Reviewed: Yes  Additional Pertinent Hx: Pt to ED  with shortness of breath and admitted for heart failure.  Rapid response  for tachycardia and transferred to PCU.  Cardiology consult.  Palliative Care consult.  PMH:  HTN, DM, anemia, OA, CHF, CKD    Diagnosis: Heart Failure    Subjective  Subjective: Pt found sitting up in the chair.  \"They just got me up.  I don't have any balance now.  I ain't never been in the bed that long.\"  Pt reports had

## 2024-08-12 NOTE — PLAN OF CARE
Problem: Discharge Planning  Goal: Discharge to home or other facility with appropriate resources  8/12/2024 1731 by Yessica Jennings RN  Outcome: Completed     Problem: ABCDS Injury Assessment  Goal: Absence of physical injury  8/12/2024 1731 by Yessica Jennings RN  Outcome: Completed     Problem: Skin/Tissue Integrity  Goal: Absence of new skin breakdown  Description: 1.  Monitor for areas of redness and/or skin breakdown  2.  Assess vascular access sites hourly  3.  Every 4-6 hours minimum:  Change oxygen saturation probe site  4.  Every 4-6 hours:  If on nasal continuous positive airway pressure, respiratory therapy assess nares and determine need for appliance change or resting period.  8/12/2024 1731 by Yessica Jennings RN  Outcome: Completed     Problem: Chronic Conditions and Co-morbidities  Goal: Patient's chronic conditions and co-morbidity symptoms are monitored and maintained or improved  8/12/2024 1731 by Yessica Jennings RN  Outcome: Completed     Problem: Safety - Adult  Goal: Free from fall injury  8/12/2024 1731 by Yessica eJnnings RN  Outcome: Completed     Problem: Respiratory - Adult  Goal: Achieves optimal ventilation and oxygenation  8/12/2024 1731 by Yessica Jennings RN  Outcome: Completed     Problem: Cardiovascular - Adult  Goal: Maintains optimal cardiac output and hemodynamic stability  8/12/2024 1731 by Yessica Jennings RN  Outcome: Completed     Problem: Cardiovascular - Adult  Goal: Absence of cardiac dysrhythmias or at baseline  Outcome: Completed  Flowsheets  Taken 8/12/2024 1205  Absence of cardiac dysrhythmias or at baseline:   Monitor cardiac rate and rhythm   Assess for signs of decreased cardiac output   Administer antiarrhythmia medication and electrolyte replacement as ordered  Taken 8/12/2024 0834  Absence of cardiac dysrhythmias or at baseline:   Monitor cardiac rate and rhythm   Assess for signs of decreased cardiac output   Administer antiarrhythmia medication and electrolyte  replacement as ordered     Problem: Metabolic/Fluid and Electrolytes - Adult  Goal: Electrolytes maintained within normal limits  8/12/2024 1731 by Yessica Jennings RN  Outcome: Completed     Problem: Metabolic/Fluid and Electrolytes - Adult  Goal: Hemodynamic stability and optimal renal function maintained  Outcome: Completed  Flowsheets  Taken 8/12/2024 1615  Hemodynamic stability and optimal renal function maintained: Monitor labs and assess for signs and symptoms of volume excess or deficit  Taken 8/12/2024 1205  Hemodynamic stability and optimal renal function maintained: Monitor labs and assess for signs and symptoms of volume excess or deficit  Taken 8/12/2024 0834  Hemodynamic stability and optimal renal function maintained:   Monitor labs and assess for signs and symptoms of volume excess or deficit   Monitor intake, output and patient weight   Monitor urine specific gravity, serum osmolarity and serum sodium as indicated or ordered

## 2024-08-12 NOTE — PLAN OF CARE
Problem: Discharge Planning  Goal: Discharge to home or other facility with appropriate resources  8/11/2024 2234 by Deena Long RN  Outcome: Progressing  8/11/2024 1929 by Huma Juárez, RN  Outcome: Progressing  Flowsheets (Taken 8/11/2024 1929)  Discharge to home or other facility with appropriate resources:   Identify barriers to discharge with patient and caregiver   Identify discharge learning needs (meds, wound care, etc)   Refer to discharge planning if patient needs post-hospital services based on physician order or complex needs related to functional status, cognitive ability or social support system     Problem: ABCDS Injury Assessment  Goal: Absence of physical injury  8/11/2024 2234 by Deena Long RN  Outcome: Progressing  8/11/2024 1929 by Huma Juárez, RN  Outcome: Progressing  Flowsheets (Taken 8/11/2024 1907 by Deena Long, RN)  Absence of Physical Injury: Implement safety measures based on patient assessment  Note: Bed alarm on, bed in lowest position, call light within reach.     Problem: Skin/Tissue Integrity  Goal: Absence of new skin breakdown  Description: 1.  Monitor for areas of redness and/or skin breakdown  2.  Assess vascular access sites hourly  3.  Every 4-6 hours minimum:  Change oxygen saturation probe site  4.  Every 4-6 hours:  If on nasal continuous positive airway pressure, respiratory therapy assess nares and determine need for appliance change or resting period.  Outcome: Progressing     Problem: Chronic Conditions and Co-morbidities  Goal: Patient's chronic conditions and co-morbidity symptoms are monitored and maintained or improved  Outcome: Progressing     Problem: Safety - Adult  Goal: Free from fall injury  Outcome: Progressing     Problem: Respiratory - Adult  Goal: Achieves optimal ventilation and oxygenation  Outcome: Progressing     Problem: Cardiovascular - Adult  Goal: Maintains optimal cardiac output and hemodynamic stability  Outcome:

## 2024-08-12 NOTE — PROGRESS NOTES
Physician Progress Note      PATIENT:               JOSÉ MIGUEL MEDEL  CSN #:                  [id number]  :                       1940  ADMIT DATE:       2024 9:02 AM  DISCH DATE:  RESPONDING  PROVIDER #:        Ricardo Javier MD          QUERY TEXT:    Dear Dr. Doss,    Patient admitted with increasing SOB on exertion. Noting documentation of,   NSTEMI, in ED record, in H/H dated . If possible, please document in the   progress notes and discharge summary if you are evaluating and/or treating   NSTEMI or NSTEMI ruled out after study:    The medical record reflects the following:  Risk Factors: CAD End Stage CHF, HTN HLDBNP is 24,600,  Clinical Indicators:  EF of 20-25% as of > EF of 10 -15% , Troponin is   99-->86,  increased dyspnea on exertion, likely related to CHF exacerbation  Treatment: Cardio consult, IV Lasix, Palliative care consult, CXR, ECHO        Thank you,  Diana Coleman RN BSN  Clinical   [email or web address]  Options provided:  -- NSTEMI  -- Demand Ischemia only, no acute NSTEMI  -- Other - I will add my own diagnosis  -- Disagree - Not applicable / Not valid  -- Disagree - Clinically unable to determine / Unknown  -- Refer to Clinical Documentation Reviewer    PROVIDER RESPONSE TEXT:    This patient has demand ischemia only, no acute NSTEMI.    Query created by: Diana Coleman on 2024 12:30 PM      Electronically signed by:  Ricardo Javier MD 2024 5:20 PM

## 2024-08-12 NOTE — DISCHARGE INSTR - COC
Continuity of Care Form    Patient Name: Pedro Becerra   :  1940  MRN:  [id number]    Admit date:  2024  Discharge date:2024      Code Status Order: Limited   Advance Directives:   Advance Care Flowsheet Documentation             Admitting Physician:  Ricardo Javier MD  PCP: Shankar Olea DO    Discharging Nurse: Yessica Jennings RN    Discharging Hospital Unit/Room#: 4322/4322-01  Discharging Unit Phone Number:     Emergency Contact:   Extended Emergency Contact Information  Primary Emergency Contact: Batsheva Becerra  Address: [address] [place] [address]  Home Phone: [phone]  Mobile Phone: [phone]  Relation: Spouse    Past Surgical History:  Past Surgical History:   Procedure Laterality Date    HERNIA REPAIR      HIP SURGERY      OTHER SURGICAL HISTORY N/A 12/15/2015    LAPAROSCOPIC LEFT INGUINAL HERNIA REPAIR WITH MESH       Immunization History:   Immunization History   Administered Date(s) Administered    COVID-19, MODERNA BLUE border, Primary or Immunocompromised, (age 12y+), IM, 100 mcg/0.5mL 2021, 2021    COVID-19, MODERNA Bivalent, (age 12y+), IM, 50 mcg/0.5 mL 2022    Influenza 2010, 2012    Influenza Vaccine, unspecified formulation 2013, 2014, 2015    Influenza Virus Vaccine 2007, 2008, 2009    Influenza Whole 2009    Influenza, FLUAD, (age 65 y+), Adjuvanted, 0.5mL 10/19/2020, 10/21/2021, 2022    Influenza, High Dose (Fluzone 65 yrs and older) 2013, 2014, 2015, 12/15/2016, 10/02/2017, 2018    Influenza, Triv, inactivated, subunit, adjuvanted, IM (Fluad 65 yrs and older) 10/07/2019    Pneumococcal, PCV-13, PREVNAR 13, (age 6w+), IM, 0.5mL 2014    Pneumococcal, PPSV23, PNEUMOVAX 23, (age 2y+), SC/IM, 0.5mL 2005, 12/15/2016    TDaP, ADACEL (age 10y-64y), BOOSTRIX (age 10y+), IM, 0.5mL 2018  Assisted  Feeding  Independent  Med Admin  Independent  Med Delivery   whole    Wound Care Documentation and Therapy:  Incision 12/15/15 Abdomen Mid (Active)   Number of days: 3163        Elimination:  Continence:   Bowel: Yes  Bladder: Yes  Urinary Catheter: None   Colostomy/Ileostomy/Ileal Conduit: No       Date of Last BM: 8/7/2024    Intake/Output Summary (Last 24 hours) at 8/12/2024 1621  Last data filed at 8/12/2024 1613  Gross per 24 hour   Intake 785 ml   Output 1000 ml   Net -215 ml     I/O last 3 completed shifts:  In: 455 [P.O.:450; I.V.:5]  Out: 1475 [Urine:1475]    Safety Concerns:     At Risk for Falls    Impairments/Disabilities:      None    Nutrition Therapy:  Current Nutrition Therapy:   - Oral Diet:  General, Carb Control 3 carbs/meal (1500kcals/day), Low Fat, and Low Sodium (2gm)  - Oral Nutrition Supplement:  Standard  daily    Routes of Feeding: Oral  Liquids: Thin Liquids  Daily Fluid Restriction: no  Last Modified Barium Swallow with Video (Video Swallowing Test): not done    Treatments at the Time of Hospital Discharge:   Respiratory Treatments: none baseline room air 99%  Oxygen Therapy:  is not on home oxygen therapy.  Ventilator:    - No ventilator support    Rehab Therapies: Physical Therapy and Occupational Therapy  Weight Bearing Status/Restrictions: No weight bearing restrictions  Other Medical Equipment (for information only, NOT a DME order):  walker  Other Treatments: n/a    Patient's personal belongings (please select all that are sent with patient):  None    RN SIGNATURE:  Electronically signed by Yessica Jennings RN on 8/12/24 at 5:47 PM EDT    CASE MANAGEMENT/SOCIAL WORK SECTION    Inpatient Status Date: ***    Readmission Risk Assessment Score:  Readmission Risk              Risk of Unplanned Readmission:  17           Discharging to Facility/ Agency   Name:   Address:  Phone:  Fax:    Dialysis Facility (if applicable)   Name:  Address:  Dialysis Schedule:  Phone:  Fax:    Case

## 2024-08-12 NOTE — DISCHARGE SUMMARY
V2.0  Discharge Summary    Name:  Pedro Becerra /Age/Sex: 1940 (83 y.o. male)   Admit Date: 2024  Discharge Date: 24    MRN & CSN:  [id number] & [id number] Encounter Date and Time 24 4:15 PM EDT    Attending:  Ricardo Arguello* Discharging Provider: Ricardo Javier MD       Hospital Course:     84yo male presenting and being admitted with worsening shortness of breath and dyspnea on exertion. 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 He is being admitted for further workup of his symptoms and more so based on his significant cardiac history. He was HDS during my evaluation. He had minimal crackles at lung bases. Chest xray did not look in florid volume overload nor did his legs were swollen. His renal function remains at baseline. He did not have electrolyte abnormalities. He has chronically elevated troponin levels.      On the day of admission, He had episodes of nonsustained wide complex tachycardia. Cardiology consulted and thinking this may be Paroxysmal a-fib with RVR/aberrancy in the setting of known and chronic widened QRS. Was started on Amiodarone infusion. Being diuresed with IV lasix.  On beta-blockers but held due to episodic bradycardia. Started on Eliquis. On telemetry.      Echo showing worsened EF of 10 to 15%.  Palliative care consulted and patient did change his code to limited DNR/DNI.  Patient is to set up with hospice services at home once discharged.  On 2024 patient was deemed fit for discharge.      Acute on chronic HFrEF exacerbation  Worsening HFrEF with a EF of 10 to 15% as of 8/10/2024  Grade 3 DD  Possible cardiac amyloidosis  -Diuresed with Lasix.  Discharged with torsemide 20 mg p.o. daily  - Palliative care consulted and patient to be discharged home with referral to hospice

## 2024-08-12 NOTE — PLAN OF CARE
Assessment  Goal: Absence of physical injury  Outcome: Progressing  Flowsheets (Taken 8/12/2024 1619)  Absence of Physical Injury: Implement safety measures based on patient assessment  Note: Pt educated on safety, uses call light and uses assistance from staff to ambulate.      Problem: Skin/Tissue Integrity  Goal: Absence of new skin breakdown  Description: 1.  Monitor for areas of redness and/or skin breakdown  2.  Assess vascular access sites hourly  3.  Every 4-6 hours minimum:  Change oxygen saturation probe site  4.  Every 4-6 hours:  If on nasal continuous positive airway pressure, respiratory therapy assess nares and determine need for appliance change or resting period.  Outcome: Progressing  Note: Pt free of new skin breakdown this shift     Problem: Chronic Conditions and Co-morbidities  Goal: Patient's chronic conditions and co-morbidity symptoms are monitored and maintained or improved  Outcome: Progressing  Flowsheets  Taken 8/12/2024 1726  Care Plan - Patient's Chronic Conditions and Co-Morbidity Symptoms are Monitored and Maintained or Improved:   Monitor and assess patient's chronic conditions and comorbid symptoms for stability, deterioration, or improvement   Collaborate with multidisciplinary team to address chronic and comorbid conditions and prevent exacerbation or deterioration   Update acute care plan with appropriate goals if chronic or comorbid symptoms are exacerbated and prevent overall improvement and discharge  Taken 8/12/2024 1615  Care Plan - Patient's Chronic Conditions and Co-Morbidity Symptoms are Monitored and Maintained or Improved:   Monitor and assess patient's chronic conditions and comorbid symptoms for stability, deterioration, or improvement   Collaborate with multidisciplinary team to address chronic and comorbid conditions and prevent exacerbation or deterioration   Update acute care plan with appropriate goals if chronic or comorbid symptoms are exacerbated and prevent  Progressing  Flowsheets  Taken 8/12/2024 1726  Electrolytes maintained within normal limits:   Monitor labs and assess patient for signs and symptoms of electrolyte imbalances   Monitor response to electrolyte replacements, including repeat lab results as appropriate   Administer electrolyte replacement as ordered  Taken 8/12/2024 1615  Electrolytes maintained within normal limits:   Monitor labs and assess patient for signs and symptoms of electrolyte imbalances   Monitor response to electrolyte replacements, including repeat lab results as appropriate  Taken 8/12/2024 1205  Electrolytes maintained within normal limits:   Monitor labs and assess patient for signs and symptoms of electrolyte imbalances   Monitor response to electrolyte replacements, including repeat lab results as appropriate   Administer electrolyte replacement as ordered  Taken 8/12/2024 0834  Electrolytes maintained within normal limits:   Monitor labs and assess patient for signs and symptoms of electrolyte imbalances   Administer electrolyte replacement as ordered   Monitor response to electrolyte replacements, including repeat lab results as appropriate  Note: Pt labs monitored for signs of electrolyte imbalances.      Problem: Cardiovascular - Adult  Goal: Absence of cardiac dysrhythmias or at baseline  Recent Flowsheet Documentation  Taken 8/12/2024 1205 by Yessica Jennings RN  Absence of cardiac dysrhythmias or at baseline:   Monitor cardiac rate and rhythm   Assess for signs of decreased cardiac output   Administer antiarrhythmia medication and electrolyte replacement as ordered  Taken 8/12/2024 0834 by Yessica Jennings RN  Absence of cardiac dysrhythmias or at baseline:   Monitor cardiac rate and rhythm   Assess for signs of decreased cardiac output   Administer antiarrhythmia medication and electrolyte replacement as ordered     Problem: Metabolic/Fluid and Electrolytes - Adult  Goal: Hemodynamic stability and optimal renal function  maintained  Recent Flowsheet Documentation  Taken 8/12/2024 1615 by Yessica Jennings, RN  Hemodynamic stability and optimal renal function maintained: Monitor labs and assess for signs and symptoms of volume excess or deficit  Taken 8/12/2024 1205 by Yessica Jennings, RN  Hemodynamic stability and optimal renal function maintained: Monitor labs and assess for signs and symptoms of volume excess or deficit  Taken 8/12/2024 0834 by Yessica Jennings, RN  Hemodynamic stability and optimal renal function maintained:   Monitor labs and assess for signs and symptoms of volume excess or deficit   Monitor intake, output and patient weight   Monitor urine specific gravity, serum osmolarity and serum sodium as indicated or ordered

## 2024-08-12 NOTE — PROGRESS NOTES
Occupational Therapy  Facility/Department: [address]  Occupational Therapy Initial Assessment and Treatment    Name: Pedro Becerra  : 1940  MRN: [id number]  Date of Service: 2024    Discharge Recommendations:  Subacute/Skilled Nursing Facility (vs home with 24hr assist and home OT)  OT Equipment Recommendations  Equipment Needed: Yes  Other: shower chair, raised toilet seat with rails    Treatment Diagnosis: Impaired ADLs, mobility, activity tolerance    Assessment   Performance deficits / Impairments: Decreased functional mobility ;Decreased ADL status;Decreased balance;Decreased strength;Decreased endurance  Assessment: Pt is from home with wife, is independent at baseline and drives. Pt now demo functional decline in ADLs and mobility due to generalized weakness and decreased activity tolerance related to heart failure with EF 10-15%. Pt requires CGA-min A for most ADLs and demo poor tolerance for standing due to fatigue. Pt able to ambulate short distance with RW and assist of 1. All functional tasks require increased time due to pt fatigue. Pt is a high fall risk. Recommend SNF to maximize pt safety and independence prior to return home. Pt adamant about returning home with therapy. Should pt d/c home, recommend 24hr assist with physical assist for all mobility. Recommend shower chair, raised toilet seat with rails. Anticipate need for RW and transport chair for longer distances.  Treatment Diagnosis: Impaired ADLs, mobility, activity tolerance  Prognosis: Fair  Decision Making: Medium Complexity  REQUIRES OT FOLLOW-UP: Yes  Activity Tolerance  Activity Tolerance: Patient limited by fatigue        Plan   Occupational Therapy Plan  Times Per Week: 2-5x  Current Treatment Recommendations: Strengthening, Balance training, Patient/Caregiver education & training, Safety education & training, Functional mobility training, Endurance training, Self-Care / ADL, Equipment evaluation, education, &  procurement     Restrictions  Position Activity Restriction  Other position/activity restrictions: Up as tolerated    Subjective   General  Chart Reviewed: Yes  Additional Pertinent Hx: Pt presented 8/8 with SOB. Admitted for worsening CHF with EF 10-15%, paroxymal afib, wide-complex tachycardia. RR on 8/8 for 's. PMHx:  HFrEF of 25 to 30% with G3DD, cardiac amyloidosis, T2DM with right toe amputation, HTN, hyperlipidemia  Family / Caregiver Present: No  Diagnosis: heart failure  Subjective  Subjective: Pt seated in bedside chair. \"The nurses got me up for the first time in 7 days\"  Pain: none    Social/Functional History  Social/Functional History  Lives With: Spouse  Type of Home: House  Home Layout: One level  Home Access: Stairs to enter with rails (2)  Bathroom Shower/Tub: Tub/Shower unit  Bathroom Toilet: Standard (no leverage)  Bathroom Equipment: None  Home Equipment: None  Has the patient had two or more falls in the past year or any fall with injury in the past year?: No  ADL Assistance: Independent  Homemaking Responsibilities: No (wife does homemaking)  Ambulation Assistance: Independent  Transfer Assistance: Independent  Active : Yes  Occupation: Retired ( at [employer])  Leisure & Hobbies: sit outside in the nice weather  Additional Comments: Pt has 2 graddaughters who stop by frequently.       Objective       Toilet Transfers  Equipment Used: Standard toilet (heavy reliance on Left grab bar)  Toilet Transfer: Contact guard assistance    UE Function  AROM: Within functional limits  Strength: Within functional limits    ADL  Grooming Skilled Clinical Factors: too fatigued to wash hands at sink following toileting. used hand wipe seated  LE Dressing: Contact guard assistance (don depends)  LE Dressing Skilled Clinical Factors: Threaded LEs seated, stood to pull depends over  hips, then had to return to seated on toilet due to fatigue. Rested 3-4 minutes before ambulating from

## 2024-08-12 NOTE — PROGRESS NOTES
Cardiology Consult Service  Daily Progress Note        Admit Date:  8/8/2024  Primary cardiologist: Dr Mathews     Reason for Consultation/Chief Complaint: AHF, PAFRVR     Subjective:     Interval history:  No acute events overnight.  Patient states that he no longer experiences any shortness of breath, denies any PND orthopnea.  He states that he would like to get discharged as soon as possible.  He denies any lower extremity edema.  He had about 1 L urine output in the past 24 hours.  His weight today is 175 pounds.  Telemetry was significant for wide QRS complex similar to his baseline EKG and irregular rhythm and rate in 100s.  There are also PVCs noted on telemetry.    HPI:    Pedro Becerra is a 83 y.o. male with a past medical history of  HFrEF of 25 to 30% with G3DD, cardiac amyloidosis, T2DM with right toe amputation, HTN, hyperlipidemia for shortness of breath.     Patient states that this has been going on for the past few weeks.  He states that it comes and goes most of the time.  Usually if he continues to take his medication it would go away on its own.  However at this time he felt that it kept Getting worse.  About a few weeks ago he did states that he had a cold which he felt was the cause of his shortness of breath however there was a period where he had resolution of his shortness of breath but then 2 days ago his shortness of breath had returned and it was much worse than before.  He stated that he is currently unable to walk to the bathroom without becoming short of breath which she was able to do prior to it.  He is a non-smoker, nonalcoholic denies any illicit drug use.  He denies any excessive salt intake states that he consumes only 1.5 to 2 L water a day at max.  He takes Lasix 20 mg twice daily at home.  He states that he never get swelling in his legs.  He has been noticing some PND denies orthopnea.  He also denies any racing of his heart.  As his shortness of breath was getting worse his  input(s): \"PT\", \"PTT\"  No results for input(s): \"CKTOTAL\", \"CKMB\", \"CKMBINDEX\", \"TROPONINI\" in the last 72 hours.  No results for input(s): \"BNP\" in the last 72 hours.  No results for input(s): \"NTPROBNP\" in the last 72 hours.  No results for input(s): \"TSH\" in the last 72 hours.    Imaging:     Echo 2015: moderate LVH, normal EF.     Echo 06/2019: severe LVH, EF 35-40%, mild RV dysfx, mild valve disease     Kathi 07/2019: mod LVd, EF 39%, mid to distal inferior and apical fixed defect.     LHC 07/2019: mild diffuse CAD, nl LVEDP.     ECG 9/6/19: NSR, 1st AVB, LAFB, RBBB (trifascicular block), possible inferior MI.     Echo 9/20/19: severe LVH, LVEF 50-55%, diastolic III, apical sparing on speckle strain imaging suggestive of amyloidosis, severe LAE, mild MR.       Tc PYP scan 3/12/20: strongly positive for amyloidosis.      09/2019 kappa/labda ratio 1.69 (normal in the setting of CKD, Cr 1.9). 24-hour UPEP normal; SPEP 05/2021 normal; hence no AL amyloidosis.      Echo 4/18/2022: Severe LVH, consider cardiac amyloidosis, LVEF 20-25% with global hypokinesis, diastolic grade 3, increased LVEDP, normal RV size with severe hypokinesis, no significant valvular abnormalities.     Limited echo 07/2022: severe LVH, EF 25-30%.   Echo 8/10/2024: Severe LVH, LVEF 10-15%, normal LV cavity, indeterminate diastolic function due to A-fib, RV dilatation with dysfunction, biatrial enlargement, moderate MR/TR, RVSP 53 (15).   Assessment & Plan:     Acute on Chronic HFrEF-resolved    Patient had been having new onset shortness of breath going on for 2 days.  He did have history of shortness of breath going on following an episode of cold/flu.  However it had resolved.  Had recent worsening that led to calling of EMS.  He did have very briefly been put on 2 L of oxygen however he was satting at 98% never the last he does have reduced EF as per his last echo on 7/27/2022 with an EF of 25-30%.  And he is up from his baseline weight of  2.5 mg p.o. twice daily  - Continue with baby aspirin, Entresto low-dose twice daily  - Continue torsemide 20 mg p.o. daily (home diuretic was Lasix 20 mg daily)  - Strict I's and O's every shift and standing weights if possible, low-salt diet, daily BMP with reflex to Mg (correct lytes for goals K >4.0 and Mg > 2.0) and wean supplemental oxygen to off (or down to baseline supplemental oxygen requirements) for sats greater than 90%.  - Will stop tafamidis (Vyndamax).  - Consider palliative care consultation.      Patient may be discharged to home (if ok with primary team) today on the above meds and follow up with me in 1 week with a BMP prior to visit. Please call me with any questions. If cardiology needs to be re-consulted during this admission, please contact our Cardiology Navigator at [phone] during normal business hours or the [facility] cardiology office/ at [phone] after hours or during the weekends/holidays.        I have personally reviewed the reports and images of labs, radiological studies, cardiac studies including ECG's and telemetry, current and old medical records. The note was completed using EMR and Dragon dictation system. Every effort was made to ensure accuracy; however, inadvertent computerized transcription errors may be present.    All questions and concerns were addressed to the patient/family. Alternatives to my treatment were discussed.     I would like to thank you for providing me the opportunity to participate in the care of your patient. If you have any questions, please do not hesitate to contact me.     Mynor Mathews MD, FACC, [facility]  [facility] [place] [address] [address]  Ph: [phone]  Fax: [phone]

## 2024-08-12 NOTE — PROGRESS NOTES
Ph: [phone], Fax: [phone]           [facility]               Reason for admission:                 Admitted with shortness of breath and heart failure.    Brief Summary :     Pedro Becerra is being seen by nephrology for CKD stage IV.      Interval History and plan:      Blood pressure is well-controlled  Urine output is 1075 mL.    Weight is 186> 183> 175 ?  Pounds  Creatinine is stable at 2.6     Plan:    On torsemide daily. 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  Daily renal panel.  Urine for protein creatinine ratio is 600 mg / day and he is on Entresto.  GFR is pretty stable at baseline.   Start calcitriol QOD  No need for dialysis/ultrafiltration.                   Assessment :          1.  Chronic Kidney Disease:He is in stage IV chronic kidney disease .  He has chronic kidney disease since 2010. Creatinine fluctuates with the use of diuretic as he also has cardiorenal syndrome.  He has underlying hypertensive nephrosclerosis.History of diabetes which is well controlled with Lantus.  Extensive workup was done in the hospital.  In the labs at the time of initial evaluation  creatinine is 2.6 with a BUN of 27 and GFR 23 mL/minute.     Ultrasound of the kidney:no hydronephrosis and had bilateral normal-size kidneys.  Urological procedures: PSA was 0.6  Biopsy:  Proteinuria:  He has 600 mg / day of proteinuria.   SPEP and UPEP did not show any myeloma protein.  Free light chain ratio was slightly elevated at 1.69.     2.  HTN/Fluid over load: Hypertension / CHF is being managed by cardiology.  He was on Tafamidis for ATTR CHF.  Echocardiogram showed LVEF of 10-15%.  He has severe LVH and had global hypokinesis of left ventricle.    Coronary angiogram in July 2019 showed minimal coronary disease and no intervention was done.     3.  Bone and hyperparathyroidism : There is no need for calcitriol as it is within  admitted to medicine for further management.    He was admitted on 3/11/2020 with HTN and CHF. He responded to lasix. He was DC on lasix, metoprolol and amlodipine.     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 His weight was 190 pounds and decreased to 182 pounds on DC.    Cardiology is consulted for heart failure and atrial fibrillation.  He is on amiodarone drip.  He was placed on Lopressor and Eliquis.  He is also on Lasix 3 times daily.  Tafamidis was stopped.    We are consulted for his chronic kidney disease stage IV.            PMH/PSH/SH/Family History:     Past Medical History:   Diagnosis Date    Abdominal hernia     Arthritis     MILD    CHF (congestive heart failure) ([facility])     7/2022 ECHO: severe concentric LVH, EF 25-30%, Global left ventricular hypokinesis, mild MR, TR, WV, AR; non-ischemic,    CKD (chronic kidney disease) stage 3, GFR 30-59 ml/min ([facility])     Dr. YUDITH Umaña    Hyperlipidemia     Hypertension     RADHA (iron deficiency anemia)     Type II or unspecified type diabetes mellitus without mention of complication, not stated as uncontrolled           Medication:     Current Facility-Administered Medications: amiodarone (CORDARONE) tablet 200 mg, 200 mg, Oral, BID  torsemide (DEMADEX) tablet 20 mg, 20 mg, Oral, Daily  apixaban (ELIQUIS) tablet 2.5 mg, 2.5 mg, Oral, BID  metoprolol tartrate (LOPRESSOR) tablet 50 mg, 50 mg, Oral, BID  melatonin disintegrating tablet 5 mg, 5 mg, Oral, Nightly PRN  sodium chloride flush 0.9 % injection 5-40 mL, 5-40 mL, IntraVENous, 2 times per day  sodium chloride flush 0.9 % injection 5-40 mL, 5-40 mL, IntraVENous, PRN  0.9 % sodium chloride infusion, , IntraVENous, PRN  sodium chloride flush 0.9 % injection 5-40 mL, 5-40 mL, IntraVENous, 2 times per day  sodium chloride flush 0.9 % injection 5-40 mL, 5-40 mL, IntraVENous, PRN  0.9 % sodium chloride infusion, , IntraVENous, PRN  polyethylene glycol (GLYCOLAX) packet 17 g, 17 g, Oral, Daily  03/04/2022 03:51 PM    BILIRUBINUR neg 12/11/2015 12:00 AM        ----------------------------------------------------------  Please call with questions at      24 Hrs Answering service [phone]  Perfect serve, or cell phone 7 am - 5pm  Ben Moreira MD   [email or web address]

## 2024-08-12 NOTE — CARE COORDINATION
CM spoke with PC NP, Rekha- referral placed to [facility] hospice per pt request. Pt wanted to be the one to discuss hospice with wife- CM sent referral to [facility], they will be sending a nurse to bedside shortly to speak with pt.     Thank you  Cat Thomas RN, BSN,   PCU   [phone]

## 2024-08-13 ENCOUNTER — TELEPHONE (OUTPATIENT)
Dept: FAMILY MEDICINE CLINIC | Age: 84
End: 2024-08-13

## 2024-08-13 NOTE — TELEPHONE ENCOUNTER
Verbal Order.    Will doctor follow for Home Health Care and Sign the orders that will be sent order.    Nevaeh @ [employer] line [phone]

## 2024-08-20 ENCOUNTER — OFFICE VISIT (OUTPATIENT)
Dept: CARDIOLOGY CLINIC | Age: 84
End: 2024-08-20
Payer: MEDICARE

## 2024-08-20 VITALS
SYSTOLIC BLOOD PRESSURE: 120 MMHG | BODY MASS INDEX: 24.44 KG/M2 | OXYGEN SATURATION: 99 % | WEIGHT: 175.2 LBS | HEART RATE: 75 BPM | DIASTOLIC BLOOD PRESSURE: 70 MMHG

## 2024-08-20 DIAGNOSIS — I50.20 SYSTOLIC HEART FAILURE, UNSPECIFIED HF CHRONICITY (HCC): Primary | ICD-10-CM

## 2024-08-20 DIAGNOSIS — E78.00 PURE HYPERCHOLESTEROLEMIA: ICD-10-CM

## 2024-08-20 DIAGNOSIS — N18.32 STAGE 3B CHRONIC KIDNEY DISEASE (HCC): ICD-10-CM

## 2024-08-20 DIAGNOSIS — I25.10 CORONARY ARTERY DISEASE INVOLVING NATIVE CORONARY ARTERY OF NATIVE HEART WITHOUT ANGINA PECTORIS: ICD-10-CM

## 2024-08-20 DIAGNOSIS — R93.89 ABNORMAL CAT SCAN: ICD-10-CM

## 2024-08-20 DIAGNOSIS — E55.9 VITAMIN D DEFICIENCY: ICD-10-CM

## 2024-08-20 DIAGNOSIS — I42.0 DILATED CARDIOMYOPATHY (HCC): ICD-10-CM

## 2024-08-20 PROCEDURE — 99215 OFFICE O/P EST HI 40 MIN: CPT | Performed by: NURSE PRACTITIONER

## 2024-08-20 PROCEDURE — 1124F ACP DISCUSS-NO DSCNMKR DOCD: CPT | Performed by: NURSE PRACTITIONER

## 2024-08-20 RX ORDER — AMIODARONE HYDROCHLORIDE 100 MG/1
100 TABLET ORAL DAILY
Qty: 90 TABLET | Refills: 3 | Status: SHIPPED | OUTPATIENT
Start: 2024-08-20

## 2024-08-20 NOTE — PROGRESS NOTES
[facility] [place]     Outpatient Follow Up Note  Pt of Dr Reid LACY,  FACC   Breonna Kc RN, APRN,CNP CVNP      CHIEF COMPLAINT / HPI: Pedro Becerra is 84 y.o. male who presents today with recent hosp visit with SOB with HFrEF of 25 to 30% with G3DD, cardiac amyloidosis, T2DM with right toe amputation, HTN, HLD    %.   TTE 8/9/2024    Left Ventricle: Severely reduced left ventricular systolic function with a visually estimated EF of 10 -15%. Left ventricle size is normal. Severely increased wall thickness.  Consider either infiltrative cardiomyopathy (cardiac amyloidosis). Global hypokinesis present. Indeterminate diastolic function due to atrial fibrillation.    Right Ventricle: Right ventricle is dilated. Reduced systolic function.    Aortic Valve: Mild regurgitation.    Mitral Valve: Moderate regurgitation.    Tricuspid Valve: Mild to moderate regurgitation. Moderately elevated RVSP, consistent with moderate pulmonary hypertension. Est RA pressure is 15 mmHg. The estimated RVSP is 53 mmHg.    Pulmonic Valve: Mild to moderate regurgitation.    Left Atrium: Left atrium is dilated.    Right Atrium: Right atrium is dilated.    IVC/SVC: IVC diameter is greater than 21 mm and decreases less than 50% during inspiration; therefore the estimated right atrial pressure is elevated (~15 mmHg). IVC is dilated.    Image quality is adequate.    Interval history:  VSS wt stable appears  hemodynamically compensated     Discussed meds diet activity  low salt diet    HR regular today       Echo 2015: moderate LVH, normal EF.   Echo 06/2019: severe LVH, EF 35-40%, mild RV dysfx, mild valve disease   Kathi 07/2019: mod LVd, EF 39%, mid to distal inferior and apical fixed defect.   LHC 07/2019: mild diffuse CAD, nl LVEDP.   ECG 9/6/19: NSR, 1st AVB, LAFB, RBBB (trifascicular block), possible inferior MI.   Echo 9/20/19: severe LVH, LVEF 50-55%, diastolic III, apical sparing on speckle strain imaging suggestive of amyloidosis,

## 2024-08-21 ENCOUNTER — TELEPHONE (OUTPATIENT)
Dept: ADMINISTRATIVE | Age: 84
End: 2024-08-21

## 2024-08-21 NOTE — TELEPHONE ENCOUNTER
Submitted PA for AMIODARONE  [facility] Key: [id number]  STATUS: not sent    While doing the PAf for this medication I could not find a correct dx code for this.      Please send back with a dx code and then we can finish the PA.    If this requires a response please respond to the pool ( P MHCX PSC MEDICATION PRE-AUTH).      Thank you please advise patient.      Follow up done daily; if no decision with in three days we will refax.  If another three days goes by with no decision will call the insurance for status.

## 2024-08-22 NOTE — TELEPHONE ENCOUNTER
Request Reference Number: [id number]. AMIODARONE TAB 100MG is approved through 12/31/2024. Your patient may now fill this prescription and it will be covered.  Authorization Expiration Date: 12/31/2024

## 2024-08-30 NOTE — PROGRESS NOTES
Physician Progress Note      PATIENT:               JOSÉ MIGUEL MEDEL  [facility] #:                  [id number]  :                       1940  ADMIT DATE:       2024 9:02 AM  DISCH DATE:        2024 6:39 PM  RESPONDING  PROVIDER #:        Mynor Mathews MD          QUERY TEXT:    Pt admitted with acute CHF exacerbation. Pt noted to also have HTN, cardiac   amyloidosis, valvular disease. If possible, please document in progress notes   and discharge summary the etiology of CHF, if able to be determined.  The medical record reflects the following:  Risk Factors: CHF, HTN, cardiac amyloidosis, ckd  Clinical Indicators: Echo 19: severe LVH, LVEF 50-55%, diastolic III,   apical sparing on speckle strain imaging suggestive of amyloidosis, severe   LAE, mild MR  -Echo 8/10/2024: Severe LVH, LVEF 10-15%, normal LV cavity, indeterminate   diastolic function due to A-fib, RV dilatation with dysfunction, biatrial   enlargement, moderate MR/TR, RVSP 53 (15).  Discharge summary: Acute on chronic HFrEF exacerbation  Worsening HFrEF with a EF of 10 to 15% as of 8/10/2024  Grade 3 DD  Possible cardiac amyloidosis    Treatment: EKG, IV Lasix, ECHO, Cardiology consult, Beta-blocker, Entresto,   Lipitor, ASA  Options provided:  -- CHF due to Hypertensive Heart Disease  -- CHF due to Hypertensive Heart Disease and ATTR amyloidosis  -- CHF due to Cardiac amyloidosis  -- CHF due to Hypertensive Heart Disease, cardiac amyloidosis,  and Valvular   Heart Disease  -- Other - I will add my own diagnosis  -- Disagree - Not applicable / Not valid  -- Disagree - Clinically unable to determine / Unknown  -- Refer to Clinical Documentation Reviewer    PROVIDER RESPONSE TEXT:    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

## 2024-09-06 ENCOUNTER — HOSPITAL ENCOUNTER (INPATIENT)
Age: 84
LOS: 3 days | Discharge: HOME HEALTH | DRG: 292 | End: 2024-09-09
Attending: STUDENT IN AN ORGANIZED HEALTH CARE EDUCATION/TRAINING PROGRAM | Admitting: INTERNAL MEDICINE
Payer: MEDICARE

## 2024-09-06 ENCOUNTER — APPOINTMENT (OUTPATIENT)
Dept: GENERAL RADIOLOGY | Age: 84
DRG: 292 | End: 2024-09-06
Payer: MEDICARE

## 2024-09-06 DIAGNOSIS — R06.00 DYSPNEA, UNSPECIFIED TYPE: Primary | ICD-10-CM

## 2024-09-06 DIAGNOSIS — I50.43 ACUTE ON CHRONIC HEART FAILURE WITH REDUCED EJECTION FRACTION AND DIASTOLIC DYSFUNCTION (HCC): ICD-10-CM

## 2024-09-06 DIAGNOSIS — I50.43 ACUTE ON CHRONIC COMBINED SYSTOLIC AND DIASTOLIC HEART FAILURE (HCC): ICD-10-CM

## 2024-09-06 LAB
ANION GAP SERPL CALCULATED.3IONS-SCNC: 20 MMOL/L (ref 3–16)
BASE EXCESS BLDV CALC-SCNC: -2.6 MMOL/L (ref -2–3)
BASOPHILS # BLD: 0 K/UL (ref 0–0.2)
BASOPHILS NFR BLD: 0.4 %
BUN SERPL-MCNC: 54 MG/DL (ref 7–20)
CALCIUM SERPL-MCNC: 11.5 MG/DL (ref 8.3–10.6)
CHLORIDE SERPL-SCNC: 98 MMOL/L (ref 99–110)
CO2 BLDV-SCNC: 23 MMOL/L
CO2 SERPL-SCNC: 20 MMOL/L (ref 21–32)
COHGB MFR BLDV: 1.9 % (ref 0–1.5)
CREAT SERPL-MCNC: 3.5 MG/DL (ref 0.8–1.3)
DEPRECATED RDW RBC AUTO: 22.1 % (ref 12.4–15.4)
DO-HGB MFR BLDV: 23.3 %
EOSINOPHIL # BLD: 0 K/UL (ref 0–0.6)
EOSINOPHIL NFR BLD: 0.4 %
GFR SERPLBLD CREATININE-BSD FMLA CKD-EPI: 16 ML/MIN/{1.73_M2}
GLUCOSE SERPL-MCNC: 248 MG/DL (ref 70–99)
HCO3 BLDV-SCNC: 22.1 MMOL/L (ref 24–28)
HCT VFR BLD AUTO: 37.6 % (ref 40.5–52.5)
HGB BLD-MCNC: 12.1 G/DL (ref 13.5–17.5)
LACTATE BLDV-SCNC: 4.1 MMOL/L (ref 0.4–2)
LYMPHOCYTES # BLD: 1.2 K/UL (ref 1–5.1)
LYMPHOCYTES NFR BLD: 15 %
MCH RBC QN AUTO: 27.9 PG (ref 26–34)
MCHC RBC AUTO-ENTMCNC: 32.2 G/DL (ref 31–36)
MCV RBC AUTO: 86.5 FL (ref 80–100)
METHGB MFR BLDV: <0 % (ref 0–1.5)
MONOCYTES # BLD: 1.1 K/UL (ref 0–1.3)
MONOCYTES NFR BLD: 14.6 %
NEUTROPHILS # BLD: 5.4 K/UL (ref 1.7–7.7)
NEUTROPHILS NFR BLD: 69.6 %
NT-PROBNP SERPL-MCNC: ABNORMAL PG/ML (ref 0–449)
PCO2 BLDV: 37.1 MMHG (ref 41–51)
PH BLDV: 7.38 [PH] (ref 7.35–7.45)
PLATELET # BLD AUTO: 294 K/UL (ref 135–450)
PMV BLD AUTO: 10.2 FL (ref 5–10.5)
PO2 BLDV: 45.8 MMHG (ref 25–40)
POTASSIUM SERPL-SCNC: 4.2 MMOL/L (ref 3.5–5.1)
RBC # BLD AUTO: 4.35 M/UL (ref 4.2–5.9)
SAO2 % BLDV: 76 %
SODIUM SERPL-SCNC: 138 MMOL/L (ref 136–145)
TROPONIN, HIGH SENSITIVITY: 137 NG/L (ref 0–22)
TROPONIN, HIGH SENSITIVITY: 137 NG/L (ref 0–22)
WBC # BLD AUTO: 7.7 K/UL (ref 4–11)

## 2024-09-06 PROCEDURE — 1200000000 HC SEMI PRIVATE

## 2024-09-06 PROCEDURE — 84484 ASSAY OF TROPONIN QUANT: CPT

## 2024-09-06 PROCEDURE — 80048 BASIC METABOLIC PNL TOTAL CA: CPT

## 2024-09-06 PROCEDURE — 83880 ASSAY OF NATRIURETIC PEPTIDE: CPT

## 2024-09-06 PROCEDURE — 83605 ASSAY OF LACTIC ACID: CPT

## 2024-09-06 PROCEDURE — 93005 ELECTROCARDIOGRAM TRACING: CPT

## 2024-09-06 PROCEDURE — 99285 EMERGENCY DEPT VISIT HI MDM: CPT

## 2024-09-06 PROCEDURE — 85025 COMPLETE CBC W/AUTO DIFF WBC: CPT

## 2024-09-06 PROCEDURE — 82803 BLOOD GASES ANY COMBINATION: CPT

## 2024-09-06 PROCEDURE — 71046 X-RAY EXAM CHEST 2 VIEWS: CPT

## 2024-09-07 LAB
ALBUMIN SERPL-MCNC: 3.7 G/DL (ref 3.4–5)
ALP SERPL-CCNC: 246 U/L (ref 40–129)
ALT SERPL-CCNC: 59 U/L (ref 10–40)
ANION GAP SERPL CALCULATED.3IONS-SCNC: 20 MMOL/L (ref 3–16)
AST SERPL-CCNC: 60 U/L (ref 15–37)
BASOPHILS # BLD: 0 K/UL (ref 0–0.2)
BASOPHILS NFR BLD: 0.3 %
BILIRUB DIRECT SERPL-MCNC: 0.5 MG/DL (ref 0–0.3)
BILIRUB INDIRECT SERPL-MCNC: 0.6 MG/DL (ref 0–1)
BILIRUB SERPL-MCNC: 1.1 MG/DL (ref 0–1)
BUN SERPL-MCNC: 56 MG/DL (ref 7–20)
CALCIUM SERPL-MCNC: 11.5 MG/DL (ref 8.3–10.6)
CHLORIDE SERPL-SCNC: 99 MMOL/L (ref 99–110)
CHOLEST SERPL-MCNC: 123 MG/DL (ref 0–199)
CO2 SERPL-SCNC: 20 MMOL/L (ref 21–32)
CREAT SERPL-MCNC: 3.6 MG/DL (ref 0.8–1.3)
DEPRECATED RDW RBC AUTO: 21.5 % (ref 12.4–15.4)
EKG ATRIAL RATE: 76 BPM
EKG DIAGNOSIS: NORMAL
EKG P AXIS: 136 DEGREES
EKG P-R INTERVAL: 296 MS
EKG Q-T INTERVAL: 576 MS
EKG QRS DURATION: 190 MS
EKG QTC CALCULATION (BAZETT): 589 MS
EKG R AXIS: -89 DEGREES
EKG T AXIS: 77 DEGREES
EKG VENTRICULAR RATE: 63 BPM
EOSINOPHIL # BLD: 0 K/UL (ref 0–0.6)
EOSINOPHIL NFR BLD: 0.2 %
FERRITIN SERPL IA-MCNC: 409.2 NG/ML (ref 30–400)
GFR SERPLBLD CREATININE-BSD FMLA CKD-EPI: 16 ML/MIN/{1.73_M2}
GLUCOSE SERPL-MCNC: 243 MG/DL (ref 70–99)
HCT VFR BLD AUTO: 37.9 % (ref 40.5–52.5)
HDLC SERPL-MCNC: 66 MG/DL (ref 40–60)
HGB BLD-MCNC: 12 G/DL (ref 13.5–17.5)
IRON SATN MFR SERPL: 23 % (ref 20–50)
IRON SERPL-MCNC: 61 UG/DL (ref 59–158)
LACTATE BLDV-SCNC: 2.8 MMOL/L (ref 0.4–2)
LACTATE BLDV-SCNC: 3 MMOL/L (ref 0.4–2)
LACTATE BLDV-SCNC: 3 MMOL/L (ref 0.4–2)
LACTATE BLDV-SCNC: 3.5 MMOL/L (ref 0.4–2)
LACTATE BLDV-SCNC: 3.5 MMOL/L (ref 0.4–2)
LDLC SERPL CALC-MCNC: 39 MG/DL
LYMPHOCYTES # BLD: 1.5 K/UL (ref 1–5.1)
LYMPHOCYTES NFR BLD: 18 %
MAGNESIUM SERPL-MCNC: 2.5 MG/DL (ref 1.8–2.4)
MCH RBC QN AUTO: 27.9 PG (ref 26–34)
MCHC RBC AUTO-ENTMCNC: 31.6 G/DL (ref 31–36)
MCV RBC AUTO: 88.3 FL (ref 80–100)
MONOCYTES # BLD: 1.3 K/UL (ref 0–1.3)
MONOCYTES NFR BLD: 15.7 %
NEUTROPHILS # BLD: 5.4 K/UL (ref 1.7–7.7)
NEUTROPHILS NFR BLD: 65.8 %
PLATELET # BLD AUTO: 267 K/UL (ref 135–450)
PMV BLD AUTO: 10 FL (ref 5–10.5)
POTASSIUM SERPL-SCNC: 4.3 MMOL/L (ref 3.5–5.1)
PROT SERPL-MCNC: 6.6 G/DL (ref 6.4–8.2)
RBC # BLD AUTO: 4.29 M/UL (ref 4.2–5.9)
SODIUM SERPL-SCNC: 139 MMOL/L (ref 136–145)
TIBC SERPL-MCNC: 261 UG/DL (ref 260–445)
TRIGL SERPL-MCNC: 88 MG/DL (ref 0–150)
TROPONIN, HIGH SENSITIVITY: 136 NG/L (ref 0–22)
TROPONIN, HIGH SENSITIVITY: 138 NG/L (ref 0–22)
URATE SERPL-MCNC: 12.5 MG/DL (ref 3.5–7.2)
VLDLC SERPL CALC-MCNC: 18 MG/DL
WBC # BLD AUTO: 8.2 K/UL (ref 4–11)

## 2024-09-07 PROCEDURE — 85025 COMPLETE CBC W/AUTO DIFF WBC: CPT

## 2024-09-07 PROCEDURE — 83605 ASSAY OF LACTIC ACID: CPT

## 2024-09-07 PROCEDURE — 36415 COLL VENOUS BLD VENIPUNCTURE: CPT

## 2024-09-07 PROCEDURE — 80076 HEPATIC FUNCTION PANEL: CPT

## 2024-09-07 PROCEDURE — 84540 ASSAY OF URINE/UREA-N: CPT

## 2024-09-07 PROCEDURE — 99223 1ST HOSP IP/OBS HIGH 75: CPT | Performed by: INTERNAL MEDICINE

## 2024-09-07 PROCEDURE — 82728 ASSAY OF FERRITIN: CPT

## 2024-09-07 PROCEDURE — 2580000003 HC RX 258

## 2024-09-07 PROCEDURE — 6360000002 HC RX W HCPCS

## 2024-09-07 PROCEDURE — 1200000000 HC SEMI PRIVATE

## 2024-09-07 PROCEDURE — 80061 LIPID PANEL: CPT

## 2024-09-07 PROCEDURE — 82570 ASSAY OF URINE CREATININE: CPT

## 2024-09-07 PROCEDURE — 6360000002 HC RX W HCPCS: Performed by: INTERNAL MEDICINE

## 2024-09-07 PROCEDURE — 51798 US URINE CAPACITY MEASURE: CPT

## 2024-09-07 PROCEDURE — 83735 ASSAY OF MAGNESIUM: CPT

## 2024-09-07 PROCEDURE — 84484 ASSAY OF TROPONIN QUANT: CPT

## 2024-09-07 PROCEDURE — 80048 BASIC METABOLIC PNL TOTAL CA: CPT

## 2024-09-07 PROCEDURE — 83540 ASSAY OF IRON: CPT

## 2024-09-07 PROCEDURE — 83550 IRON BINDING TEST: CPT

## 2024-09-07 PROCEDURE — 84550 ASSAY OF BLOOD/URIC ACID: CPT

## 2024-09-07 PROCEDURE — 6370000000 HC RX 637 (ALT 250 FOR IP)

## 2024-09-07 RX ORDER — SODIUM CHLORIDE 0.9 % (FLUSH) 0.9 %
5-40 SYRINGE (ML) INJECTION PRN
Status: DISCONTINUED | OUTPATIENT
Start: 2024-09-07 | End: 2024-09-09 | Stop reason: HOSPADM

## 2024-09-07 RX ORDER — ALLOPURINOL 100 MG/1
100 TABLET ORAL DAILY
Status: DISCONTINUED | OUTPATIENT
Start: 2024-09-07 | End: 2024-09-09 | Stop reason: HOSPADM

## 2024-09-07 RX ORDER — FUROSEMIDE 10 MG/ML
20 INJECTION INTRAMUSCULAR; INTRAVENOUS 3 TIMES DAILY
Status: DISCONTINUED | OUTPATIENT
Start: 2024-09-07 | End: 2024-09-08

## 2024-09-07 RX ORDER — AMIODARONE HYDROCHLORIDE 200 MG/1
100 TABLET ORAL DAILY
Status: DISCONTINUED | OUTPATIENT
Start: 2024-09-07 | End: 2024-09-09 | Stop reason: HOSPADM

## 2024-09-07 RX ORDER — ASPIRIN 81 MG/1
81 TABLET ORAL DAILY
Status: DISCONTINUED | OUTPATIENT
Start: 2024-09-07 | End: 2024-09-09 | Stop reason: HOSPADM

## 2024-09-07 RX ORDER — ONDANSETRON 2 MG/ML
4 INJECTION INTRAMUSCULAR; INTRAVENOUS EVERY 6 HOURS PRN
Status: DISCONTINUED | OUTPATIENT
Start: 2024-09-07 | End: 2024-09-09 | Stop reason: HOSPADM

## 2024-09-07 RX ORDER — DORZOLAMIDE HYDROCHLORIDE AND TIMOLOL MALEATE 20; 5 MG/ML; MG/ML
1 SOLUTION/ DROPS OPHTHALMIC EVERY 12 HOURS SCHEDULED
Status: DISCONTINUED | OUTPATIENT
Start: 2024-09-07 | End: 2024-09-09 | Stop reason: HOSPADM

## 2024-09-07 RX ORDER — CALCITRIOL 0.25 UG/1
0.25 CAPSULE, LIQUID FILLED ORAL EVERY OTHER DAY
Status: DISCONTINUED | OUTPATIENT
Start: 2024-09-07 | End: 2024-09-09 | Stop reason: HOSPADM

## 2024-09-07 RX ORDER — FUROSEMIDE 10 MG/ML
40 INJECTION INTRAMUSCULAR; INTRAVENOUS ONCE
Status: COMPLETED | OUTPATIENT
Start: 2024-09-07 | End: 2024-09-07

## 2024-09-07 RX ORDER — METOPROLOL TARTRATE 50 MG
50 TABLET ORAL 2 TIMES DAILY
Status: DISCONTINUED | OUTPATIENT
Start: 2024-09-07 | End: 2024-09-09 | Stop reason: HOSPADM

## 2024-09-07 RX ORDER — ACETAMINOPHEN 650 MG/1
650 SUPPOSITORY RECTAL EVERY 6 HOURS PRN
Status: DISCONTINUED | OUTPATIENT
Start: 2024-09-07 | End: 2024-09-09 | Stop reason: HOSPADM

## 2024-09-07 RX ORDER — SODIUM CHLORIDE 9 MG/ML
INJECTION, SOLUTION INTRAVENOUS PRN
Status: DISCONTINUED | OUTPATIENT
Start: 2024-09-07 | End: 2024-09-09 | Stop reason: HOSPADM

## 2024-09-07 RX ORDER — ALOGLIPTIN 6.25 MG/1
6.25 TABLET, FILM COATED ORAL DAILY
Status: DISCONTINUED | OUTPATIENT
Start: 2024-09-07 | End: 2024-09-09 | Stop reason: HOSPADM

## 2024-09-07 RX ORDER — SODIUM CHLORIDE 0.9 % (FLUSH) 0.9 %
5-40 SYRINGE (ML) INJECTION EVERY 12 HOURS SCHEDULED
Status: DISCONTINUED | OUTPATIENT
Start: 2024-09-07 | End: 2024-09-09 | Stop reason: HOSPADM

## 2024-09-07 RX ORDER — ONDANSETRON 4 MG/1
4 TABLET, ORALLY DISINTEGRATING ORAL EVERY 8 HOURS PRN
Status: DISCONTINUED | OUTPATIENT
Start: 2024-09-07 | End: 2024-09-09 | Stop reason: HOSPADM

## 2024-09-07 RX ORDER — ACETAMINOPHEN 325 MG/1
650 TABLET ORAL EVERY 6 HOURS PRN
Status: DISCONTINUED | OUTPATIENT
Start: 2024-09-07 | End: 2024-09-09 | Stop reason: HOSPADM

## 2024-09-07 RX ORDER — TRAZODONE HYDROCHLORIDE 50 MG/1
25 TABLET, FILM COATED ORAL NIGHTLY PRN
Status: DISCONTINUED | OUTPATIENT
Start: 2024-09-07 | End: 2024-09-09 | Stop reason: HOSPADM

## 2024-09-07 RX ORDER — POLYETHYLENE GLYCOL 3350 17 G/17G
17 POWDER, FOR SOLUTION ORAL DAILY PRN
Status: DISCONTINUED | OUTPATIENT
Start: 2024-09-07 | End: 2024-09-09 | Stop reason: HOSPADM

## 2024-09-07 RX ORDER — ATORVASTATIN CALCIUM 40 MG/1
40 TABLET, FILM COATED ORAL DAILY
Status: DISCONTINUED | OUTPATIENT
Start: 2024-09-07 | End: 2024-09-09 | Stop reason: HOSPADM

## 2024-09-07 RX ADMIN — METOPROLOL TARTRATE 50 MG: 50 TABLET, FILM COATED ORAL at 07:56

## 2024-09-07 RX ADMIN — AMIODARONE HYDROCHLORIDE 100 MG: 200 TABLET ORAL at 07:56

## 2024-09-07 RX ADMIN — DORZOLAMIDE HYDROCHLORIDE AND TIMOLOL MALEATE 1 DROP: 20; 5 SOLUTION/ DROPS OPHTHALMIC at 21:00

## 2024-09-07 RX ADMIN — TRAZODONE HYDROCHLORIDE 25 MG: 50 TABLET ORAL at 01:51

## 2024-09-07 RX ADMIN — SODIUM CHLORIDE, PRESERVATIVE FREE 10 ML: 5 INJECTION INTRAVENOUS at 19:47

## 2024-09-07 RX ADMIN — ATORVASTATIN CALCIUM 40 MG: 40 TABLET, FILM COATED ORAL at 07:55

## 2024-09-07 RX ADMIN — SACUBITRIL AND VALSARTAN 1 TABLET: 24; 26 TABLET, FILM COATED ORAL at 07:55

## 2024-09-07 RX ADMIN — APIXABAN 2.5 MG: 2.5 TABLET, FILM COATED ORAL at 07:56

## 2024-09-07 RX ADMIN — ALLOPURINOL 100 MG: 100 TABLET ORAL at 07:55

## 2024-09-07 RX ADMIN — FUROSEMIDE 40 MG: 10 INJECTION, SOLUTION INTRAMUSCULAR; INTRAVENOUS at 07:04

## 2024-09-07 RX ADMIN — FUROSEMIDE 20 MG: 10 INJECTION, SOLUTION INTRAMUSCULAR; INTRAVENOUS at 19:45

## 2024-09-07 RX ADMIN — ASPIRIN 81 MG: 81 TABLET, COATED ORAL at 07:56

## 2024-09-07 RX ADMIN — DORZOLAMIDE HYDROCHLORIDE AND TIMOLOL MALEATE 1 DROP: 20; 5 SOLUTION/ DROPS OPHTHALMIC at 08:00

## 2024-09-07 RX ADMIN — CALCITRIOL 0.25 MCG: 0.25 CAPSULE ORAL at 07:55

## 2024-09-07 RX ADMIN — METOPROLOL TARTRATE 50 MG: 50 TABLET, FILM COATED ORAL at 01:52

## 2024-09-07 RX ADMIN — METOPROLOL TARTRATE 50 MG: 50 TABLET, FILM COATED ORAL at 19:45

## 2024-09-07 RX ADMIN — ALOGLIPTIN 6.25 MG: 6.25 TABLET, FILM COATED ORAL at 07:56

## 2024-09-07 RX ADMIN — SACUBITRIL AND VALSARTAN 1 TABLET: 24; 26 TABLET, FILM COATED ORAL at 01:51

## 2024-09-07 RX ADMIN — APIXABAN 2.5 MG: 2.5 TABLET, FILM COATED ORAL at 01:52

## 2024-09-07 RX ADMIN — FUROSEMIDE 20 MG: 10 INJECTION, SOLUTION INTRAMUSCULAR; INTRAVENOUS at 15:33

## 2024-09-07 RX ADMIN — SODIUM CHLORIDE, PRESERVATIVE FREE 10 ML: 5 INJECTION INTRAVENOUS at 08:00

## 2024-09-07 RX ADMIN — APIXABAN 2.5 MG: 2.5 TABLET, FILM COATED ORAL at 19:45

## 2024-09-07 ASSESSMENT — PAIN SCALES - GENERAL
PAINLEVEL_OUTOF10: 0

## 2024-09-08 LAB
ALBUMIN SERPL-MCNC: 3.5 G/DL (ref 3.4–5)
ALBUMIN SERPL-MCNC: 3.6 G/DL (ref 3.4–5)
ALBUMIN SERPL-MCNC: 3.7 G/DL (ref 3.4–5)
ALP SERPL-CCNC: 266 U/L (ref 40–129)
ALT SERPL-CCNC: 70 U/L (ref 10–40)
ANION GAP SERPL CALCULATED.3IONS-SCNC: 12 MMOL/L (ref 3–16)
ANION GAP SERPL CALCULATED.3IONS-SCNC: 14 MMOL/L (ref 3–16)
AST SERPL-CCNC: 71 U/L (ref 15–37)
BASOPHILS # BLD: 0 K/UL (ref 0–0.2)
BASOPHILS NFR BLD: 0.2 %
BILIRUB DIRECT SERPL-MCNC: 0.6 MG/DL (ref 0–0.3)
BILIRUB INDIRECT SERPL-MCNC: 0.5 MG/DL (ref 0–1)
BILIRUB SERPL-MCNC: 1.1 MG/DL (ref 0–1)
BUN SERPL-MCNC: 54 MG/DL (ref 7–20)
BUN SERPL-MCNC: 57 MG/DL (ref 7–20)
CALCIUM SERPL-MCNC: 10.5 MG/DL (ref 8.3–10.6)
CALCIUM SERPL-MCNC: 10.8 MG/DL (ref 8.3–10.6)
CHLORIDE SERPL-SCNC: 100 MMOL/L (ref 99–110)
CHLORIDE SERPL-SCNC: 99 MMOL/L (ref 99–110)
CO2 SERPL-SCNC: 25 MMOL/L (ref 21–32)
CO2 SERPL-SCNC: 26 MMOL/L (ref 21–32)
CREAT SERPL-MCNC: 3.2 MG/DL (ref 0.8–1.3)
CREAT SERPL-MCNC: 3.7 MG/DL (ref 0.8–1.3)
CREAT UR-MCNC: 77.9 MG/DL (ref 39–259)
DEPRECATED RDW RBC AUTO: 21.9 % (ref 12.4–15.4)
EOSINOPHIL # BLD: 0.1 K/UL (ref 0–0.6)
EOSINOPHIL NFR BLD: 1.2 %
GFR SERPLBLD CREATININE-BSD FMLA CKD-EPI: 15 ML/MIN/{1.73_M2}
GFR SERPLBLD CREATININE-BSD FMLA CKD-EPI: 18 ML/MIN/{1.73_M2}
GLUCOSE SERPL-MCNC: 186 MG/DL (ref 70–99)
GLUCOSE SERPL-MCNC: 203 MG/DL (ref 70–99)
HCT VFR BLD AUTO: 38 % (ref 40.5–52.5)
HGB BLD-MCNC: 12 G/DL (ref 13.5–17.5)
LACTATE BLDV-SCNC: 1.9 MMOL/L (ref 0.4–2)
LYMPHOCYTES # BLD: 1 K/UL (ref 1–5.1)
LYMPHOCYTES NFR BLD: 13.5 %
MAGNESIUM SERPL-MCNC: 2.4 MG/DL (ref 1.8–2.4)
MAGNESIUM SERPL-MCNC: 2.5 MG/DL (ref 1.8–2.4)
MCH RBC QN AUTO: 27.6 PG (ref 26–34)
MCHC RBC AUTO-ENTMCNC: 31.7 G/DL (ref 31–36)
MCV RBC AUTO: 87.1 FL (ref 80–100)
MONOCYTES # BLD: 0.9 K/UL (ref 0–1.3)
MONOCYTES NFR BLD: 11.3 %
NEUTROPHILS # BLD: 5.7 K/UL (ref 1.7–7.7)
NEUTROPHILS NFR BLD: 73.8 %
PHOSPHATE SERPL-MCNC: 3.5 MG/DL (ref 2.5–4.9)
PHOSPHATE SERPL-MCNC: 3.7 MG/DL (ref 2.5–4.9)
PLATELET # BLD AUTO: 255 K/UL (ref 135–450)
PMV BLD AUTO: 9.2 FL (ref 5–10.5)
POTASSIUM SERPL-SCNC: 3.6 MMOL/L (ref 3.5–5.1)
POTASSIUM SERPL-SCNC: 3.8 MMOL/L (ref 3.5–5.1)
PROT SERPL-MCNC: 6.5 G/DL (ref 6.4–8.2)
RBC # BLD AUTO: 4.36 M/UL (ref 4.2–5.9)
SODIUM SERPL-SCNC: 138 MMOL/L (ref 136–145)
SODIUM SERPL-SCNC: 138 MMOL/L (ref 136–145)
UUN UR-MCNC: 461 MG/DL (ref 800–1666)
WBC # BLD AUTO: 7.7 K/UL (ref 4–11)

## 2024-09-08 PROCEDURE — 6370000000 HC RX 637 (ALT 250 FOR IP)

## 2024-09-08 PROCEDURE — 80069 RENAL FUNCTION PANEL: CPT

## 2024-09-08 PROCEDURE — 80076 HEPATIC FUNCTION PANEL: CPT

## 2024-09-08 PROCEDURE — 2580000003 HC RX 258

## 2024-09-08 PROCEDURE — 99233 SBSQ HOSP IP/OBS HIGH 50: CPT | Performed by: INTERNAL MEDICINE

## 2024-09-08 PROCEDURE — 85025 COMPLETE CBC W/AUTO DIFF WBC: CPT

## 2024-09-08 PROCEDURE — 36415 COLL VENOUS BLD VENIPUNCTURE: CPT

## 2024-09-08 PROCEDURE — 6360000002 HC RX W HCPCS: Performed by: INTERNAL MEDICINE

## 2024-09-08 PROCEDURE — 1200000000 HC SEMI PRIVATE

## 2024-09-08 PROCEDURE — 6370000000 HC RX 637 (ALT 250 FOR IP): Performed by: INTERNAL MEDICINE

## 2024-09-08 PROCEDURE — 83735 ASSAY OF MAGNESIUM: CPT

## 2024-09-08 RX ORDER — TORSEMIDE 20 MG/1
40 TABLET ORAL DAILY
Status: DISCONTINUED | OUTPATIENT
Start: 2024-09-09 | End: 2024-09-09 | Stop reason: HOSPADM

## 2024-09-08 RX ORDER — TORSEMIDE 20 MG/1
20 TABLET ORAL ONCE
Status: COMPLETED | OUTPATIENT
Start: 2024-09-08 | End: 2024-09-08

## 2024-09-08 RX ADMIN — TORSEMIDE 20 MG: 20 TABLET ORAL at 15:24

## 2024-09-08 RX ADMIN — ATORVASTATIN CALCIUM 40 MG: 40 TABLET, FILM COATED ORAL at 08:27

## 2024-09-08 RX ADMIN — SODIUM CHLORIDE, PRESERVATIVE FREE 10 ML: 5 INJECTION INTRAVENOUS at 08:28

## 2024-09-08 RX ADMIN — ALLOPURINOL 100 MG: 100 TABLET ORAL at 08:27

## 2024-09-08 RX ADMIN — ALOGLIPTIN 6.25 MG: 6.25 TABLET, FILM COATED ORAL at 08:28

## 2024-09-08 RX ADMIN — SODIUM CHLORIDE, PRESERVATIVE FREE 10 ML: 5 INJECTION INTRAVENOUS at 19:40

## 2024-09-08 RX ADMIN — METOPROLOL TARTRATE 50 MG: 50 TABLET, FILM COATED ORAL at 19:55

## 2024-09-08 RX ADMIN — APIXABAN 2.5 MG: 2.5 TABLET, FILM COATED ORAL at 19:55

## 2024-09-08 RX ADMIN — DORZOLAMIDE HYDROCHLORIDE AND TIMOLOL MALEATE 1 DROP: 20; 5 SOLUTION/ DROPS OPHTHALMIC at 08:28

## 2024-09-08 RX ADMIN — ASPIRIN 81 MG: 81 TABLET, COATED ORAL at 08:27

## 2024-09-08 RX ADMIN — APIXABAN 2.5 MG: 2.5 TABLET, FILM COATED ORAL at 08:27

## 2024-09-08 RX ADMIN — METOPROLOL TARTRATE 50 MG: 50 TABLET, FILM COATED ORAL at 08:28

## 2024-09-08 RX ADMIN — DORZOLAMIDE HYDROCHLORIDE AND TIMOLOL MALEATE 1 DROP: 20; 5 SOLUTION/ DROPS OPHTHALMIC at 19:40

## 2024-09-08 RX ADMIN — AMIODARONE HYDROCHLORIDE 100 MG: 200 TABLET ORAL at 08:27

## 2024-09-08 RX ADMIN — POTASSIUM BICARBONATE 40 MEQ: 782 TABLET, EFFERVESCENT ORAL at 22:23

## 2024-09-08 RX ADMIN — FUROSEMIDE 20 MG: 10 INJECTION, SOLUTION INTRAMUSCULAR; INTRAVENOUS at 08:28

## 2024-09-08 ASSESSMENT — PAIN SCALES - GENERAL
PAINLEVEL_OUTOF10: 0

## 2024-09-09 VITALS
SYSTOLIC BLOOD PRESSURE: 115 MMHG | WEIGHT: 170.42 LBS | RESPIRATION RATE: 18 BRPM | HEART RATE: 73 BPM | TEMPERATURE: 97.4 F | DIASTOLIC BLOOD PRESSURE: 80 MMHG | OXYGEN SATURATION: 96 % | BODY MASS INDEX: 23.86 KG/M2 | HEIGHT: 71 IN

## 2024-09-09 LAB
ALBUMIN SERPL-MCNC: 3.6 G/DL (ref 3.4–5)
ALP SERPL-CCNC: 291 U/L (ref 40–129)
ALT SERPL-CCNC: 78 U/L (ref 10–40)
ANION GAP SERPL CALCULATED.3IONS-SCNC: 17 MMOL/L (ref 3–16)
AST SERPL-CCNC: 75 U/L (ref 15–37)
BASOPHILS # BLD: 0 K/UL (ref 0–0.2)
BASOPHILS NFR BLD: 0.3 %
BILIRUB DIRECT SERPL-MCNC: 0.6 MG/DL (ref 0–0.3)
BILIRUB INDIRECT SERPL-MCNC: 0.8 MG/DL (ref 0–1)
BILIRUB SERPL-MCNC: 1.4 MG/DL (ref 0–1)
BUN SERPL-MCNC: 53 MG/DL (ref 7–20)
CALCIUM SERPL-MCNC: 10.1 MG/DL (ref 8.3–10.6)
CHLORIDE SERPL-SCNC: 101 MMOL/L (ref 99–110)
CO2 SERPL-SCNC: 22 MMOL/L (ref 21–32)
CREAT SERPL-MCNC: 3.4 MG/DL (ref 0.8–1.3)
DEPRECATED RDW RBC AUTO: 22.7 % (ref 12.4–15.4)
EOSINOPHIL # BLD: 0.1 K/UL (ref 0–0.6)
EOSINOPHIL NFR BLD: 0.8 %
GFR SERPLBLD CREATININE-BSD FMLA CKD-EPI: 17 ML/MIN/{1.73_M2}
GLUCOSE SERPL-MCNC: 204 MG/DL (ref 70–99)
HCT VFR BLD AUTO: 38.9 % (ref 40.5–52.5)
HGB BLD-MCNC: 12.3 G/DL (ref 13.5–17.5)
LYMPHOCYTES # BLD: 1.1 K/UL (ref 1–5.1)
LYMPHOCYTES NFR BLD: 14.2 %
MAGNESIUM SERPL-MCNC: 2.4 MG/DL (ref 1.8–2.4)
MCH RBC QN AUTO: 27.8 PG (ref 26–34)
MCHC RBC AUTO-ENTMCNC: 31.6 G/DL (ref 31–36)
MCV RBC AUTO: 88 FL (ref 80–100)
MONOCYTES # BLD: 1 K/UL (ref 0–1.3)
MONOCYTES NFR BLD: 13.1 %
NEUTROPHILS # BLD: 5.3 K/UL (ref 1.7–7.7)
NEUTROPHILS NFR BLD: 71.6 %
PHOSPHATE SERPL-MCNC: 3.5 MG/DL (ref 2.5–4.9)
PLATELET # BLD AUTO: 276 K/UL (ref 135–450)
PMV BLD AUTO: 10.3 FL (ref 5–10.5)
POTASSIUM SERPL-SCNC: 4.1 MMOL/L (ref 3.5–5.1)
PROT SERPL-MCNC: 6.5 G/DL (ref 6.4–8.2)
RBC # BLD AUTO: 4.42 M/UL (ref 4.2–5.9)
SODIUM SERPL-SCNC: 140 MMOL/L (ref 136–145)
WBC # BLD AUTO: 7.5 K/UL (ref 4–11)

## 2024-09-09 PROCEDURE — 80076 HEPATIC FUNCTION PANEL: CPT

## 2024-09-09 PROCEDURE — 97530 THERAPEUTIC ACTIVITIES: CPT

## 2024-09-09 PROCEDURE — 85025 COMPLETE CBC W/AUTO DIFF WBC: CPT

## 2024-09-09 PROCEDURE — 6370000000 HC RX 637 (ALT 250 FOR IP)

## 2024-09-09 PROCEDURE — 36415 COLL VENOUS BLD VENIPUNCTURE: CPT

## 2024-09-09 PROCEDURE — 2580000003 HC RX 258

## 2024-09-09 PROCEDURE — 97161 PT EVAL LOW COMPLEX 20 MIN: CPT

## 2024-09-09 PROCEDURE — 97166 OT EVAL MOD COMPLEX 45 MIN: CPT

## 2024-09-09 PROCEDURE — 80069 RENAL FUNCTION PANEL: CPT

## 2024-09-09 PROCEDURE — 6370000000 HC RX 637 (ALT 250 FOR IP): Performed by: INTERNAL MEDICINE

## 2024-09-09 PROCEDURE — 83735 ASSAY OF MAGNESIUM: CPT

## 2024-09-09 PROCEDURE — 97535 SELF CARE MNGMENT TRAINING: CPT

## 2024-09-09 PROCEDURE — 97116 GAIT TRAINING THERAPY: CPT

## 2024-09-09 RX ORDER — TORSEMIDE 20 MG/1
40 TABLET ORAL DAILY
Qty: 30 TABLET | Refills: 3 | Status: SHIPPED | OUTPATIENT
Start: 2024-09-09

## 2024-09-09 RX ADMIN — SODIUM CHLORIDE, PRESERVATIVE FREE 10 ML: 5 INJECTION INTRAVENOUS at 09:12

## 2024-09-09 RX ADMIN — CALCITRIOL 0.25 MCG: 0.25 CAPSULE ORAL at 09:12

## 2024-09-09 RX ADMIN — ALOGLIPTIN 6.25 MG: 6.25 TABLET, FILM COATED ORAL at 09:12

## 2024-09-09 RX ADMIN — METOPROLOL TARTRATE 50 MG: 50 TABLET, FILM COATED ORAL at 09:12

## 2024-09-09 RX ADMIN — TORSEMIDE 40 MG: 20 TABLET ORAL at 09:12

## 2024-09-09 RX ADMIN — APIXABAN 2.5 MG: 2.5 TABLET, FILM COATED ORAL at 09:12

## 2024-09-09 RX ADMIN — ATORVASTATIN CALCIUM 40 MG: 40 TABLET, FILM COATED ORAL at 09:12

## 2024-09-09 RX ADMIN — ASPIRIN 81 MG: 81 TABLET, COATED ORAL at 09:12

## 2024-09-09 RX ADMIN — AMIODARONE HYDROCHLORIDE 100 MG: 200 TABLET ORAL at 09:13

## 2024-09-09 RX ADMIN — DORZOLAMIDE HYDROCHLORIDE AND TIMOLOL MALEATE 1 DROP: 20; 5 SOLUTION/ DROPS OPHTHALMIC at 09:12

## 2024-09-09 RX ADMIN — ALLOPURINOL 100 MG: 100 TABLET ORAL at 09:13

## 2024-09-09 ASSESSMENT — PAIN SCALES - GENERAL
PAINLEVEL_OUTOF10: 0

## 2024-09-09 ASSESSMENT — ENCOUNTER SYMPTOMS
ABDOMINAL PAIN: 0
ABDOMINAL DISTENTION: 0
VOMITING: 0
SHORTNESS OF BREATH: 0
COUGH: 0
DIARRHEA: 0
CONSTIPATION: 0
CHEST TIGHTNESS: 0

## 2024-09-10 ENCOUNTER — TELEPHONE (OUTPATIENT)
Dept: FAMILY MEDICINE CLINIC | Age: 84
End: 2024-09-10

## 2024-09-10 ENCOUNTER — CARE COORDINATION (OUTPATIENT)
Dept: CASE MANAGEMENT | Age: 84
End: 2024-09-10

## 2024-09-10 DIAGNOSIS — I50.9 CONGESTIVE HEART FAILURE, UNSPECIFIED HF CHRONICITY, UNSPECIFIED HEART FAILURE TYPE (HCC): Primary | ICD-10-CM

## 2024-09-10 PROCEDURE — 1111F DSCHRG MED/CURRENT MED MERGE: CPT | Performed by: STUDENT IN AN ORGANIZED HEALTH CARE EDUCATION/TRAINING PROGRAM

## 2024-09-11 ENCOUNTER — FOLLOWUP TELEPHONE ENCOUNTER (OUTPATIENT)
Dept: INPATIENT UNIT | Age: 84
End: 2024-09-11

## 2024-09-13 ENCOUNTER — CARE COORDINATION (OUTPATIENT)
Dept: CASE MANAGEMENT | Age: 84
End: 2024-09-13

## 2024-09-13 ENCOUNTER — TELEPHONE (OUTPATIENT)
Dept: FAMILY MEDICINE CLINIC | Age: 84
End: 2024-09-13

## 2024-09-16 ENCOUNTER — TELEPHONE (OUTPATIENT)
Dept: FAMILY MEDICINE CLINIC | Age: 84
End: 2024-09-16

## 2024-09-17 ENCOUNTER — CARE COORDINATION (OUTPATIENT)
Dept: CASE MANAGEMENT | Age: 84
End: 2024-09-17

## 2024-09-18 RX ORDER — DOCUSATE SODIUM 100 MG/1
100 CAPSULE, LIQUID FILLED ORAL 2 TIMES DAILY
Qty: 60 CAPSULE | Refills: 0 | Status: SHIPPED | OUTPATIENT
Start: 2024-09-18 | End: 2024-10-18

## 2024-09-18 RX ORDER — LANOLIN ALCOHOL/MO/W.PET/CERES
3 CREAM (GRAM) TOPICAL NIGHTLY PRN
Qty: 30 TABLET | Refills: 0 | Status: SHIPPED | OUTPATIENT
Start: 2024-09-18

## 2024-09-18 RX ORDER — MIRTAZAPINE 15 MG/1
15 TABLET, FILM COATED ORAL NIGHTLY
Qty: 30 TABLET | Refills: 0 | Status: SHIPPED | OUTPATIENT
Start: 2024-09-18

## 2024-09-19 ENCOUNTER — TELEPHONE (OUTPATIENT)
Dept: PHARMACY | Facility: CLINIC | Age: 84
End: 2024-09-19

## 2024-09-20 ENCOUNTER — OFFICE VISIT (OUTPATIENT)
Dept: CARDIOLOGY CLINIC | Age: 84
End: 2024-09-20
Payer: MEDICARE

## 2024-09-20 VITALS
HEART RATE: 76 BPM | BODY MASS INDEX: 20.39 KG/M2 | SYSTOLIC BLOOD PRESSURE: 110 MMHG | WEIGHT: 146.2 LBS | DIASTOLIC BLOOD PRESSURE: 76 MMHG

## 2024-09-20 DIAGNOSIS — D68.69 SECONDARY HYPERCOAGULABLE STATE (HCC): ICD-10-CM

## 2024-09-20 DIAGNOSIS — I10 PRIMARY HYPERTENSION: ICD-10-CM

## 2024-09-20 DIAGNOSIS — I47.29 NSVT (NONSUSTAINED VENTRICULAR TACHYCARDIA) (HCC): ICD-10-CM

## 2024-09-20 DIAGNOSIS — E78.2 MIXED HYPERLIPIDEMIA: ICD-10-CM

## 2024-09-20 DIAGNOSIS — E85.4 CARDIAC AMYLOIDOSIS (HCC): ICD-10-CM

## 2024-09-20 DIAGNOSIS — I48.0 PAF (PAROXYSMAL ATRIAL FIBRILLATION) (HCC): ICD-10-CM

## 2024-09-20 DIAGNOSIS — I43 CARDIAC AMYLOIDOSIS (HCC): ICD-10-CM

## 2024-09-20 DIAGNOSIS — I50.20 HFREF (HEART FAILURE WITH REDUCED EJECTION FRACTION) (HCC): Primary | ICD-10-CM

## 2024-09-20 DIAGNOSIS — I25.10 MILD CAD: ICD-10-CM

## 2024-09-20 PROCEDURE — 1123F ACP DISCUSS/DSCN MKR DOCD: CPT | Performed by: NURSE PRACTITIONER

## 2024-09-20 PROCEDURE — 99214 OFFICE O/P EST MOD 30 MIN: CPT | Performed by: NURSE PRACTITIONER

## 2024-09-20 RX ORDER — METOPROLOL SUCCINATE 50 MG/1
50 TABLET, EXTENDED RELEASE ORAL DAILY
Qty: 90 TABLET | Refills: 3 | Status: SHIPPED | OUTPATIENT
Start: 2024-09-20

## 2024-09-24 ENCOUNTER — CARE COORDINATION (OUTPATIENT)
Dept: CASE MANAGEMENT | Age: 84
End: 2024-09-24

## 2024-09-27 ENCOUNTER — CARE COORDINATION (OUTPATIENT)
Dept: CASE MANAGEMENT | Age: 84
End: 2024-09-27

## 2024-09-27 RX ORDER — CALCITRIOL 0.25 UG/1
0.25 CAPSULE, LIQUID FILLED ORAL EVERY OTHER DAY
Qty: 30 CAPSULE | Refills: 3 | Status: SHIPPED | OUTPATIENT
Start: 2024-09-27

## 2024-10-01 ENCOUNTER — CARE COORDINATION (OUTPATIENT)
Dept: CASE MANAGEMENT | Age: 84
End: 2024-10-01

## 2024-10-01 NOTE — CARE COORDINATION
Patient Current Location:  Home: [address] [address]    Care Transition Nurse contacted the spouse/partner  by telephone. Verified name and  as identifiers.    Additional needs identified to be addressed with provider   No needs identified                 Method of communication with provider: none.    Care Summary Note: CTN spoke with patients spouse this afternoon for follow up CTN call.  Spouse states patient is doing a little better, states PT with [facility] was in home yesterday.  Spouse states patient was able to do a little more exercises than last week, he is a little stronger to her.  BP's continue to be running well, per spouse.  Appetite is still poor, she is offering ensure and other supplements as well.  No reports of any fever, chills, nausea, vomiting, chest pain, SOB or cough.   Patient with no congestion, pain, difficulty emptying bladder, LE edema, feeling lightheaded, dizziness, and heart palpitations.  NUNU hose in place, states they are on during the day, removed at night.  SN with [facility] agency to be in home later today.    Plan of care updates since last contact:  Education: patient instructed to continue to monitor for any worsening weakness, any returning BLE Edema, or SOB/SOBE, reporting to MD immediately.  Review of patient management of conditions/medications: make sure patient is resting as needed, taking all medications as prescribed.        Advance Care Planning:   Does patient have an Advance Directive: reviewed during previous call, see note. .    Medication Review:  No changes since last call.     Assessments:  Care Transitions Subsequent and Final Call    Schedule Follow Up Appointment with PCP: Declined  Subsequent and Final Calls  Do you have any ongoing symptoms?: Yes  Onset of Patient-reported symptoms: Other  Patient-reported symptoms: Other, Weakness  Have your medications changed?: No  Do you have any questions related to your medications?: No  Do you

## 2024-10-03 ENCOUNTER — APPOINTMENT (OUTPATIENT)
Dept: CT IMAGING | Age: 84
DRG: 292 | End: 2024-10-03
Payer: MEDICARE

## 2024-10-03 ENCOUNTER — HOSPITAL ENCOUNTER (INPATIENT)
Age: 84
LOS: 4 days | Discharge: HOSPICE | DRG: 292 | End: 2024-10-07
Attending: EMERGENCY MEDICINE | Admitting: FAMILY MEDICINE
Payer: MEDICARE

## 2024-10-03 ENCOUNTER — APPOINTMENT (OUTPATIENT)
Dept: GENERAL RADIOLOGY | Age: 84
DRG: 292 | End: 2024-10-03
Payer: MEDICARE

## 2024-10-03 DIAGNOSIS — I50.23 ACUTE ON CHRONIC SYSTOLIC CONGESTIVE HEART FAILURE (HCC): Primary | ICD-10-CM

## 2024-10-03 LAB
ALBUMIN SERPL-MCNC: 3.5 G/DL (ref 3.4–5)
ALBUMIN/GLOB SERPL: 1 {RATIO} (ref 1.1–2.2)
ALP SERPL-CCNC: 264 U/L (ref 40–129)
ALT SERPL-CCNC: 32 U/L (ref 10–40)
ANION GAP SERPL CALCULATED.3IONS-SCNC: 17 MMOL/L (ref 3–16)
AST SERPL-CCNC: 53 U/L (ref 15–37)
BASE EXCESS BLDV CALC-SCNC: 1.5 MMOL/L (ref -2–3)
BASOPHILS # BLD: 0 K/UL (ref 0–0.2)
BASOPHILS NFR BLD: 0.6 %
BILIRUB SERPL-MCNC: 1.2 MG/DL (ref 0–1)
BUN SERPL-MCNC: 43 MG/DL (ref 7–20)
CALCIUM SERPL-MCNC: 10.2 MG/DL (ref 8.3–10.6)
CHLORIDE SERPL-SCNC: 95 MMOL/L (ref 99–110)
CO2 BLDV-SCNC: 30 MMOL/L
CO2 SERPL-SCNC: 21 MMOL/L (ref 21–32)
COHGB MFR BLDV: 1.3 % (ref 0–1.5)
CREAT SERPL-MCNC: 3.2 MG/DL (ref 0.8–1.3)
DEPRECATED RDW RBC AUTO: 21.4 % (ref 12.4–15.4)
DO-HGB MFR BLDV: 77.4 %
EKG ATRIAL RATE: 84 BPM
EKG DIAGNOSIS: NORMAL
EKG Q-T INTERVAL: 548 MS
EKG QRS DURATION: 206 MS
EKG QTC CALCULATION (BAZETT): 600 MS
EKG R AXIS: 260 DEGREES
EKG T AXIS: 72 DEGREES
EKG VENTRICULAR RATE: 72 BPM
EOSINOPHIL # BLD: 0 K/UL (ref 0–0.6)
EOSINOPHIL NFR BLD: 0.5 %
GFR SERPLBLD CREATININE-BSD FMLA CKD-EPI: 18 ML/MIN/{1.73_M2}
GLUCOSE BLD-MCNC: 194 MG/DL (ref 70–99)
GLUCOSE SERPL-MCNC: 228 MG/DL (ref 70–99)
HCO3 BLDV-SCNC: 28.1 MMOL/L (ref 24–28)
HCT VFR BLD AUTO: 45 % (ref 40.5–52.5)
HGB BLD-MCNC: 14.3 G/DL (ref 13.5–17.5)
LYMPHOCYTES # BLD: 1.6 K/UL (ref 1–5.1)
LYMPHOCYTES NFR BLD: 25.5 %
MCH RBC QN AUTO: 28.3 PG (ref 26–34)
MCHC RBC AUTO-ENTMCNC: 31.9 G/DL (ref 31–36)
MCV RBC AUTO: 88.8 FL (ref 80–100)
METHGB MFR BLDV: 0.5 % (ref 0–1.5)
MONOCYTES # BLD: 0.8 K/UL (ref 0–1.3)
MONOCYTES NFR BLD: 12.7 %
NEUTROPHILS # BLD: 3.9 K/UL (ref 1.7–7.7)
NEUTROPHILS NFR BLD: 60.7 %
NT-PROBNP SERPL-MCNC: ABNORMAL PG/ML (ref 0–449)
PCO2 BLDV: 51 MMHG (ref 41–51)
PERFORMED ON: ABNORMAL
PH BLDV: 7.35 [PH] (ref 7.35–7.45)
PLATELET # BLD AUTO: 249 K/UL (ref 135–450)
PMV BLD AUTO: 10 FL (ref 5–10.5)
PO2 BLDV: <30 MMHG (ref 25–40)
POTASSIUM SERPL-SCNC: 4.1 MMOL/L (ref 3.5–5.1)
PROT SERPL-MCNC: 6.9 G/DL (ref 6.4–8.2)
RBC # BLD AUTO: 5.06 M/UL (ref 4.2–5.9)
SAO2 % BLDV: 21 %
SODIUM SERPL-SCNC: 133 MMOL/L (ref 136–145)
TROPONIN, HIGH SENSITIVITY: 119 NG/L (ref 0–22)
TROPONIN, HIGH SENSITIVITY: 141 NG/L (ref 0–22)
TROPONIN, HIGH SENSITIVITY: 145 NG/L (ref 0–22)
TROPONIN, HIGH SENSITIVITY: 149 NG/L (ref 0–22)
WBC # BLD AUTO: 6.5 K/UL (ref 4–11)

## 2024-10-03 PROCEDURE — 99285 EMERGENCY DEPT VISIT HI MDM: CPT

## 2024-10-03 PROCEDURE — 6360000002 HC RX W HCPCS: Performed by: EMERGENCY MEDICINE

## 2024-10-03 PROCEDURE — 84484 ASSAY OF TROPONIN QUANT: CPT

## 2024-10-03 PROCEDURE — 85025 COMPLETE CBC W/AUTO DIFF WBC: CPT

## 2024-10-03 PROCEDURE — 80053 COMPREHEN METABOLIC PANEL: CPT

## 2024-10-03 PROCEDURE — 6370000000 HC RX 637 (ALT 250 FOR IP): Performed by: FAMILY MEDICINE

## 2024-10-03 PROCEDURE — 71250 CT THORAX DX C-: CPT

## 2024-10-03 PROCEDURE — 93005 ELECTROCARDIOGRAM TRACING: CPT | Performed by: EMERGENCY MEDICINE

## 2024-10-03 PROCEDURE — 36415 COLL VENOUS BLD VENIPUNCTURE: CPT

## 2024-10-03 PROCEDURE — 83880 ASSAY OF NATRIURETIC PEPTIDE: CPT

## 2024-10-03 PROCEDURE — 6360000002 HC RX W HCPCS: Performed by: FAMILY MEDICINE

## 2024-10-03 PROCEDURE — 82803 BLOOD GASES ANY COMBINATION: CPT

## 2024-10-03 PROCEDURE — 71046 X-RAY EXAM CHEST 2 VIEWS: CPT

## 2024-10-03 PROCEDURE — 2060000000 HC ICU INTERMEDIATE R&B

## 2024-10-03 PROCEDURE — 2580000003 HC RX 258: Performed by: FAMILY MEDICINE

## 2024-10-03 RX ORDER — ALBUTEROL SULFATE 0.83 MG/ML
2.5 SOLUTION RESPIRATORY (INHALATION)
Status: DISCONTINUED | OUTPATIENT
Start: 2024-10-03 | End: 2024-10-07 | Stop reason: HOSPADM

## 2024-10-03 RX ORDER — ATORVASTATIN CALCIUM 40 MG/1
40 TABLET, FILM COATED ORAL DAILY
Status: DISCONTINUED | OUTPATIENT
Start: 2024-10-04 | End: 2024-10-07 | Stop reason: HOSPADM

## 2024-10-03 RX ORDER — ACETAMINOPHEN 325 MG/1
650 TABLET ORAL EVERY 6 HOURS PRN
Status: DISCONTINUED | OUTPATIENT
Start: 2024-10-03 | End: 2024-10-07 | Stop reason: HOSPADM

## 2024-10-03 RX ORDER — MIDODRINE HYDROCHLORIDE 5 MG/1
5 TABLET ORAL ONCE
Status: COMPLETED | OUTPATIENT
Start: 2024-10-03 | End: 2024-10-03

## 2024-10-03 RX ORDER — INSULIN LISPRO 100 [IU]/ML
0-4 INJECTION, SOLUTION INTRAVENOUS; SUBCUTANEOUS
Status: DISCONTINUED | OUTPATIENT
Start: 2024-10-04 | End: 2024-10-07

## 2024-10-03 RX ORDER — DEXTROSE MONOHYDRATE 100 MG/ML
INJECTION, SOLUTION INTRAVENOUS CONTINUOUS PRN
Status: DISCONTINUED | OUTPATIENT
Start: 2024-10-03 | End: 2024-10-07 | Stop reason: HOSPADM

## 2024-10-03 RX ORDER — GLUCAGON 1 MG/ML
1 KIT INJECTION PRN
Status: DISCONTINUED | OUTPATIENT
Start: 2024-10-03 | End: 2024-10-07 | Stop reason: HOSPADM

## 2024-10-03 RX ORDER — FUROSEMIDE 10 MG/ML
20 INJECTION INTRAMUSCULAR; INTRAVENOUS 2 TIMES DAILY
Status: DISCONTINUED | OUTPATIENT
Start: 2024-10-03 | End: 2024-10-05

## 2024-10-03 RX ORDER — MIRTAZAPINE 15 MG/1
15 TABLET, FILM COATED ORAL NIGHTLY
Status: DISCONTINUED | OUTPATIENT
Start: 2024-10-03 | End: 2024-10-07 | Stop reason: HOSPADM

## 2024-10-03 RX ORDER — POLYETHYLENE GLYCOL 3350 17 G/17G
17 POWDER, FOR SOLUTION ORAL DAILY PRN
Status: DISCONTINUED | OUTPATIENT
Start: 2024-10-03 | End: 2024-10-07 | Stop reason: HOSPADM

## 2024-10-03 RX ORDER — SODIUM CHLORIDE 0.9 % (FLUSH) 0.9 %
5-40 SYRINGE (ML) INJECTION EVERY 12 HOURS SCHEDULED
Status: DISCONTINUED | OUTPATIENT
Start: 2024-10-03 | End: 2024-10-07 | Stop reason: HOSPADM

## 2024-10-03 RX ORDER — ENOXAPARIN SODIUM 100 MG/ML
30 INJECTION SUBCUTANEOUS DAILY
Status: DISCONTINUED | OUTPATIENT
Start: 2024-10-03 | End: 2024-10-03 | Stop reason: ALTCHOICE

## 2024-10-03 RX ORDER — ASPIRIN 81 MG/1
81 TABLET ORAL DAILY
Status: DISCONTINUED | OUTPATIENT
Start: 2024-10-04 | End: 2024-10-07 | Stop reason: HOSPADM

## 2024-10-03 RX ORDER — SODIUM CHLORIDE 9 MG/ML
INJECTION, SOLUTION INTRAVENOUS PRN
Status: DISCONTINUED | OUTPATIENT
Start: 2024-10-03 | End: 2024-10-07 | Stop reason: HOSPADM

## 2024-10-03 RX ORDER — ACETAMINOPHEN 650 MG/1
650 SUPPOSITORY RECTAL EVERY 6 HOURS PRN
Status: DISCONTINUED | OUTPATIENT
Start: 2024-10-03 | End: 2024-10-07 | Stop reason: HOSPADM

## 2024-10-03 RX ORDER — INSULIN LISPRO 100 [IU]/ML
0-4 INJECTION, SOLUTION INTRAVENOUS; SUBCUTANEOUS NIGHTLY
Status: DISCONTINUED | OUTPATIENT
Start: 2024-10-03 | End: 2024-10-07

## 2024-10-03 RX ORDER — SODIUM CHLORIDE 0.9 % (FLUSH) 0.9 %
5-40 SYRINGE (ML) INJECTION PRN
Status: DISCONTINUED | OUTPATIENT
Start: 2024-10-03 | End: 2024-10-07 | Stop reason: HOSPADM

## 2024-10-03 RX ORDER — FUROSEMIDE 10 MG/ML
40 INJECTION INTRAMUSCULAR; INTRAVENOUS ONCE
Status: COMPLETED | OUTPATIENT
Start: 2024-10-03 | End: 2024-10-03

## 2024-10-03 RX ADMIN — MIRTAZAPINE 15 MG: 15 TABLET, FILM COATED ORAL at 21:29

## 2024-10-03 RX ADMIN — FUROSEMIDE 40 MG: 10 INJECTION, SOLUTION INTRAMUSCULAR; INTRAVENOUS at 19:01

## 2024-10-03 RX ADMIN — APIXABAN 2.5 MG: 2.5 TABLET, FILM COATED ORAL at 21:29

## 2024-10-03 RX ADMIN — SODIUM CHLORIDE, PRESERVATIVE FREE 10 ML: 5 INJECTION INTRAVENOUS at 21:29

## 2024-10-03 RX ADMIN — MIDODRINE HYDROCHLORIDE 5 MG: 5 TABLET ORAL at 19:14

## 2024-10-03 RX ADMIN — ENOXAPARIN SODIUM 30 MG: 100 INJECTION SUBCUTANEOUS at 19:03

## 2024-10-03 ASSESSMENT — PAIN SCALES - GENERAL: PAINLEVEL_OUTOF10: 0

## 2024-10-03 ASSESSMENT — LIFESTYLE VARIABLES
HOW OFTEN DO YOU HAVE A DRINK CONTAINING ALCOHOL: NEVER
HOW MANY STANDARD DRINKS CONTAINING ALCOHOL DO YOU HAVE ON A TYPICAL DAY: PATIENT DOES NOT DRINK

## 2024-10-03 NOTE — ED NOTES
How does patient ambulate?   []Low Fall Risk (ambulates by themselves without support)  []Stand by assist   []Contact Guard   []Front wheel walker  []Wheelchair   []Steady  []Bed bound  []History of Lower Extremity Amputation  [x]Unknown, did not assess in the emergency department PT HAS CANE FROM HOME AT BEDSIDE   How does patient take pills?  [x]Whole with Water  []Crushed in applesauce  []Crushed in pudding  []Other  []Unknown no oral medications were given in the ED  Is patient alert?   [x]Alert  []Drowsy but responds to voice  []Doesn't respond to voice but responds to painful stimuli  []Unresponsive  Is patient oriented?   [x]To person  [x]To place  [x]To time  [x]To situation  []Confused  []Agitated  [x]Follows commands  If patient is disoriented or from a Skill Nursing Facility has family been notified of admission?   []Yes   [x]No n/a FROM HOME AND HAS PHONE TO CALL FAMILY FOR UPDATE S  Patient belongings?   [x]Cell phone  []Wallet   []Dentures  [x]Clothing  Any specific patient or family belongings/needs/dynamics?   N/A  Miscellaneous comments/pending orders?  N/A    If there are any additional questions please reach out to the Emergency Department.           Jerrod Villavicencio RN  10/03/24 1845       Jerrod Villavicencio RN  10/03/24 1914

## 2024-10-03 NOTE — ED PROVIDER NOTES
THE [facility]  EMERGENCY DEPARTMENT ENCOUNTER          ATTENDING PHYSICIAN NOTE       Date of evaluation: 10/3/2024    Chief Complaint     Shortness of Breath (Pt endorses trouble breathing that began yesterday, denies associated symptoms such as CP, cough, weakness, fatigue )      History of Present Illness     Pedro Becerra is a 84 y.o. male with history of HFrEF, cardiac amyloidosis, A-fib, CKD, diabetes, multiple other comorbidities presenting for shortness of breath.  Patient states symptoms been present for approximately 1 day are not particularly worse with any exertion where he can become short of breath with short ambulation, just a few steps.  Also had some shortness of breath when laying flat last night.  Denies any fever, cough, or congestion.  No pain such as chest pain, abdominal pain, or back pain.  No significant lower extremity edema or weight gain, states he is having a good response to his home diuretic, which appears to be torsemide.  Patient admitted for similar symptoms in early September of this year and underwent further IV diuresis and was transition from Lasix to torsemide with discontinuation of Entresto due to hypotension.    Physical Exam     INITIAL VITALS: BP: 104/89, Temp: 97.2 °F (36.2 °C), Pulse: 68, Respirations: 22, SpO2: 100 %     Physical Exam    Nursing note and vitals reviewed.    General:  Adult male, alert and appropriately interactive. In no distress.  HENT: Normocephalic and atraumatic. External ears normal. Nose appears normal externally.  Eyes: Conjunctivae normal. No scleral icterus.  Neck: Neck supple. No tracheal deviation present.   CV: Normal rate. Regular rhythm. S1/S2 auscultated. No murmurs, gallops or rubs.   Pulm: Effort normal on RA.  Diminished at bilateral bases, no crackles, no wheezes or prolonged expiratory phase, no focal rales or rhonchi  GI: Soft. No distension. No tenderness. No rebound or guarding. No masses. No peritoneal signs.   Total Protein 6.9 6.4 - 8.2 g/dL    Albumin 3.5 3.4 - 5.0 g/dL    Albumin/Globulin Ratio 1.0 (L) 1.1 - 2.2    Total Bilirubin 1.2 (H) 0.0 - 1.0 mg/dL    Alkaline Phosphatase 264 (H) 40 - 129 U/L    ALT 32 10 - 40 U/L    AST 53 (H) 15 - 37 U/L   Blood Gas, Venous   Result Value Ref Range    pH, Estrada 7.350 7.350 - 7.450    pCO2, Estrada 51.0 41.0 - 51.0 mmHg    PO2, Estrada <30.0 25.0 - 40.0 mmHg    HCO3, Venous 28.1 (H) 24.0 - 28.0 mmol/L    Base Excess, Estrada 1.5 -2.0 - 3.0 mmol/L    O2 Sat, Estrada 21 Not established %    Carboxyhemoglobin 1.3 0.0 - 1.5 %    MetHgb, Estrada 0.5 0.0 - 1.5 %    TC02 (Calc), Estrada 30 mmol/L    Hemoglobin, Estrada, Reduced 77.40 %   Troponin   Result Value Ref Range    Troponin, High Sensitivity 141 (H) 0 - 22 ng/L   Troponin   Result Value Ref Range    Troponin, High Sensitivity 119 (H) 0 - 22 ng/L   BNP   Result Value Ref Range    NT Pro-BNP >70,000 (H) 0 - 449 pg/mL   Troponin   Result Value Ref Range    Troponin, High Sensitivity 145 (H) 0 - 22 ng/L   Troponin   Result Value Ref Range    Troponin, High Sensitivity 149 (H) 0 - 22 ng/L   POCT Glucose   Result Value Ref Range    POC Glucose 194 (H) 70 - 99 mg/dl    Performed on ACCU-CHEK    EKG 12 Lead   Result Value Ref Range    Ventricular Rate 72 BPM    Atrial Rate 84 BPM    QRS Duration 206 ms    Q-T Interval 548 ms    QTc Calculation (Bazett) 600 ms    R Axis 260 degrees    T Axis 72 degrees    Diagnosis        Poor data quality, interpretation may be adversely affectedAtrial fibrillation with premature ventricular or aberrantly conducted complexesRight bundle branch blockInferior infarct , age undeterminedAnterolateral infarct , age undeterminedAbnormal ECGConfirmed by MD, ER ([id number]),  YOLY KRAFT ([id number]) on 10/3/2024 6:25:30 PM         MOST RECENT VITALS:  BP: 113/82,Temp: 96.8 °F (36 °C), Pulse: 70, Respirations: 20, SpO2: 94 %     Procedures     Procedures    ED Course     Nursing Notes, Past Medical Hx, Past Surgical Hx, Social

## 2024-10-03 NOTE — ED NOTES
Pt received 40mg IV lasix per ED provider order. Dr. Santi LACY verbally ordered to hold 20mg IV lasix for tonight as pt had just received 40mg lasix per ED order (see MAR) and there are concerns for his blood pressure dropping. This information was relayed to PCU nurse via verbal report.      Jerrod Villavicencio RN  10/03/24 192

## 2024-10-04 PROBLEM — Z71.89 ADVANCE CARE PLANNING: Status: ACTIVE | Noted: 2024-10-04

## 2024-10-04 PROBLEM — Z51.5 ENCOUNTER FOR PALLIATIVE CARE: Status: ACTIVE | Noted: 2024-10-04

## 2024-10-04 LAB
ALBUMIN SERPL-MCNC: 3.1 G/DL (ref 3.4–5)
ALBUMIN/GLOB SERPL: 1.1 {RATIO} (ref 1.1–2.2)
ALP SERPL-CCNC: 239 U/L (ref 40–129)
ALT SERPL-CCNC: 27 U/L (ref 10–40)
ANION GAP SERPL CALCULATED.3IONS-SCNC: 10 MMOL/L (ref 3–16)
ANION GAP SERPL CALCULATED.3IONS-SCNC: 12 MMOL/L (ref 3–16)
ANION GAP SERPL CALCULATED.3IONS-SCNC: 15 MMOL/L (ref 3–16)
AST SERPL-CCNC: 33 U/L (ref 15–37)
BASOPHILS # BLD: 0.1 K/UL (ref 0–0.2)
BASOPHILS NFR BLD: 1.1 %
BILIRUB SERPL-MCNC: 1 MG/DL (ref 0–1)
BUN SERPL-MCNC: 40 MG/DL (ref 7–20)
BUN SERPL-MCNC: 42 MG/DL (ref 7–20)
BUN SERPL-MCNC: 44 MG/DL (ref 7–20)
CALCIUM SERPL-MCNC: 9.5 MG/DL (ref 8.3–10.6)
CALCIUM SERPL-MCNC: 9.6 MG/DL (ref 8.3–10.6)
CALCIUM SERPL-MCNC: 9.6 MG/DL (ref 8.3–10.6)
CHLORIDE SERPL-SCNC: 94 MMOL/L (ref 99–110)
CHLORIDE SERPL-SCNC: 97 MMOL/L (ref 99–110)
CHLORIDE SERPL-SCNC: 99 MMOL/L (ref 99–110)
CO2 SERPL-SCNC: 22 MMOL/L (ref 21–32)
CO2 SERPL-SCNC: 25 MMOL/L (ref 21–32)
CO2 SERPL-SCNC: 26 MMOL/L (ref 21–32)
CREAT SERPL-MCNC: 3.2 MG/DL (ref 0.8–1.3)
CREAT SERPL-MCNC: 3.3 MG/DL (ref 0.8–1.3)
CREAT SERPL-MCNC: 3.4 MG/DL (ref 0.8–1.3)
DEPRECATED RDW RBC AUTO: 20.8 % (ref 12.4–15.4)
EKG DIAGNOSIS: NORMAL
EKG Q-T INTERVAL: 576 MS
EKG QRS DURATION: 208 MS
EKG QTC CALCULATION (BAZETT): 599 MS
EKG R AXIS: 234 DEGREES
EKG T AXIS: 59 DEGREES
EKG VENTRICULAR RATE: 65 BPM
EOSINOPHIL # BLD: 0.1 K/UL (ref 0–0.6)
EOSINOPHIL NFR BLD: 1.1 %
GFR SERPLBLD CREATININE-BSD FMLA CKD-EPI: 17 ML/MIN/{1.73_M2}
GFR SERPLBLD CREATININE-BSD FMLA CKD-EPI: 17 ML/MIN/{1.73_M2}
GFR SERPLBLD CREATININE-BSD FMLA CKD-EPI: 18 ML/MIN/{1.73_M2}
GLUCOSE BLD-MCNC: 183 MG/DL (ref 70–99)
GLUCOSE BLD-MCNC: 196 MG/DL (ref 70–99)
GLUCOSE BLD-MCNC: 207 MG/DL (ref 70–99)
GLUCOSE BLD-MCNC: 214 MG/DL (ref 70–99)
GLUCOSE SERPL-MCNC: 169 MG/DL (ref 70–99)
GLUCOSE SERPL-MCNC: 179 MG/DL (ref 70–99)
GLUCOSE SERPL-MCNC: 218 MG/DL (ref 70–99)
HCT VFR BLD AUTO: 40 % (ref 40.5–52.5)
HGB BLD-MCNC: 13 G/DL (ref 13.5–17.5)
LYMPHOCYTES # BLD: 1.5 K/UL (ref 1–5.1)
LYMPHOCYTES NFR BLD: 26.6 %
MAGNESIUM SERPL-MCNC: 2.2 MG/DL (ref 1.8–2.4)
MAGNESIUM SERPL-MCNC: 2.2 MG/DL (ref 1.8–2.4)
MCH RBC QN AUTO: 28.4 PG (ref 26–34)
MCHC RBC AUTO-ENTMCNC: 32.4 G/DL (ref 31–36)
MCV RBC AUTO: 87.7 FL (ref 80–100)
MONOCYTES # BLD: 0.6 K/UL (ref 0–1.3)
MONOCYTES NFR BLD: 10.5 %
NEUTROPHILS # BLD: 3.3 K/UL (ref 1.7–7.7)
NEUTROPHILS NFR BLD: 60.7 %
PERFORMED ON: ABNORMAL
PLATELET # BLD AUTO: 206 K/UL (ref 135–450)
PMV BLD AUTO: 9.7 FL (ref 5–10.5)
POTASSIUM SERPL-SCNC: 2.9 MMOL/L (ref 3.5–5.1)
POTASSIUM SERPL-SCNC: 3.8 MMOL/L (ref 3.5–5.1)
POTASSIUM SERPL-SCNC: 4.3 MMOL/L (ref 3.5–5.1)
PROT SERPL-MCNC: 5.9 G/DL (ref 6.4–8.2)
RBC # BLD AUTO: 4.57 M/UL (ref 4.2–5.9)
SODIUM SERPL-SCNC: 131 MMOL/L (ref 136–145)
SODIUM SERPL-SCNC: 133 MMOL/L (ref 136–145)
SODIUM SERPL-SCNC: 136 MMOL/L (ref 136–145)
TROPONIN, HIGH SENSITIVITY: 130 NG/L (ref 0–22)
WBC # BLD AUTO: 5.5 K/UL (ref 4–11)

## 2024-10-04 PROCEDURE — 93005 ELECTROCARDIOGRAM TRACING: CPT | Performed by: FAMILY MEDICINE

## 2024-10-04 PROCEDURE — 83735 ASSAY OF MAGNESIUM: CPT

## 2024-10-04 PROCEDURE — 6360000002 HC RX W HCPCS: Performed by: NURSE PRACTITIONER

## 2024-10-04 PROCEDURE — 93010 ELECTROCARDIOGRAM REPORT: CPT | Performed by: INTERNAL MEDICINE

## 2024-10-04 PROCEDURE — 80053 COMPREHEN METABOLIC PANEL: CPT

## 2024-10-04 PROCEDURE — 2580000003 HC RX 258: Performed by: FAMILY MEDICINE

## 2024-10-04 PROCEDURE — 99222 1ST HOSP IP/OBS MODERATE 55: CPT | Performed by: NURSE PRACTITIONER

## 2024-10-04 PROCEDURE — 6370000000 HC RX 637 (ALT 250 FOR IP)

## 2024-10-04 PROCEDURE — 6370000000 HC RX 637 (ALT 250 FOR IP): Performed by: INTERNAL MEDICINE

## 2024-10-04 PROCEDURE — 36415 COLL VENOUS BLD VENIPUNCTURE: CPT

## 2024-10-04 PROCEDURE — 84484 ASSAY OF TROPONIN QUANT: CPT

## 2024-10-04 PROCEDURE — 6370000000 HC RX 637 (ALT 250 FOR IP): Performed by: FAMILY MEDICINE

## 2024-10-04 PROCEDURE — 6360000002 HC RX W HCPCS: Performed by: FAMILY MEDICINE

## 2024-10-04 PROCEDURE — 6360000002 HC RX W HCPCS

## 2024-10-04 PROCEDURE — 2060000000 HC ICU INTERMEDIATE R&B

## 2024-10-04 PROCEDURE — 99223 1ST HOSP IP/OBS HIGH 75: CPT | Performed by: INTERNAL MEDICINE

## 2024-10-04 PROCEDURE — 85025 COMPLETE CBC W/AUTO DIFF WBC: CPT

## 2024-10-04 RX ORDER — POTASSIUM CHLORIDE 7.45 MG/ML
10 INJECTION INTRAVENOUS
Status: COMPLETED | OUTPATIENT
Start: 2024-10-04 | End: 2024-10-04

## 2024-10-04 RX ORDER — ONDANSETRON 2 MG/ML
4 INJECTION INTRAMUSCULAR; INTRAVENOUS EVERY 6 HOURS PRN
Status: DISCONTINUED | OUTPATIENT
Start: 2024-10-04 | End: 2024-10-04

## 2024-10-04 RX ORDER — MAGNESIUM SULFATE IN WATER 40 MG/ML
2000 INJECTION, SOLUTION INTRAVENOUS PRN
Status: DISCONTINUED | OUTPATIENT
Start: 2024-10-04 | End: 2024-10-04

## 2024-10-04 RX ORDER — LANOLIN ALCOHOL/MO/W.PET/CERES
3 CREAM (GRAM) TOPICAL NIGHTLY PRN
Status: DISCONTINUED | OUTPATIENT
Start: 2024-10-04 | End: 2024-10-05

## 2024-10-04 RX ORDER — POTASSIUM CHLORIDE 7.45 MG/ML
10 INJECTION INTRAVENOUS PRN
Status: DISCONTINUED | OUTPATIENT
Start: 2024-10-04 | End: 2024-10-04

## 2024-10-04 RX ORDER — POTASSIUM CHLORIDE 1500 MG/1
40 TABLET, EXTENDED RELEASE ORAL PRN
Status: DISCONTINUED | OUTPATIENT
Start: 2024-10-04 | End: 2024-10-04

## 2024-10-04 RX ORDER — METOPROLOL SUCCINATE 25 MG/1
25 TABLET, EXTENDED RELEASE ORAL DAILY
Status: DISCONTINUED | OUTPATIENT
Start: 2024-10-04 | End: 2024-10-07 | Stop reason: HOSPADM

## 2024-10-04 RX ORDER — ALLOPURINOL 100 MG/1
100 TABLET ORAL DAILY
Status: DISCONTINUED | OUTPATIENT
Start: 2024-10-04 | End: 2024-10-07 | Stop reason: HOSPADM

## 2024-10-04 RX ORDER — AMIODARONE HYDROCHLORIDE 200 MG/1
100 TABLET ORAL DAILY
Status: DISCONTINUED | OUTPATIENT
Start: 2024-10-04 | End: 2024-10-04

## 2024-10-04 RX ADMIN — METOPROLOL SUCCINATE 25 MG: 25 TABLET, EXTENDED RELEASE ORAL at 10:27

## 2024-10-04 RX ADMIN — POTASSIUM BICARBONATE 40 MEQ: 391 TABLET, EFFERVESCENT ORAL at 23:38

## 2024-10-04 RX ADMIN — POTASSIUM CHLORIDE 10 MEQ: 10 INJECTION, SOLUTION INTRAVENOUS at 11:05

## 2024-10-04 RX ADMIN — POTASSIUM BICARBONATE 40 MEQ: 391 TABLET, EFFERVESCENT ORAL at 10:30

## 2024-10-04 RX ADMIN — ATORVASTATIN CALCIUM 40 MG: 40 TABLET, FILM COATED ORAL at 08:58

## 2024-10-04 RX ADMIN — APIXABAN 2.5 MG: 2.5 TABLET, FILM COATED ORAL at 08:58

## 2024-10-04 RX ADMIN — ASPIRIN 81 MG: 81 TABLET, COATED ORAL at 08:58

## 2024-10-04 RX ADMIN — POTASSIUM CHLORIDE 10 MEQ: 10 INJECTION, SOLUTION INTRAVENOUS at 09:04

## 2024-10-04 RX ADMIN — SODIUM CHLORIDE, PRESERVATIVE FREE 10 ML: 5 INJECTION INTRAVENOUS at 08:58

## 2024-10-04 RX ADMIN — MIRTAZAPINE 15 MG: 15 TABLET, FILM COATED ORAL at 20:11

## 2024-10-04 RX ADMIN — FUROSEMIDE 20 MG: 10 INJECTION, SOLUTION INTRAMUSCULAR; INTRAVENOUS at 17:13

## 2024-10-04 RX ADMIN — FUROSEMIDE 20 MG: 10 INJECTION, SOLUTION INTRAMUSCULAR; INTRAVENOUS at 08:57

## 2024-10-04 RX ADMIN — INSULIN LISPRO 1 UNITS: 100 INJECTION, SOLUTION INTRAVENOUS; SUBCUTANEOUS at 17:13

## 2024-10-04 RX ADMIN — APIXABAN 2.5 MG: 2.5 TABLET, FILM COATED ORAL at 20:11

## 2024-10-04 RX ADMIN — ALLOPURINOL 100 MG: 100 TABLET ORAL at 20:11

## 2024-10-04 RX ADMIN — SODIUM CHLORIDE, PRESERVATIVE FREE 10 ML: 5 INJECTION INTRAVENOUS at 20:11

## 2024-10-04 ASSESSMENT — PAIN SCALES - GENERAL
PAINLEVEL_OUTOF10: 0

## 2024-10-04 NOTE — H&P
Hospital Medicine History & Physical      Date of Admission: 10/3/2024    Date of Service:  Pt seen/examined on 10/3/2024    [x]Admitted to Inpatient with expected LOS greater than two midnights due to medical therapy.  []Placed in Observation status.    Chief Admission Complaint: Shortness of breath    Presenting Admission History:      84 y.o. male who presented to [facility] with shortness of breath with elevated BNP.  PMHx significant for CHF with ejection fraction of 10 to 15% A-fib on anticoagulation diabetes hypertension hyperlipidemia.      Patient states for the last several days short of breath now progressing to dyspnea on exertion and orthopnea.  Denies chest pain chest pressure abdominal pain.  Denies abdominal distention/swelling denies lower extremity swelling denies cough congestion.  Denies fevers chills numbness tingling    In the ER  Respiratory rate elevated    Sodium 133  Glucose 228  Creatinine 3.2 previous 3.4  BUN 43 previous 53  Gap 17 previous 17  Alk phos 264 previous 291  AST 53 previous 75    BNP greater than 70,000  Troponin 141 previous 138, repeat 119    Chest x-ray shows CHF with fluid overload. Underlying infiltrate not excluded.   Assessment/Plan:      Current Principal Problem:  Heart failure (HCC)    Shortness of breath/heart failure/elevated BNP/elevated troponin/  -Received Lasix in the ER  -Will monitor blood pressure as previous admissions patient has had severe hypertension  -Lasix 20 mg IV twice daily  -Cardiology consult  -Trend troponin  -Midodrine x 1  -Due to low blood pressure hold home metoprolol torsemide amiodarone  -Continue home Eliquis  -Breathing treatments  -CT chest     Discussed management of the case in detail w/ the Emergency Department Provider:     CXR: I have reviewed the CXR with the following interpretation: CHF with fluid overload. Underlying infiltrate not excluded.   EKG:  I have reviewed the EKG with the following interpretation: A-fib with  There is pulmonary vascular fullness and hazy interstitial opacities. No visible pneumothorax.     CHF with fluid overload.  Underlying infiltrate not excluded. Electronically signed by Michele Campos    XR CHEST (2 VW)    Result Date: 9/6/2024  EXAM: CHEST X-RAY 2 VIEWS INDICATION: Shortness of Breath, COMPARISON: 8/8/2024 FINDINGS: HEART / MEDIASTINUM: Stable cardiomegaly. Small pleural effusions with bibasilar airspace opacities are present. No evidence for pulmonary venous congestion, however. LUNGS/PLEURA: No dense consolidation, pleural effusion, pneumothorax. Pulmonary vasculature is normal. BONES / SOFT TISSUES: No acute abnormality. OTHER: None.     Cardiomegaly and small pleural effusions with basilar subsegmental atelectasis Electronically signed by Braeden Rivero MD      PCP: Shankar Olea DO    Past Medical History:        Diagnosis Date    Abdominal hernia     Arthritis     MILD    Cardiac amyloidosis ([facility])     CHF (congestive heart failure) ([facility])     7/2022 ECHO: severe concentric LVH, EF 25-30%, Global left ventricular hypokinesis, mild MR, TR, PA, AR; non-ischemic,    CKD (chronic kidney disease) stage 3, GFR 30-59 ml/min ([facility])     Dr. YUDITH Umaña    HFrEF (heart failure with reduced ejection fraction) ([facility]) 2024    EF 10-15%, mod MR, mod TR, mod pulm HTN,  cardiac amyloidsis.    Hyperlipidemia     Hypertension     RADHA (iron deficiency anemia)     NSVT (nonsustained ventricular tachycardia) ([facility])     PAF (paroxysmal atrial fibrillation) ([facility])     Type II or unspecified type diabetes mellitus without mention of complication, not stated as uncontrolled        Past Surgical History:        Procedure Laterality Date    HERNIA REPAIR      HIP SURGERY      OTHER SURGICAL HISTORY N/A 12/15/2015    LAPAROSCOPIC LEFT INGUINAL HERNIA REPAIR WITH MESH       Medications Prior to Admission:   Prior to Admission medications    Medication Sig Start Date End Date Taking? Authorizing Provider   calcitRIOL (ROCALTROL)

## 2024-10-04 NOTE — CONSULTS
List of Home Medications the patient is currently taking is complete.  Home Medication list in EPIC updated to reflect changes noted below.  Source of medications in list is [facility] prescription fill history and recent MD encounters.      No changes were made to medications list.    Please note:  On 9/20, pt saw cardiology who intended for pt to remain on apixaban and torsemide per notes  Apixaban last fill 8/12 for 30 day supply  Torsemide last fill 9/9 for 15 day supply  Allopurinol last filled 6/13 for 90 day supply, however there are multiple refills left on this RX. Unsure why this hasn't been refilled yet.    Please call with any questions.  Joanne Salazar, PharmD, [facility]  Main pharmacy: [id number]  10/3/2024 8:24 PM      Current Outpatient Medications   Medication Instructions    allopurinol (ZYLOPRIM) 100 mg, Oral, DAILY    amiodarone (PACERONE) 100 mg, Oral, DAILY    apixaban (ELIQUIS) 2.5 mg, Oral, 2 TIMES DAILY    aspirin 81 mg, Oral, DAILY    atorvastatin (LIPITOR) 40 mg, Oral, DAILY    calcitRIOL (ROCALTROL) 0.25 mcg, Oral, EVERY OTHER DAY    docusate sodium (COLACE) 100 mg, Oral, 2 TIMES DAILY    dorzolamide-timolol (COSOPT) 22.3-6.8 MG/ML ophthalmic solution INSTILL 1 DROP INTO EACH EYE EVERY 12 HOURS    melatonin (RA MELATONIN) 3 mg, Oral, NIGHTLY PRN    metoprolol succinate (TOPROL XL) 50 mg, Oral, DAILY    mirtazapine (REMERON) 15 mg, Oral, NIGHTLY    SITagliptin (JANUVIA) 25 mg, Oral, DAILY    torsemide (DEMADEX) 40 mg, Oral, DAILY

## 2024-10-04 NOTE — DISCHARGE INSTRUCTIONS
Obtain BMP in 1 week and follow-up with cardiologist.  Stop amiodarone and started on low-dose metoprolol 25 daily and torsemide increased to 50 mg daily    Extra Heart Failure Education/ Tools/ Resources:     https://[employer].IdeaString/publication/?h=037790   --- this is American Heart Association interactive Healthier Living with Heart Failure guidebook.  Please click hyperlink or copy / paste link into search bar. The QR Code is also available below. Use your mouse to scroll through the pages.  Lots of information about weight monitoring, diet tips, activity, meds, etc    Heart Failure Tools and Resources QR Code is below. It includes multiple resources to include symptom tracker, med tracker, further HF info, and access to a HF Support Network online Community    HF [place] Nimco  -- this is a free smart phone nimco available for iPhone and Android download.  Use your phone to track sodium / fluid intake, zone tool symptom tracking, weights, medications, etc. Click on this hyperlink  HF [place] Nimco   for QR code for easy download or the link is also found in the below HF Tools and Resources.      DASH (Dietary Approach to Stop Hypertension) diet --  https://www.nhlbi.nih.gov/education/dash-eating-plan -- this diet is a flexible eating plan that promotes heart healthy eating style.  Click on hyperlink or copy / paste link into search bar.  Lots of low sodium recipes and tips.    https://www.elicit/recipes  -- more free recipes

## 2024-10-04 NOTE — PLAN OF CARE
Problem: Discharge Planning  Goal: Discharge to home or other facility with appropriate resources  Outcome: Progressing  Flowsheets (Taken 10/4/2024 0318)  Discharge to home or other facility with appropriate resources:   Identify barriers to discharge with patient and caregiver   Arrange for needed discharge resources and transportation as appropriate   Identify discharge learning needs (meds, wound care, etc)   Refer to discharge planning if patient needs post-hospital services based on physician order or complex needs related to functional status, cognitive ability or social support system     Problem: Safety - Adult  Goal: Free from fall injury  Outcome: Progressing  Flowsheets (Taken 10/4/2024 0318)  Free From Fall Injury: Instruct family/caregiver on patient safety  Note: Fall protocol in place      Problem: Pain  Goal: Verbalizes/displays adequate comfort level or baseline comfort level  Outcome: Progressing  Flowsheets (Taken 10/4/2024 0318)  Verbalizes/displays adequate comfort level or baseline comfort level:   Encourage patient to monitor pain and request assistance   Assess pain using appropriate pain scale   Administer analgesics based on type and severity of pain and evaluate response   Implement non-pharmacological measures as appropriate and evaluate response

## 2024-10-04 NOTE — PLAN OF CARE
Problem: Safety - Adult  Goal: Free from fall injury  10/4/2024 1614 by Naa Sunshine, RN  Outcome: Progressing  Note: Pt is alert and oriented x 4 with some intermittent confusion. Bed alarm on, call light within reach. Pt calls appropriately for assistance.     Problem: Pain  Goal: Verbalizes/displays adequate comfort level or baseline comfort level  10/4/2024 1614 by Naa Sunshine, RN  Outcome: Progressing  Flowsheets (Taken 10/4/2024 1614)  Verbalizes/displays adequate comfort level or baseline comfort level: Assess pain using appropriate pain scale  Note: No c/o pain.     Problem: Cardiovascular - Adult  Goal: Maintains optimal cardiac output and hemodynamic stability  Outcome: Progressing  Flowsheets (Taken 10/4/2024 1614)  Maintains optimal cardiac output and hemodynamic stability: Monitor blood pressure and heart rate  Note: Vitals stable, afib on tele rate controled. Receiving lasix 20 mg IV BID. K 2.9, received 20 mEq IV and 40 mEq po x 2. Lungs with fine crackles in bases, trace edema to BLE.

## 2024-10-04 NOTE — CONSULTS
CHF Nutrition Education    Consult received for heart failure diet education.  Education deferred or not appropriate at this time related to  patient received CHF diet education at this facility on 9/7 .      Electronically signed by Clarissa Ortiz RD on 10/4/2024 at 7:08 AM

## 2024-10-04 NOTE — PROGRESS NOTES
4 Eyes Skin Assessment     NAME:  Pedro Becerra  YOB: 1940  MEDICAL RECORD NUMBER:  [id number]    The patient is being assessed for  Admission    I agree that at least one RN has performed a thorough Head to Toe Skin Assessment on the patient. ALL assessment sites listed below have been assessed.          Areas assessed by both nurses:    Head, Face, Ears, Shoulders, Back, Chest, Arms, Elbows, Hands, Sacrum. Buttock, Coccyx, Ischium, Legs. Feet and Heels, and Under Medical Devices     Blanching redness on sacral area , abrasion on the upper and mid back         Does the Patient have a Wound? No noted wound(s)       Federico Prevention initiated by RN: No  Wound Care Orders initiated by RN: No    Pressure Injury (Stage 3,4, Unstageable, DTI, NWPT, and Complex wounds) if present, place Wound referral order by RN under : No    New Ostomies, if present place, Ostomy referral order under : No     Nurse 1 eSignature: Electronically signed by Rodney Wilkins RN on 10/4/24 at 1:52 AM EDT    **SHARE this note so that the co-signing nurse can place an eSignature**    Nurse 2 eSignature: Electronically signed by De Ashley RN on 10/3/24 at 8:10 PM EDT

## 2024-10-04 NOTE — CONSULTS
[facility]  Palliative Medicine Consultation Note      Date Of Admission:10/3/2024  Date of consult: 10/04/24  Seen by PC in the past:  Yes    Recommendations:         Pt is familiar to palliative care. Met with his great niece Real in the hallway and obtained pt's wife's permission on the phone to speak with Real. We entered the room and talked with the pt, and he agreed to have [place] present at the bedside for the discussion. Introduced palliative care and had a voluntary discussion about advance care planning. The pt says his wishes are to continue PT/OT at home and he wants to continue to come to the hospital for acute illnesses. I brought up hospice, which had discussed on previous admissions, and he's not interested at this time. Discussed code status and the pt asked Real for her opinion, since she is a nurse. Real discussed potential burdens/risks of CPR with the pt, and he decided he would not want efforts at resuscitation nor would he want to be intubated for any reason. The pt reports this type of conversation is somewhat stressful for him. Real says she will discuss the pt's stated wishes to be DNR/DNI with pt's wife later on at home.    Addendum: Pt/family requested to complete HCPOA. The pt named his wife Batsheva as his agent and nephew Nasrin as the alternate agent. I provided Nasrin at the bedside with 2 copies and placed a copy in pt's chart.    1. Goals of Care/Advanced Care planning/Code status:  changed to DNR/DNI (Limited- no to all interventions) per the pt's stated wishes today. He is not interested in hospice at this time. He wants to continue PT/OT at home and wants to come back to the hospital when he gets sob at home.  2. Pain: pt denies pain  3. SOB: pt p/w sob and was admitted with acute on chronic heart failure. Dr. Mathews seeing pt and anticipates changing IV lasix to his home dose of po torsemide tomorrow. Known HF with EF 10-15%.   4.       Mental Status: He is alert and oriented to person, place, and time.          Palliative Performance Scale:  [] 60% Ambulation reduced; Significant disease; Can't do hobbies/housework; intake normal or reduced; occasional assist; LOC full/confusion  [x] 50% Mainly sit/lie; Extensive disease; Can't do any work; Considerable assist; intake normal  Or reduced; LOC full/confusion  [] 40% Mainly in bed; Extensive disease; Mainly assist; intake normal or reduced; occasional assist; LOC full/confusion  [] 30% Bed Bound; Extensive disease; Total care; intake reduced; LOC full/confusion  [] 20% Bed Bound; Extensive disease; Total care; intake minimal; Drowsy/coma  [] 10% Bed Bound; Extensive disease; Total care; Mouth care only; Drowsy/coma  [] 0% Death      Vitals:    /74   Pulse 83   Temp 97.7 °F (36.5 °C) (Oral)   Resp 18   Ht 1.803 m (5' 11\")   Wt 77.2 kg (170 lb 3.1 oz)   SpO2 96%   BMI 23.74 kg/m²     Labs:    BMP:   Recent Labs     10/03/24  1647 10/04/24  0538 10/04/24  1127   * 133* 136   K 4.1 2.9* 3.8   CL 95* 97* 99   CO2 21 26 22   BUN 43* 42* 40*   CREATININE 3.2* 3.3* 3.2*   GLUCOSE 228* 179* 169*     CBC:   Recent Labs     10/03/24  1647 10/04/24  0539   WBC 6.5 5.5   HGB 14.3 13.0*   HCT 45.0 40.0*    206       LFT's:   Recent Labs     10/03/24  1647 10/04/24  0538   AST 53* 33   ALT 32 27   BILITOT 1.2* 1.0   ALKPHOS 264* 239*     Troponin: No results for input(s): \"TROPONINI\" in the last 72 hours.  BNP: No results for input(s): \"BNP\" in the last 72 hours.  ABGs: No results for input(s): \"PHART\", \"QKE6HZK\", \"PO2ART\" in the last 72 hours.  INR: No results for input(s): \"INR\" in the last 72 hours.    U/A:No results for input(s): \"NITRITE\", \"COLORU\", \"PHUR\", \"LABCAST\", \"WBCUA\", \"RBCUA\", \"MUCUS\", \"TRICHOMONAS\", \"YEAST\", \"BACTERIA\", \"CLARITYU\", \"SPECGRAV\", \"LEUKOCYTESUR\", \"UROBILINOGEN\", \"BILIRUBINUR\", \"BLOODU\", \"GLUCOSEU\", \"AMORPHOUS\" in the last 72 hours.    Invalid input(s):

## 2024-10-04 NOTE — CONSULTS
Cardiology Consultation                                                                    Pt Name: Pedro Becerra  Age: 84 y.o.  Sex: male  : 1940  Location: [facility]/4453-01    Referring Physician: Jessie Garcia,*  Primary cardiologist: Dr. Mynor Mathews      Reason for Consult:     Reason for Consultation/Chief Complaint: CHF Exacerbation    HPI:      Pedro Becerra is a 84 y.o. 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 Baseline 2.6), mild CAD, NSTEMI, trifascicular block w/ RBBB, and NSVT. He presented to the ED with complaint of SOB and we are being consulted for acute heart failure exacerbation.      ECHO 2024: LVEF 10-15%. Severe increased wall thickness. Intermediate diastolic dysfunction. Mild aortic regurgitation, moderate mitral regurgitation. Left atrium dilation.     EKG 10/4/2024: Afib, RBBB, V1-V4 T wave inversion.     Echo 2022: Severe LVH, consider cardiac amyloidosis, LVEF 20-25% with global hypokinesis, diastolic grade 3, increased LVEDP, normal RV size with severe hypokinesis, no significant valvular abnormalities.     Tc PYP scan 3/12/20: strongly positive for amyloidosis.      2019 kappa/labda ratio 1.69 (normal in the setting of CKD, Cr 1.9). 24-hour UPEP normal; SPEP 2021 normal; hence no AL amyloidosis.     C 2019: mild diffuse CAD, nl LVEDP     Patient reportedly experienced one day of SOB and was told to come to ED before symptoms got worse. He reports SOB was exacerbated by exertion and laying flat. He has remained compliant with home torsemide. He lives at home with his wife who takes care of him.  He denies any chest pain, abdominal pain, or recent lower extremity swelling. He was recently admitted in September for similar presentation requiring diuresis. Patient reports SOB has since resolved and is feeling better.     Initially received 20 and 40mg IV lasix. Labs significant for K of  HFrEF.  Systolic heart failure is due to nonischemic cardiomyopathy (advanced cardiac amyloidosis, tachyarrhythmias).  Patient presented very mildly volume overloaded. Patient is NYHA FC IV (end stage HF).  2.  Elevated troponin and proBNP.  Patient has elevated values in the setting of cardiac amyloidosis.  proBNP higher due to acute exacerbation.  There is no evidence of ACS.  3.  Mild CAD.  4.  Hypertension.  BP is controlled.   5.  PAF RVR.  Subclinical (no palpitations), patient is in afib with CVR.   6.  History of NSVT  7.  ATTR amyloidosis.  Patient has been on Vyndamax (clinical trial did suggest worsening symptoms when patients have NYHA FC III), Vyndamax was stopped.       - Strict I's and O's every shift and standing weights if possible, low-salt diet, twice a day BMP with reflex to Mg (correct lytes for goals K >4.0 and Mg > 2.0) and wean supplemental oxygen to off (or down to baseline supplemental oxygen requirements) for sats greater than 90%.  -May continue with Lasix 20 mg IV twice daily once electrolytes are repleted.  - Will change Lopressor 50 p.o. twice daily (home dose) to Toprol 25 mg p.o. daily to prevent tachyarrhythmias.  -Once patient is euvolemic (likely tomorrow), could change his IV Lasix to torsemide 50 mg p.o. daily.  - No need for amiodarone at this time.  Will accept A-fib as permanent but control the rate.  - Continue Eliquis, baby aspirin  - Unable to start ACEI, ARB, spironolactone, Entresto, hydralazine, nitrates or SGLT2 due to CKD and/or hypotension.   - No ICD or LifeVest due to end-stage HF with limited life expectancy.   -CODE STATUS was previously extensively discussed with patient and wife.  Due to advanced age and end-stage heart failure I recommendeded patient reconsider his decision to be full code (in the event of sudden cardiac arrest it would be highly unlikely patient would recover neurologically intact after ACLS protocol). Patient is currently FULL code.       If

## 2024-10-04 NOTE — CARE COORDINATION
Case Management Assessment  Initial Evaluation    Date/Time of Evaluation: 10/4/2024 4:02 PM  Assessment Completed by: Chinyere Guzman    If patient is discharged prior to next notation, then this note serves as note for discharge by case management.    Patient Name: Pedro Becerra                   YOB: 1940  Diagnosis: Heart failure (HCC) [I50.9]  Acute on chronic systolic congestive heart failure (HCC) [I50.23]                   Date / Time: 10/3/2024  4:24 PM    Patient Admission Status: Inpatient   Readmission Risk (Low < 19, Mod (19-27), High > 27): Readmission Risk Score: 22.6    Current PCP: Shankar Olea, DO  PCP verified by CM? Yes    Chart Reviewed: Yes      History Provided by: Patient, Medical Record  Patient Orientation: Alert and Oriented    Patient Cognition: Alert    Hospitalization in the last 30 days (Readmission):  Yes    If yes, Readmission Assessment in CM Navigator will be completed.  Readmission Assessment  Number of Days since last admission?: 8-30 days  Previous Disposition: Home with Home Health  Who is being Interviewed: Patient  What was the patient's/caregiver's perception as to why they think they needed to return back to the hospital?: Other (Comment) (SOB)  Did you visit your Primary Care Physician after you left the hospital, before you returned this time?: No  Why weren't you able to visit your PCP?: Other (Comment) (via phone)  Did you see a specialist, such as Cardiac, Pulmonary, Orthopedic Physician, etc. after you left the hospital?: Yes (cardiology)  Who advised the patient to return to the hospital?: Self-referral  Does the patient report anything that got in the way of taking their medications?: No  In our efforts to provide the best possible care to you and others like you, can you think of anything that we could have done to help you after you left the hospital the first time, so that you might not have needed to return so soon?: Other (Comment)  PC note.      The Plan for Transition of Care is related to the following treatment goals of Heart failure (HCC) [I50.9]  Acute on chronic systolic congestive heart failure (HCC) [I50.23]    IF APPLICABLE: The Patient and/or patient representative Pedro and his family were provided with a choice of provider and agrees with the discharge plan. Freedom of choice list with basic dialogue that supports the patient's individualized plan of care/goals and shares the quality data associated with the providers was provided to: Patient   Patient Representative Name:       The Patient and/or Patient Representative Agree with the Discharge Plan? Yes    Chinyere Guzman  Case Management Department  Ph: [phone]

## 2024-10-04 NOTE — PROGRESS NOTES
Hospital Medicine Progress Note      Date of Admission: 10/3/2024  Hospital Day: 2    Chief Admission Complaint: Shortness of breath     Subjective: Patient seen and examined at bedside today.  Patient states his breathing  has improved significantly and chest discomfort is much better.  Urine output 650 mL from last night this morning    Presenting Admission History:       This is a 84-year-old male with medical history significant for HFrEF 10 to 15%, paroxysmal A-fib on anticoagulation, diabetes mellitus, hypertension, cardiac or myeloid presented with worsening shortness of breath of 1 day duration with mild chest discomfort.  Worsening with exertion and laying flat.  Denies any dietary habit change, leg swelling, fever  In the ER, tachypneic.  Chest x-ray concerning for pulmonary congestion.  BNP greater than 70,000, troponin 141.  He was started on IV Lasix and admitted inpatient for further management of CHF exacerbation.    Assessment/Plan:      Acute on chronic HFrEF  Nonischemic cardiomyopathy EF 10 to 15%  ATTR amyloidosis  Hypertension, stable  BNP more than 70,000  Cardiology consulted, recommend IV Lasix 20 mg twice daily, decrease Toprol to 25 mg daily from 50  Replete potassium and magnesium to maintainK>4 and mg >2  Strict I's and O's and daily weight  BMP twice daily  Plan to switch IV Lasix to torsemide 50 mg p.o. daily tomorrow if patient euvolemic  Unable to start ACE, ARB's, Aldactone, Entresto, hydralazine, nitrates or SGLT2 due to CKD and/or hypotension per cardiology.  No ICD or LifeVest recommended at this time due to end-stage heart failure with limited life expectancy.    Elevated troponin 2/2 demand ischemia  History of mild CAD  Troponin peak at 149 before downtrending  No concerns of ACS at this time  Continue aspirin and statin    Permanent A-fib with controlled rate  History of NSVT  On beta-blocker, and Eliquis  Cardiology recommending not resuming amiodarone as A-fib is

## 2024-10-05 ENCOUNTER — APPOINTMENT (OUTPATIENT)
Dept: GENERAL RADIOLOGY | Age: 84
DRG: 292 | End: 2024-10-05
Payer: MEDICARE

## 2024-10-05 LAB
ANION GAP SERPL CALCULATED.3IONS-SCNC: 14 MMOL/L (ref 3–16)
ANION GAP SERPL CALCULATED.3IONS-SCNC: 17 MMOL/L (ref 3–16)
BASOPHILS # BLD: 0 K/UL (ref 0–0.2)
BASOPHILS NFR BLD: 0.4 %
BUN SERPL-MCNC: 46 MG/DL (ref 7–20)
BUN SERPL-MCNC: 47 MG/DL (ref 7–20)
CALCIUM SERPL-MCNC: 10.1 MG/DL (ref 8.3–10.6)
CALCIUM SERPL-MCNC: 9.7 MG/DL (ref 8.3–10.6)
CHLORIDE SERPL-SCNC: 94 MMOL/L (ref 99–110)
CHLORIDE SERPL-SCNC: 95 MMOL/L (ref 99–110)
CO2 SERPL-SCNC: 24 MMOL/L (ref 21–32)
CO2 SERPL-SCNC: 25 MMOL/L (ref 21–32)
CREAT SERPL-MCNC: 3.5 MG/DL (ref 0.8–1.3)
CREAT SERPL-MCNC: 3.8 MG/DL (ref 0.8–1.3)
DEPRECATED RDW RBC AUTO: 21.1 % (ref 12.4–15.4)
EKG ATRIAL RATE: 72 BPM
EKG DIAGNOSIS: NORMAL
EKG Q-T INTERVAL: 532 MS
EKG QRS DURATION: 206 MS
EKG QTC CALCULATION (BAZETT): 598 MS
EKG R AXIS: -73 DEGREES
EKG T AXIS: 96 DEGREES
EKG VENTRICULAR RATE: 76 BPM
EOSINOPHIL # BLD: 0 K/UL (ref 0–0.6)
EOSINOPHIL NFR BLD: 0.2 %
GFR SERPLBLD CREATININE-BSD FMLA CKD-EPI: 15 ML/MIN/{1.73_M2}
GFR SERPLBLD CREATININE-BSD FMLA CKD-EPI: 16 ML/MIN/{1.73_M2}
GLUCOSE BLD-MCNC: 194 MG/DL (ref 70–99)
GLUCOSE BLD-MCNC: 195 MG/DL (ref 70–99)
GLUCOSE BLD-MCNC: 202 MG/DL (ref 70–99)
GLUCOSE BLD-MCNC: 221 MG/DL (ref 70–99)
GLUCOSE SERPL-MCNC: 192 MG/DL (ref 70–99)
GLUCOSE SERPL-MCNC: 194 MG/DL (ref 70–99)
HCT VFR BLD AUTO: 41.9 % (ref 40.5–52.5)
HGB BLD-MCNC: 13.5 G/DL (ref 13.5–17.5)
LYMPHOCYTES # BLD: 1.3 K/UL (ref 1–5.1)
LYMPHOCYTES NFR BLD: 20.5 %
MAGNESIUM SERPL-MCNC: 2.3 MG/DL (ref 1.8–2.4)
MAGNESIUM SERPL-MCNC: 2.3 MG/DL (ref 1.8–2.4)
MCH RBC QN AUTO: 28.3 PG (ref 26–34)
MCHC RBC AUTO-ENTMCNC: 32.3 G/DL (ref 31–36)
MCV RBC AUTO: 87.9 FL (ref 80–100)
MONOCYTES # BLD: 0.9 K/UL (ref 0–1.3)
MONOCYTES NFR BLD: 14.7 %
NEUTROPHILS # BLD: 4 K/UL (ref 1.7–7.7)
NEUTROPHILS NFR BLD: 64.2 %
PERFORMED ON: ABNORMAL
PLATELET # BLD AUTO: 222 K/UL (ref 135–450)
PMV BLD AUTO: 9.6 FL (ref 5–10.5)
POTASSIUM SERPL-SCNC: 4.3 MMOL/L (ref 3.5–5.1)
POTASSIUM SERPL-SCNC: 4.4 MMOL/L (ref 3.5–5.1)
RBC # BLD AUTO: 4.77 M/UL (ref 4.2–5.9)
SODIUM SERPL-SCNC: 133 MMOL/L (ref 136–145)
SODIUM SERPL-SCNC: 136 MMOL/L (ref 136–145)
WBC # BLD AUTO: 6.2 K/UL (ref 4–11)

## 2024-10-05 PROCEDURE — 85025 COMPLETE CBC W/AUTO DIFF WBC: CPT

## 2024-10-05 PROCEDURE — 80048 BASIC METABOLIC PNL TOTAL CA: CPT

## 2024-10-05 PROCEDURE — 36415 COLL VENOUS BLD VENIPUNCTURE: CPT

## 2024-10-05 PROCEDURE — 93010 ELECTROCARDIOGRAM REPORT: CPT | Performed by: INTERNAL MEDICINE

## 2024-10-05 PROCEDURE — 6370000000 HC RX 637 (ALT 250 FOR IP): Performed by: NURSE PRACTITIONER

## 2024-10-05 PROCEDURE — 71045 X-RAY EXAM CHEST 1 VIEW: CPT

## 2024-10-05 PROCEDURE — 6370000000 HC RX 637 (ALT 250 FOR IP): Performed by: FAMILY MEDICINE

## 2024-10-05 PROCEDURE — 2060000000 HC ICU INTERMEDIATE R&B

## 2024-10-05 PROCEDURE — 99232 SBSQ HOSP IP/OBS MODERATE 35: CPT | Performed by: INTERNAL MEDICINE

## 2024-10-05 PROCEDURE — 6370000000 HC RX 637 (ALT 250 FOR IP)

## 2024-10-05 PROCEDURE — 83735 ASSAY OF MAGNESIUM: CPT

## 2024-10-05 PROCEDURE — 2580000003 HC RX 258: Performed by: FAMILY MEDICINE

## 2024-10-05 PROCEDURE — 6370000000 HC RX 637 (ALT 250 FOR IP): Performed by: INTERNAL MEDICINE

## 2024-10-05 PROCEDURE — 93005 ELECTROCARDIOGRAM TRACING: CPT | Performed by: NURSE PRACTITIONER

## 2024-10-05 RX ORDER — LORAZEPAM 2 MG/ML
0.5 INJECTION INTRAMUSCULAR
Status: ACTIVE | OUTPATIENT
Start: 2024-10-05 | End: 2024-10-06

## 2024-10-05 RX ORDER — TORSEMIDE 10 MG/1
50 TABLET ORAL DAILY
Qty: 30 TABLET | Refills: 3 | Status: CANCELLED | OUTPATIENT
Start: 2024-10-05

## 2024-10-05 RX ORDER — METOPROLOL SUCCINATE 25 MG/1
25 TABLET, EXTENDED RELEASE ORAL DAILY
Qty: 30 TABLET | Refills: 3 | Status: CANCELLED | OUTPATIENT
Start: 2024-10-05

## 2024-10-05 RX ORDER — DOCUSATE SODIUM 100 MG/1
100 CAPSULE, LIQUID FILLED ORAL DAILY
Status: DISCONTINUED | OUTPATIENT
Start: 2024-10-05 | End: 2024-10-07 | Stop reason: HOSPADM

## 2024-10-05 RX ORDER — PANTOPRAZOLE SODIUM 40 MG/1
40 TABLET, DELAYED RELEASE ORAL
Status: DISCONTINUED | OUTPATIENT
Start: 2024-10-05 | End: 2024-10-07 | Stop reason: HOSPADM

## 2024-10-05 RX ORDER — LANOLIN ALCOHOL/MO/W.PET/CERES
6 CREAM (GRAM) TOPICAL NIGHTLY PRN
Status: DISCONTINUED | OUTPATIENT
Start: 2024-10-05 | End: 2024-10-07 | Stop reason: HOSPADM

## 2024-10-05 RX ORDER — TORSEMIDE 100 MG/1
50 TABLET ORAL DAILY
Status: DISCONTINUED | OUTPATIENT
Start: 2024-10-05 | End: 2024-10-07 | Stop reason: HOSPADM

## 2024-10-05 RX ORDER — DORZOLAMIDE HYDROCHLORIDE AND TIMOLOL MALEATE 20; 5 MG/ML; MG/ML
1 SOLUTION/ DROPS OPHTHALMIC EVERY 12 HOURS SCHEDULED
Status: DISCONTINUED | OUTPATIENT
Start: 2024-10-05 | End: 2024-10-07 | Stop reason: HOSPADM

## 2024-10-05 RX ORDER — SENNOSIDES A AND B 8.6 MG/1
2 TABLET, FILM COATED ORAL NIGHTLY
Status: DISCONTINUED | OUTPATIENT
Start: 2024-10-05 | End: 2024-10-07 | Stop reason: HOSPADM

## 2024-10-05 RX ADMIN — TORSEMIDE 50 MG: 100 TABLET ORAL at 09:03

## 2024-10-05 RX ADMIN — METOPROLOL SUCCINATE 25 MG: 25 TABLET, EXTENDED RELEASE ORAL at 09:04

## 2024-10-05 RX ADMIN — SODIUM CHLORIDE, PRESERVATIVE FREE 10 ML: 5 INJECTION INTRAVENOUS at 09:04

## 2024-10-05 RX ADMIN — PANTOPRAZOLE SODIUM 40 MG: 40 TABLET, DELAYED RELEASE ORAL at 16:48

## 2024-10-05 RX ADMIN — ASPIRIN 81 MG: 81 TABLET, COATED ORAL at 09:03

## 2024-10-05 RX ADMIN — DORZOLAMIDE HYDROCHLORIDE AND TIMOLOL MALEATE 1 DROP: 20; 5 SOLUTION/ DROPS OPHTHALMIC at 20:29

## 2024-10-05 RX ADMIN — MIRTAZAPINE 15 MG: 15 TABLET, FILM COATED ORAL at 20:29

## 2024-10-05 RX ADMIN — APIXABAN 2.5 MG: 2.5 TABLET, FILM COATED ORAL at 20:29

## 2024-10-05 RX ADMIN — SODIUM CHLORIDE, PRESERVATIVE FREE 10 ML: 5 INJECTION INTRAVENOUS at 20:29

## 2024-10-05 RX ADMIN — Medication 6 MG: at 20:29

## 2024-10-05 RX ADMIN — APIXABAN 2.5 MG: 2.5 TABLET, FILM COATED ORAL at 09:03

## 2024-10-05 RX ADMIN — INSULIN LISPRO 1 UNITS: 100 INJECTION, SOLUTION INTRAVENOUS; SUBCUTANEOUS at 09:04

## 2024-10-05 RX ADMIN — INSULIN LISPRO 1 UNITS: 100 INJECTION, SOLUTION INTRAVENOUS; SUBCUTANEOUS at 18:01

## 2024-10-05 RX ADMIN — SENNOSIDES 17.2 MG: 8.6 TABLET, FILM COATED ORAL at 20:29

## 2024-10-05 RX ADMIN — POLYETHYLENE GLYCOL 3350 17 G: 17 POWDER, FOR SOLUTION ORAL at 09:03

## 2024-10-05 RX ADMIN — DORZOLAMIDE HYDROCHLORIDE AND TIMOLOL MALEATE 1 DROP: 20; 5 SOLUTION/ DROPS OPHTHALMIC at 09:04

## 2024-10-05 RX ADMIN — ALLOPURINOL 100 MG: 100 TABLET ORAL at 09:03

## 2024-10-05 RX ADMIN — ATORVASTATIN CALCIUM 40 MG: 40 TABLET, FILM COATED ORAL at 09:03

## 2024-10-05 RX ADMIN — DOCUSATE SODIUM 100 MG: 100 CAPSULE, LIQUID FILLED ORAL at 16:47

## 2024-10-05 ASSESSMENT — PAIN SCALES - GENERAL
PAINLEVEL_OUTOF10: 0
PAINLEVEL_OUTOF10: 0

## 2024-10-05 NOTE — CONSULTS
Ph: [phone], Fax: [phone]           [facility]               Reason for admission:                 Shortness of breath/CHF decompensation    Brief Summary :     Pedro Becerra is being seen by nephrology for NISHI on CKD  He is known to have advanced heart failure and advanced renal failure admitted with fluid overload/shortness of breath.      Interval History and plan:      Admitted with shortness of breath  Currently on torsemide 50 mg/day  Seen by cardiology  He is currently DNR-DNI palliative care is following  Deemed to have very poor prognosis per cardiology  Seen in room with multiple family  Alert, orientated,  Very pleasant  Mild edema    Plan:  Supportive care  Agree with po diuretics  Doesn't appear to have severe volume overload at this times  Hasn't wanted dialysis   Urine output is poor  Will follow closely                      Assessment :     Acute Kidney Injury  Creatinine 3.8 at the time of consult  3.4 on presentation  Baseline appears to be variable and was 2.6 on 8/24  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 male presented to   the hospital on 10/3/2024 with shortness of breath.  He is a gentleman known to have significant CHF with EF of 10 to 15%, A-fib on anticoagulation, diabetes hypertension and hyperlipidemia.  He has shortness of breath on exertion and also orthopnea.  He came to the emergency room and was noted to have high creatinine, also tachypnea  Also found to have proBNP of      ----------------------------------------------------------  Please call with questions at      24 Hrs Answering service [phone]  Perfect serve, or cell phone 7 am - 5pm  Ben Moreira MD   [email or web address]

## 2024-10-05 NOTE — PLAN OF CARE
Problem: Chronic Conditions and Co-morbidities  Goal: Patient's chronic conditions and co-morbidity symptoms are monitored and maintained or improved  Flowsheets  Taken 10/5/2024 1843  Care Plan - Patient's Chronic Conditions and Co-Morbidity Symptoms are Monitored and Maintained or Improved:   Monitor and assess patient's chronic conditions and comorbid symptoms for stability, deterioration, or improvement   Collaborate with multidisciplinary team to address chronic and comorbid conditions and prevent exacerbation or deterioration   Update acute care plan with appropriate goals if chronic or comorbid symptoms are exacerbated and prevent overall improvement and discharge  Taken 10/5/2024 1118  Care Plan - Patient's Chronic Conditions and Co-Morbidity Symptoms are Monitored and Maintained or Improved:   Monitor and assess patient's chronic conditions and comorbid symptoms for stability, deterioration, or improvement   Collaborate with multidisciplinary team to address chronic and comorbid conditions and prevent exacerbation or deterioration   Update acute care plan with appropriate goals if chronic or comorbid symptoms are exacerbated and prevent overall improvement and discharge  Note: Pt hypothermic during this shift requiring warming blanket. Vital signs otherwise stable. PO torsemide as ordered for CHF exacerbation. Nephrology consulted for NISHI. Low urine output during this shift. MD aware.      Problem: Discharge Planning  Goal: Discharge to home or other facility with appropriate resources  Flowsheets (Taken 10/5/2024 1843)  Discharge to home or other facility with appropriate resources:   Identify barriers to discharge with patient and caregiver   Arrange for needed discharge resources and transportation as appropriate   Identify discharge learning needs (meds, wound care, etc)  Note: Plan for patient to return home with family and home care when medically stable for discharge.      Problem: Safety -  Adult  Goal: Free from fall injury  Flowsheets (Taken 10/5/2024 1843)  Free From Fall Injury: Instruct family/caregiver on patient safety  Note: Pt has remained free from falls during this shift. Pt resting in bed in low position with bed alarm activated. Pt calls appropriately before getting up. Call light and personal belongings are within reach.      Problem: Respiratory - Adult  Goal: Achieves optimal ventilation and oxygenation  Flowsheets (Taken 10/5/2024 1843)  Achieves optimal ventilation and oxygenation:   Assess for changes in respiratory status   Assess for changes in mentation and behavior   Oxygen supplementation based on oxygen saturation or arterial blood gases   Encourage broncho-pulmonary hygiene including cough, deep breathe, incentive spirometry  Note: Oxygen saturation remains stable during this shift on room air. Respiratory rate within normal limits with normal respiratory effort. Pt does intermittently complain of shortness of breath.      Problem: Cardiovascular - Adult  Goal: Maintains optimal cardiac output and hemodynamic stability  Flowsheets  Taken 10/5/2024 1843  Maintains optimal cardiac output and hemodynamic stability:   Monitor blood pressure and heart rate   Monitor urine output and notify Licensed Independent Practitioner for values outside of normal range   Assess for signs of decreased cardiac output  Taken 10/5/2024 0910  Maintains optimal cardiac output and hemodynamic stability:   Monitor blood pressure and heart rate   Monitor urine output and notify Licensed Independent Practitioner for values outside of normal range   Assess for signs of decreased cardiac output  Note: Pt has remained in a fib during this shift with HR controlled. Cardiology following for management of HF exacerbation.

## 2024-10-05 NOTE — PLAN OF CARE
Problem: Chronic Conditions and Co-morbidities  Goal: Patient's chronic conditions and co-morbidity symptoms are monitored and maintained or improved  Outcome: Progressing  Flowsheets (Taken 10/5/2024 0314)  Care Plan - Patient's Chronic Conditions and Co-Morbidity Symptoms are Monitored and Maintained or Improved:   Monitor and assess patient's chronic conditions and comorbid symptoms for stability, deterioration, or improvement   Collaborate with multidisciplinary team to address chronic and comorbid conditions and prevent exacerbation or deterioration   Update acute care plan with appropriate goals if chronic or comorbid symptoms are exacerbated and prevent overall improvement and discharge     Problem: Discharge Planning  Goal: Discharge to home or other facility with appropriate resources  Outcome: Progressing  Flowsheets (Taken 10/5/2024 0314)  Discharge to home or other facility with appropriate resources:   Identify barriers to discharge with patient and caregiver   Arrange for needed discharge resources and transportation as appropriate   Identify discharge learning needs (meds, wound care, etc)   Refer to discharge planning if patient needs post-hospital services based on physician order or complex needs related to functional status, cognitive ability or social support system     Problem: Safety - Adult  Goal: Free from fall injury  10/5/2024 0314 by Rodney Wilkins RN  Outcome: Progressing  Flowsheets (Taken 10/5/2024 0314)  Free From Fall Injury: Instruct family/caregiver on patient safety  Note: Fall protocol in place      Problem: Pain  Goal: Verbalizes/displays adequate comfort level or baseline comfort level  10/5/2024 0314 by Rodney Wilkins RN  Outcome: Progressing  Flowsheets (Taken 10/5/2024 0314)  Verbalizes/displays adequate comfort level or baseline comfort level:   Encourage patient to monitor pain and request assistance   Administer analgesics based on type and  severity of pain and evaluate response   Assess pain using appropriate pain scale   Consider cultural and social influences on pain and pain management   Notify Licensed Independent Practitioner if interventions unsuccessful or patient reports new pain     Problem: Cardiovascular - Adult  Goal: Maintains optimal cardiac output and hemodynamic stability  10/4/2024 1614 by Naa Sunshine RN  Outcome: Progressing  Flowsheets (Taken 10/4/2024 1614)  Maintains optimal cardiac output and hemodynamic stability: Monitor blood pressure and heart rate  Note: Vitals stable, afib on tele rate controled. Receiving lasix 20 mg IV BID. K 2.9, received 20 mEq IV and 40 mEq po x 2. Lungs with fine crackles in bases, trace edema to BLE.     Problem: Skin/Tissue Integrity  Goal: Absence of new skin breakdown  Description: 1.  Monitor for areas of redness and/or skin breakdown  2.  Assess vascular access sites hourly  3.  Every 4-6 hours minimum:  Change oxygen saturation probe site  4.  Every 4-6 hours:  If on nasal continuous positive airway pressure, respiratory therapy assess nares and determine need for appliance change or resting period.  Outcome: Progressing

## 2024-10-05 NOTE — PROGRESS NOTES
Hospital Medicine Progress Note      Date of Admission: 10/3/2024  Hospital Day: 3    Chief Admission Complaint: Shortness of breath     Subjective: Patient seen and examined at bedside today.  He states he did not feel well overnight and had some shortness of breath.  He also states he is did not have any bowel movement for the last 3 days.  This afternoon he is hypothermic  Urine output in the last 24 hours 1 L, since morning 100 mL  Increasing serum creatinine level today  No change in telemetry     Presenting Admission History:       This is a 84-year-old male with medical history significant for HFrEF 10 to 15%, paroxysmal A-fib on anticoagulation, diabetes mellitus, hypertension, cardiac or myeloid presented with worsening shortness of breath of 1 day duration with mild chest discomfort.  Worsening with exertion and laying flat.  Denies any dietary habit change, leg swelling, fever  In the ER, tachypneic.  Chest x-ray concerning for pulmonary congestion.  BNP greater than 70,000, troponin 141.  He was started on IV Lasix and admitted inpatient for further management of CHF exacerbation.    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   Patient initiated on torsemide 10/5  Decreased Toprol to 25 mg daily from 50 per cardiology recommendation on 10/4  Replete potassium and magnesium to maintainK>4 and mg >2  Strict I's and O's and daily weight  BMP twice daily  Unable to start ACE, ARB's, Aldactone, Entresto, hydralazine, nitrates or SGLT2 due to CKD and/or hypotension per cardiology.  No ICD or LifeVest recommended at this time due to end-stage heart failure with limited life expectancy.  CXR 10/5, stable with mild improvement in bibasilar opacity    Hypothermia, dave hemodynamically stable

## 2024-10-05 NOTE — PROGRESS NOTES
[facility]  H+P  Consult  OP Visit  FU Visit   CC/INTERVAL HX   Admit 10/3/2024   CC NICM, AF, CKD   Subjective States had rough night.  No cp, intermittent sob.     Tele Reviewed available   EXAM   VS /87   Pulse 82   Temp 98.2 °F (36.8 °C) (Rectal)   Resp 21   Ht 1.803 m (5' 11\")   Wt 76.6 kg (168 lb 14 oz)   SpO2 97%   BMI 23.55 kg/m²    Gen Alert, coop, Ødistress Pulse decr Head NC, AT, Ø abnorm   Heart Irreg Irreg, no MRG Abd  Soft, NT, +BS, Ømass Eyes PER, conj/corn clr   Lung CTAB, unlab, ØDTP Nck/Thr S/s, tm, nt, Øbru/jvd, lmt Nose Nares, Ø drain, nt   Ext Ext nl, AT,  ØC/C/E Neuro Nl gross m/s exam Lymph No cerv/ax LA   Ch Wall NT, no deform Skin Col/txt/trg nl, Ørash/les Psych Nl mood/affect      MEDICATIONS   Relevant cardiac medications Reviewed in EPIC   LABS   Hgb Lab Results   Component Value Date    HGB 13.0 (L) 10/04/2024      Cr Lab Results   Component Value Date    CREATININE 3.4 (H) 10/04/2024      Trop Lab Results   Component Value Date    TROPHS 130 (H) 10/04/2024    TROPHS 149 (H) 10/03/2024    TROPHS 145 (H) 10/03/2024       ASSESSMENT TIME   More than 35 minutes spent reviewing patient chart (including but not limited to notes, labs, imaging and other testing), interviewing patient and/or family members, performing a physical exam, documentation of my findings above and subsequent follow-up of ordered testing.  More than 50% of that time spent face to face with patient discussing clinical condition and counseling regarding treatment plan.     ASSESSMENT AND PLAN   *NICM - Amyloid  Status End stage combined s/d  LHC  7/19 Minimal cad  TTE 8/24 10%  CVM Toprol 25, lasix 20bid, eliquis 2.5bid, asa 81, lipitor 40  Plan Appears euvolemic, no evidence fluid overload, off O2 and sating well   Continue current meds   Acute followup with Dr. Mathews in office  *AF  Status persistent  Plan Continue eliquis, toprol  *CKD  Status Cr 3.4  Plan Per renal  *Code status  Limited with

## 2024-10-06 LAB
ANION GAP SERPL CALCULATED.3IONS-SCNC: 20 MMOL/L (ref 3–16)
ANION GAP SERPL CALCULATED.3IONS-SCNC: 22 MMOL/L (ref 3–16)
BASOPHILS # BLD: 0 K/UL (ref 0–0.2)
BASOPHILS NFR BLD: 0.4 %
BUN SERPL-MCNC: 53 MG/DL (ref 7–20)
BUN SERPL-MCNC: 55 MG/DL (ref 7–20)
CALCIUM SERPL-MCNC: 10.4 MG/DL (ref 8.3–10.6)
CALCIUM SERPL-MCNC: 9.9 MG/DL (ref 8.3–10.6)
CHLORIDE SERPL-SCNC: 91 MMOL/L (ref 99–110)
CHLORIDE SERPL-SCNC: 96 MMOL/L (ref 99–110)
CO2 SERPL-SCNC: 18 MMOL/L (ref 21–32)
CO2 SERPL-SCNC: 22 MMOL/L (ref 21–32)
CREAT SERPL-MCNC: 3.9 MG/DL (ref 0.8–1.3)
CREAT SERPL-MCNC: 4 MG/DL (ref 0.8–1.3)
DEPRECATED RDW RBC AUTO: 20.9 % (ref 12.4–15.4)
EOSINOPHIL # BLD: 0 K/UL (ref 0–0.6)
EOSINOPHIL NFR BLD: 0.1 %
GFR SERPLBLD CREATININE-BSD FMLA CKD-EPI: 14 ML/MIN/{1.73_M2}
GFR SERPLBLD CREATININE-BSD FMLA CKD-EPI: 14 ML/MIN/{1.73_M2}
GLUCOSE BLD-MCNC: 160 MG/DL (ref 70–99)
GLUCOSE BLD-MCNC: 187 MG/DL (ref 70–99)
GLUCOSE BLD-MCNC: 196 MG/DL (ref 70–99)
GLUCOSE BLD-MCNC: 270 MG/DL (ref 70–99)
GLUCOSE SERPL-MCNC: 171 MG/DL (ref 70–99)
GLUCOSE SERPL-MCNC: 171 MG/DL (ref 70–99)
HCT VFR BLD AUTO: 43.6 % (ref 40.5–52.5)
HGB BLD-MCNC: 13.6 G/DL (ref 13.5–17.5)
LACTATE BLDV-SCNC: 3.6 MMOL/L (ref 0.4–2)
LYMPHOCYTES # BLD: 1.4 K/UL (ref 1–5.1)
LYMPHOCYTES NFR BLD: 19.5 %
MAGNESIUM SERPL-MCNC: 2.4 MG/DL (ref 1.8–2.4)
MAGNESIUM SERPL-MCNC: 2.4 MG/DL (ref 1.8–2.4)
MCH RBC QN AUTO: 28.4 PG (ref 26–34)
MCHC RBC AUTO-ENTMCNC: 31.2 G/DL (ref 31–36)
MCV RBC AUTO: 91.2 FL (ref 80–100)
MONOCYTES # BLD: 0.9 K/UL (ref 0–1.3)
MONOCYTES NFR BLD: 12.5 %
NEUTROPHILS # BLD: 5 K/UL (ref 1.7–7.7)
NEUTROPHILS NFR BLD: 67.5 %
PERFORMED ON: ABNORMAL
PLATELET # BLD AUTO: 221 K/UL (ref 135–450)
PMV BLD AUTO: 9.6 FL (ref 5–10.5)
POTASSIUM SERPL-SCNC: 4.8 MMOL/L (ref 3.5–5.1)
POTASSIUM SERPL-SCNC: 4.9 MMOL/L (ref 3.5–5.1)
RBC # BLD AUTO: 4.78 M/UL (ref 4.2–5.9)
REASON FOR REJECTION: NORMAL
REASON FOR REJECTION: NORMAL
REJECTED TEST: NORMAL
REJECTED TEST: NORMAL
SODIUM SERPL-SCNC: 134 MMOL/L (ref 136–145)
SODIUM SERPL-SCNC: 135 MMOL/L (ref 136–145)
WBC # BLD AUTO: 7.4 K/UL (ref 4–11)

## 2024-10-06 PROCEDURE — 83735 ASSAY OF MAGNESIUM: CPT

## 2024-10-06 PROCEDURE — 6370000000 HC RX 637 (ALT 250 FOR IP): Performed by: FAMILY MEDICINE

## 2024-10-06 PROCEDURE — 6370000000 HC RX 637 (ALT 250 FOR IP)

## 2024-10-06 PROCEDURE — 36415 COLL VENOUS BLD VENIPUNCTURE: CPT

## 2024-10-06 PROCEDURE — 2060000000 HC ICU INTERMEDIATE R&B

## 2024-10-06 PROCEDURE — 80048 BASIC METABOLIC PNL TOTAL CA: CPT

## 2024-10-06 PROCEDURE — 85025 COMPLETE CBC W/AUTO DIFF WBC: CPT

## 2024-10-06 PROCEDURE — 2580000003 HC RX 258: Performed by: FAMILY MEDICINE

## 2024-10-06 PROCEDURE — 6370000000 HC RX 637 (ALT 250 FOR IP): Performed by: INTERNAL MEDICINE

## 2024-10-06 PROCEDURE — 87040 BLOOD CULTURE FOR BACTERIA: CPT

## 2024-10-06 PROCEDURE — 99232 SBSQ HOSP IP/OBS MODERATE 35: CPT | Performed by: INTERNAL MEDICINE

## 2024-10-06 PROCEDURE — 83605 ASSAY OF LACTIC ACID: CPT

## 2024-10-06 RX ORDER — AMIODARONE HYDROCHLORIDE 200 MG/1
100 TABLET ORAL DAILY
Status: DISCONTINUED | OUTPATIENT
Start: 2024-10-06 | End: 2024-10-07

## 2024-10-06 RX ORDER — SODIUM BICARBONATE 650 MG/1
650 TABLET ORAL 4 TIMES DAILY
Status: DISCONTINUED | OUTPATIENT
Start: 2024-10-06 | End: 2024-10-07

## 2024-10-06 RX ADMIN — PANTOPRAZOLE SODIUM 40 MG: 40 TABLET, DELAYED RELEASE ORAL at 06:08

## 2024-10-06 RX ADMIN — SENNOSIDES 17.2 MG: 8.6 TABLET, FILM COATED ORAL at 21:12

## 2024-10-06 RX ADMIN — SODIUM BICARBONATE 650 MG: 650 TABLET ORAL at 21:12

## 2024-10-06 RX ADMIN — DOCUSATE SODIUM 100 MG: 100 CAPSULE, LIQUID FILLED ORAL at 09:01

## 2024-10-06 RX ADMIN — APIXABAN 2.5 MG: 2.5 TABLET, FILM COATED ORAL at 21:12

## 2024-10-06 RX ADMIN — METOPROLOL SUCCINATE 25 MG: 25 TABLET, EXTENDED RELEASE ORAL at 09:02

## 2024-10-06 RX ADMIN — ASPIRIN 81 MG: 81 TABLET, COATED ORAL at 09:01

## 2024-10-06 RX ADMIN — DORZOLAMIDE HYDROCHLORIDE AND TIMOLOL MALEATE 1 DROP: 20; 5 SOLUTION/ DROPS OPHTHALMIC at 21:13

## 2024-10-06 RX ADMIN — APIXABAN 2.5 MG: 2.5 TABLET, FILM COATED ORAL at 09:02

## 2024-10-06 RX ADMIN — ALLOPURINOL 100 MG: 100 TABLET ORAL at 09:01

## 2024-10-06 RX ADMIN — MIRTAZAPINE 15 MG: 15 TABLET, FILM COATED ORAL at 21:12

## 2024-10-06 RX ADMIN — SODIUM BICARBONATE 650 MG: 650 TABLET ORAL at 16:58

## 2024-10-06 RX ADMIN — ATORVASTATIN CALCIUM 40 MG: 40 TABLET, FILM COATED ORAL at 09:01

## 2024-10-06 RX ADMIN — AMIODARONE HYDROCHLORIDE 100 MG: 200 TABLET ORAL at 16:57

## 2024-10-06 RX ADMIN — TORSEMIDE 50 MG: 100 TABLET ORAL at 09:02

## 2024-10-06 RX ADMIN — SODIUM CHLORIDE, PRESERVATIVE FREE 10 ML: 5 INJECTION INTRAVENOUS at 09:02

## 2024-10-06 RX ADMIN — SODIUM CHLORIDE, PRESERVATIVE FREE 10 ML: 5 INJECTION INTRAVENOUS at 21:13

## 2024-10-06 RX ADMIN — DORZOLAMIDE HYDROCHLORIDE AND TIMOLOL MALEATE 1 DROP: 20; 5 SOLUTION/ DROPS OPHTHALMIC at 09:02

## 2024-10-06 ASSESSMENT — PAIN SCALES - GENERAL
PAINLEVEL_OUTOF10: 0
PAINLEVEL_OUTOF10: 0

## 2024-10-06 NOTE — PROGRESS NOTES
[facility]  H+P  Consult  OP Visit  FU Visit   CC/INTERVAL HX   Admit 10/3/2024   CC NICM, AF, CKD   Subjective Better night last night.  No cp, sob.  Lying flat off O2.   Tele Reviewed available   EXAM   VS BP 99/78   Pulse 75   Temp 97.9 °F (36.6 °C) (Rectal)   Resp 18   Ht 1.803 m (5' 11\")   Wt 76.1 kg (167 lb 12.3 oz)   SpO2 98%   BMI 23.40 kg/m²    Gen Alert, coop, Ødistress Pulse decr Head NC, AT, Ø abnorm   Heart Irreg Irreg, no MRG Abd  Soft, NT, +BS, Ømass Eyes PER, conj/corn clr   Lung CTAB, unlab, ØDTP Nck/Thr S/s, tm, nt, Øbru/jvd, lmt Nose Nares, Ø drain, nt   Ext Ext nl, AT,  ØC/C/E Neuro Nl gross m/s exam Lymph No cerv/ax LA   Ch Wall NT, no deform Skin Col/txt/trg nl, Ørash/les Psych Nl mood/affect      MEDICATIONS   Relevant cardiac medications Reviewed in EPIC   LABS   Hgb Lab Results   Component Value Date    HGB 13.5 10/05/2024      Cr Lab Results   Component Value Date    CREATININE 3.5 (H) 10/05/2024      Trop Lab Results   Component Value Date    TROPHS 130 (H) 10/04/2024    TROPHS 149 (H) 10/03/2024    TROPHS 145 (H) 10/03/2024       ASSESSMENT TIME   More than 35 minutes spent reviewing patient chart (including but not limited to notes, labs, imaging and other testing), interviewing patient and/or family members, performing a physical exam, documentation of my findings above and subsequent follow-up of ordered testing.  More than 50% of that time spent face to face with patient discussing clinical condition and counseling regarding treatment plan.     ASSESSMENT AND PLAN   *NICM - Amyloid  Status End stage combined s/d  LHC  7/19 Minimal cad  TTE 8/24 10%  CVM Toprol 25, lasix 20bid, eliquis 2.5bid, asa 81, lipitor 40  Plan Appears euvolemic, no evidence fluid overload, off O2 and sating well   Continue current meds   Acute followup with Dr. Mathews in office  *AF  Status persistent  Plan Continue eliquis, toprol  *CKD  Status Cr 3.4  Plan Per renal  *Code status  Limited

## 2024-10-06 NOTE — PROGRESS NOTES
Hospital Medicine Progress Note      Date of Admission: 10/3/2024  Hospital Day: 4    Chief Admission Complaint: Shortness of breath     Subjective: Patient seen and examined at bedside today.  States he continues to have on and off shortness of breath/difficulty breathing.  Appears slightly lethargic  He was hypothermic yesterday requiring Reny hugger  Urine output 350 mL  Telemetry reviewed revealed nonsustained V. tach    Presenting Admission History:       This is a 84-year-old male with medical history significant for HFrEF 10 to 15%, paroxysmal A-fib on anticoagulation, diabetes mellitus, hypertension, cardiac or myeloid presented with worsening shortness of breath of 1 day duration with mild chest discomfort.  Worsening with exertion and laying flat.  Denies any dietary habit change, leg swelling, fever  In the ER, tachypneic.  Chest x-ray concerning for pulmonary congestion.  BNP greater than 70,000, troponin 141.  He was started on IV Lasix and admitted inpatient for further management of CHF exacerbation.    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 tach in the setting of EF of 10 to 15%  BNP more than 70,000  Cardiology consulted, recommended IV Lasix 20 mg twice daily for 1 day followed by transition to torsemide 50 mg p.o.   Patient initiated on torsemide 10/5  Decreased Toprol to 25 mg daily from 50 per cardiology recommendation on 10/4  Replete potassium and magnesium to maintainK>4 and mg >2  Strict I's and O's and daily weight  BMP twice daily  Unable to start ACE, ARB's, Aldactone, Entresto, hydralazine, nitrates or SGLT2 due to CKD and/or hypotension per cardiology.  No ICD or LifeVest recommended at this time due to end-stage heart failure with limited life expectancy.  CXR 10/5, stable with mild improvement in bibasilar opacity  Given the presence of nonsustained V. tach, will resume  10:28 PM         Jessie Garcia MD

## 2024-10-06 NOTE — PROGRESS NOTES
Ph: [phone], Fax: [phone]           [facility]               Reason for admission:                 Shortness of breath/CHF decompensation    Brief Summary :     Pedro Becerra is being seen by nephrology for NISHI on CKD  He is known to have advanced heart failure and advanced renal failure admitted with fluid overload/shortness of breath.      Interval History and plan:      Admitted with shortness of breath  Currently on torsemide 50 mg/day  Creatinine going up significantly  Currently DNR-DNI palliative care is following  He has consistently refused to do dialysis in the past  However he is very comfortable and chatting with family    Plan:  Agree with holding diuretics  Agree with bicarb  Will follow along with palliative care  About the goals of care  Discussed with                      Assessment :     Acute Kidney Injury  Creatinine 3.8 at the time of consult  3.4 on presentation  Baseline appears to be variable and was 2.6 on 8/24  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 male presented to   the hospital on 10/3/2024 with shortness of breath.  He is a gentleman known to have significant CHF with EF of 10 to 15%, A-fib on anticoagulation, diabetes hypertension and hyperlipidemia.  He has shortness of breath on exertion and also orthopnea.  He came to the emergency room and was noted to have high creatinine, also tachypnea  Also found to have proBNP of 70,000  He was thought to have fluid overload  IntraVENous, PRN  polyethylene glycol (GLYCOLAX) packet 17 g, 17 g, Oral, Daily PRN  acetaminophen (TYLENOL) tablet 650 mg, 650 mg, Oral, Q6H PRN **OR** acetaminophen (TYLENOL) suppository 650 mg, 650 mg, Rectal, Q6H PRN  albuterol (PROVENTIL) (2.5 MG/3ML) 0.083% nebulizer solution 2.5 mg, 2.5 mg, Nebulization, Q2H PRN  apixaban (ELIQUIS) tablet 2.5 mg, 2.5 mg, Oral, BID  aspirin EC tablet 81 mg, 81 mg, Oral, Daily  atorvastatin (LIPITOR) tablet 40 mg, 40 mg, Oral, Daily  mirtazapine (REMERON) tablet 15 mg, 15 mg, Oral, Nightly  insulin lispro (HUMALOG,ADMELOG) injection vial 0-4 Units, 0-4 Units, SubCUTAneous, TID WC  insulin lispro (HUMALOG,ADMELOG) injection vial 0-4 Units, 0-4 Units, SubCUTAneous, Nightly  glucose chewable tablet 16 g, 4 tablet, Oral, PRN  dextrose bolus 10% 125 mL, 125 mL, IntraVENous, PRN **OR** dextrose bolus 10% 250 mL, 250 mL, IntraVENous, PRN  glucagon injection 1 mg, 1 mg, SubCUTAneous, PRN  dextrose 10 % infusion, , IntraVENous, Continuous PRN    Vitals :     Vitals:    10/06/24 1200   BP: 121/86   Pulse: 84   Resp: 16   Temp: 97.4 °F (36.3 °C)   SpO2: 97%          Physical Examination :     appearance: Alert, orientated  Respiratory: no distress  Cardiovascular: no visibly  raised JVD, Edema 1+  Abdomen: -  soft  Other relevant findings: -      Labs :     CBC:   Recent Labs     10/04/24  0539 10/05/24  0812 10/06/24  1244   WBC 5.5 6.2 7.4   HGB 13.0* 13.5 13.6   HCT 40.0* 41.9 43.6    222 221     BMP:    Recent Labs     10/05/24  0812 10/05/24  1802 10/06/24  1244   * 136 134*   K 4.3 4.4 4.8   CL 94* 95* 96*   CO2 25 24 18*   BUN 46* 47* 55*   CREATININE 3.8* 3.5* 4.0*   GLUCOSE 192* 194* 171*   MG 2.30 2.30 2.40     Lab Results   Component Value Date/Time    COLORU Yellow 03/04/2022 03:51 PM    NITRU POSITIVE 03/04/2022 03:51 PM    GLUCOSEU Negative 03/04/2022 03:51 PM    KETUA Negative 03/04/2022 03:51 PM    UROBILINOGEN 1.0 03/04/2022 03:51 PM    BILIRUBINUR Negative

## 2024-10-06 NOTE — PLAN OF CARE
Problem: Chronic Conditions and Co-morbidities  Goal: Patient's chronic conditions and co-morbidity symptoms are monitored and maintained or improved  Outcome: Progressing  Flowsheets  Taken 10/6/2024 1201  Care Plan - Patient's Chronic Conditions and Co-Morbidity Symptoms are Monitored and Maintained or Improved: Monitor and assess patient's chronic conditions and comorbid symptoms for stability, deterioration, or improvement  Taken 10/6/2024 0810  Care Plan - Patient's Chronic Conditions and Co-Morbidity Symptoms are Monitored and Maintained or Improved: Monitor and assess patient's chronic conditions and comorbid symptoms for stability, deterioration, or improvement     Problem: Safety - Adult  Goal: Free from fall injury  Outcome: Progressing  Flowsheets (Taken 10/6/2024 0810)  Free From Fall Injury: Instruct family/caregiver on patient safety

## 2024-10-07 VITALS
HEART RATE: 85 BPM | RESPIRATION RATE: 18 BRPM | HEIGHT: 71 IN | DIASTOLIC BLOOD PRESSURE: 70 MMHG | WEIGHT: 169.31 LBS | SYSTOLIC BLOOD PRESSURE: 108 MMHG | TEMPERATURE: 96.9 F | OXYGEN SATURATION: 91 % | BODY MASS INDEX: 23.7 KG/M2

## 2024-10-07 LAB
ANION GAP SERPL CALCULATED.3IONS-SCNC: 19 MMOL/L (ref 3–16)
BASOPHILS # BLD: 0 K/UL (ref 0–0.2)
BASOPHILS NFR BLD: 0.2 %
BUN SERPL-MCNC: 60 MG/DL (ref 7–20)
CALCIUM SERPL-MCNC: 9.9 MG/DL (ref 8.3–10.6)
CHLORIDE SERPL-SCNC: 94 MMOL/L (ref 99–110)
CO2 SERPL-SCNC: 20 MMOL/L (ref 21–32)
CREAT SERPL-MCNC: 4.2 MG/DL (ref 0.8–1.3)
DEPRECATED RDW RBC AUTO: 21.6 % (ref 12.4–15.4)
EOSINOPHIL # BLD: 0 K/UL (ref 0–0.6)
EOSINOPHIL NFR BLD: 0.1 %
GFR SERPLBLD CREATININE-BSD FMLA CKD-EPI: 13 ML/MIN/{1.73_M2}
GLUCOSE BLD-MCNC: 192 MG/DL (ref 70–99)
GLUCOSE BLD-MCNC: 241 MG/DL (ref 70–99)
GLUCOSE SERPL-MCNC: 204 MG/DL (ref 70–99)
HCT VFR BLD AUTO: 41.6 % (ref 40.5–52.5)
HGB BLD-MCNC: 13.4 G/DL (ref 13.5–17.5)
LACTATE BLDV-SCNC: 3.7 MMOL/L (ref 0.4–2)
LYMPHOCYTES # BLD: 1.7 K/UL (ref 1–5.1)
LYMPHOCYTES NFR BLD: 21 %
MAGNESIUM SERPL-MCNC: 2.4 MG/DL (ref 1.8–2.4)
MCH RBC QN AUTO: 28.6 PG (ref 26–34)
MCHC RBC AUTO-ENTMCNC: 32.3 G/DL (ref 31–36)
MCV RBC AUTO: 88.5 FL (ref 80–100)
MONOCYTES # BLD: 1.1 K/UL (ref 0–1.3)
MONOCYTES NFR BLD: 13.6 %
NEUTROPHILS # BLD: 5.3 K/UL (ref 1.7–7.7)
NEUTROPHILS NFR BLD: 65.1 %
PERFORMED ON: ABNORMAL
PERFORMED ON: ABNORMAL
PLATELET # BLD AUTO: 253 K/UL (ref 135–450)
PMV BLD AUTO: 10.5 FL (ref 5–10.5)
POTASSIUM SERPL-SCNC: 4.8 MMOL/L (ref 3.5–5.1)
PROCALCITONIN SERPL IA-MCNC: 0.87 NG/ML (ref 0–0.15)
RBC # BLD AUTO: 4.7 M/UL (ref 4.2–5.9)
SODIUM SERPL-SCNC: 133 MMOL/L (ref 136–145)
TROPONIN, HIGH SENSITIVITY: 173 NG/L (ref 0–22)
WBC # BLD AUTO: 8.1 K/UL (ref 4–11)

## 2024-10-07 PROCEDURE — 6370000000 HC RX 637 (ALT 250 FOR IP)

## 2024-10-07 PROCEDURE — 2580000003 HC RX 258: Performed by: FAMILY MEDICINE

## 2024-10-07 PROCEDURE — 6370000000 HC RX 637 (ALT 250 FOR IP): Performed by: INTERNAL MEDICINE

## 2024-10-07 PROCEDURE — 85025 COMPLETE CBC W/AUTO DIFF WBC: CPT

## 2024-10-07 PROCEDURE — 93005 ELECTROCARDIOGRAM TRACING: CPT

## 2024-10-07 PROCEDURE — 6370000000 HC RX 637 (ALT 250 FOR IP): Performed by: FAMILY MEDICINE

## 2024-10-07 PROCEDURE — 83605 ASSAY OF LACTIC ACID: CPT

## 2024-10-07 PROCEDURE — 84484 ASSAY OF TROPONIN QUANT: CPT

## 2024-10-07 PROCEDURE — 84145 PROCALCITONIN (PCT): CPT

## 2024-10-07 PROCEDURE — 36415 COLL VENOUS BLD VENIPUNCTURE: CPT

## 2024-10-07 PROCEDURE — 2580000003 HC RX 258

## 2024-10-07 PROCEDURE — 6360000002 HC RX W HCPCS

## 2024-10-07 PROCEDURE — 83735 ASSAY OF MAGNESIUM: CPT

## 2024-10-07 PROCEDURE — 80048 BASIC METABOLIC PNL TOTAL CA: CPT

## 2024-10-07 RX ORDER — SCOLOPAMINE TRANSDERMAL SYSTEM 1 MG/1
1 PATCH, EXTENDED RELEASE TRANSDERMAL
Qty: 3 PATCH | Refills: 0
Start: 2024-10-10

## 2024-10-07 RX ORDER — PANTOPRAZOLE SODIUM 40 MG/1
40 TABLET, DELAYED RELEASE ORAL
Qty: 30 TABLET | Refills: 3
Start: 2024-10-08

## 2024-10-07 RX ORDER — SCOLOPAMINE TRANSDERMAL SYSTEM 1 MG/1
1 PATCH, EXTENDED RELEASE TRANSDERMAL
Status: DISCONTINUED | OUTPATIENT
Start: 2024-10-07 | End: 2024-10-07 | Stop reason: HOSPADM

## 2024-10-07 RX ORDER — INSULIN LISPRO 100 [IU]/ML
0-4 INJECTION, SOLUTION INTRAVENOUS; SUBCUTANEOUS NIGHTLY
Qty: 3 ML | Refills: 0
Start: 2024-10-07

## 2024-10-07 RX ORDER — METOPROLOL SUCCINATE 25 MG/1
25 TABLET, EXTENDED RELEASE ORAL DAILY
Qty: 30 TABLET | Refills: 3
Start: 2024-10-08

## 2024-10-07 RX ORDER — INSULIN LISPRO 100 [IU]/ML
0-8 INJECTION, SOLUTION INTRAVENOUS; SUBCUTANEOUS
Qty: 3 ML | Refills: 0
Start: 2024-10-07

## 2024-10-07 RX ORDER — INSULIN LISPRO 100 [IU]/ML
0-8 INJECTION, SOLUTION INTRAVENOUS; SUBCUTANEOUS
Status: DISCONTINUED | OUTPATIENT
Start: 2024-10-07 | End: 2024-10-07 | Stop reason: HOSPADM

## 2024-10-07 RX ORDER — INSULIN LISPRO 100 [IU]/ML
0-4 INJECTION, SOLUTION INTRAVENOUS; SUBCUTANEOUS NIGHTLY
Status: DISCONTINUED | OUTPATIENT
Start: 2024-10-07 | End: 2024-10-07 | Stop reason: HOSPADM

## 2024-10-07 RX ORDER — TORSEMIDE 10 MG/1
20 TABLET ORAL DAILY
Qty: 30 TABLET | Refills: 3
Start: 2024-10-08

## 2024-10-07 RX ADMIN — APIXABAN 2.5 MG: 2.5 TABLET, FILM COATED ORAL at 09:01

## 2024-10-07 RX ADMIN — DORZOLAMIDE HYDROCHLORIDE AND TIMOLOL MALEATE 1 DROP: 20; 5 SOLUTION/ DROPS OPHTHALMIC at 09:12

## 2024-10-07 RX ADMIN — TORSEMIDE 50 MG: 100 TABLET ORAL at 09:01

## 2024-10-07 RX ADMIN — ATORVASTATIN CALCIUM 40 MG: 40 TABLET, FILM COATED ORAL at 09:01

## 2024-10-07 RX ADMIN — SODIUM CHLORIDE, PRESERVATIVE FREE 10 ML: 5 INJECTION INTRAVENOUS at 09:16

## 2024-10-07 RX ADMIN — INSULIN LISPRO 1 UNITS: 100 INJECTION, SOLUTION INTRAVENOUS; SUBCUTANEOUS at 08:05

## 2024-10-07 RX ADMIN — METOPROLOL SUCCINATE 25 MG: 25 TABLET, EXTENDED RELEASE ORAL at 09:02

## 2024-10-07 RX ADMIN — PANTOPRAZOLE SODIUM 40 MG: 40 TABLET, DELAYED RELEASE ORAL at 05:51

## 2024-10-07 RX ADMIN — ASPIRIN 81 MG: 81 TABLET, COATED ORAL at 09:01

## 2024-10-07 RX ADMIN — ALLOPURINOL 100 MG: 100 TABLET ORAL at 09:01

## 2024-10-07 RX ADMIN — DOCUSATE SODIUM 100 MG: 100 CAPSULE, LIQUID FILLED ORAL at 09:01

## 2024-10-07 RX ADMIN — VANCOMYCIN HYDROCHLORIDE 2000 MG: 10 INJECTION, POWDER, LYOPHILIZED, FOR SOLUTION INTRAVENOUS at 09:44

## 2024-10-07 RX ADMIN — CEFEPIME 2000 MG: 2 INJECTION, POWDER, FOR SOLUTION INTRAVENOUS at 09:21

## 2024-10-07 ASSESSMENT — PAIN SCALES - GENERAL
PAINLEVEL_OUTOF10: 0

## 2024-10-07 NOTE — CONSULTS
[facility] -  Clinical Pharmacy Note    Vancomycin - Management by Pharmacy    Consult Date(s): 10/7/24  Consulting Provider(s): Dr. Garcia    Assessment / Plan  Sepsis - Vancomycin  Concurrent Antimicrobials:   Cefepime  Day of Vanc Therapy / Ordered Duration: #1 of 7  Current Dosing Method: Intermittent Dosing by Levels  Therapeutic Goal: ~ 15 mg/L  Current Dose / Plan:   Pt with NISHI on CKD -   Per nephrology, baseline SCr is variable; had SCr of 2.5 in Aug 2024  Given NISHI on CKD - will dose vancomycin based on intermittent levels for now.  Have entered placeholder onto MAR  Will give loading dose of 2000mg IV x1 (~25 mg/kg)  Dose provides ~500mL of fluid based on standard concentration, hx of HF  Vancomycin level ordered for tomorrow AM, Tues 10/8 to assess timing of next dose  Will continue to monitor clinical condition and make adjustments to regimen as appropriate.    Please call with questions--  Yamilet Painting, PharmD, [facility]  Wireless: [phone]   10/7/2024 8:36 AM        Interval update:  therapy initiation     Subjective/Objective:   Pedro Becerra is a 84 y.o. male with a PMHx significant for HFrEF (EF 10-15%), AFib, HTN, HLD who presented 10/3 with SOB and chest discomfort. In ED 10/3, CXR concerning for pulmonary congestion, admitted with acute on chronic HF. On 10/7 AM, rapid response called for bradycardia; pt also started on broad-spectrum ABx for sepsis.      Pharmacy is consulted to dose vancomycin .    Ht Readings from Last 1 Encounters:   10/03/24 1.803 m (5' 11\")     Wt Readings from Last 1 Encounters:   10/07/24 76.8 kg (169 lb 5 oz)     Current & Prior Antimicrobial Regimen(s):  Cefepime (10/7-current)  Vancomycin - Pharmacy to dose  Intermittent dosing (10/7-current)    Vancomycin Level(s) / Doses:  Date Time Dose Type of Level / Level Interpretation                 Note: Serum levels collected for AUC-based dosing may be high if collected in close proximity to the dose

## 2024-10-07 NOTE — PROGRESS NOTES
Physician Progress Note      PATIENT:               JOSÉ MIGUEL MEDEL  CSN #:                  [id number]  :                       1940  ADMIT DATE:       10/3/2024 4:24 PM  DISCH DATE:  RESPONDING  PROVIDER #:        Grzegorz Bonner MD          QUERY TEXT:    Pt admitted with acute on chronic combined systolic and diastolic CHF.  Noted   to have HTN and non-ischemic cardiomyopathy.  If possible, please document in   progress notes and discharge summary the etiology of CHF, if able to be   determined.    The medical record reflects the following:  Risk Factors: HTN, non-ischemic cardiomyopathy, cardiac amyloidosis  Clinical Indicators: ECHO done 2024 shows EF of 10 -15%  Treatment: Demadex, I&O, daily weight, code status changed to DNR/DNI  Options provided:  -- CHF due to Hypertensive Heart Disease  -- CHF not due to Hypertensive heart disease but due to non-ischemic   cardiomyopathy  -- CHF due to Hypertensive Heart Disease and non-ischemic cardiomyopathy  -- Other - I will add my own diagnosis  -- Disagree - Not applicable / Not valid  -- Disagree - Clinically unable to determine / Unknown  -- Refer to Clinical Documentation Reviewer    PROVIDER RESPONSE TEXT:    This patient has CHF not due to Hypertensive heart disease but due to   non-ischemic cardiomyopathy    Query created by: Zonia Donovan on 10/7/2024 10:00 AM      Electronically signed by:  Grzegorz Bonner MD 10/7/2024 2:27 PM

## 2024-10-07 NOTE — DISCHARGE INSTR - COC
Continuity of Care Form    Patient Name: Pedro Becerra   :  1940  MRN:  [id number]    Admit date:  10/3/2024  Discharge date:  ***    Code Status Order: Limited   Advance Directives:   Advance Care Flowsheet Documentation             Admitting Physician:  Lee Hancock MD  PCP: Shankar Olea DO    Discharging Nurse: ***  Discharging Hospital Unit/Room#: 4453/4453-01  Discharging Unit Phone Number: ***    Emergency Contact:   Extended Emergency Contact Information  Primary Emergency Contact: Batsheva Becerra  Address: [address] [place] [address]  Home Phone: [phone]  Mobile Phone: [phone]  Relation: Spouse  Secondary Emergency Contact: Nasrin Herbert  Mobile Phone: [phone]  Relation: Niece/Nephew    Past Surgical History:  Past Surgical History:   Procedure Laterality Date    HERNIA REPAIR      HIP SURGERY      OTHER SURGICAL HISTORY N/A 12/15/2015    LAPAROSCOPIC LEFT INGUINAL HERNIA REPAIR WITH MESH       Immunization History:   Immunization History   Administered Date(s) Administered    COVID-19, MODERNA BLUE border, Primary or Immunocompromised, (age 12y+), IM, 100 mcg/0.5mL 2021, 2021    COVID-19, MODERNA Bivalent, (age 12y+), IM, 50 mcg/0.5 mL 2022    Influenza 2010, 2012    Influenza Vaccine, unspecified formulation 2013, 2014, 2015    Influenza Virus Vaccine 2007, 2008, 2009    Influenza Whole 2009    Influenza, FLUAD, (age 65 y+), IM, Quadv, 0.5mL 10/19/2020, 10/21/2021, 2022    Influenza, FLUAD, (age 65 y+), IM, Trivalent PF, 0.5mL 10/07/2019    Influenza, FLUZONE High Dose, (age 65 y+), IM, Trivalent PF, 0.5mL 2013, 2014, 2015, 12/15/2016, 10/02/2017, 2018    Pneumococcal, PCV-13, PREVNAR 13, (age 6w+), IM, 0.5mL 2014    Pneumococcal, PPSV23, PNEUMOVAX 23, (age 2y+), SC/IM, 0.5mL 2005, 12/15/2016    TDaP,  Belongings:[id number]}    RN SIGNATURE:  {Esignature:[id number]}    CASE MANAGEMENT/SOCIAL WORK SECTION    Inpatient Status Date: ***    Readmission Risk Assessment Score:  Readmission Risk              Risk of Unplanned Readmission:  21           Discharging to Facility/ Agency   Name:   Address:  Phone:  Fax:    Dialysis Facility (if applicable)   Name:  Address:  Dialysis Schedule:  Phone:  Fax:    / signature: {Esignature:[id number]}    PHYSICIAN SECTION    Prognosis: Poor    Condition at Discharge: Terminal    Rehab Potential (if transferring to Rehab): Poor    Recommended Labs or Other Treatments After Discharge: None    Physician Certification: I certify the above information and transfer of Pedro Becerra  is necessary for the continuing treatment of the diagnosis listed and that he requires Hospice for less 30 days.     Update Admission H&P: Changes in H&P as follows - ***    PHYSICIAN SIGNATURE:  {Esignature:[id number]}

## 2024-10-07 NOTE — PROGRESS NOTES
At the start of shift Pt had a bradycardia episode hr dropped to the 20s. Rapid was called. MD, respiratory and al staff available came to bedside. Pt Hr returned back to normal limits.   ..Electronically signed by Lexy Steinberg RN on 10/7/2024 at 9:34 AM

## 2024-10-07 NOTE — PROGRESS NOTES
Palliative Medicine Progress Note    Admit Date: 10/3/2024  Hospital Day:  Hospital Day: 5     CC: SOB  HPI: HPI PMH of CHF with ejection fraction of 10 to 15%, A-fib on anticoagulation, diabetes, hypertension, hyperlipidemia who presented with shortness of breath. He was started on IV lasix and was admitted with acute on chronic heart failure. His renal function is worsening and he has been bradycardic.    Recommendations:   Received a call from pt's great niece Real stating that the pt's wife and nephew will come in at 11 am and would like to discuss hospice options outside the room. She says the pt becomes easily overwhelmed and anxious when discussing his health conditions, which he has a poor understanding of, and the family would like to avoid causing further anxiety. Will meet with family at 11 am. D/w Dr. Garcia.    1. Goals of Care/Advanced Care planning/Code status:  DNR/DNI (Limited- no to all interventions).   2. Pain: pt denies pain  3. SOB: pt p/w sob and was admitted with acute on chronic heart failure. Known HF with EF 10-15%.   4. Disposition: TBD    Subjective:     Scheduled Meds:   scopolamine  1 patch TransDERmal Q72H    cefepime  1,000 mg IntraVENous Q12H    vancomycin  25 mg/kg IntraVENous Once    vancomycin (VANCOCIN) intermittent dosing (placeholder)   Other RX Placeholder    insulin lispro  0-8 Units SubCUTAneous TID     insulin lispro  0-4 Units SubCUTAneous Nightly    dorzolamide-timolol  1 drop Both Eyes 2 times per day    torsemide  50 mg Oral Daily    docusate sodium  100 mg Oral Daily    senna  2 tablet Oral Nightly    pantoprazole  40 mg Oral QAM AC    [Held by provider] metoprolol succinate  25 mg Oral Daily    allopurinol  100 mg Oral Daily    sodium chloride flush  5-40 mL IntraVENous 2 times per day    apixaban  2.5 mg Oral BID    aspirin  81 mg Oral Daily    atorvastatin  40 mg Oral Daily    mirtazapine  15 mg Oral Nightly       Continuous Infusions:   sodium chloride    heart failure (HCC)    Encounter for palliative care    Advance care planning  Resolved Problems:    * No resolved hospital problems. *      Time spent with patient and/or family: 10 min  Time reviewing records: 5  Time communicating with providers: 10    A total of 25 minutes spent with the patient and family on unit greater than 50% face to face time in counseling regarding palliative care and goals of care for the patient.     Rekha Abel NP  Palliative Care  [phone]

## 2024-10-07 NOTE — PROGRESS NOTES
Pt had a few moments of bradycardia Hr dropped down to 36 shortly came back up to 61 bpm. Pt denies any chest pain , SOB. /77. MD. Garcia notified .Electronically signed by Lexy Steinberg RN on 10/7/2024 at 11:40 AM

## 2024-10-07 NOTE — PROGRESS NOTES
Pharmacy Note - Renal Dosing and Extended Infusion Beta-Lactam Adjustment    Cefepime  1000 mg q24h  for treatment of Sepsis of unknown etiology. Per [facility] Renal Dose Adjustment Policy and Extended Infusion Beta-Lactam Policy, cefepime will be changed to 2000mg load followed by 1000mg Q12h extended infusion    Estimated Creatinine Clearance: Estimated Creatinine Clearance: 14 mL/min (A) (based on SCr of 3.9 mg/dL (H)).    BMI: Body mass index is 23.61 kg/m².    Please call with any questions.    Thank you,    Santiago Thapa, [facility]

## 2024-10-07 NOTE — DISCHARGE SUMMARY
V2.0  Discharge Summary    Name:  Pedro Becerra /Age/Sex: 1940 (84 y.o. male)   Admit Date: 10/3/2024  Discharge Date: 10/7/24    MRN & CSN:  [id number] & [id number] Encounter Date and Time 10/7/24 1:41 PM EDT    Attending:  Jessie Garcia,* Discharging Provider: Jessie Garcia MD       Hospital Course:     Brief HPI: Pedro Becerra is a 84 y.o. male with medical history significant for HFrEF 10 to 15%, paroxysmal A-fib on anticoagulation, diabetes mellitus, hypertension, cardiac or myeloid presented with worsening shortness of breath of 1 day duration with mild chest discomfort.  Worsening with exertion and laying flat.  Denies any dietary habit change, leg swelling, fever  In the ER, tachypneic.  Chest x-ray concerning for pulmonary congestion.  BNP greater than 70,000, troponin 141.  He was started on IV Lasix and admitted inpatient for further management of CHF exacerbation.    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 tach in the setting of EF of 10 to 15%  Cardiology consulted, recommended IV Lasix 20 mg twice daily for 1 day followed by transition to torsemide 50 mg p.o.   Patient initiated on torsemide 10/5  Decreased Toprol to 25 mg daily from 50 per cardiology recommendation on 10/4  Repleted potassium and magnesium to maintainK>4 and mg >2  Strict I's and O's and daily weight  BMP twice daily  Unable to start ACE, ARB's, Aldactone, Entresto, hydralazine, nitrates or SGLT2 due to CKD and/or hypotension per cardiology.  No ICD or LifeVest recommended at this time due to end-stage heart failure with limited life expectancy.  CXR 10/5, stable with mild improvement in bibasilar opacity  Given the presence of nonsustained V. tach, resumed amiodarone but was discontinued as patient developed bradycardia.        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

## 2024-10-07 NOTE — PLAN OF CARE
Problem: Discharge Planning  Goal: Discharge to home or other facility with appropriate resources  10/7/2024 1117 by Lexy Steinberg RN  Outcome: Progressing     Problem: ABCDS Injury Assessment  Goal: Absence of physical injury  Outcome: Progressing     Problem: Pain  Goal: Verbalizes/displays adequate comfort level or baseline comfort level  Outcome: Progressing     Problem: Respiratory - Adult  Goal: Achieves optimal ventilation and oxygenation  10/7/2024 1117 by Lexy Steinberg RN  Outcome: Progressing     Problem: Metabolic/Fluid and Electrolytes - Adult  Goal: Electrolytes maintained within normal limits  Outcome: Progressing

## 2024-10-07 NOTE — PLAN OF CARE
Problem: Chronic Conditions and Co-morbidities  Goal: Patient's chronic conditions and co-morbidity symptoms are monitored and maintained or improved  10/7/2024 0321 by Tequila Oviedo RN  Outcome: Progressing  Flowsheets (Taken 10/6/2024 1201 by Natividad Law, RN)  Care Plan - Patient's Chronic Conditions and Co-Morbidity Symptoms are Monitored and Maintained or Improved: Monitor and assess patient's chronic conditions and comorbid symptoms for stability, deterioration, or improvement     Problem: Discharge Planning  Goal: Discharge to home or other facility with appropriate resources  Outcome: Progressing  Flowsheets (Taken 10/6/2024 0810 by Natividad Law, RN)  Discharge to home or other facility with appropriate resources: Identify barriers to discharge with patient and caregiver     Problem: Safety - Adult  Goal: Free from fall injury  10/7/2024 0321 by Tequila Oviedo RN  Outcome: Progressing  Flowsheets (Taken 10/6/2024 0810 by Natividad Law, RN)  Free From Fall Injury: Instruct family/caregiver on patient safety     Problem: Respiratory - Adult  Goal: Achieves optimal ventilation and oxygenation  Outcome: Progressing  Flowsheets (Taken 10/5/2024 1843 by Letha Vinson, RN)  Achieves optimal ventilation and oxygenation:   Assess for changes in respiratory status   Assess for changes in mentation and behavior   Oxygen supplementation based on oxygen saturation or arterial blood gases   Encourage broncho-pulmonary hygiene including cough, deep breathe, incentive spirometry  Note: SpO2 98% RA.      Problem: Cardiovascular - Adult  Goal: Maintains optimal cardiac output and hemodynamic stability  Outcome: Progressing  Flowsheets (Taken 10/5/2024 1843 by Letha Vinson, RN)  Maintains optimal cardiac output and hemodynamic stability:   Monitor blood pressure and heart rate   Monitor urine output and notify Licensed Independent Practitioner for values outside of normal  range   Assess for signs of decreased cardiac output

## 2024-10-07 NOTE — SIGNIFICANT EVENT
Name: Pedro Becerra            Admitted: 10/3/2024     Significant event:  Rapid response d/t bradycardia    Pt w/ a hx of non-ischemic HFrEF (LVEF 10-15%), ATTR amyloidosis, HTN, HLD, DM2, CKD (Cr. Baseline 2.6), mild CAD, NSTEMI, trifascicular block w/ RBBB . ICU team was called to bedside because patient reportedly having bradycardia down to 20s on tele. On arrival, pt HR 58-59 and /79. Patient resting comfortably in bed with no complaints. Tele was checked and though there was a read of HR of 29, the telemetry strip did NOT show a HR in 20s. Rhythm was irregular with HR in 60s.    /80   Pulse 80   Temp 97.1 °F (36.2 °C) (Axillary)   Resp 18   Ht 1.803 m (5' 11\")   Wt 76.8 kg (169 lb 5 oz)   SpO2 95%   BMI 23.61 kg/m²   General appearance: elderly male, resting comfortably   Lungs: CTAB   Heart: irregular rhythm, bradycardia   Abdomen: not assessed   Neurologic: A&Ox3     A/P   #Bradycardia   - STAT EKG  - Troponin  - hold amio for now     Code:Limited  Disposition: PCU     Brionna Fried MD PGY-1 IM   10/7/2024  7:57 AM

## 2024-10-07 NOTE — CARE COORDINATION
Case Management Assessment            Discharge Note                    Date / Time of Note: 10/7/2024 4:30 PM                  Discharge Note Completed by: Chinyere Guzman    Patient Name: Pedro Becerra   YOB: 1940  Diagnosis: Heart failure (HCC) [I50.9]  Acute on chronic systolic congestive heart failure (HCC) [I50.23]   Date / Time: 10/3/2024  4:24 PM    Current PCP: Shankar Olea DO  Clinic patient: No    Hospitalization in the last 30 days: Yes  Readmission Assessment  Number of Days since last admission?: 8-30 days  Previous Disposition: Home with Home Health  Who is being Interviewed: Patient  What was the patient's/caregiver's perception as to why they think they needed to return back to the hospital?: Other (Comment) (SOB)  Did you visit your Primary Care Physician after you left the hospital, before you returned this time?: No  Why weren't you able to visit your PCP?: Other (Comment) (via phone)  Did you see a specialist, such as Cardiac, Pulmonary, Orthopedic Physician, etc. after you left the hospital?: Yes (cardiology)  Who advised the patient to return to the hospital?: Self-referral  Does the patient report anything that got in the way of taking their medications?: No  In our efforts to provide the best possible care to you and others like you, can you think of anything that we could have done to help you after you left the hospital the first time, so that you might not have needed to return so soon?: Other (Comment) (n/a)    Advance Directives:  Code Status: Limited  [place] DNR form completed and on chart: Yes    Financial:  Payor: [facility] MEDICARE / Plan: [facility] MEDICARE ADVANTAGE / Product Type: *No Product type* /      Pharmacy:    [id number] - [place], [place] - [address] - P [phone] - F [phone]  [address] [place] [address]  Phone: [phone] Fax: [phone]      Assistance purchasing medications?: Potential

## 2024-10-07 NOTE — PROGRESS NOTES
Ph: [phone], Fax: [phone]           [facility]               Reason for admission:                 Shortness of breath/CHF decompensation    Brief Summary :     Pedro Becerra is being seen by nephrology for NISHI on CKD  He is known to have advanced heart failure and advanced renal failure admitted with fluid overload/shortness of breath.      Interval History and plan:     BP is well controlled with urine of 550 ml  Admitted with shortness of breath  Currently on torsemide 50 mg/day  Creatinine is 4 > 3.9  Weight is 177 >> 169 pounds         Plan:    DC bicarbonate and resume torsemide QD  Will follow along with palliative care  About the goals of care  Currently DNR-DNI and palliative care is following  He has consistently refused to do dialysis in the past                         Assessment :     Acute Kidney Injury  Creatinine 3.8 at the time of consult  3.4 on presentation  Baseline appears to be variable and was 2.6 on 8/24  Has CKD 3B/4 at baseline    Hypertension   BP: (121)/(85)  Pulse:  [76-84]   BP goal inpatient 130-140 systolic inpatient  Blood pressure soft    HFrEF  Very low EF of 10 to 15%  Also known to have A-fib             [facility] would like to thank Jessie Garcia,*   for opportunity to serve this patient      Please call with questions at-   24 Hrs Answering service [phone] or  7 am- 5 pm via Perfect serve or cell phone  Dr.Muhammad Meggan Umaña MD       HPI :     Pedro Becerra is a 84 y.o. male presented to   the hospital on 10/3/2024 with shortness of breath.  He is a gentleman known to have significant CHF with EF of 10 to 15%, A-fib on anticoagulation, diabetes hypertension and hyperlipidemia.  He has shortness of breath on exertion and also orthopnea.  He came to the emergency room and was noted to have high creatinine, also tachypnea  Also found to have proBNP of 70,000  He was thought to have fluid overload and was started on Lasix  IntraVENous, PRN  polyethylene glycol (GLYCOLAX) packet 17 g, 17 g, Oral, Daily PRN  acetaminophen (TYLENOL) tablet 650 mg, 650 mg, Oral, Q6H PRN **OR** acetaminophen (TYLENOL) suppository 650 mg, 650 mg, Rectal, Q6H PRN  albuterol (PROVENTIL) (2.5 MG/3ML) 0.083% nebulizer solution 2.5 mg, 2.5 mg, Nebulization, Q2H PRN  apixaban (ELIQUIS) tablet 2.5 mg, 2.5 mg, Oral, BID  aspirin EC tablet 81 mg, 81 mg, Oral, Daily  atorvastatin (LIPITOR) tablet 40 mg, 40 mg, Oral, Daily  mirtazapine (REMERON) tablet 15 mg, 15 mg, Oral, Nightly  insulin lispro (HUMALOG,ADMELOG) injection vial 0-4 Units, 0-4 Units, SubCUTAneous, TID WC  insulin lispro (HUMALOG,ADMELOG) injection vial 0-4 Units, 0-4 Units, SubCUTAneous, Nightly  glucose chewable tablet 16 g, 4 tablet, Oral, PRN  dextrose bolus 10% 125 mL, 125 mL, IntraVENous, PRN **OR** dextrose bolus 10% 250 mL, 250 mL, IntraVENous, PRN  glucagon injection 1 mg, 1 mg, SubCUTAneous, PRN  dextrose 10 % infusion, , IntraVENous, Continuous PRN    Vitals :     Vitals:    10/07/24 0600   BP:    Pulse: 76   Resp:    Temp:    SpO2:           Physical Examination :     appearance: Alert, orientated  Respiratory: no distress  Cardiovascular: no visibly  raised JVD, Edema 1+  Abdomen: -  soft  Other relevant findings: -      Labs :     CBC:   Recent Labs     10/05/24  0812 10/06/24  1244   WBC 6.2 7.4   HGB 13.5 13.6   HCT 41.9 43.6    221     BMP:    Recent Labs     10/05/24  1802 10/06/24  1244 10/06/24  1724    134* 135*   K 4.4 4.8 4.9   CL 95* 96* 91*   CO2 24 18* 22   BUN 47* 55* 53*   CREATININE 3.5* 4.0* 3.9*   GLUCOSE 194* 171* 171*   MG 2.30 2.40 2.40     Lab Results   Component Value Date/Time    COLORU Yellow 03/04/2022 03:51 PM    NITRU POSITIVE 03/04/2022 03:51 PM    GLUCOSEU Negative 03/04/2022 03:51 PM    KETUA Negative 03/04/2022 03:51 PM    UROBILINOGEN 1.0 03/04/2022 03:51 PM    BILIRUBINUR Negative 03/04/2022 03:51 PM    BILIRUBINUR neg 12/11/2015 12:00 AM

## 2024-10-08 LAB
EKG DIAGNOSIS: NORMAL
EKG Q-T INTERVAL: 516 MS
EKG QRS DURATION: 192 MS
EKG QTC CALCULATION (BAZETT): 595 MS
EKG R AXIS: 233 DEGREES
EKG T AXIS: 58 DEGREES
EKG VENTRICULAR RATE: 80 BPM

## 2024-10-08 PROCEDURE — 93010 ELECTROCARDIOGRAM REPORT: CPT | Performed by: INTERNAL MEDICINE

## 2024-10-10 LAB
BACTERIA BLD CULT ORG #2: NORMAL
BACTERIA BLD CULT: NORMAL

## 2024-10-13 NOTE — PROGRESS NOTES
Physician Progress Note      PATIENT:               JOSÉ MIGUEL MEDEL  CSN #:                  [id number]  :                       1940  ADMIT DATE:       10/3/2024 4:24 PM  DISCH DATE:        10/7/2024 4:15 PM  RESPONDING  PROVIDER #:        Jessie Garcia MD          QUERY TEXT:    Pt admitted with acute on chronic combined systolic and diastolic CHF.  Noted   to have elevated troponin and per 10/4 cardiology consult note \"NSTEMI Type II   - [facility] 2019: mild diffuse CAD - Troponin elevation 141 -> 119 -> 145 ->   149. Likely demand ischemia given fluid overload (pulmonary edema)\"   documented.  No other documentation of NSTEMI Type II noted and \"Elevated   troponin 2/2 demand ischemia\" documented in attending progress notes.  If   possible, please document in progress notes and discharge summary if you are   evaluating and /or treating any of the following:    The medical record reflects the following:  Risk Factors: acute on chronic CHF  Clinical Indicators: troponin 141, 119, 145, 149, 130, 173  Treatment: cardiology consult, trending troponin, diuretics  Options provided:  -- Demand ischemia.  NSTEMI Type II ruled out  -- Other - I will add my own diagnosis  -- Disagree - Not applicable / Not valid  -- Disagree - Clinically unable to determine / Unknown  -- Refer to Clinical Documentation Reviewer    PROVIDER RESPONSE TEXT:    Demand ischemia. NSTEMI Type II ruled out    Query created by: Zonia Donovan on 10/11/2024 4:24 AM      Electronically signed by:  Jessie Garcia MD 10/13/2024 3:28 PM

## 2024-10-17 NOTE — CONSULTS
HF RN consult received from Dr Hancock as part of HF order set.    Pt is 30 day HF readmission.  He was last hospitalized 9/6 - 9/9 with HF.  He was also hospitalized 8/8 - 8/12 with HF.  Pt has known severe HFrEF -- end stage.  His EF is 10-15% gr 3 DD and has cardiac amyloidosis.  Pt was advised each admission of his grave prognosis and met with [facility] care.  He did not want to speak with hospice at that time and sought ongoing care.   Pall care referral was placed as part of 30 day HF readmission protocol.  Pt now agreeable to hospice and opted to discharge under hospice care.